# Patient Record
Sex: MALE | Race: WHITE | NOT HISPANIC OR LATINO | Employment: FULL TIME | URBAN - METROPOLITAN AREA
[De-identification: names, ages, dates, MRNs, and addresses within clinical notes are randomized per-mention and may not be internally consistent; named-entity substitution may affect disease eponyms.]

---

## 2020-05-21 ENCOUNTER — TRANSCRIBE ORDERS (OUTPATIENT)
Dept: ADMINISTRATIVE | Facility: HOSPITAL | Age: 55
End: 2020-05-21

## 2020-05-21 ENCOUNTER — APPOINTMENT (OUTPATIENT)
Dept: RADIOLOGY | Facility: CLINIC | Age: 55
End: 2020-05-21
Payer: COMMERCIAL

## 2020-05-21 DIAGNOSIS — M50.83: ICD-10-CM

## 2020-05-21 DIAGNOSIS — M50.83: Primary | ICD-10-CM

## 2020-05-21 PROCEDURE — 72050 X-RAY EXAM NECK SPINE 4/5VWS: CPT

## 2020-06-20 ENCOUNTER — APPOINTMENT (EMERGENCY)
Dept: CT IMAGING | Facility: HOSPITAL | Age: 55
End: 2020-06-20
Payer: COMMERCIAL

## 2020-06-20 ENCOUNTER — HOSPITAL ENCOUNTER (EMERGENCY)
Facility: HOSPITAL | Age: 55
Discharge: HOME | End: 2020-06-20
Attending: EMERGENCY MEDICINE | Admitting: EMERGENCY MEDICINE
Payer: COMMERCIAL

## 2020-06-20 VITALS
OXYGEN SATURATION: 96 % | RESPIRATION RATE: 16 BRPM | DIASTOLIC BLOOD PRESSURE: 79 MMHG | SYSTOLIC BLOOD PRESSURE: 133 MMHG | HEART RATE: 65 BPM | WEIGHT: 278.88 LBS | TEMPERATURE: 96.9 F

## 2020-06-20 DIAGNOSIS — R51.9 NONINTRACTABLE HEADACHE, UNSPECIFIED CHRONICITY PATTERN, UNSPECIFIED HEADACHE TYPE: Primary | ICD-10-CM

## 2020-06-20 LAB
ALBUMIN SERPL BCP-MCNC: 3.7 G/DL (ref 3.5–5)
ALP SERPL-CCNC: 63 U/L (ref 46–116)
ALT SERPL W P-5'-P-CCNC: 35 U/L (ref 12–78)
ANION GAP SERPL CALCULATED.3IONS-SCNC: 9 MMOL/L (ref 4–13)
AST SERPL W P-5'-P-CCNC: 18 U/L (ref 5–45)
BASOPHILS # BLD AUTO: 0.12 THOUSANDS/ΜL (ref 0–0.1)
BASOPHILS NFR BLD AUTO: 2 % (ref 0–1)
BILIRUB SERPL-MCNC: 0.47 MG/DL (ref 0.2–1)
BUN SERPL-MCNC: 12 MG/DL (ref 5–25)
CALCIUM SERPL-MCNC: 8.1 MG/DL (ref 8.3–10.1)
CHLORIDE SERPL-SCNC: 103 MMOL/L (ref 100–108)
CO2 SERPL-SCNC: 26 MMOL/L (ref 21–32)
CREAT SERPL-MCNC: 1.2 MG/DL (ref 0.6–1.3)
EOSINOPHIL # BLD AUTO: 0.62 THOUSAND/ΜL (ref 0–0.61)
EOSINOPHIL NFR BLD AUTO: 9 % (ref 0–6)
ERYTHROCYTE [DISTWIDTH] IN BLOOD BY AUTOMATED COUNT: 12.6 % (ref 11.6–15.1)
GFR SERPL CREATININE-BSD FRML MDRD: 67 ML/MIN/1.73SQ M
GLUCOSE SERPL-MCNC: 112 MG/DL (ref 65–140)
HCT VFR BLD AUTO: 46 % (ref 36.5–49.3)
HGB BLD-MCNC: 15.5 G/DL (ref 12–17)
IMM GRANULOCYTES # BLD AUTO: 0.03 THOUSAND/UL (ref 0–0.2)
IMM GRANULOCYTES NFR BLD AUTO: 1 % (ref 0–2)
LYMPHOCYTES # BLD AUTO: 2.52 THOUSANDS/ΜL (ref 0.6–4.47)
LYMPHOCYTES NFR BLD AUTO: 38 % (ref 14–44)
MAGNESIUM SERPL-MCNC: 1.7 MG/DL (ref 1.6–2.6)
MCH RBC QN AUTO: 31.2 PG (ref 26.8–34.3)
MCHC RBC AUTO-ENTMCNC: 33.7 G/DL (ref 31.4–37.4)
MCV RBC AUTO: 93 FL (ref 82–98)
MONOCYTES # BLD AUTO: 0.62 THOUSAND/ΜL (ref 0.17–1.22)
MONOCYTES NFR BLD AUTO: 9 % (ref 4–12)
NEUTROPHILS # BLD AUTO: 2.69 THOUSANDS/ΜL (ref 1.85–7.62)
NEUTS SEG NFR BLD AUTO: 41 % (ref 43–75)
NRBC BLD AUTO-RTO: 0 /100 WBCS
NT-PROBNP SERPL-MCNC: 27 PG/ML
PLATELET # BLD AUTO: 199 THOUSANDS/UL (ref 149–390)
PMV BLD AUTO: 11.5 FL (ref 8.9–12.7)
POTASSIUM SERPL-SCNC: 3.4 MMOL/L (ref 3.5–5.3)
PROT SERPL-MCNC: 7.1 G/DL (ref 6.4–8.2)
RBC # BLD AUTO: 4.97 MILLION/UL (ref 3.88–5.62)
SODIUM SERPL-SCNC: 138 MMOL/L (ref 136–145)
TROPONIN I SERPL-MCNC: <0.02 NG/ML
TSH SERPL DL<=0.05 MIU/L-ACNC: 1.98 UIU/ML (ref 0.36–3.74)
WBC # BLD AUTO: 6.6 THOUSAND/UL (ref 4.31–10.16)

## 2020-06-20 PROCEDURE — 84484 ASSAY OF TROPONIN QUANT: CPT | Performed by: EMERGENCY MEDICINE

## 2020-06-20 PROCEDURE — 96361 HYDRATE IV INFUSION ADD-ON: CPT

## 2020-06-20 PROCEDURE — 80053 COMPREHEN METABOLIC PANEL: CPT | Performed by: EMERGENCY MEDICINE

## 2020-06-20 PROCEDURE — 70450 CT HEAD/BRAIN W/O DYE: CPT

## 2020-06-20 PROCEDURE — 99285 EMERGENCY DEPT VISIT HI MDM: CPT | Performed by: EMERGENCY MEDICINE

## 2020-06-20 PROCEDURE — 83735 ASSAY OF MAGNESIUM: CPT | Performed by: EMERGENCY MEDICINE

## 2020-06-20 PROCEDURE — 96374 THER/PROPH/DIAG INJ IV PUSH: CPT

## 2020-06-20 PROCEDURE — 36415 COLL VENOUS BLD VENIPUNCTURE: CPT | Performed by: EMERGENCY MEDICINE

## 2020-06-20 PROCEDURE — 84443 ASSAY THYROID STIM HORMONE: CPT | Performed by: EMERGENCY MEDICINE

## 2020-06-20 PROCEDURE — 83880 ASSAY OF NATRIURETIC PEPTIDE: CPT | Performed by: EMERGENCY MEDICINE

## 2020-06-20 PROCEDURE — 93005 ELECTROCARDIOGRAM TRACING: CPT

## 2020-06-20 PROCEDURE — 85025 COMPLETE CBC W/AUTO DIFF WBC: CPT | Performed by: EMERGENCY MEDICINE

## 2020-06-20 PROCEDURE — 99284 EMERGENCY DEPT VISIT MOD MDM: CPT

## 2020-06-20 RX ORDER — METOCLOPRAMIDE HYDROCHLORIDE 5 MG/ML
10 INJECTION INTRAMUSCULAR; INTRAVENOUS ONCE
Status: COMPLETED | OUTPATIENT
Start: 2020-06-20 | End: 2020-06-20

## 2020-06-20 RX ORDER — CITALOPRAM 20 MG/1
TABLET ORAL
COMMUNITY
Start: 2016-02-02 | End: 2021-05-03

## 2020-06-20 RX ORDER — LORAZEPAM 1 MG/1
2 TABLET ORAL
Status: ON HOLD | COMMUNITY
Start: 2016-02-02 | End: 2021-05-12 | Stop reason: SDUPTHER

## 2020-06-20 RX ADMIN — SODIUM CHLORIDE 1000 ML: 0.9 INJECTION, SOLUTION INTRAVENOUS at 09:11

## 2020-06-20 RX ADMIN — METOCLOPRAMIDE HYDROCHLORIDE 10 MG: 5 INJECTION INTRAMUSCULAR; INTRAVENOUS at 09:12

## 2020-06-22 LAB
ATRIAL RATE: 67 BPM
P AXIS: 41 DEGREES
PR INTERVAL: 216 MS
QRS AXIS: 16 DEGREES
QRSD INTERVAL: 104 MS
QT INTERVAL: 458 MS
QTC INTERVAL: 483 MS
T WAVE AXIS: 56 DEGREES
VENTRICULAR RATE: 67 BPM

## 2020-06-22 PROCEDURE — 93010 ELECTROCARDIOGRAM REPORT: CPT | Performed by: INTERNAL MEDICINE

## 2020-07-08 ENCOUNTER — TRANSCRIBE ORDERS (OUTPATIENT)
Dept: ADMINISTRATIVE | Facility: HOSPITAL | Age: 55
End: 2020-07-08

## 2020-07-08 DIAGNOSIS — R00.2 PALPITATIONS: Primary | ICD-10-CM

## 2020-07-09 ENCOUNTER — HOSPITAL ENCOUNTER (OUTPATIENT)
Dept: NON INVASIVE DIAGNOSTICS | Facility: HOSPITAL | Age: 55
Discharge: HOME | End: 2020-07-09
Payer: COMMERCIAL

## 2020-07-09 DIAGNOSIS — R00.2 PALPITATIONS: ICD-10-CM

## 2020-07-09 PROCEDURE — 93226 XTRNL ECG REC<48 HR SCAN A/R: CPT

## 2020-07-09 PROCEDURE — 93225 XTRNL ECG REC<48 HRS REC: CPT

## 2020-07-22 ENCOUNTER — APPOINTMENT (OUTPATIENT)
Dept: PHYSICAL THERAPY | Facility: CLINIC | Age: 55
End: 2020-07-22
Payer: COMMERCIAL

## 2020-07-23 ENCOUNTER — APPOINTMENT (OUTPATIENT)
Dept: PHYSICAL THERAPY | Facility: CLINIC | Age: 55
End: 2020-07-23
Payer: COMMERCIAL

## 2020-07-27 ENCOUNTER — EVALUATION (OUTPATIENT)
Dept: PHYSICAL THERAPY | Facility: CLINIC | Age: 55
End: 2020-07-27
Payer: COMMERCIAL

## 2020-07-27 DIAGNOSIS — R26.81 UNSTEADINESS: Primary | ICD-10-CM

## 2020-07-27 PROCEDURE — 97162 PT EVAL MOD COMPLEX 30 MIN: CPT | Performed by: PHYSICAL THERAPIST

## 2020-07-27 NOTE — PROGRESS NOTES
PT Evaluation     Today's date: 2020  Patient name: Leyla Chambers  : 1965  MRN: [id number]  Referring provider: Chen Lu DO  Dx:   Encounter Diagnosis     ICD-10-CM    1  Unsteadiness R26 81        Start Time: 1115  Stop Time: 1210  Total time in clinic (min): 55 minutes    Assessment  Assessment details: Lashae Cardenas is a 59 y/o male presenting to therapy with idiopathic headaches and lightheadedness  The patient reports that there does not seem to be a pattern of aggravating factors, onset is random and occurs daily  Ibuprofen partially relieves HA, dizziness medication was not effective for treatment of lightheaded episodes  Previous imaging had revealed degenerative changes at C4-C5  Upon examination, the patient's symptoms were not reproduced with special testing or palpation  Clinical findings are not consistent with instability, cervicogenic HA, or VBI  Next session will include vestibular examination to test for dysfunction of the oculomotor system and inner ear  Impairments: poor posture   Other impairment: Headaches and lightheadedness    Symptom irritability: lowUnderstanding of Dx/Px/POC: good   Prognosis: fair    Goals  ST  Patient will report 50% reduction in number of headaches in 2 weeks  2  Patient will improve cervical SB to [facility] in 2 weeks  LT  Patient will abolish lightheadedness in order to feel safe while driving by D/C   2  Patient will report <1 headache per week by D/C        Plan  Patient would benefit from: skilled physical therapy  Planned therapy interventions: postural training, patient education, massage, manual therapy, therapeutic exercise, therapeutic activities and home exercise program  Frequency: 2x week  Duration in weeks: 6  Plan of Care beginning date: 2020  Plan of Care expiration date: 2020  Treatment plan discussed with: patient        Subjective Evaluation    History of Present Illness  Date of onset: 2/1/2020  Mechanism of injury: Patient reports headaches and lightheadedness which started about 6 months ago  Takes ibuprofen for headaches, experiences some relief  States that he sometimes feels concerned about driving due to lightheadedness  Symptoms increase outside in the heat, hydration seems to help  Not a recurrent problem   Quality of life: fair    Pain  No pain reported  Relieving factors: medications  Progression: no change    Social Support  Lives with: alone    Employment status: working    Diagnostic Tests  X-ray: abnormal  CT scan: normal  Carotid study: normal  Treatments  Previous treatment: medication  Current treatment: medication  Patient Goals  Patient goal: Reduced headaches, eliminate lightheadedness during, feel safe with driving        Objective     Concurrent Complaints  Positive for dizziness and headaches  Negative for night pain, faints, nausea/motion sickness, tinnitus, trouble swallowing, difficulty breathing, shortness of breath and respiratory pain    Additional Special Questions  Sleep disturbances a result of chronic anxiety according to subjective report  Static Posture     Head  Forward  Palpation   Left   No palpable tenderness to the cervical paraspinals, scalenes, sternocleidomastoid, suboccipitals and upper trapezius  Hypertonic in the sternocleidomastoid  Right   No palpable tenderness to the cervical paraspinals, levator scapulae, scalenes, sternocleidomastoid, suboccipitals and upper trapezius  Hypertonic in the sternocleidomastoid       Neurological Testing     Sensation   Cervical/Thoracic   Left   Intact: light touch    Right   Intact: light touch    Reflexes   Left   Biceps (C5/C6): trace (1+)  Brachioradialis (C6): trace (1+)  Triceps (C7): trace (1+)    Right   Biceps (C5/C6): trace (1+)  Brachioradialis (C6): trace (1+)  Triceps (C7): trace (1+)    Active Range of Motion   Cervical/Thoracic Spine       Cervical    Flexion:  WFL  Extension: WFL  Left lateral flexion:  Restriction level: minimal  Right lateral flexion:  Restriction level minimal  Left rotation:  WFL  Right rotation:  [facility]    Thoracic    Flexion:  WFL  Extension:  WFL  Left lateral flexion:  WFL  Right lateral flexion:  WFL  Left rotation:  WFL  Right rotation:  WFL    Passive Range of Motion   Cervical/Thoracic Spine   Normal passive range of motion    Strength/Myotome Testing     Left Shoulder     Planes of Motion   Flexion: WFL   Extension: WFL   Abduction: WFL     Right Shoulder     Planes of Motion   Flexion: WFL   Extension: WFL   Abduction: WFL     Left Elbow   Flexion: WFL    Right Elbow   Flexion: WFL    Left Wrist/Hand   Wrist extension: WFL  Wrist flexion: WFL    Right Wrist/Hand   Wrist extension: WFL  Wrist flexion: WFL    Tests   Cervical   Negative alar ligament test, transverse ligament test and VBI  Left   Negative cervical flexion-rotation test      Right   Negative cervical flexion-rotation test      Left Shoulder   Negative cervical rotation lateral flexion  Right Shoulder   Negative cervical rotation lateral flexion  Neuro Exam:     Headaches   Patient reports headaches: Yes  BP (Manual cuff): 118/92  HR: 85 bpm  O2sat: 97%    Patient was evaluated by ELD[facility] [place] under the direct supervision of Aracelis Reed, PT, DPT           Precautions: Heart palpitations, anxiety, LBP, C4-C5 disc degeneration     Daily Treatment Diary:      Initial Evaluation Date: 07/27/20  Compliance 7/27                     Visit Number 1                    Re-Eval  IE                 [facility]   Foto Captured Y                           7/27                     Manual                                                                                        Ther-Ex Neuro Re-Ed                                                                                                Ther-Act                                                               Modalities

## 2020-07-29 ENCOUNTER — EVALUATION (OUTPATIENT)
Dept: PHYSICAL THERAPY | Facility: CLINIC | Age: 55
End: 2020-07-29
Payer: COMMERCIAL

## 2020-07-29 ENCOUNTER — TRANSCRIBE ORDERS (OUTPATIENT)
Dept: PHYSICAL THERAPY | Facility: CLINIC | Age: 55
End: 2020-07-29

## 2020-07-29 DIAGNOSIS — R26.81 GAIT INSTABILITY: Primary | ICD-10-CM

## 2020-07-29 DIAGNOSIS — R26.81 UNSTEADINESS: Primary | ICD-10-CM

## 2020-07-29 PROCEDURE — 97112 NEUROMUSCULAR REEDUCATION: CPT | Performed by: PHYSICAL THERAPIST

## 2020-07-29 PROCEDURE — 97164 PT RE-EVAL EST PLAN CARE: CPT | Performed by: PHYSICAL THERAPIST

## 2020-07-29 NOTE — LETTER
2020    Jesús Stanley DO  [address]    Patient: Guerline Aj   YOB: 1965   Date of Visit: 2020     Encounter Diagnosis     ICD-10-CM    1  Unsteadiness R26 81        Dear Dr Sergey Gardiner: Thank you for your recent referral of Guerline Aj  Please review the attached evaluation summary from Peterson's recent visit  Please verify that you agree with the plan of care by signing the attached order  If you have any questions or concerns, please do not hesitate to call  I sincerely appreciate the opportunity to share in the care of one of your patients and hope to have another opportunity to work with you in the near future  Sincerely,    Gary Rodriges, PT      Referring Provider:      I certify that I have read the below Plan of Care and certify the need for these services furnished under this plan of treatment while under my care  Jesús Stanley DO  [facility] [address]: [phone]          PT Re-Evaluation Negar Torrez Exam    Today's date: 2020  Patient name: Guerline Aj  : 1965  MRN: [id number]  Referring provider: Lety Hernández DO  Dx:   Encounter Diagnosis     ICD-10-CM    1  Unsteadiness R26 81                   Assessment  Assessment details: Guerline Aj was evaluated today for cc of dizziness/light headedness for past 5-6 weeks  Upon examination, 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, particularly with convergence, smooth pursuit, and VOR  These recreated symptoms for him and are quickly fatigued and are likely contributors to his cc  Cervical spine is restricted in rotation though no HA symptoms were reproduced with cervicogenic testing  We discussed lifestyle approaches towards handling dizziness including routine, adequate sleep, respiration, and hydration   Recommend he continue to f/u with cardiology and participate in routine PT to address deficits found at eval      Plan  Patient would benefit from: skilled physical therapy  Planned therapy interventions: joint mobilization, neuromuscular re-education, postural training, strengthening, stretching, therapeutic activities, therapeutic exercise and therapeutic training  Frequency: 2x week  Duration in weeks: 6  Plan of Care beginning date: 7/29/2020  Plan of Care expiration date: 9/9/2020        Subjective Evaluation    History of Present Illness  Mechanism of injury: Pt  Being re-evaluated today for cc of dizziness  He denies any positional causes of symptoms  Describes them as uneasiness/light headedness with sensation of the room moving occasionally  he does notice it most in busy environments and at end of day  He reports hx of palpitations  He did not exercise for past several months but has recently started  He notices it being worse at end of workout and is receiving cardiology workup  Patient Goals  Patient goals for therapy: improved balance and return to sport/leisure activities          Objective     Concurrent Complaints  Positive for headaches and tinnitus (20 seconds on/off )  Negative for nausea/motion sickness, visual change (screen can becomes blurry but minimal issue reported), hearing loss, memory loss, aural fullness, poor concentration and peripheral neuropathy    Active Range of Motion   Cervical/Thoracic Spine       Cervical    Left rotation: 50 degrees  Right rotation: 75 degrees           Neuro Exam:     Dizziness  Positive for disequilibrium, vertigo (in the past, sometimes the room is moving) and light-headedness (15/20 mins)  Negative for oscillopsia, motion sickness (occasional diarrhea), rocking or swaying, diplopia and floating or swimming  Exacerbating factors  Positive for walking and walking in busy environment  Negative for rolling in bed, looking up, turning head and supine to/from sitting       Symptoms   Intensity at best: 0/10  Intensity at worst: 6/10  Average intensity: 2/10    Headaches   Patient reports headaches: Yes  Cervical exam   Ligament Laxity Testing   Alar ligament: WNL  Sharp Edis:  WNL  Modified VBI   Seated posture: forward head posture and internally rotated shoulders    Vestibular/Ocular Motor Screen (VOMS)    Baseline:    Headache (0/10, Dizziness 1/10, Nausea 1/10, Fogginess 0/10  Smooth Pursuits:    Headache (1/10, Dizziness 3/10, Nausea 0/10, Fogginess 0/10  Saccades - Horizontal:  Headache (1}/10, Dizziness 3/10, Nausea 0/10, Fogginess 0/10  Saccades - Vertical  Headache (0/10, Dizziness 4/10, Nausea 0/10, Fogginess 0/10  VOR - Horizontal   Headache (0/10, Dizziness 4/10, Nausea 0/10, Fogginess 0/10  VOR - Vertical   Headache (0/10, Dizziness 4/10, Nausea 0/10, Fogginess 0/10  Visual Motion Sensitivity Headache (2/10, Dizziness 6/10, Nausea 0/10, Fogginess 0/10  Convergece:   Measure 1:12, Measure 2:12, Measure 3:12                 Precautions: Heart palpitations, anxiety, LBP, C4-C5 disc degeneration     Daily Treatment Diary:      Initial Evaluation Date: 07/27/20  Compliance 7/27 7/29                   Visit Number 1 2                   Re-Eval  IE IE                MC   Foto Captured Y Y                          7/27 7/29                   Manual                      C/S STM - -                                                               Ther-Ex                      C/S AROM - 20x ea                   Lev Scap Stretch - 4x30"                                                                                                                                                                                                   Neuro Re-Ed                      VOR1 - 30Hz 2x30"                   Saccades - 30Hz 2x30"           VMS Traning - TS c ball toss                                     Ther-Act                                                               Modalities

## 2020-07-29 NOTE — PROGRESS NOTES
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, particularly with convergence, smooth pursuit, and VOR  These recreated symptoms for him and are quickly fatigued and are likely contributors to his cc  Cervical spine is restricted in rotation though no HA symptoms were reproduced with cervicogenic testing  We discussed lifestyle approaches towards handling dizziness including routine, adequate sleep, respiration, and hydration  Recommend he continue to f/u with cardiology and participate in routine PT to address deficits found at eval      Plan  Patient would benefit from: skilled physical therapy  Planned therapy interventions: joint mobilization, neuromuscular re-education, postural training, strengthening, stretching, therapeutic activities, therapeutic exercise and therapeutic training  Frequency: 2x week  Duration in weeks: 6  Plan of Care beginning date: 2020  Plan of Care expiration date: 2020        Subjective Evaluation    History of Present Illness  Mechanism of injury: Pt  Being re-evaluated today for cc of dizziness  He denies any positional causes of symptoms  Describes them as uneasiness/light headedness with sensation of the room moving occasionally  he does notice it most in busy environments and at end of day  He reports hx of palpitations  He did not exercise for past several months but has recently started  He notices it being worse at end of workout and is receiving cardiology workup      Patient Goals  Patient goals for therapy: improved balance and return to sport/leisure activities          Objective     Concurrent Complaints  Positive for headaches and tinnitus (20 seconds on/off )  Negative for nausea/motion sickness, visual change (screen can becomes blurry but minimal issue reported), hearing loss, memory loss, aural fullness, poor concentration and peripheral neuropathy    Active Range of Motion   Cervical/Thoracic Spine       Cervical    Left rotation: 50 degrees  Right rotation: 75 degrees           Neuro Exam:     Dizziness  Positive for disequilibrium, vertigo (in the past, sometimes the room is moving) and light-headedness (15/20 mins)  Negative for oscillopsia, motion sickness (occasional diarrhea), rocking or swaying, diplopia and floating or swimming  Exacerbating factors  Positive for walking and walking in busy environment  Negative for rolling in bed, looking up, turning head and supine to/from sitting  Symptoms   Intensity at best: 0/10  Intensity at worst: 6/10  Average intensity: 2/10    Headaches   Patient reports headaches: Yes  Cervical exam   Ligament Laxity Testing   Alar ligament: WNL  Sharp Edis:  WNL  Modified VBI   Seated posture: forward head posture and internally rotated shoulders    Vestibular/Ocular Motor Screen (VOMS)    Baseline:    Headache (0/10, Dizziness 1/10, Nausea 1/10, Fogginess 0/10  Smooth Pursuits:    Headache (1/10, Dizziness 3/10, Nausea 0/10, Fogginess 0/10  Saccades - Horizontal:  Headache (1}/10, Dizziness 3/10, Nausea 0/10, Fogginess 0/10  Saccades - Vertical  Headache (0/10, Dizziness 4/10, Nausea 0/10, Fogginess 0/10  VOR - Horizontal   Headache (0/10, Dizziness 4/10, Nausea 0/10, Fogginess 0/10  VOR - Vertical   Headache (0/10, Dizziness 4/10, Nausea 0/10, Fogginess 0/10  Visual Motion Sensitivity Headache (2/10, Dizziness 6/10, Nausea 0/10, Fogginess 0/10  Convergece:   Measure 1:12, Measure 2:12, Measure 3:12                 Precautions: Heart palpitations, anxiety, LBP, C4-C5 disc degeneration     Daily Treatment Diary:      Initial Evaluation Date: 07/27/20  Compliance 7/27 7/29                   Visit Number 1 2                   Re-Eval  IE IE                [address]   Foto Captured Y Y                          7/27 7/29                   Manual                      C/S STM - -                                                               Ther-Ex                      C/S AROM - 20x ea                   Lev Scap Stretch - 4x30"                                                                                                                                                                                                   Neuro Re-Ed                      VOR1 - 30Hz 2x30"                   Saccades - 30Hz 2x30"           VMS Traning - TS c ball toss                                     Ther-Act                                                               Modalities

## 2020-07-30 ENCOUNTER — APPOINTMENT (OUTPATIENT)
Dept: PHYSICAL THERAPY | Facility: CLINIC | Age: 55
End: 2020-07-30
Payer: COMMERCIAL

## 2020-08-03 ENCOUNTER — OFFICE VISIT (OUTPATIENT)
Dept: PHYSICAL THERAPY | Facility: CLINIC | Age: 55
End: 2020-08-03
Payer: COMMERCIAL

## 2020-08-03 DIAGNOSIS — R26.81 UNSTEADINESS: Primary | ICD-10-CM

## 2020-08-03 PROCEDURE — 97110 THERAPEUTIC EXERCISES: CPT | Performed by: PHYSICAL THERAPIST

## 2020-08-03 PROCEDURE — 97140 MANUAL THERAPY 1/> REGIONS: CPT | Performed by: PHYSICAL THERAPIST

## 2020-08-03 PROCEDURE — 97112 NEUROMUSCULAR REEDUCATION: CPT | Performed by: PHYSICAL THERAPIST

## 2020-08-05 ENCOUNTER — OFFICE VISIT (OUTPATIENT)
Dept: PHYSICAL THERAPY | Facility: CLINIC | Age: 55
End: 2020-08-05
Payer: COMMERCIAL

## 2020-08-05 DIAGNOSIS — R26.81 UNSTEADINESS: Primary | ICD-10-CM

## 2020-08-05 PROCEDURE — 97112 NEUROMUSCULAR REEDUCATION: CPT | Performed by: PHYSICAL THERAPIST

## 2020-08-05 PROCEDURE — 97140 MANUAL THERAPY 1/> REGIONS: CPT | Performed by: PHYSICAL THERAPIST

## 2020-08-05 PROCEDURE — 97110 THERAPEUTIC EXERCISES: CPT | Performed by: PHYSICAL THERAPIST

## 2020-08-05 NOTE — PROGRESS NOTES
Daily Note     Today's date: 2020  Patient name: Tay Seay  : 1965  MRN: [id number]  Referring provider: Marc Galeas DO  Dx:   Encounter Diagnosis     ICD-10-CM    1  Unsteadiness  R26 81                   Subjective: Pt  Denies completing HEP  States he will begin them daily this weekend  Objective: See treatment diary below       Assessment: Tay Seay was progressed with PT interventions, focusing on appropriate technique and intensity  Pt  Required moderate cuing from therapist to complete  Initiated scapular training with chin tuck  Reiterated importance of HEP compliance  Pt  Would benefit from continued physical therapy to promote improved activity tolerance and function  Plan: Continue per plan of care  Progress treatment as tolerated         Precautions: Heart palpitations, anxiety, LBP, C4-C5 disc degeneration     Daily Treatment Diary:      Initial Evaluation Date: 20  Compliance 7/27  7/29  8/3  8/5               Visit Number 1 2  3  4               Re-Eval  IE IE  -  -            [facility] Captured Y Y  -  -                      7/27  7/29  8/3  8/5               Manual                      C/S STM - -  10'  10'                                                           Ther-Ex                      C/S AROM - 20x ea  30x  30x               Lev Scap Stretch - 4x30"  4x30"  4x30"               Upper trap stretch - - - 4x30"               No monies with chin tuck - - - No tb, 10x5"               TB rows - - - -                                                                                                                             Neuro Re-Ed                      VOR1 - 30Hz 2x30" 30 Hz 2x30"  HEP               Saccades - 30Hz 2x30" 30HZ 2x45" HEP         VMS Traning - TS c ball toss TS c ball toss HEP                                   Ther-Act                                                               Modalities

## 2020-08-10 ENCOUNTER — OFFICE VISIT (OUTPATIENT)
Dept: PHYSICAL THERAPY | Facility: CLINIC | Age: 55
End: 2020-08-10
Payer: COMMERCIAL

## 2020-08-10 DIAGNOSIS — R26.81 UNSTEADINESS: Primary | ICD-10-CM

## 2020-08-10 PROCEDURE — 97112 NEUROMUSCULAR REEDUCATION: CPT | Performed by: PHYSICAL THERAPIST

## 2020-08-10 PROCEDURE — 97140 MANUAL THERAPY 1/> REGIONS: CPT | Performed by: PHYSICAL THERAPIST

## 2020-08-10 PROCEDURE — 97110 THERAPEUTIC EXERCISES: CPT | Performed by: PHYSICAL THERAPIST

## 2020-08-10 NOTE — PROGRESS NOTES
Daily Note     Today's date: 8/10/2020  Patient name: Maykel Prasad  : 1965  MRN: [id number]  Referring provider: Carlos Barragan DO  Dx:   Encounter Diagnosis     ICD-10-CM    1  Unsteadiness  R26 81                   Subjective: Pt reports his headaches occur when he exerts himself, like cutting the grass, and he has noticed little change over the past week  Objective: See treatment diary below      Assessment:  Pt does well w progression of occulomotor activities and continued cervical stretches  He has slight HA towards end of VOR vert, but subsides w seated rest   He progresses no monies w/o difficulty  He has difficulty w convergence and loses focus at about 4-5in  Patient will continue to benefit from skilled PT to address remaining deficits  Plan: Continue per plan of care        Precautions: Heart palpitations, anxiety, LBP, C4-C5 disc degeneration     Daily Treatment Diary:      Initial Evaluation Date: 20  Compliance 7/27  7/29  8/3  8/5  8/10             Visit Number 1 2  3  4  5             Re-Eval  IE IE  -  -            MC   Foto Captured Y Y  -  -                      7/27  7/29  8/3  8/5  8/10             Manual                      C/S STM - -  10'  10'  10'                                                         Ther-Ex                      C/S AROM - 20x ea  30x  30x  30x             Lev Scap Stretch - 4x30"  4x30"  4x30"  4x30"             Upper trap stretch - - - 4x30"  4x30"             No monies with chin tuck - - - No tb, 10x5"  Green TB, 2x10x5"             TB rows - - - -                                                                                                                             Neuro Re-Ed                      VOR1 - 30Hz 2x30" 30 Hz 2x30"  HEP  30 Hz 4x30 2x hor/vert             Saccades - 30Hz 2x30" 30HZ 2x45" HEP 30Hz  2x45"          VMS Traning - TS c ball toss TS c ball toss HEP TS c ball toss        convergence     10x5" Ther-Act                                                               Modalities                                                                          Treatment assisted by Ava Lung PT,DPT,OCS

## 2020-08-12 ENCOUNTER — APPOINTMENT (OUTPATIENT)
Dept: PHYSICAL THERAPY | Facility: CLINIC | Age: 55
End: 2020-08-12
Payer: COMMERCIAL

## 2020-08-13 ENCOUNTER — APPOINTMENT (OUTPATIENT)
Dept: PHYSICAL THERAPY | Facility: CLINIC | Age: 55
End: 2020-08-13
Payer: COMMERCIAL

## 2020-08-17 ENCOUNTER — APPOINTMENT (OUTPATIENT)
Dept: PHYSICAL THERAPY | Facility: CLINIC | Age: 55
End: 2020-08-17
Payer: COMMERCIAL

## 2020-08-19 ENCOUNTER — APPOINTMENT (OUTPATIENT)
Dept: PHYSICAL THERAPY | Facility: CLINIC | Age: 55
End: 2020-08-19
Payer: COMMERCIAL

## 2020-08-24 ENCOUNTER — EVALUATION (OUTPATIENT)
Dept: PHYSICAL THERAPY | Facility: CLINIC | Age: 55
End: 2020-08-24
Payer: COMMERCIAL

## 2020-08-24 DIAGNOSIS — R26.81 UNSTEADINESS: Primary | ICD-10-CM

## 2020-08-24 PROCEDURE — 97112 NEUROMUSCULAR REEDUCATION: CPT | Performed by: PHYSICAL THERAPIST

## 2020-08-24 NOTE — PROGRESS NOTES
PT Re-Evaluation Caesar Dimple Exam    Today's date: 2020  Patient name: Elizabeth Alvarado  : 1965  MRN: [id number]  Referring provider: Vitaly Curiel DO  Dx:   Encounter Diagnosis     ICD-10-CM    1  Unsteadiness  R26 81                   Assessment  Assessment details: Elizabeth Alvarado was evaluated today for cc of dizziness/light headedness for past 5-6 weeks  Upon examination, 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, particularly with convergence, smooth pursuit, and VOR  These recreated symptoms for him and are quickly fatigued and are likely contributors to his cc  Cervical spine is restricted in rotation though no HA symptoms were reproduced with cervicogenic testing  We discussed lifestyle approaches towards handling dizziness including routine, adequate sleep, respiration, and hydration  Recommend he continue to f/u with cardiology and participate in routine PT to address deficits found at eval      Plan  Patient would benefit from: skilled physical therapy  Planned therapy interventions: joint mobilization, neuromuscular re-education, postural training, strengthening, stretching, therapeutic activities, therapeutic exercise and therapeutic training  Frequency: 2x week  Duration in weeks: 6  Plan of Care beginning date: 2020  Plan of Care expiration date: 2020        Subjective Evaluation    History of Present Illness  Mechanism of injury: Pt  Being re-evaluated today for cc of dizziness  He denies any positional causes of symptoms  Describes them as uneasiness/light headedness with sensation of the room moving occasionally  he does notice it most in busy environments and at end of day  He reports hx of palpitations  He did not exercise for past several months but has recently started  He notices it being worse at end of workout and is receiving cardiology workup      Patient Goals  Patient goals for therapy: improved balance and return to sport/leisure activities          Objective     Concurrent Complaints  Positive for headaches and tinnitus (20 seconds on/off )  Negative for nausea/motion sickness, visual change (screen can becomes blurry but minimal issue reported), hearing loss, memory loss, aural fullness, poor concentration and peripheral neuropathy    Active Range of Motion   Cervical/Thoracic Spine       Cervical    Left rotation: 50 degrees  Right rotation: 75 degrees           Neuro Exam:     Dizziness  Positive for disequilibrium, vertigo (in the past, sometimes the room is moving) and light-headedness (15/20 mins)  Negative for oscillopsia, motion sickness (occasional diarrhea), rocking or swaying, diplopia and floating or swimming  Exacerbating factors  Positive for walking and walking in busy environment  Negative for rolling in bed, looking up, turning head and supine to/from sitting  Symptoms   Intensity at best: 0/10  Intensity at worst: 6/10  Average intensity: 2/10    Headaches   Patient reports headaches: Yes  Cervical exam   Ligament Laxity Testing   Alar ligament: WNL  Sharp Edis:  WNL  Modified VBI   Seated posture: forward head posture and internally rotated shoulders    Vestibular/Ocular Motor Screen (VOMS)    Baseline:    Headache (0/10, Dizziness 1/10, Nausea 1/10, Fogginess 0/10  Smooth Pursuits:    Headache (1/10, Dizziness 3/10, Nausea 0/10, Fogginess 0/10  Saccades - Horizontal:  Headache (1}/10, Dizziness 3/10, Nausea 0/10, Fogginess 0/10  Saccades - Vertical  Headache (0/10, Dizziness 4/10, Nausea 0/10, Fogginess 0/10  VOR - Horizontal   Headache (0/10, Dizziness 4/10, Nausea 0/10, Fogginess 0/10  VOR - Vertical   Headache (0/10, Dizziness 4/10, Nausea 0/10, Fogginess 0/10  Visual Motion Sensitivity Headache (2/10, Dizziness 6/10, Nausea 0/10, Fogginess 0/10  Convergece:   Measure 1:12, Measure 2:12, Measure 3:12          Flowsheet Rows      Most Recent Value   PT/OT G-Codes   Current Score  78   Projected Score 91             Precautions: Heart palpitations, anxiety, LBP, C4-C5 disc degeneration     Daily Treatment Diary:      Initial Evaluation Date: 07/27/20  Compliance 7/27 7/29                   Visit Number 1 2                   Re-Eval  IE IE                [facility]   Foto Captured Y Y                          7/27 7/29                   Manual                      C/S STM - -                                                               Ther-Ex                      C/S AROM - 20x ea                   Lev Scap Stretch - 4x30"                                                                                                                                                                                                   Neuro Re-Ed                      VOR1 - 30Hz 2x30"                   Saccades - 30Hz 2x30"           VMS Traning - TS c ball toss                                     Ther-Act                                                               Modalities

## 2020-08-25 NOTE — PROGRESS NOTES
PT Re-Evaluation Merilynn Pallas Exam   Today's date: 2020   Patient name: Shahram Villafana   : 1965   MRN: [id number]   Referring provider: Will Dhillon DO   Dx:         Encounter Diagnosis     ICD-10-CM    1  Unsteadiness  R26 81    Assessment   Assessment details: Shahram Villafana has continued with PT for 5 sessions addressing vestibular hypofunction  Pt  Continues to have idiopathic dizziness and is following up with ENT and cardiology  Pt  Continues to have symptoms that are vaguely recreated during vestibular testing to establish treatment trajectory  At today's visit, he does seem to have some improvement in convergence and vestibular ocular reflex function  Recommend continued PT for 3 weeks to further treat deficits and promote improved function  Plan   Patient would benefit from: skilled physical therapy  Planned therapy interventions: joint mobilization, neuromuscular re-education, postural training, strengthening, stretching, therapeutic activities, therapeutic exercise and therapeutic training  Frequency: 2x week  Duration in weeks: 6  Plan of Care beginning date: 2020  Plan of Care expiration date: 10/6/2020  Subjective Evaluation   History of Present Illness   Mechanism of injury: Pt  Has continued with PT for 5 sessions of vestibular training with cervical treatment as well  He notes neck is feeling better, but dizziness remains  He continues to describe symptoms as unsteadiness and light headedness  He is following up with ENT and is scheduled for VNG  He is also seeing cardiology  He is motivated to continue  Patient Goals   Patient goals for therapy: improved balance and return to sport/leisure activities  Objective   Concurrent Complaints   Positive for headaches and tinnitus (20 seconds on/off )   Negative for nausea/motion sickness, visual change (screen can becomes blurry but minimal issue reported), hearing loss, memory loss, aural fullness, poor concentration and peripheral neuropathy   Active Range of Motion   Cervical/Thoracic Spine   Cervical   Left rotation: 75 degrees   Right rotation: 75 degrees   Neuro Exam:   Dizziness  Positive for disequilibrium, vertigo (in the past, sometimes the room is moving) and light-headedness (15/20 mins)  Negative for oscillopsia, motion sickness (occasional diarrhea), rocking or swaying, diplopia and floating or swimming  Exacerbating factors  Positive for walking and walking in busy environment  Negative for rolling in bed, looking up, turning head and supine to/from sitting  Symptoms   Intensity at best: 0/10   Intensity at worst: 5/10   Average intensity: 1/10   Headaches   Patient reports headaches: Yes  Cervical exam   Ligament Laxity Testing   Alar ligament: WNL  Sharp Edis:  WNL   Modified VBI   Seated posture: forward head posture and internally rotated shoulders   Oculomotor exam   Cover test: normal   Positional testing   Kristen-Hallpike   Left posterior canal: WNL  Right posterior canal: WNL   Vestibular/Ocular Motor Screen (VOMS)   Baseline: Headache (0/10, Dizziness 1/10, Nausea 1/10, Fogginess 0/10   Smooth Pursuits: Headache (1/10, Dizziness 3/10, Nausea 0/10, Fogginess 0/10   Saccades - Horizontal: Headache (1}/10, Dizziness 3/10, Nausea 0/10, Fogginess 0/10   Saccades - Vertical Headache (0/10, Dizziness 3/10, Nausea 0/10, Fogginess 0/10   VOR - Horizontal Headache (0/10, Dizziness 3/10, Nausea 0/10, Fogginess 0/10   VOR - Vertical Headache (0/10, Dizziness 3/10, Nausea 0/10, Fogginess 0/10   Visual Motion Sensitivity Headache (2/10, Dizziness 5/10, Nausea 0/10, Fogginess 0/10   Convergece: Measure 1:12, Measure 2:12, Measure 3:12       Flowsheet Rows       Most Recent Value   PT/OT G-Codes   Current Score 78   Projected Score 91     Precautions: Heart palpitations, anxiety, LBP, C4-C5 disc degeneration     Daily Treatment Diary:      Initial Evaluation Date: 07/27/20  Compliance 7/27  7/29  8/3  8/5  8/10  8/24 Visit Number 1 2  3  4  5  6           Re-Eval  IE IE  -  -            [facility]   Foto Captured Y Y  -  -                      7/27  7/29  8/3  8/5  8/10  8/24           Manual                      C/S STM - -  10'  10'  10'  10'                                                       Ther-Ex                      C/S AROM - 20x ea  30x  30x  30x  30x           Lev Scap Stretch - 4x30"  4x30"  4x30"  4x30"  -           Upper trap stretch - - - 4x30"  4x30"  -           No monies with chin tuck - - - No tb, 10x5"  Green TB, 2x10x5"  -           TB rows - - - -                                                                                                                             Neuro Re-Ed                      VOR1 - 30Hz 2x30" 30 Hz 2x30"  HEP  30 Hz 4x30 2x hor/vert  Standing on foam, 60Hz, 2x1'           Saccades - 30Hz 2x30" 30HZ 2x45" HEP 30Hz  2x45"   standing on foam  2x1', 60Hz       VMS Traning - TS c ball toss TS c ball toss HEP TS c ball toss Reverse ball bounce and catch 2'       convergence     10x5"                     Ther-Act                                                               Modalities

## 2020-09-03 ENCOUNTER — APPOINTMENT (OUTPATIENT)
Dept: PHYSICAL THERAPY | Facility: CLINIC | Age: 55
End: 2020-09-03
Payer: COMMERCIAL

## 2020-09-09 ENCOUNTER — OFFICE VISIT (OUTPATIENT)
Dept: PHYSICAL THERAPY | Facility: CLINIC | Age: 55
End: 2020-09-09
Payer: COMMERCIAL

## 2020-09-09 DIAGNOSIS — R26.81 UNSTEADINESS: Primary | ICD-10-CM

## 2020-09-09 PROCEDURE — 97110 THERAPEUTIC EXERCISES: CPT | Performed by: PHYSICAL THERAPIST

## 2020-09-09 PROCEDURE — 97140 MANUAL THERAPY 1/> REGIONS: CPT | Performed by: PHYSICAL THERAPIST

## 2020-09-09 PROCEDURE — 97112 NEUROMUSCULAR REEDUCATION: CPT | Performed by: PHYSICAL THERAPIST

## 2020-09-09 NOTE — PROGRESS NOTES
Daily Note     Today's date: 2020  Patient name: Guerline Aj  : 1965  MRN: [id number]  Referring provider: Lety Hernández DO  Dx:   Encounter Diagnosis     ICD-10-CM    1  Unsteadiness  R26 81                   Subjective: Pt  Presents to PT with complaints of lingering lightheadedness  He states that he continues to complete his HEP but feels like he is not making any progress  He reports waking up in the morning feeling lightheaded and it does not improve for a few hours  He also states that the lightheadedness comes and goes throughout the day  He went to see his ear, nose and throat Dr  But states nothing remarkable was found within his inner ear  Objective: See treatment diary below      Assessment: Tolerated treatment well  He was able to tolerate increased speed with his neuro exercises and only had a slight increase in his symptoms  He stated feeling stiff within his neck towards the end of his session which was relieved with STM  Patient exhibited good technique with therapeutic exercises and would benefit from continued PT      Plan: Continue per plan of care      Pt  Seen by ELDER Hubbard under direct supervision of Gary Rodriges, PT, DPT        Precautions: Heart palpitations, anxiety, LBP, C4-C5 disc degeneration     Daily Treatment Diary:      Initial Evaluation Date: 20  Compliance 7/27  7/29  8/3  8/5  8/10  9/9           Visit Number 1 2  3  4  5  7           Re-Eval  IE IE  -  -            [facility]   Foto Captured Y Y  -  -                      7/27  7/29  8/3  8/5  8/10  9/9           Manual                      C/S STM - -  10'  10'  10'  10'                                                       Ther-Ex                      C/S AROM - 20x ea  30x  30x  30x  30x           Lev Scap Stretch - 4x30"  4x30"  4x30"  4x30"  4x30"           Upper trap stretch - - - 4x30"  4x30"  4x30"           No monies with chin tuck - - - No tb, 10x5"  Green TB, 2x10x5"  green TB, 2x10x5" TB rows - - - -               UBE          5'/5'                                                                                                   Neuro Re-Ed                      VOR1 - 30Hz 2x30" 30 Hz 2x30"  HEP  30 Hz 4x30 2x hor/vert  80 Hz 2x1' hor/vert foam            Saccades - 30Hz 2x30" 30HZ 2x45" HEP 30Hz  2x45"   80 Hz 2x1' foam        VMS Traning - TS c ball toss TS c ball toss HEP TS c ball toss Narrow NATHALIA foam c ball toss       convergence     10x5"                     Ther-Act                                                               Modalities

## 2020-09-14 ENCOUNTER — APPOINTMENT (OUTPATIENT)
Dept: PHYSICAL THERAPY | Facility: CLINIC | Age: 55
End: 2020-09-14
Payer: COMMERCIAL

## 2020-09-17 ENCOUNTER — APPOINTMENT (OUTPATIENT)
Dept: PHYSICAL THERAPY | Facility: CLINIC | Age: 55
End: 2020-09-17
Payer: COMMERCIAL

## 2020-10-05 NOTE — PROGRESS NOTES
Faridaalbina Whit has not been been treated in PT since 9/9  Patient did not return phone call to schedule additional appointments and will be discharged at this time

## 2020-10-22 ENCOUNTER — TRANSCRIBE ORDERS (OUTPATIENT)
Dept: ADMINISTRATIVE | Facility: HOSPITAL | Age: 55
End: 2020-10-22

## 2020-10-22 DIAGNOSIS — R00.2 PALPITATIONS: Primary | ICD-10-CM

## 2020-10-22 DIAGNOSIS — I65.29 OCCLUSION AND STENOSIS OF UNSPECIFIED CAROTID ARTERY: ICD-10-CM

## 2020-11-02 ENCOUNTER — HOSPITAL ENCOUNTER (OUTPATIENT)
Dept: NON INVASIVE DIAGNOSTICS | Facility: HOSPITAL | Age: 55
Discharge: HOME | End: 2020-11-02
Payer: COMMERCIAL

## 2020-11-02 DIAGNOSIS — I65.29 OCCLUSION AND STENOSIS OF UNSPECIFIED CAROTID ARTERY: ICD-10-CM

## 2020-11-02 PROCEDURE — 93880 EXTRACRANIAL BILAT STUDY: CPT | Performed by: SURGERY

## 2020-11-02 PROCEDURE — 93880 EXTRACRANIAL BILAT STUDY: CPT

## 2020-11-04 ENCOUNTER — HOSPITAL ENCOUNTER (EMERGENCY)
Facility: HOSPITAL | Age: 55
Discharge: HOME | End: 2020-11-04
Attending: EMERGENCY MEDICINE | Admitting: EMERGENCY MEDICINE
Payer: COMMERCIAL

## 2020-11-04 ENCOUNTER — APPOINTMENT (EMERGENCY)
Dept: CT IMAGING | Facility: HOSPITAL | Age: 55
End: 2020-11-04
Payer: COMMERCIAL

## 2020-11-04 VITALS
OXYGEN SATURATION: 99 % | HEART RATE: 79 BPM | SYSTOLIC BLOOD PRESSURE: 128 MMHG | RESPIRATION RATE: 14 BRPM | HEIGHT: 75 IN | WEIGHT: 255 LBS | TEMPERATURE: 97.6 F | BODY MASS INDEX: 31.71 KG/M2 | DIASTOLIC BLOOD PRESSURE: 80 MMHG

## 2020-11-04 DIAGNOSIS — R42 LIGHTHEADEDNESS: Primary | ICD-10-CM

## 2020-11-04 LAB
ALBUMIN SERPL BCP-MCNC: 4 G/DL (ref 3.5–5)
ALP SERPL-CCNC: 65 U/L (ref 46–116)
ALT SERPL W P-5'-P-CCNC: 42 U/L (ref 12–78)
ANION GAP SERPL CALCULATED.3IONS-SCNC: 8 MMOL/L (ref 4–13)
APTT PPP: 27 SECONDS (ref 23–37)
AST SERPL W P-5'-P-CCNC: 20 U/L (ref 5–45)
BASOPHILS # BLD AUTO: 0.13 THOUSANDS/ΜL (ref 0–0.1)
BASOPHILS NFR BLD AUTO: 2 % (ref 0–1)
BILIRUB SERPL-MCNC: 0.41 MG/DL (ref 0.2–1)
BUN SERPL-MCNC: 18 MG/DL (ref 5–25)
CALCIUM SERPL-MCNC: 8.8 MG/DL (ref 8.3–10.1)
CHLORIDE SERPL-SCNC: 103 MMOL/L (ref 100–108)
CO2 SERPL-SCNC: 29 MMOL/L (ref 21–32)
CREAT SERPL-MCNC: 1.2 MG/DL (ref 0.6–1.3)
EOSINOPHIL # BLD AUTO: 0.52 THOUSAND/ΜL (ref 0–0.61)
EOSINOPHIL NFR BLD AUTO: 7 % (ref 0–6)
ERYTHROCYTE [DISTWIDTH] IN BLOOD BY AUTOMATED COUNT: 13 % (ref 11.6–15.1)
GFR SERPL CREATININE-BSD FRML MDRD: 67 ML/MIN/1.73SQ M
GLUCOSE SERPL-MCNC: 99 MG/DL (ref 65–140)
HCT VFR BLD AUTO: 48.6 % (ref 36.5–49.3)
HGB BLD-MCNC: 16.2 G/DL (ref 12–17)
IMM GRANULOCYTES # BLD AUTO: 0.04 THOUSAND/UL (ref 0–0.2)
IMM GRANULOCYTES NFR BLD AUTO: 1 % (ref 0–2)
INR PPP: 0.91 (ref 0.84–1.19)
LYMPHOCYTES # BLD AUTO: 2.62 THOUSANDS/ΜL (ref 0.6–4.47)
LYMPHOCYTES NFR BLD AUTO: 33 % (ref 14–44)
MCH RBC QN AUTO: 31.4 PG (ref 26.8–34.3)
MCHC RBC AUTO-ENTMCNC: 33.3 G/DL (ref 31.4–37.4)
MCV RBC AUTO: 94 FL (ref 82–98)
MONOCYTES # BLD AUTO: 0.7 THOUSAND/ΜL (ref 0.17–1.22)
MONOCYTES NFR BLD AUTO: 9 % (ref 4–12)
NEUTROPHILS # BLD AUTO: 3.88 THOUSANDS/ΜL (ref 1.85–7.62)
NEUTS SEG NFR BLD AUTO: 48 % (ref 43–75)
NRBC BLD AUTO-RTO: 0 /100 WBCS
NT-PROBNP SERPL-MCNC: 22 PG/ML
PLATELET # BLD AUTO: 244 THOUSANDS/UL (ref 149–390)
PMV BLD AUTO: 11.7 FL (ref 8.9–12.7)
POTASSIUM SERPL-SCNC: 3.8 MMOL/L (ref 3.5–5.3)
PROT SERPL-MCNC: 7.9 G/DL (ref 6.4–8.2)
PROTHROMBIN TIME: 12.1 SECONDS (ref 11.6–14.5)
RBC # BLD AUTO: 5.16 MILLION/UL (ref 3.88–5.62)
SODIUM SERPL-SCNC: 140 MMOL/L (ref 136–145)
TROPONIN I SERPL-MCNC: <0.02 NG/ML
TSH SERPL DL<=0.05 MIU/L-ACNC: 2.94 UIU/ML (ref 0.36–3.74)
WBC # BLD AUTO: 7.89 THOUSAND/UL (ref 4.31–10.16)

## 2020-11-04 PROCEDURE — G1004 CDSM NDSC: HCPCS

## 2020-11-04 PROCEDURE — 96375 TX/PRO/DX INJ NEW DRUG ADDON: CPT

## 2020-11-04 PROCEDURE — 84484 ASSAY OF TROPONIN QUANT: CPT | Performed by: EMERGENCY MEDICINE

## 2020-11-04 PROCEDURE — 93005 ELECTROCARDIOGRAM TRACING: CPT

## 2020-11-04 PROCEDURE — 96361 HYDRATE IV INFUSION ADD-ON: CPT

## 2020-11-04 PROCEDURE — 70450 CT HEAD/BRAIN W/O DYE: CPT

## 2020-11-04 PROCEDURE — 85025 COMPLETE CBC W/AUTO DIFF WBC: CPT | Performed by: EMERGENCY MEDICINE

## 2020-11-04 PROCEDURE — 85610 PROTHROMBIN TIME: CPT | Performed by: EMERGENCY MEDICINE

## 2020-11-04 PROCEDURE — 99284 EMERGENCY DEPT VISIT MOD MDM: CPT

## 2020-11-04 PROCEDURE — 85730 THROMBOPLASTIN TIME PARTIAL: CPT | Performed by: EMERGENCY MEDICINE

## 2020-11-04 PROCEDURE — 36415 COLL VENOUS BLD VENIPUNCTURE: CPT | Performed by: EMERGENCY MEDICINE

## 2020-11-04 PROCEDURE — 99285 EMERGENCY DEPT VISIT HI MDM: CPT | Performed by: EMERGENCY MEDICINE

## 2020-11-04 PROCEDURE — 96374 THER/PROPH/DIAG INJ IV PUSH: CPT

## 2020-11-04 PROCEDURE — 80053 COMPREHEN METABOLIC PANEL: CPT | Performed by: EMERGENCY MEDICINE

## 2020-11-04 PROCEDURE — 83880 ASSAY OF NATRIURETIC PEPTIDE: CPT | Performed by: EMERGENCY MEDICINE

## 2020-11-04 PROCEDURE — 84443 ASSAY THYROID STIM HORMONE: CPT | Performed by: EMERGENCY MEDICINE

## 2020-11-04 RX ORDER — LORAZEPAM 2 MG/ML
1 INJECTION INTRAMUSCULAR ONCE
Status: COMPLETED | OUTPATIENT
Start: 2020-11-04 | End: 2020-11-04

## 2020-11-04 RX ORDER — ONDANSETRON 2 MG/ML
4 INJECTION INTRAMUSCULAR; INTRAVENOUS ONCE
Status: COMPLETED | OUTPATIENT
Start: 2020-11-04 | End: 2020-11-04

## 2020-11-04 RX ADMIN — ONDANSETRON 4 MG: 2 INJECTION INTRAMUSCULAR; INTRAVENOUS at 19:53

## 2020-11-04 RX ADMIN — SODIUM CHLORIDE 1000 ML: 0.9 INJECTION, SOLUTION INTRAVENOUS at 19:14

## 2020-11-04 RX ADMIN — LORAZEPAM 1 MG: 2 INJECTION INTRAMUSCULAR; INTRAVENOUS at 19:54

## 2020-11-05 LAB
ATRIAL RATE: 77 BPM
P AXIS: 13 DEGREES
PR INTERVAL: 186 MS
QRS AXIS: 2 DEGREES
QRSD INTERVAL: 98 MS
QT INTERVAL: 424 MS
QTC INTERVAL: 479 MS
T WAVE AXIS: 34 DEGREES
VENTRICULAR RATE: 77 BPM

## 2020-12-21 ENCOUNTER — HOSPITAL ENCOUNTER (OUTPATIENT)
Dept: PULMONOLOGY | Facility: HOSPITAL | Age: 55
Discharge: HOME | End: 2020-12-21
Payer: COMMERCIAL

## 2020-12-21 DIAGNOSIS — R00.2 PALPITATIONS: ICD-10-CM

## 2020-12-21 PROCEDURE — 94727 GAS DIL/WSHOT DETER LNG VOL: CPT

## 2020-12-21 PROCEDURE — 94729 DIFFUSING CAPACITY: CPT | Performed by: INTERNAL MEDICINE

## 2020-12-21 PROCEDURE — 94760 N-INVAS EAR/PLS OXIMETRY 1: CPT

## 2020-12-21 PROCEDURE — 94010 BREATHING CAPACITY TEST: CPT | Performed by: INTERNAL MEDICINE

## 2020-12-21 PROCEDURE — 94729 DIFFUSING CAPACITY: CPT

## 2020-12-21 PROCEDURE — 94727 GAS DIL/WSHOT DETER LNG VOL: CPT | Performed by: INTERNAL MEDICINE

## 2020-12-21 PROCEDURE — 94010 BREATHING CAPACITY TEST: CPT

## 2021-02-10 ENCOUNTER — TRANSCRIBE ORDERS (OUTPATIENT)
Dept: ADMINISTRATIVE | Facility: HOSPITAL | Age: 56
End: 2021-02-10

## 2021-02-10 DIAGNOSIS — R42 LIGHTHEADEDNESS: Primary | ICD-10-CM

## 2021-02-19 ENCOUNTER — TRANSCRIBE ORDERS (OUTPATIENT)
Dept: ADMINISTRATIVE | Facility: HOSPITAL | Age: 56
End: 2021-02-19

## 2021-02-19 ENCOUNTER — TELEPHONE (OUTPATIENT)
Dept: OTOLARYNGOLOGY | Facility: CLINIC | Age: 56
End: 2021-02-19

## 2021-02-19 DIAGNOSIS — R42 DIZZINESS: Primary | ICD-10-CM

## 2021-02-19 NOTE — TELEPHONE ENCOUNTER
LEONEL to reschedule EEG, because [facility] are no longer doing them  Asked patient to call [phone] to reschedule this appointment  Left him now that I was canceling his appointment at [facility], THE [facility]

## 2021-03-11 ENCOUNTER — TELEPHONE (OUTPATIENT)
Dept: NEUROLOGY | Facility: CLINIC | Age: 56
End: 2021-03-11

## 2021-03-11 NOTE — TELEPHONE ENCOUNTER
Best contact number for patient: [phone]     Emergency Contact name and number: [phone]     Referring provider and telephone number:    Primary Care Provider Name and if affiliated with [facility]:     Reason for Appointment/Dx: lightheadedness     Have you seen and followed up with a pediatric Neurologist for this disease in the past?  No (If yes ok to schedule with Dr Emerita Sepulveda)    Neurology Location patient would like to be seen:    Order received? Yes                                                 Records Received? Yes    Have you ever seen another Neurologist?       No    Insurance Information    Insurance Name:    ID/Policy #:    Secondary Insurance:    ID/Policy#: Workman's Comp/ Accident/ School  Information      Workman's Comp/Accident/School related?        No    If yes name of Insurance company:    Date of Injury:    Type of Injury:    [address] Name and Telephone Number:    Notes:                   Appointment date: 08/24/2021

## 2021-03-22 ENCOUNTER — TELEPHONE (OUTPATIENT)
Dept: PSYCHIATRY | Facility: CLINIC | Age: 56
End: 2021-03-22

## 2021-03-22 NOTE — TELEPHONE ENCOUNTER
Behavorial Health Outpatient Intake Questions    Referred by:pcp    Please advised interviewee that they need to answer all questions truthfully to allow for best care and any misrepresentations of information may affect their ability to be seen at this clinic   => Was this discussed? Yes     [facility] -     Presenting Problem (in patient's words): anxiety and panic attacks    Are there any developmental disabilities? ? If yes, can they speak to you on the phone? If they are too limited to speak to you on phone, refer out No    Are you taking any psychiatric medications? Yes    => If yes, who prescribes? If yes, are they injectable medications? Does the patient have a language barrier or hearing impairment? No    Have you been treated at [facility] by a therapist or a doctor in the past? If yes, who? No    Has the patient been hospitalized for mental health? No   If yes, how long ago was last hospitalization and where was it? Do you actively use alcohol or marijuana or illegal substances? If yes, what and how much - refer out to Drug and alcohol treatment if use is excessive or daily use of illegal substances No concerns of substance abuse are reported  Do you have a community treatment team or ? No    Legal History-     Does the patient have any history of arrests, MCC/assisted time, or DUIs? No  If Yes-  1) What types of charges? 2) When were they last incarcerated? 3) Are they currently on parole or probation? Minor Child-    Who has custody of the child? Is there a custody agreement? If there is a custody agreement remind parent that they must bring a copy to the first appt or they will not be seen       Intake Team, please check with provider before scheduling if flags come up such as:  - complex case  - legal history (other than DUI)  - communication barrier concerns are present  - if, in your judgment, this needs further review    ACCEPTED as a patient Yes  => Appointment Date: tbd    Referred Elsewhere? No    Name of Rin Herrera ID# [id number]  [id number] Phone #  If ins is primary or secondary  If patient is a minor, parents information such as Name, D  O B of guarantor

## 2021-03-24 ENCOUNTER — TELEPHONE (OUTPATIENT)
Dept: BEHAVIORAL/MENTAL HEALTH CLINIC | Facility: CLINIC | Age: 56
End: 2021-03-24

## 2021-03-24 ENCOUNTER — HOSPITAL ENCOUNTER (OUTPATIENT)
Dept: NEUROLOGY | Facility: CLINIC | Age: 56
Discharge: HOME | End: 2021-03-24
Payer: COMMERCIAL

## 2021-03-24 DIAGNOSIS — R42 DIZZINESS: ICD-10-CM

## 2021-03-24 PROCEDURE — 95816 EEG AWAKE AND DROWSY: CPT

## 2021-03-24 PROCEDURE — 95816 EEG AWAKE AND DROWSY: CPT | Performed by: PSYCHIATRY & NEUROLOGY

## 2021-04-28 NOTE — TELEPHONE ENCOUNTER
Called [facility] [employer] and [facility] letting them know I have faxed the patient's order over 3 times and it is not in his chart yet  Waiting for a call back

## 2021-05-03 ENCOUNTER — HOSPITAL ENCOUNTER (EMERGENCY)
Facility: HOSPITAL | Age: 56
End: 2021-05-03
Attending: EMERGENCY MEDICINE | Admitting: EMERGENCY MEDICINE
Payer: COMMERCIAL

## 2021-05-03 VITALS
HEART RATE: 82 BPM | OXYGEN SATURATION: 98 % | DIASTOLIC BLOOD PRESSURE: 82 MMHG | SYSTOLIC BLOOD PRESSURE: 129 MMHG | WEIGHT: 268.08 LBS | RESPIRATION RATE: 20 BRPM | HEIGHT: 75 IN | TEMPERATURE: 98.5 F | BODY MASS INDEX: 33.33 KG/M2

## 2021-05-03 DIAGNOSIS — F41.9 ANXIETY AND DEPRESSION: ICD-10-CM

## 2021-05-03 DIAGNOSIS — R45.851 SUICIDAL IDEATION: ICD-10-CM

## 2021-05-03 DIAGNOSIS — F32.A ANXIETY AND DEPRESSION: ICD-10-CM

## 2021-05-03 DIAGNOSIS — F32.A DEPRESSION: Primary | ICD-10-CM

## 2021-05-03 LAB
AMPHETAMINES SERPL QL SCN: NEGATIVE
ANION GAP SERPL CALCULATED.3IONS-SCNC: 13 MMOL/L (ref 4–13)
BARBITURATES UR QL: NEGATIVE
BASOPHILS # BLD AUTO: 0.08 THOUSANDS/ΜL (ref 0–0.1)
BASOPHILS NFR BLD AUTO: 1 % (ref 0–1)
BENZODIAZ UR QL: NEGATIVE
BUN SERPL-MCNC: 10 MG/DL (ref 5–25)
CALCIUM SERPL-MCNC: 8.9 MG/DL (ref 8.3–10.1)
CHLORIDE SERPL-SCNC: 101 MMOL/L (ref 100–108)
CO2 SERPL-SCNC: 22 MMOL/L (ref 21–32)
COCAINE UR QL: NEGATIVE
CREAT SERPL-MCNC: 1.22 MG/DL (ref 0.6–1.3)
EOSINOPHIL # BLD AUTO: 0.02 THOUSAND/ΜL (ref 0–0.61)
EOSINOPHIL NFR BLD AUTO: 0 % (ref 0–6)
ERYTHROCYTE [DISTWIDTH] IN BLOOD BY AUTOMATED COUNT: 12.6 % (ref 11.6–15.1)
ETHANOL SERPL-MCNC: <3 MG/DL (ref 0–3)
GFR SERPL CREATININE-BSD FRML MDRD: 65 ML/MIN/1.73SQ M
GLUCOSE SERPL-MCNC: 141 MG/DL (ref 65–140)
HCT VFR BLD AUTO: 45.1 % (ref 36.5–49.3)
HGB BLD-MCNC: 15.4 G/DL (ref 12–17)
IMM GRANULOCYTES # BLD AUTO: 0.03 THOUSAND/UL (ref 0–0.2)
IMM GRANULOCYTES NFR BLD AUTO: 0 % (ref 0–2)
LYMPHOCYTES # BLD AUTO: 1.26 THOUSANDS/ΜL (ref 0.6–4.47)
LYMPHOCYTES NFR BLD AUTO: 17 % (ref 14–44)
MCH RBC QN AUTO: 31.2 PG (ref 26.8–34.3)
MCHC RBC AUTO-ENTMCNC: 34.1 G/DL (ref 31.4–37.4)
MCV RBC AUTO: 92 FL (ref 82–98)
METHADONE UR QL: NEGATIVE
MONOCYTES # BLD AUTO: 0.38 THOUSAND/ΜL (ref 0.17–1.22)
MONOCYTES NFR BLD AUTO: 5 % (ref 4–12)
NEUTROPHILS # BLD AUTO: 5.51 THOUSANDS/ΜL (ref 1.85–7.62)
NEUTS SEG NFR BLD AUTO: 77 % (ref 43–75)
NRBC BLD AUTO-RTO: 0 /100 WBCS
OPIATES UR QL SCN: NEGATIVE
OXYCODONE+OXYMORPHONE UR QL SCN: NEGATIVE
PCP UR QL: NEGATIVE
PLATELET # BLD AUTO: 217 THOUSANDS/UL (ref 149–390)
PMV BLD AUTO: 11.6 FL (ref 8.9–12.7)
POTASSIUM SERPL-SCNC: 3.9 MMOL/L (ref 3.5–5.3)
RBC # BLD AUTO: 4.93 MILLION/UL (ref 3.88–5.62)
SARS-COV-2 RNA RESP QL NAA+PROBE: NEGATIVE
SODIUM SERPL-SCNC: 136 MMOL/L (ref 136–145)
THC UR QL: NEGATIVE
TROPONIN I SERPL-MCNC: <0.02 NG/ML
WBC # BLD AUTO: 7.28 THOUSAND/UL (ref 4.31–10.16)

## 2021-05-03 PROCEDURE — 93005 ELECTROCARDIOGRAM TRACING: CPT

## 2021-05-03 PROCEDURE — U0005 INFEC AGEN DETEC AMPLI PROBE: HCPCS | Performed by: PHYSICIAN ASSISTANT

## 2021-05-03 PROCEDURE — 99285 EMERGENCY DEPT VISIT HI MDM: CPT

## 2021-05-03 PROCEDURE — 96374 THER/PROPH/DIAG INJ IV PUSH: CPT

## 2021-05-03 PROCEDURE — 99285 EMERGENCY DEPT VISIT HI MDM: CPT | Performed by: PHYSICIAN ASSISTANT

## 2021-05-03 PROCEDURE — 80307 DRUG TEST PRSMV CHEM ANLYZR: CPT | Performed by: PHYSICIAN ASSISTANT

## 2021-05-03 PROCEDURE — 82077 ASSAY SPEC XCP UR&BREATH IA: CPT | Performed by: PHYSICIAN ASSISTANT

## 2021-05-03 PROCEDURE — NC001 PR NO CHARGE: Performed by: STUDENT IN AN ORGANIZED HEALTH CARE EDUCATION/TRAINING PROGRAM

## 2021-05-03 PROCEDURE — 80048 BASIC METABOLIC PNL TOTAL CA: CPT | Performed by: PHYSICIAN ASSISTANT

## 2021-05-03 PROCEDURE — 96376 TX/PRO/DX INJ SAME DRUG ADON: CPT

## 2021-05-03 PROCEDURE — U0003 INFECTIOUS AGENT DETECTION BY NUCLEIC ACID (DNA OR RNA); SEVERE ACUTE RESPIRATORY SYNDROME CORONAVIRUS 2 (SARS-COV-2) (CORONAVIRUS DISEASE [COVID-19]), AMPLIFIED PROBE TECHNIQUE, MAKING USE OF HIGH THROUGHPUT TECHNOLOGIES AS DESCRIBED BY CMS-2020-01-R: HCPCS | Performed by: PHYSICIAN ASSISTANT

## 2021-05-03 PROCEDURE — 96361 HYDRATE IV INFUSION ADD-ON: CPT

## 2021-05-03 PROCEDURE — 36415 COLL VENOUS BLD VENIPUNCTURE: CPT | Performed by: PHYSICIAN ASSISTANT

## 2021-05-03 PROCEDURE — 84484 ASSAY OF TROPONIN QUANT: CPT | Performed by: PHYSICIAN ASSISTANT

## 2021-05-03 PROCEDURE — 85025 COMPLETE CBC W/AUTO DIFF WBC: CPT | Performed by: PHYSICIAN ASSISTANT

## 2021-05-03 RX ORDER — OLANZAPINE 2.5 MG/1
2.5 TABLET ORAL
Status: CANCELLED | OUTPATIENT
Start: 2021-05-03

## 2021-05-03 RX ORDER — MAGNESIUM HYDROXIDE/ALUMINUM HYDROXICE/SIMETHICONE 120; 1200; 1200 MG/30ML; MG/30ML; MG/30ML
30 SUSPENSION ORAL EVERY 4 HOURS PRN
Status: CANCELLED | OUTPATIENT
Start: 2021-05-03

## 2021-05-03 RX ORDER — ACETAMINOPHEN 325 MG/1
975 TABLET ORAL EVERY 6 HOURS PRN
Status: CANCELLED | OUTPATIENT
Start: 2021-05-03

## 2021-05-03 RX ORDER — OLANZAPINE 2.5 MG/1
5 TABLET ORAL
Status: CANCELLED | OUTPATIENT
Start: 2021-05-03

## 2021-05-03 RX ORDER — LORAZEPAM 2 MG/ML
1 INJECTION INTRAMUSCULAR ONCE
Status: COMPLETED | OUTPATIENT
Start: 2021-05-03 | End: 2021-05-03

## 2021-05-03 RX ORDER — BENZTROPINE MESYLATE 1 MG/ML
1 INJECTION INTRAMUSCULAR; INTRAVENOUS
Status: CANCELLED | OUTPATIENT
Start: 2021-05-03

## 2021-05-03 RX ORDER — TRAZODONE HYDROCHLORIDE 50 MG/1
50 TABLET ORAL
Status: CANCELLED | OUTPATIENT
Start: 2021-05-03

## 2021-05-03 RX ORDER — MINERAL OIL AND PETROLATUM 150; 830 MG/G; MG/G
1 OINTMENT OPHTHALMIC
Status: CANCELLED | OUTPATIENT
Start: 2021-05-03

## 2021-05-03 RX ORDER — ACETAMINOPHEN 325 MG/1
650 TABLET ORAL EVERY 4 HOURS PRN
Status: CANCELLED | OUTPATIENT
Start: 2021-05-03

## 2021-05-03 RX ORDER — OLANZAPINE 10 MG/1
5 INJECTION, POWDER, LYOPHILIZED, FOR SOLUTION INTRAMUSCULAR
Status: CANCELLED | OUTPATIENT
Start: 2021-05-03

## 2021-05-03 RX ORDER — HYDROXYZINE HYDROCHLORIDE 25 MG/1
50 TABLET, FILM COATED ORAL
Status: CANCELLED | OUTPATIENT
Start: 2021-05-03

## 2021-05-03 RX ORDER — HYDROXYZINE HYDROCHLORIDE 25 MG/1
25 TABLET, FILM COATED ORAL
Status: CANCELLED | OUTPATIENT
Start: 2021-05-03

## 2021-05-03 RX ORDER — BENZTROPINE MESYLATE 1 MG/1
0.5 TABLET ORAL
Status: CANCELLED | OUTPATIENT
Start: 2021-05-03

## 2021-05-03 RX ORDER — FLUOXETINE HYDROCHLORIDE 20 MG/1
60 CAPSULE ORAL DAILY
COMMUNITY
Start: 2021-03-09 | End: 2021-05-12 | Stop reason: HOSPADM

## 2021-05-03 RX ORDER — AMOXICILLIN 250 MG
1 CAPSULE ORAL DAILY PRN
Status: CANCELLED | OUTPATIENT
Start: 2021-05-03

## 2021-05-03 RX ORDER — LORAZEPAM 2 MG/ML
0.5 INJECTION INTRAMUSCULAR ONCE
Status: COMPLETED | OUTPATIENT
Start: 2021-05-03 | End: 2021-05-03

## 2021-05-03 RX ORDER — LORAZEPAM 1 MG/1
1 TABLET ORAL ONCE
Status: COMPLETED | OUTPATIENT
Start: 2021-05-03 | End: 2021-05-03

## 2021-05-03 RX ORDER — IBUPROFEN 400 MG/1
400 TABLET ORAL ONCE
Status: COMPLETED | OUTPATIENT
Start: 2021-05-03 | End: 2021-05-03

## 2021-05-03 RX ADMIN — IBUPROFEN 400 MG: 400 TABLET ORAL at 23:22

## 2021-05-03 RX ADMIN — LORAZEPAM 1 MG: 2 INJECTION INTRAMUSCULAR; INTRAVENOUS at 14:20

## 2021-05-03 RX ADMIN — SODIUM CHLORIDE 1000 ML: 0.9 INJECTION, SOLUTION INTRAVENOUS at 15:49

## 2021-05-03 RX ADMIN — LORAZEPAM 1 MG: 1 TABLET ORAL at 20:15

## 2021-05-03 RX ADMIN — LORAZEPAM 0.5 MG: 2 INJECTION INTRAMUSCULAR; INTRAVENOUS at 15:04

## 2021-05-03 NOTE — ED NOTES
Crisis spoke to Patient over the phone as agreed upon by Patient and provider  Patient is a 59 y/o male presenting with increased depression/anxiety, racing thoughts, and suicidal ideations with a plan to shoot self  Patient does own a pistol  Patient said he has been trying to cope with increased anxiety and panic attacks for about 1 yr  Patient stated the anxiety began when he started experiencing chronic light headed "spells" on a daily basis  He began to think there was something seriously wrong causing an increase in anxiety  The anxiety eventually caused panic attacks  Patient is now experiencing panic attacks worrying about having a panic attack  Patient has been to several doctors to address both the lightheadedness and the palpitations he has been experiencing  Patient said he frequently has suicidal ideations with a plan to shoot himself with his hand gun but is able to talk himself out of it  Patient has an initial psych eval with a psychiatrist on 5/5  Patient stated the anxiety has been so bad he has been on FMLA for the last 3-4 weeks  Patient denied any homicidal ideations or any visual/auditory hallucinations  Patient agreed to sign a 201 after rights and detailed explanation of the 72 hr notice were read to and reviewed with him  Patient is interested in staying in network    Crisis to f/u

## 2021-05-03 NOTE — PROGRESS NOTES
Received message that Shameka Tubbs is being admitted from the ER and is needing admission orders  Chart review was done, pertinent information was obtained from the RN and admission orders were placed  Current medications were review, PRN medications for anxiety, sleep, pain and agitation were ordered      Gemma Garcia MD

## 2021-05-03 NOTE — ED NOTES
Pt on portable phone talking to [place] with [facility] at this time     Christian Santos, RN  05/03/21 2973

## 2021-05-03 NOTE — ED NOTES
201 faxed at this time, patient also medically cleared at this time per Phil Stokes RN  05/03/21 1330

## 2021-05-03 NOTE — ED NOTES
While this RN was getting bloodwork pt made comment of "I am having suicidal thoughts"  RN asked pt "how long have you had these thoughts", pt stated "for the past few months but I am going to see a therapist this Wednesday that I've never seen before"  RN asked pt if he has a plan, pt responds with "I want to shot myself with a gun"  RN let Cassie Irby PA-C aware that pt stated he had suicidal thoughts, crisis to be contacted after ethanol and urine are obtained        Willam Doherty RN  05/03/21 [id number]

## 2021-05-03 NOTE — ED PROVIDER NOTES
History  Chief Complaint   Patient presents with    Anxiety     pt c/o increased anxiety w/chest pain and tingling in extremities since last evening  pt took 1/2 tab lorazepam last evening w/o relief  hx covid positive 2/2021  denies travel/fevers/cough     24-year-old male presents emergency department for evaluation of anxiety  Patient states he has been having increased anxiety  Reports today he felt as if he was going to pass out  States he was experiencing chest pain and tingling in all of his extremities  States the symptoms started last night and have worsened today  Reports he took 1/2 tab of Ativan last night without improvement of symptoms  States he has been following with his PCP for anxiety and symptoms are not improving  Scheduled to see outpatient psychiatrist on Wednesday  Patient states he has chronic lightheadedness and was feeling faint today  States he has had several tests and workup for lightheadedness it is believed insert lying to back to his anxiety  Patient reports he has had fleeting ideas of suicide  Denies any current suicidal ideations  Denies any plan  Denies any history of suicide attempt  Denies any homicidal ideations  Denies any visual or auditory hallucinations  Denies any drug or alcohol use  History provided by:  Patient  Anxiety  Presenting symptoms: suicidal thoughts    Presenting symptoms: no aggressive behavior, no agitation, no bizarre behavior, no delusions, no depression, no disorganized speech, no disorganized thought process, no hallucinations, no homicidal ideas, no paranoid behavior, no self-mutilation, no suicidal threats and no suicide attempt    Associated symptoms: anxiety and chest pain    Associated symptoms: no headaches        Prior to Admission Medications   Prescriptions Last Dose Informant Patient Reported? Taking?    FLUoxetine (PROzac) 20 mg capsule 5/3/2021 at Unknown time  Yes Yes   Sig: Take 60 mg by mouth daily    LORazepam (ATIVAN) 1 mg tablet 2021 at Unknown time  Yes Yes   Si mg    betamethasone valerate (VALISONE) 0 1 % cream   Yes No      Facility-Administered Medications: None       Past Medical History:   Diagnosis Date    Anxiety     Hypertension     Palpitation        History reviewed  No pertinent surgical history  History reviewed  No pertinent family history  I have reviewed and agree with the history as documented  E-Cigarette/Vaping    E-Cigarette Use Never User      E-Cigarette/Vaping Substances    Nicotine No     THC No     CBD No     Flavoring No      Social History     Tobacco Use    Smoking status: Former Smoker     Types: Cigars    Smokeless tobacco: Never Used   Substance Use Topics    Alcohol use: Not Currently     Frequency: Monthly or less     Drinks per session: 1 or 2     Binge frequency: Less than monthly    Drug use: Not Currently       Review of Systems   Constitutional: Negative  HENT: Negative  Respiratory: Negative  Cardiovascular: Positive for chest pain  Negative for palpitations and leg swelling  Gastrointestinal: Negative  Genitourinary: Negative  Musculoskeletal: Negative  Skin: Negative  Neurological: Positive for light-headedness  Negative for tremors, seizures, syncope, facial asymmetry, speech difficulty, weakness, numbness and headaches  Psychiatric/Behavioral: Positive for suicidal ideas  Negative for agitation, hallucinations, homicidal ideas, paranoia and self-injury  The patient is nervous/anxious  All other systems reviewed and are negative  Physical Exam  Physical Exam  Vitals signs and nursing note reviewed  Constitutional:       General: He is not in acute distress  Appearance: Normal appearance  He is normal weight  He is not ill-appearing, toxic-appearing or diaphoretic  HENT:      Head: Normocephalic and atraumatic  Nose: Nose normal  No congestion or rhinorrhea        Mouth/Throat:      Mouth: Mucous membranes are moist       Pharynx: Oropharynx is clear  No oropharyngeal exudate or posterior oropharyngeal erythema  Eyes:      Extraocular Movements: Extraocular movements intact  Conjunctiva/sclera: Conjunctivae normal       Pupils: Pupils are equal, round, and reactive to light  Neck:      Musculoskeletal: Normal range of motion and neck supple  Cardiovascular:      Rate and Rhythm: Normal rate and regular rhythm  Pulmonary:      Effort: Pulmonary effort is normal  No respiratory distress  Breath sounds: Normal breath sounds  No stridor  No wheezing, rhonchi or rales  Chest:      Chest wall: No tenderness  Abdominal:      General: Abdomen is flat  Bowel sounds are normal       Palpations: Abdomen is soft  Tenderness: There is no abdominal tenderness  Musculoskeletal: Normal range of motion  General: No swelling, tenderness or deformity  Right lower leg: No edema  Left lower leg: No edema  Skin:     General: Skin is warm and dry  Capillary Refill: Capillary refill takes less than 2 seconds  Coloration: Skin is not pale  Findings: No bruising, erythema or rash  Neurological:      General: No focal deficit present  Mental Status: He is alert and oriented to person, place, and time  Motor: No weakness  Psychiatric:         Attention and Perception: Attention normal  He does not perceive auditory or visual hallucinations  Mood and Affect: Affect normal  Mood is anxious and depressed  Speech: Speech normal          Behavior: Behavior normal          Thought Content: Thought content is not paranoid or delusional  Thought content includes suicidal ideation  Thought content does not include homicidal ideation  Thought content does not include homicidal or suicidal plan           Cognition and Memory: Cognition normal          Judgment: Judgment normal          Vital Signs  ED Triage Vitals [05/03/21 1338]   Temperature Pulse Respirations Blood Pressure SpO2   (!) 97 1 °F (36 2 °C) 98 (!) 25 142/89 97 %      Temp Source Heart Rate Source Patient Position - Orthostatic VS BP Location FiO2 (%)   Temporal Monitor Lying Right arm --      Pain Score       4           Vitals:    05/03/21 1645 05/03/21 1647 05/03/21 1649 05/03/21 1926   BP: 133/85 154/84 140/79 154/93   Pulse: 81 87 90 83   Patient Position - Orthostatic VS: Lying - Orthostatic VS Sitting - Orthostatic VS Standing - Orthostatic VS Lying         Visual Acuity      ED Medications  Medications   LORazepam (ATIVAN) injection 1 mg (1 mg Intravenous Given 5/3/21 1420)   LORazepam (ATIVAN) injection 0 5 mg (0 5 mg Intravenous Given 5/3/21 1504)   sodium chloride 0 9 % bolus 1,000 mL (0 mL Intravenous Stopped 5/3/21 1703)   LORazepam (ATIVAN) tablet 1 mg (1 mg Oral Given 5/3/21 2015)       Diagnostic Studies  Results Reviewed     Procedure Component Value Units Date/Time    Novel Coronavirus [facility] [[id number]]  (Normal) Collected: 05/03/21 1732    Lab Status: Final result Specimen: Nares from Nasopharyngeal Swab Updated: 05/03/21 1832     SARS-CoV-2 Negative    Narrative: The specimen collection materials, transport medium, and/or testing methodology utilized in the production of these test results have been proven to be reliable in a limited validation with an abbreviated program under the Emergency Utilization Authorization provided by the FDA  Testing reported as "Presumptive positive" will be confirmed with secondary testing to ensure result accuracy  Clinical caution and judgement should be used with the interpretation of these results with consideration of the clinical impression and other laboratory testing  Testing reported as "Positive" or "Negative" has been proven to be accurate according to standard laboratory validation requirements  All testing is performed with control materials showing appropriate reactivity at standard intervals        Rapid drug screen, urine [[id number]]  (Normal) Collected: 05/03/21 1548    Lab Status: Final result Specimen: Urine, Clean Catch Updated: 05/03/21 1626     Amph/Meth UR Negative     Barbiturate Ur Negative     Benzodiazepine Urine Negative     Cocaine Urine Negative     Methadone Urine Negative     Opiate Urine Negative     PCP Ur Negative     THC Urine Negative     Oxycodone Urine Negative    Narrative:      FOR MEDICAL PURPOSES ONLY  IF CONFIRMATION NEEDED PLEASE CONTACT THE LAB WITHIN 5 DAYS      Drug Screen Cutoff Levels:  AMPHETAMINE/METHAMPHETAMINES  1000 ng/mL  BARBITURATES     200 ng/mL  BENZODIAZEPINES     200 ng/mL  COCAINE      300 ng/mL  METHADONE      300 ng/mL  OPIATES      300 ng/mL  PHENCYCLIDINE     25 ng/mL  THC       50 ng/mL  OXYCODONE      100 ng/mL    Ethanol [[id number]]  (Normal) Collected: 05/03/21 1505    Lab Status: Final result Specimen: Blood from Arm, Left Updated: 05/03/21 1530     Ethanol Lvl <3 mg/dL     Troponin I [[id number]]  (Normal) Collected: 05/03/21 1414    Lab Status: Final result Specimen: Blood from Arm, Left Updated: 05/03/21 1441     Troponin I <0 02 ng/mL     Basic metabolic panel [[id number]]  (Abnormal) Collected: 05/03/21 1414    Lab Status: Final result Specimen: Blood from Arm, Left Updated: 05/03/21 1432     Sodium 136 mmol/L      Potassium 3 9 mmol/L      Chloride 101 mmol/L      CO2 22 mmol/L      ANION GAP 13 mmol/L      BUN 10 mg/dL      Creatinine 1 22 mg/dL      Glucose 141 mg/dL      Calcium 8 9 mg/dL      eGFR 65 ml/min/1 73sq m     Narrative:      [facility] guidelines for Chronic Kidney Disease (CKD):     Stage 1 with normal or high GFR (GFR > 90 mL/min/1 73 square meters)    Stage 2 Mild CKD (GFR = 60-89 mL/min/1 73 square meters)    Stage 3A Moderate CKD (GFR = 45-59 mL/min/1 73 square meters)    Stage 3B Moderate CKD (GFR = 30-44 mL/min/1 73 square meters)    Stage 4 Severe CKD (GFR = 15-29 mL/min/1 73 square meters)    Stage 5 End Stage CKD (GFR <15 mL/min/1 73 square meters)  Note: GFR calculation is accurate only with a steady state creatinine    CBC and differential [[id number]]  (Abnormal) Collected: 05/03/21 1414    Lab Status: Final result Specimen: Blood from Arm, Left Updated: 05/03/21 1422     WBC 7 28 Thousand/uL      RBC 4 93 Million/uL      Hemoglobin 15 4 g/dL      Hematocrit 45 1 %      MCV 92 fL      MCH 31 2 pg      MCHC 34 1 g/dL      RDW 12 6 %      MPV 11 6 fL      Platelets 106 Thousands/uL      nRBC 0 /100 WBCs      Neutrophils Relative 77 %      Immat GRANS % 0 %      Lymphocytes Relative 17 %      Monocytes Relative 5 %      Eosinophils Relative 0 %      Basophils Relative 1 %      Neutrophils Absolute 5 51 Thousands/µL      Immature Grans Absolute 0 03 Thousand/uL      Lymphocytes Absolute 1 26 Thousands/µL      Monocytes Absolute 0 38 Thousand/µL      Eosinophils Absolute 0 02 Thousand/µL      Basophils Absolute 0 08 Thousands/µL                  No orders to display              Procedures  ECG 12 Lead Documentation Only    Date/Time: 5/3/2021 1:40 PM  Performed by: Aileen Johnson PA-C  Authorized by: Aileen Johnson PA-C     Indications / Diagnosis:  Weakness  ECG reviewed by me, the ED Provider: yes    Patient location:  ED  Interpretation:     Interpretation: non-specific    Rate:     ECG rate:  94    ECG rate assessment: normal    Rhythm:     Rhythm: sinus rhythm    Ectopy:     Ectopy: none    QRS:     QRS axis:  Normal    QRS intervals:  Normal  Conduction:     Conduction: normal    ST segments:     ST segments:  Normal  T waves:     T waves: normal               ED Course  ED Course as of May 03 2045   Mon May 03, 2021   1452 Discussed results and findings with the patient  Patient states he continues to feel very anxious  Reports he has been having suicidal thoughts  Does see outpatient psychiatry on Wednesday  Denies any suicidal plan  Denies any history of attempting to kill self     Called Ugo from Crisis for evaluation waiting for return call      [phone] Patient on phone with Martha Lance from Crisis       1625 Patient was evaluated by Martha Lance  He is agreeable to signing 201  Will be placed on continual observation       1645 201 signed               SBIRT 22yo+      Most Recent Value   SBIRT (25 yo +)   In order to provide better care to our patients, we are screening all of our patients for alcohol and drug use  Would it be okay to ask you these screening questions? Yes Filed at: 05/03/2021 1416   Initial Alcohol Screen: US AUDIT-C    1  How often do you have a drink containing alcohol?  0 Filed at: 05/03/2021 1416   2  How many drinks containing alcohol do you have on a typical day you are drinking? 0 Filed at: 05/03/2021 1416   3a  Male UNDER 65: How often do you have five or more drinks on one occasion? 0 Filed at: 05/03/2021 1416   3b  FEMALE Any Age, or MALE 65+: How often do you have 4 or more drinks on one occassion? 0 Filed at: 05/03/2021 1416   Audit-C Score  0 Filed at: 05/03/2021 1416   LESLY: How many times in the past year have you    Used an illegal drug or used a prescription medication for non-medical reasons? Never Filed at: 05/03/2021 1416                    MDM  Number of Diagnoses or Management Options  Anxiety and depression: new and requires workup  Suicidal ideation: new and requires workup  Diagnosis management comments: 49-year-old male presents to the emergency department for evaluation of anxiety, depression, feeling pain does diffuse going to pass out, chest pain  Vitals and medical record reviewed  Laboratory findings relatively unremarkable  EKG nonischemic  Troponin negative  Patient was found have suicidal ideations  After discussion with crisis, patient agreeable to admission and 201 was signed  Patient's anxiety controlled with Ativan  Resting comfortably in the ED  Accepted by [address] [place]          Amount and/or Complexity of Data Reviewed  Clinical lab tests: ordered and reviewed  Review and summarize past medical records: yes        Disposition  Final diagnoses:   Anxiety and depression   Suicidal ideation     Time reflects when diagnosis was documented in both MDM as applicable and the Disposition within this note     Time User Action Codes Description Comment    5/3/2021  6:31 PM Bard Kc Add [F32 9] Depression     5/3/2021  8:29 PM Lisa Mustafa Add [F41 9,  F32 9] Anxiety and depression     5/3/2021  8:29 PM Lisa Mustafa Add [G15 534] Suicidal ideation       ED Disposition     ED Disposition Condition Date/Time Comment    Transfer to [facility] May 3, 2021  4:34 PM Lorena Jones should be transferred out to behavior health and has been medically cleared          MD Documentation      Most Recent Value   Patient Condition  The patient has been stabilized such that within reasonable medical probability, no material deterioration of the patient condition or the condition of the unborn child(re) is likely to result from the transfer   Reason for Transfer  -- [Behavioral health]   Benefits of Transfer  Specialized equipment and/or services available at the receiving facility (Include comment)________________________ [facility]]   Risks of Transfer  Potential for delay in receiving treatment, Potential deterioration of medical condition, Loss of IV, Increased discomfort during transfer, Possible worsening of condition or death during transfer   Accepting Physician  Dr Ilya Forde Name, [address] [place] [address]    (Name & Tel number)  Gracia Luque [phone]   Transported by [employer] and Unit #)  Gissel Chavarria   Sending MD Dr Hubert Mullen   Provider Certification  General risk, such as traffic hazards, adverse weather conditions, rough terrain or turbulence, possible failure of equipment (including vehicle or aircraft), or consequences of actions of persons outside the control of the transport personnel, Unanticipated needs of medical equipment and personnel during transport, Risk of worsening condition, The possibility of a transport vehicle being unavailable      RN Documentation      Most [address] Name, [address], [place]    (Name & Tel number)  Steve Morin [phone]   Transported by (Company and Unit #)  [employer]      Follow-up Information    None         Patient's Medications   Discharge Prescriptions    No medications on file     No discharge procedures on file      PDMP Review     None          ED Provider  Electronically Signed by           Nivia Martell PA-C  05/03/21 6767

## 2021-05-03 NOTE — ED NOTES
Patient is accepted at [address]  Patient is accepted by Dr Austin Harrington      Transportation is arranged with [facility]  Transportation is scheduled for [phone]  Patient may go to the floor after 2100    Nurse report is to be called to [phone] prior to patient transfer

## 2021-05-03 NOTE — EMTALA/ACUTE CARE TRANSFER
[address]  [place] [address]  Dept: [phone]      EMTALA TRANSFER CONSENT    NAME Mica Oliveira                                         1965                              MRN [id number]    I have been informed of my rights regarding examination, treatment, and transfer   by Dr Maddie Lorenzo DO    Benefits: Specialized equipment and/or services available at the receiving facility (Include comment)________________________(Behavioral health)    Risks: Potential for delay in receiving treatment, Potential deterioration of medical condition, Loss of IV, Increased discomfort during transfer, Possible worsening of condition or death during transfer      Consent for Transfer:  I acknowledge that my medical condition has been evaluated and explained to me by the emergency department physician or other qualified medical person and/or my attending physician, who has recommended that I be transferred to the service of  Accepting Physician: Dr Joshua Zaragoza at [address] [facility] Name, [address] [facility] [facility] [address]  The above potential benefits of such transfer, the potential risks associated with such transfer, and the probable risks of not being transferred have been explained to me, and I fully understand them  The doctor has explained that, in my case, the benefits of transfer outweigh the risks  I agree to be transferred  I authorize the performance of emergency medical procedures and treatments upon me in both transit and upon arrival at the receiving facility  Additionally, I authorize the release of any and all medical records to the receiving facility and request they be transported with me, if possible  I understand that the safest mode of transportation during a medical emergency is an ambulance and that the Hospital advocates the use of this mode of transport   Risks of traveling to the receiving facility by car, including absence of medical control, life sustaining equipment, such as oxygen, and medical personnel has been explained to me and I fully understand them  (ROBBIE CORRECT BOX BELOW)  [  ]  I consent to the stated transfer and to be transported by ambulance/helicopter  [  ]  I consent to the stated transfer, but refuse transportation by ambulance and accept full responsibility for my transportation by car  I understand the risks of non-ambulance transfers and I exonerate the Hospital and its staff from any deterioration in my condition that results from this refusal     X___________________________________________    DATE  21  TIME________  Signature of patient or legally responsible individual signing on patient behalf           RELATIONSHIP TO PATIENT_________________________          Provider Certification    NAME Talia Small                                         1965                              MRN [id number]    A medical screening exam was performed on the above named patient  Based on the examination:    Condition Necessitating Transfer The encounter diagnosis was Depression      Patient Condition: The patient has been stabilized such that within reasonable medical probability, no material deterioration of the patient condition or the condition of the unborn child(re) is likely to result from the transfer    Reason for Transfer: (Behavioral health)    Transfer Requirements: [address], [place] [address]   · Space available and qualified personnel available for treatment as acknowledged by Elizabeth Adams [phone]  · Agreed to accept transfer and to provide appropriate medical treatment as acknowledged by       Dr Dez Mendes  · Appropriate medical records of the examination and treatment of the patient are provided at the time of transfer   [address], [address] _______  · Transfer will be performed by qualified personnel from [facility]  and appropriate transfer equipment as required, including the use of necessary and appropriate life support measures  Provider Certification: I have examined the patient and explained the following risks and benefits of being transferred/refusing transfer to the patient/family:  General risk, such as traffic hazards, adverse weather conditions, rough terrain or turbulence, possible failure of equipment (including vehicle or aircraft), or consequences of actions of persons outside the control of the transport personnel, Unanticipated needs of medical equipment and personnel during transport, Risk of worsening condition, The possibility of a transport vehicle being unavailable      Based on these reasonable risks and benefits to the patient and/or the unborn child(re), and based upon the information available at the time of the patients examination, I certify that the medical benefits reasonably to be expected from the provision of appropriate medical treatments at another medical facility outweigh the increasing risks, if any, to the individuals medical condition, and in the case of labor to the unborn child, from effecting the transfer      X____________________________________________ DATE 05/03/21        TIME_______      ORIGINAL - SEND TO MEDICAL RECORDS   COPY - SEND WITH PATIENT DURING TRANSFER

## 2021-05-03 NOTE — ED NOTES
Once medical clearance note is entered and intake recieves signed 201 patient will be referred to [facility] for admission  Fax 201 to [phone]  [place] Text this crisis worker if there are any questions please

## 2021-05-04 ENCOUNTER — HOSPITAL ENCOUNTER (INPATIENT)
Facility: HOSPITAL | Age: 56
LOS: 8 days | Discharge: HOME | DRG: 881 | End: 2021-05-12
Attending: PSYCHIATRY & NEUROLOGY | Admitting: STUDENT IN AN ORGANIZED HEALTH CARE EDUCATION/TRAINING PROGRAM
Payer: COMMERCIAL

## 2021-05-04 DIAGNOSIS — F32.A DEPRESSION: ICD-10-CM

## 2021-05-04 DIAGNOSIS — F32.A DEPRESSED: Primary | ICD-10-CM

## 2021-05-04 PROBLEM — I49.3 PVC (PREMATURE VENTRICULAR CONTRACTION): Status: ACTIVE | Noted: 2021-05-04

## 2021-05-04 PROBLEM — Z00.8 MEDICAL CLEARANCE FOR PSYCHIATRIC ADMISSION: Status: ACTIVE | Noted: 2021-05-04

## 2021-05-04 LAB
ATRIAL RATE: 93 BPM
ATRIAL RATE: 94 BPM
P AXIS: 49 DEGREES
P AXIS: 53 DEGREES
PR INTERVAL: 174 MS
PR INTERVAL: 178 MS
QRS AXIS: 12 DEGREES
QRS AXIS: 21 DEGREES
QRSD INTERVAL: 94 MS
QRSD INTERVAL: 96 MS
QT INTERVAL: 390 MS
QT INTERVAL: 398 MS
QTC INTERVAL: 484 MS
QTC INTERVAL: 497 MS
T WAVE AXIS: 38 DEGREES
T WAVE AXIS: 49 DEGREES
VENTRICULAR RATE: 93 BPM
VENTRICULAR RATE: 94 BPM

## 2021-05-04 PROCEDURE — 99222 1ST HOSP IP/OBS MODERATE 55: CPT | Performed by: INTERNAL MEDICINE

## 2021-05-04 RX ORDER — ONDANSETRON 4 MG/1
4 TABLET, ORALLY DISINTEGRATING ORAL EVERY 6 HOURS PRN
Status: DISCONTINUED | OUTPATIENT
Start: 2021-05-04 | End: 2021-05-12 | Stop reason: HOSPADM

## 2021-05-04 RX ORDER — OLANZAPINE 10 MG/1
5 INJECTION, POWDER, LYOPHILIZED, FOR SOLUTION INTRAMUSCULAR
Status: DISCONTINUED | OUTPATIENT
Start: 2021-05-04 | End: 2021-05-12 | Stop reason: HOSPADM

## 2021-05-04 RX ORDER — TRAZODONE HYDROCHLORIDE 50 MG/1
50 TABLET ORAL
Status: DISCONTINUED | OUTPATIENT
Start: 2021-05-04 | End: 2021-05-12 | Stop reason: HOSPADM

## 2021-05-04 RX ORDER — BENZTROPINE MESYLATE 0.5 MG/1
0.5 TABLET ORAL
Status: DISCONTINUED | OUTPATIENT
Start: 2021-05-04 | End: 2021-05-12 | Stop reason: HOSPADM

## 2021-05-04 RX ORDER — OLANZAPINE 5 MG/1
5 TABLET ORAL
Status: DISCONTINUED | OUTPATIENT
Start: 2021-05-04 | End: 2021-05-12 | Stop reason: HOSPADM

## 2021-05-04 RX ORDER — MINERAL OIL AND PETROLATUM 150; 830 MG/G; MG/G
1 OINTMENT OPHTHALMIC
Status: DISCONTINUED | OUTPATIENT
Start: 2021-05-04 | End: 2021-05-12 | Stop reason: HOSPADM

## 2021-05-04 RX ORDER — HYDROXYZINE 50 MG/1
50 TABLET, FILM COATED ORAL
Status: DISCONTINUED | OUTPATIENT
Start: 2021-05-04 | End: 2021-05-12 | Stop reason: HOSPADM

## 2021-05-04 RX ORDER — ACETAMINOPHEN 325 MG/1
975 TABLET ORAL EVERY 6 HOURS PRN
Status: DISCONTINUED | OUTPATIENT
Start: 2021-05-04 | End: 2021-05-12 | Stop reason: HOSPADM

## 2021-05-04 RX ORDER — AMOXICILLIN 250 MG
1 CAPSULE ORAL DAILY PRN
Status: DISCONTINUED | OUTPATIENT
Start: 2021-05-04 | End: 2021-05-12 | Stop reason: HOSPADM

## 2021-05-04 RX ORDER — CITALOPRAM 20 MG/1
20 TABLET ORAL DAILY
Status: DISCONTINUED | OUTPATIENT
Start: 2021-05-04 | End: 2021-05-06

## 2021-05-04 RX ORDER — MAGNESIUM HYDROXIDE/ALUMINUM HYDROXICE/SIMETHICONE 120; 1200; 1200 MG/30ML; MG/30ML; MG/30ML
30 SUSPENSION ORAL EVERY 4 HOURS PRN
Status: DISCONTINUED | OUTPATIENT
Start: 2021-05-04 | End: 2021-05-12 | Stop reason: HOSPADM

## 2021-05-04 RX ORDER — ARIPIPRAZOLE 2 MG/1
2 TABLET ORAL DAILY
Status: DISCONTINUED | OUTPATIENT
Start: 2021-05-04 | End: 2021-05-12 | Stop reason: HOSPADM

## 2021-05-04 RX ORDER — ACETAMINOPHEN 325 MG/1
650 TABLET ORAL EVERY 4 HOURS PRN
Status: DISCONTINUED | OUTPATIENT
Start: 2021-05-04 | End: 2021-05-12 | Stop reason: HOSPADM

## 2021-05-04 RX ORDER — MIRTAZAPINE 7.5 MG/1
7.5 TABLET, FILM COATED ORAL
Status: DISCONTINUED | OUTPATIENT
Start: 2021-05-04 | End: 2021-05-05

## 2021-05-04 RX ORDER — BENZTROPINE MESYLATE 1 MG/ML
1 INJECTION INTRAMUSCULAR; INTRAVENOUS
Status: DISCONTINUED | OUTPATIENT
Start: 2021-05-04 | End: 2021-05-12 | Stop reason: HOSPADM

## 2021-05-04 RX ORDER — OLANZAPINE 2.5 MG/1
2.5 TABLET ORAL
Status: DISCONTINUED | OUTPATIENT
Start: 2021-05-04 | End: 2021-05-12 | Stop reason: HOSPADM

## 2021-05-04 RX ORDER — LORAZEPAM 1 MG/1
1 TABLET ORAL EVERY 6 HOURS PRN
Status: DISCONTINUED | OUTPATIENT
Start: 2021-05-04 | End: 2021-05-11

## 2021-05-04 RX ORDER — HYDROXYZINE HYDROCHLORIDE 25 MG/1
25 TABLET, FILM COATED ORAL
Status: DISCONTINUED | OUTPATIENT
Start: 2021-05-04 | End: 2021-05-12 | Stop reason: HOSPADM

## 2021-05-04 RX ADMIN — ONDANSETRON 4 MG: 4 TABLET, ORALLY DISINTEGRATING ORAL at 16:23

## 2021-05-04 RX ADMIN — LORAZEPAM 1 MG: 1 TABLET ORAL at 10:20

## 2021-05-04 RX ADMIN — CITALOPRAM HYDROBROMIDE 20 MG: 20 TABLET ORAL at 10:20

## 2021-05-04 RX ADMIN — ACETAMINOPHEN 650 MG: 325 TABLET ORAL at 16:15

## 2021-05-04 RX ADMIN — ACETAMINOPHEN 975 MG: 325 TABLET ORAL at 10:20

## 2021-05-04 RX ADMIN — LORAZEPAM 1 MG: 1 TABLET ORAL at 16:20

## 2021-05-04 RX ADMIN — TRAZODONE HYDROCHLORIDE 50 MG: 50 TABLET ORAL at 22:28

## 2021-05-04 RX ADMIN — ARIPIPRAZOLE 2 MG: 2 TABLET ORAL at 10:20

## 2021-05-04 RX ADMIN — ACETAMINOPHEN 650 MG: 325 TABLET ORAL at 21:22

## 2021-05-04 RX ADMIN — TRAZODONE HYDROCHLORIDE 50 MG: 50 TABLET ORAL at 01:18

## 2021-05-04 RX ADMIN — MIRTAZAPINE 7.5 MG: 7.5 TABLET, FILM COATED ORAL at 21:19

## 2021-05-04 NOTE — ED NOTES
Belongings from locker and security safe given to [facility]        Marin Marquez, [facility]  05/03/21 8792

## 2021-05-04 NOTE — ASSESSMENT & PLAN NOTE
 The patient was evaluated and is medically clear for admission to the [facility] for treatment of the underlying psychiatric illness

## 2021-05-04 NOTE — ED ATTENDING ATTESTATION
5/3/2021  Rj Goldstein DO, saw and evaluated the patient  I have discussed the patient with the resident/non-physician practitioner and agree with the resident's/non-physician practitioner's findings, Plan of Care, and MDM as documented in the resident's/non-physician practitioner's note, except where noted  All available labs and Radiology studies were reviewed  I was present for key portions of any procedure(s) performed by the resident/non-physician practitioner and I was immediately available to provide assistance  At this point I agree with the current assessment done in the Emergency Department    I have conducted an independent evaluation of this patient a history and physical     Medically cleared and stable for psychiatric transfer

## 2021-05-04 NOTE — ASSESSMENT & PLAN NOTE
· Patient with known history of PVCs  · Being seen by the [facility] and [facility] as an outpatient  · Continue to monitor off medications at this time    · ECG done on 05/03/2021 showed normal sinus rhythm

## 2021-05-04 NOTE — NURSING NOTE
Pt constricted and withdrawn but pleasant and med-compliant  Refused meals and groups  VSS  Using ativan po prn for panic attack with positive effect  Disheveled with steady gait  Denied SI  Monitored for safety and support

## 2021-05-04 NOTE — PROGRESS NOTES
05/04/21 1254   Team Meeting   Meeting Type Tx Team Meeting   Initial Conference Date 05/04/21   Next Conference Date 06/14/21   Team Members Present   Team Members Present Physician;Nurse;   Physician Team Member Gross   Nursing Team Member [facility]   Care Management Team Member Natanael   Patient/Family Present   Patient Present Yes   Patient's Family Present No

## 2021-05-04 NOTE — NURSING NOTE
Patient reports that PRN of Ativan one mg po and Zofran 4 mg po have been effective  Reports decreased anxiety and nausea

## 2021-05-04 NOTE — ED NOTES
Transport packet created, original 201 and copy of chart placed in transport packet        Melody Mills RN  05/03/21 8534

## 2021-05-04 NOTE — PLAN OF CARE
Problem: SELF HARM/SUICIDALITY  Goal: Will have no self-injury during hospital stay  Description: INTERVENTIONS:  - Q 15 MINUTES: Routine safety checks  - Q WAKING SHIFT & PRN: Assess risk to determine if routine checks are adequate to maintain patient safety  - Encourage patient to participate actively in care by formulating a plan to combat response to suicidal ideation, identify supports and resources  Outcome: Progressing     Problem: ANXIETY  Goal: Will report anxiety at manageable levels  Description: INTERVENTIONS:  - Administer medication as ordered  - Teach and encourage coping skills  - Provide emotional support  - Assess patient/family for anxiety and ability to cope  Outcome: Progressing  Goal: By discharge: Patient will verbalize 2 strategies to deal with anxiety  Description: Interventions:  - Identify any obvious source/trigger to anxiety  - Staff will assist patient in applying identified coping technique/skills  - Encourage attendance of scheduled groups and activities  Outcome: Progressing     Problem: DISCHARGE PLANNING  Goal: Discharge to home or other facility with appropriate resources  Description: INTERVENTIONS:  - Identify barriers to discharge w/patient and caregiver  - Arrange for needed discharge resources and transportation as appropriate  - Identify discharge learning needs (meds, wound care, etc )  - Arrange for interpretive services to assist at discharge as needed  - Refer to Case Management Department for coordinating discharge planning if the patient needs post-hospital services based on physician/advanced practitioner order or complex needs related to functional status, cognitive ability, or social support system  Outcome: Progressing     Problem: DEPRESSION  Goal: Will be euthymic at discharge  Description: INTERVENTIONS:  - Administer medication as ordered  - Provide emotional support via 1:1 interaction with staff  - Encourage involvement in milieu/groups/activities  - Monitor for social isolation  Outcome: Progressing     Problem: Alteration in Thoughts and Perception  Goal: Attend and participate in unit activities, including therapeutic, recreational, and educational groups  Description: Interventions:  -Encourage Visitation and family involvement in care  Outcome: Progressing

## 2021-05-04 NOTE — NURSING NOTE
Patient has been mainly seclusive to his room  Laying in bed  " Im just so anxious"  Also c/o not having an appetite, along with nausea  Declined dinner at this time  Spoke about anxiety being his reason for admission   Currently denying SI

## 2021-05-04 NOTE — PROGRESS NOTES
Pt refused self assessment at this time  Pt endorsed a headache and resting in bed  Pt noted he was taking medication for headache and nursing aware  Reviewed group schedule for orientation to unit        05/04/21 0417   Activity/Group Checklist   Group Admission/Discharge   Attendance Refused

## 2021-05-04 NOTE — ED NOTES
Insurance Authorization for admission:   Phone call placed to [facility]  Phone number: [phone]  Spoke to Michelleaaliyah Aminnimesh      4 days approved  Level of care: Inpatient  Review on 05/07/21  Authorization # [id number]         UR to call [phone] on 5/7 for concurrent review  A care manager will be assigned at that time  This is an out of network auth and Pt is to be made aware of same, however, he has out of network benefits    EVS (Eligibility Verification System) called - [phone]    Automated system indicates: not on file

## 2021-05-04 NOTE — PROGRESS NOTES
05/04/21 1254   Team Meeting   Meeting Type Daily Rounds   Initial Conference Date 05/04/21   Next Conference Date 05/05/21   Team Members Present   Team Members Present Physician;Nurse;;; Occupational Therapist   Physician Team Member Gross   Nursing Team Member [facility] Management Team Member [address] [place] [address]   Social Work Team Member seymour   OT Team Member Rea   Patient/Family Present   Patient Present No   Patient's Family Present No   Independent, A&Ox4, increased anxiety, +SI to shoot self, Trazadone for sleep is helpful

## 2021-05-04 NOTE — CASE MANAGEMENT
Patient admitted today at 63 on a 12 from [facility] ER   met with patient in order to complete the intake/assessment and patient presents disheveled in appearance, cooperative, mood depressed and anxious  Manfred Contreras reports he has been experiencing anxiety and panic attacks which started one year ago as well as  lightheadedness   Derrekjackson Contreras reports he has been to the ER may time for these complaints, numerous tests have been completed as well as a number of consults with physicians and physical therapy  Manfred Johnsont said all of the tests came back "ok"  Derrekjackson Contreras said the neurologist then  spoke to him about these symptoms being caused by anxiety  Derrekjackson Contreras reports the last panic attack he had was yesterday and he said his arms and legs went numb and he could not breath  Manfred Contreras reports at times he has thought about suicide and had a plan to shoot himself  Patient reports he does own a handgun and said it is locked in his car and it is in the "glove box"  Marvajuliana Ben said he talks himself out of harming himself and he said he has never attempted to harm himself in the past   called Peterson's wife Nancie Lynch and asked if she can remove the handgun from the car and have a responsible family member or friend lock the gun up at their home  Brie said she will look into this and ask a family member   will follow up with Brie  Manfred Contreras refused to sign a SAM for his wife or PCP  Nancie Lynch was told this and  told her at this time information can not be given to her  and she said she understood  Manfred Contreras reports this is his first inpatient psychiatric admission  He reports he has a first appointment with psychiatrist Dr Jenny Monson in [place] tomorrow  SAM was signed and  called and notified the office of patient's admission  The office asked  to call closer to discharge and the appointment will be rescheduled  Derrekjackson Contreras denies drug and alcohol history   UDS was negative  AUDIT 3/40  PAWSS 0  Minal Hawthorne denies legal   Minal  reports he is employed full time at [address] and is a [place]  but is currently on [employer]  Minal Hawthorne reports he enjoys watching TV and exercising  Minal Hawthorne reports coping mechanisms are walking and listening to music  Minal Hawthorne reports his pharmacy is [facility] in [facility]

## 2021-05-04 NOTE — H&P
Initial Psychiatric Evaluation    Medical Record Number: [id number]  Encounter: [id number]      History:     Darylene Haddock is an 60 y o , male, admitted to the psychiatric unit under a 201 status to Dr Susie Torres' service with the chief complaint of  increased anxiety and panic and fear of  Of having a panic attack  In addition he has chronic lightheadedness that bothers him  He he has been treated in the past by his family doctor with multiple medications including escitalopram Prozac Effexor which have given him partial positive effect but not enough so they were discontinued  He is afraid to leave his home for fear of getting an attack and has been on medical leave from his work place  He lives with his wife who is supportive and has 2 children and apparently there is a good family structure  Patient has been worked up medically and neurologically with the MRI cardiac evaluation and he says he is in good health  Known is sure why he continues to have this chronic lightheadedness which frightens him  He is a social drinker there is no illicit drug use and is not describe any significant trauma from earlier times  He has never seen a psychiatrist before but has an appointment coming up in the near future to start seeing 1 and hopefully a therapist       Past Medical History:   Diagnosis Date    Anxiety     Depression     Hypertension     Palpitation     Panic attack        Past surgical history:  No past surgical history on file  Family history:  No family history on file      Current medications:    Current Facility-Administered Medications:     acetaminophen (TYLENOL) tablet 650 mg, 650 mg, Oral, Q4H PRN, Lorena Gonzalez MD    acetaminophen (TYLENOL) tablet 650 mg, 650 mg, Oral, Q4H PRN, Lorena Gonzalez MD    acetaminophen (TYLENOL) tablet 975 mg, 975 mg, Oral, Q6H PRN, Lorena Gonzalez MD    aluminum-magnesium hydroxide-simethicone (MYLANTA) oral suspension 30 mL, 30 mL, Oral, Q4H PRN, Agustin Zelaya MD    ARIPiprazole (ABILIFY) tablet 2 mg, 2 mg, Oral, Daily, Manny Lenz MD    artificial tear (LUBRIFRESH P M ) ophthalmic ointment 1 application, 1 application, Both Eyes, Q3H PRN, Agustin Zelaya MD    benztropine (COGENTIN) injection 1 mg, 1 mg, Intramuscular, Q4H PRN Max 6/day, Agustin Zelaya MD    benztropine (COGENTIN) tablet 0 5 mg, 0 5 mg, Oral, Q4H PRN Max 6/day, Agustin Zelaya MD    citalopram (CeleXA) oral solution 20 mg, 20 mg, Oral, Daily, Manny Lenz MD    hydrOXYzine HCL (ATARAX) tablet 25 mg, 25 mg, Oral, Q6H PRN Max 4/day, Agustin Zelaya MD    hydrOXYzine HCL (ATARAX) tablet 50 mg, 50 mg, Oral, Q6H PRN Max 4/day, Agustin Zelaya MD    LORazepam (ATIVAN) tablet 1 mg, 1 mg, Oral, Q6H PRN, Manny Lenz MD    mirtazapine (REMERON) tablet 7 5 mg, 7 5 mg, Oral, HS, Manny Lenz MD    OLANZapine (ZyPREXA) IM injection 5 mg, 5 mg, Intramuscular, Q3H PRN Max 3/day, Agustin Zelaya MD    OLANZapine (ZyPREXA) tablet 2 5 mg, 2 5 mg, Oral, Q4H PRN Max 6/day, Agustin Zelaya MD    OLANZapine (ZyPREXA) tablet 5 mg, 5 mg, Oral, Q4H PRN Max 3/day, Agustin Zelaya MD    OLANZapine (ZyPREXA) tablet 5 mg, 5 mg, Oral, Q3H PRN Max 3/day, Agustin Zelaya MD    ondansetron (ZOFRAN-ODT) dispersible tablet 4 mg, 4 mg, Oral, Q6H PRN, Melanie King PA-C    senna-docusate sodium (SENOKOT S) 8 6-50 mg per tablet 1 tablet, 1 tablet, Oral, Daily PRN, Agustin Zelaya MD    traZODone (DESYREL) tablet 50 mg, 50 mg, Oral, Q6H PRN Max 3/day, Agustin Zelaya MD    traZODone (DESYREL) tablet 50 mg, 50 mg, Oral, HS PRN, Agustin Zelaya MD, 50 mg at 05/04/21 0118    Psychiatric Review Of Systems:  sleep: yes  appetite changes: no  weight changes: no  energy/anergy: no  interest/pleasure/anhedonia: no  somatic symptoms: yes  anxiety/panic: yes  shweta: no  guilty/hopeless: yes  self injurious behavior/risky behavior: no    Past Psychiatric History:   Therapy, Out Patient PCP  Currently in treatment with PCP    Past Suicide attempts:  No  Past Violent behavior:  No  Past Psychiatric medication trial:  Yes  Past Psychiatric Hospitalizations:  No    Allergies:  No Known Allergies    Social History:  Social History     Socioeconomic History    Marital status: /Civil Union     Spouse name: Not on file    Number of children: Not on file    Years of education: Not on file    Highest education level: Not on file   Occupational History    Not on file   Social Needs    Financial resource strain: Not on file    Food insecurity     Worry: Not on file     Inability: Not on file   [employer] needs     Medical: Not on file     Non-medical: Not on file   Tobacco Use    Smoking status: Former Smoker     Types: Cigars    Smokeless tobacco: Never Used   Substance and Sexual Activity    Alcohol use: Not Currently     Frequency: Monthly or less     Drinks per session: 1 or 2     Binge frequency: Less than monthly    Drug use: Not Currently    Sexual activity: Not Currently   Lifestyle    Physical activity     Days per week: Not on file     Minutes per session: Not on file    Stress: Not on file   Relationships    Social connections     Talks on phone: Not on file     Gets together: Not on file     Attends Latter day service: Not on file     Active member of club or organization: Not on file     Attends meetings of clubs or organizations: Not on file     Relationship status: Not on file    Intimate partner violence     Fear of current or ex partner: Not on file     Emotionally abused: Not on file     Physically abused: Not on file     Forced sexual activity: Not on file   Other Topics Concern    Not on file   Social History Narrative    Not on file       Trauma/ Abuse/ Neglect none reported    Physical Examination:     Vital Signs:  Vitals:    05/04/21 0017 05/04/21 0100 05/04/21 0756   BP: 132/96 132/96 115/62   BP Location: Right arm Right arm Right arm   Pulse: 104 104 62   Resp: 16 16 18   Temp: (!) 96 5 °F (35 8 °C) (!) 96 5 °F (35 8 °C) 98 2 °F (36 8 °C)   TempSrc: Temporal Temporal Temporal   SpO2: 95%  93%   Weight: 117 kg (258 lb 2 5 oz) 117 kg (258 lb)    Height: 6' 3" (1 905 m) 6' 3" (1 905 m)          Appearance:  age appropriate and casually dressed   Behavior:  normal   Speech:  normal volume   Mood:  anxious and depressed   Affect:  constricted   Thought Process:  normal   Thought Content:  normal   Perceptual Disturbances: None   Risk Potential: none   Sensorium:  person, place, situation and time   Cognition:  intact   Consciousness:  alert and awake    Attention: attention span and concentration were age appropriate   Intellect: average   Recent Memory Short term memory grossly intact   Remote Memory Long term memory grossly intact   Insight:  good   Judgment: good      Motor Activity: no abnormal movements     Patient Strengths/Assets: ability for insight, adapts well, average or above intelligence, capable of independent living  Patient Barriers/Limitations: difficulty adapting    Diagnostic Studies:     Recent Labs:  Results Reviewed     None          I/O Past 24 hours:  No intake/output data recorded  No intake/output data recorded  Impression / Plan:     Depressed  anxiety disorder  Panic disorder  Recommended Treatment:      Medications  1) restarted the citalopram 20 mg added Abilify 2 mg and Remeron 7 5 mg q h s  Non-pharmacological treatments  1) Continue with group therapy, milieu therapy and occupational therapy  2) Medical will be consulted to help manage comorbid conditions    Safety  1) Safety/communication plan established targeting dynamic risk factors above  Counseling / Coordination of Care    Total floor / unit time spent today 50 minutes  Greater than 50% of total time was spent with the patient and / or family counseling and / or coordination of care  A description of the counseling / coordination of care         Patient's Rights, confidentiality and exceptions to confidentiality, use of automated medical record, Jessenia Beavers staff access to medical record, and consent to treatment reviewed        Yohana Jarrell MD

## 2021-05-04 NOTE — ED NOTES
Patient requesting to not be transported until morning  Asked patient why and they stated they "were apprehensive " Pt reported being scared to go  Able to calm and re-direct patient  Patient still wishing to leave jamal Millan RN  05/03/21 1567

## 2021-05-04 NOTE — DISCHARGE INSTR - APPOINTMENTS
Rajwinder Maria RN, our [employer], will be calling you after your discharge, on the phone number that you provided  She will be available as an additional support, if needed  If you wish to speak with her, you may contact Rome Levin at [phone]

## 2021-05-04 NOTE — CONSULTS
Nicolas Jama SOLEDAD [place] 1965, 60 y o  male MRN: [id number]  Unit/Bed#: [facility]uja Mckoy 375-54 Encounter: [id number]  Primary Care Provider: Yaneth Galarza DO   Date and time admitted to hospital: 5/4/2021 12:25 AM    Inpatient consult for Medical Clearance for [facility] patient  Consult performed by: Claressa Seip, PA-C  Consult ordered by: Yessi Chong MD          Medical clearance for psychiatric admission  Assessment & Plan   The patient was evaluated and is medically clear for admission to the [facility] for treatment of the underlying psychiatric illness  PVC (premature ventricular contraction)  Assessment & Plan  · Patient with known history of PVCs  · Being seen by the heart care group and Manuel as an outpatient  · Continue to monitor off medications at this time  · ECG done on 05/03/2021 showed normal sinus rhythm    ECT Clearance:   History of recent seizure or stroke:  No   History of pheochromocytoma:  No   History of active bleeding (Intracranial hemorrhage, aneurysm or AVM):  No   History of metallic implants in the head or neck:  No   History of increased intracranial pressure with mass effect:  No   Does the patient have a current arrhythmia? History PVCs      Based on above criteria, Patient is not medically cleared for ECT should it be recommended  Would recommend clearance by cardiology prior to ECT treatment given history of PVC  Counseling / Coordination of Care Time: 45 minutes  Greater than 50% of total time spent on patient counseling and coordination of care  Collaboration of Care: Were Recommendations Directly Discussed with Primary Treatment Team? - Yes     History of Present Illness:    Kaleb Ta is a 60 y o  male who is originally admitted to the psychiatry service due to increasing anxiety with suicidal ideation   We are consulted for medical clearance for admission to [address] [place] [address] and treatment of underlying psychiatric illness  Patient currently denies any dizziness, headache, shortness of breath, chest pain, vomiting, urinary symptoms at this time  Patient is currently complaining of some lightheadedness, nausea, diarrhea  Patient has a past medical history significant for PVCs which are being monitored off medications at this time  Patient has a psychiatric history significant for anxiety and depression  Review of Systems:    Review of Systems   Constitutional: Negative for chills and fever  HENT: Negative for congestion, ear pain and sore throat  Eyes: Negative for pain and visual disturbance  Respiratory: Negative for cough and shortness of breath  Cardiovascular: Negative for chest pain and palpitations  Gastrointestinal: Negative for abdominal pain, constipation, diarrhea, nausea and vomiting  Genitourinary: Negative for dysuria and hematuria  Musculoskeletal: Negative for arthralgias and back pain  Skin: Negative for color change and rash  Neurological: Positive for light-headedness  Negative for dizziness, seizures, syncope and headaches  Psychiatric/Behavioral: Positive for suicidal ideas  The patient is nervous/anxious  All other systems reviewed and are negative  Past Medical and Surgical History:     Past Medical History:   Diagnosis Date    Anxiety     Depression     Hypertension     Palpitation     Panic attack        No past surgical history on file      Meds/Allergies:    all medications and allergies reviewed    Allergies: No Known Allergies    Social History:     Marital Status: /Civil Union    Substance Use History:   Social History     Substance and Sexual Activity   Alcohol Use Not Currently    Frequency: Monthly or less    Drinks per session: 1 or 2    Binge frequency: Less than monthly     Social History     Tobacco Use   Smoking Status Former Smoker    Types: Cigars   Smokeless Tobacco Never Used     Social History     Substance and Sexual Activity   Drug Use Not Currently       Family History:    No family history on file  Physical Exam:     Vitals:   Blood Pressure: 132/96 (05/04/21 0100)  Pulse: 104 (05/04/21 0100)  Temperature: (!) 96 5 °F (35 8 °C) (05/04/21 0100)  Temp Source: Temporal (05/04/21 0100)  Respirations: 16 (05/04/21 0100)  Height: 6' 3" (190 5 cm) (05/04/21 0100)  Weight - Scale: 117 kg (258 lb) (05/04/21 0100)  SpO2: 95 % (05/04/21 0017)    Physical Exam  Vitals signs reviewed  Constitutional:       General: He is not in acute distress  Appearance: He is obese  HENT:      Head: Normocephalic and atraumatic  Nose: No congestion or rhinorrhea  Mouth/Throat:      Mouth: Mucous membranes are moist       Pharynx: Oropharynx is clear  Eyes:      General: No scleral icterus  Pupils: Pupils are equal, round, and reactive to light  Cardiovascular:      Rate and Rhythm: Regular rhythm  Tachycardia present  Pulses: Normal pulses  Heart sounds: No murmur  Pulmonary:      Effort: Pulmonary effort is normal       Breath sounds: Normal breath sounds  No wheezing or rales  Abdominal:      General: Bowel sounds are normal  There is no distension  Palpations: Abdomen is soft  Tenderness: There is no abdominal tenderness  Musculoskeletal: Normal range of motion  Right lower leg: No edema  Left lower leg: No edema  Skin:     General: Skin is warm and dry  Findings: No rash  Neurological:      Mental Status: He is alert and oriented to person, place, and time  Psychiatric:         Mood and Affect: Mood is anxious  Thought Content: Thought content includes suicidal ideation  Additional Data:     Lab Results: I have personally reviewed pertinent reports        Results from last 7 days   Lab Units 05/03/21  1414   WBC Thousand/uL 7 28   HEMOGLOBIN g/dL 15 4   HEMATOCRIT % 45 1   PLATELETS Thousands/uL 217   NEUTROS PCT % 77*   LYMPHS PCT % 17   MONOS PCT % 5 EOS PCT % 0     Results from last 7 days   Lab Units 05/03/21  1414   SODIUM mmol/L 136   POTASSIUM mmol/L 3 9   CHLORIDE mmol/L 101   CO2 mmol/L 22   BUN mg/dL 10   CREATININE mg/dL 1 22   ANION GAP mmol/L 13   CALCIUM mg/dL 8 9   GLUCOSE RANDOM mg/dL 141*         Results from last 7 days   Lab Units 05/03/21  1414   TROPONIN I ng/mL <0 02     No results found for: HGBA1C        EKG, Pathology, and Other Studies Reviewed on Admission:   · EKG (5/3/2021): Normal sinus rhythm    ** Please Note: This note has been constructed using a voice recognition system   **

## 2021-05-04 NOTE — NURSING NOTE
Pt admitted to [facility] 6B unit from [facility] ED on a 201 status  Pt A/O X4, BMAT 4, LBM 5/3  On admission, Pt indicated that this is his first inpatient psychiatric admission when asked whether he feels safe here  However did say  he feels safe being here in the hospital  Pt reported increased and worsening anxiety led to his visit to the ED and eventually here in this hospital  Pt denied previous SI statement made with a plan to shoot self while in the ED  Pt also admitted to having a hand gun in the car at home and his spouse is aware that there is a gun in the car  Skin intact  Pt received 50mg trazodone at 0118 for c/o difficulties falling/staying asleep

## 2021-05-04 NOTE — PLAN OF CARE
Problem: SELF HARM/SUICIDALITY  Goal: Will have no self-injury during hospital stay  Description: INTERVENTIONS:  - Q 15 MINUTES: Routine safety checks  - Q WAKING SHIFT & PRN: Assess risk to determine if routine checks are adequate to maintain patient safety  - Encourage patient to participate actively in care by formulating a plan to combat response to suicidal ideation, identify supports and resources  5/4/2021 0053 by Александр Lira RN  Outcome: Progressing  5/4/2021 0053 by Александр Lira RN  Outcome: Progressing     Problem: ANXIETY  Goal: Will report anxiety at manageable levels  Description: INTERVENTIONS:  - Administer medication as ordered  - Teach and encourage coping skills  - Provide emotional support  - Assess patient/family for anxiety and ability to cope  5/4/2021 0053 by Александр Lira RN  Outcome: Progressing  5/4/2021 0053 by Александр Lira RN  Outcome: Progressing  Goal: By discharge: Patient will verbalize 2 strategies to deal with anxiety  Description: Interventions:  - Identify any obvious source/trigger to anxiety  - Staff will assist patient in applying identified coping technique/skills  - Encourage attendance of scheduled groups and activities  5/4/2021 0053 by Александр Lira RN  Outcome: Progressing  5/4/2021 0053 by Александр Lira RN  Outcome: Progressing     Problem: DEPRESSION  Goal: Will be euthymic at discharge  Description: INTERVENTIONS:  - Administer medication as ordered  - Provide emotional support via 1:1 interaction with staff  - Encourage involvement in milieu/groups/activities  - Monitor for social isolation  Outcome: Progressing

## 2021-05-04 NOTE — TREATMENT PLAN
TREATMENT PLAN REVIEW - [address]  [facility] 60 [facility]  1965 male MRN: [id number]    51 [facility] [address] 6[address] OABHU Room / Bed: Anuja Mckoy [facility] 030-09 Encounter: [id number]          Admit Date/Time:  5/4/2021 12:25 AM    Treatment Team: Attending Provider: Sanjiv Sears MD; Consulting Physician: Claressa Seip, PA-C; Patient Care Assistant: Justin Kaplan; Registered Nurse: Alfonso Mejia, RN; Patient Care Assistant: Franklin Barton; Registered Nurse: Aravind Patiño RN; Occupational Therapy Assistant: JOSE Dobson    Diagnosis: Principal Problem:    Depressed  Active Problems:    Medical clearance for psychiatric admission    PVC (premature ventricular contraction)      Patient Strengths/Assets: ability for insight, adapts well, average or above intelligence, capable of independent living, cooperative, communication skills    Patient Barriers/Limitations: difficulty adapting    Short Term Goals: decrease in depressive symptoms, decrease in anxiety symptoms    Long Term Goals: improvement in depression, improvement in anxiety, stabilization of mood    Progress Towards Goals: starting psychiatric medications as prescribed    Recommended Treatment: medication management, patient medication education, group therapy, milieu therapy, continued Behavioral Health psychiatric evaluation/assessment process    Treatment Frequency: daily medication monitoring, group and milieu therapy daily, monitoring through interdisciplinary rounds, monitoring through weekly patient care conferences    Expected Discharge Date:  5/11    Discharge Plan: referral for outpatient medication management with a psychiatrist, referral for outpatient psychotherapy    Treatment Plan Created/Updated By: Sanjiv Sears MD

## 2021-05-05 LAB
CHOLEST SERPL-MCNC: 198 MG/DL
EST. AVERAGE GLUCOSE BLD GHB EST-MCNC: 114 MG/DL
HBA1C MFR BLD: 5.6 %
HDLC SERPL-MCNC: 35 MG/DL
LDLC SERPL CALC-MCNC: 129 MG/DL
NONHDLC SERPL-MCNC: 163 MG/DL
TRIGL SERPL-MCNC: 170 MG/DL

## 2021-05-05 PROCEDURE — 80061 LIPID PANEL: CPT | Performed by: PSYCHIATRY & NEUROLOGY

## 2021-05-05 PROCEDURE — 83036 HEMOGLOBIN GLYCOSYLATED A1C: CPT | Performed by: PSYCHIATRY & NEUROLOGY

## 2021-05-05 RX ORDER — MIRTAZAPINE 15 MG/1
15 TABLET, FILM COATED ORAL
Status: DISCONTINUED | OUTPATIENT
Start: 2021-05-05 | End: 2021-05-12 | Stop reason: HOSPADM

## 2021-05-05 RX ADMIN — LORAZEPAM 1 MG: 1 TABLET ORAL at 08:41

## 2021-05-05 RX ADMIN — MIRTAZAPINE 15 MG: 15 TABLET, FILM COATED ORAL at 21:19

## 2021-05-05 RX ADMIN — TRAZODONE HYDROCHLORIDE 50 MG: 50 TABLET ORAL at 22:39

## 2021-05-05 RX ADMIN — ARIPIPRAZOLE 2 MG: 2 TABLET ORAL at 08:36

## 2021-05-05 RX ADMIN — CITALOPRAM HYDROBROMIDE 20 MG: 20 TABLET ORAL at 08:36

## 2021-05-05 NOTE — NURSING NOTE
Pt appears anxious  Pt rated 1/4 for anxiety and 5/10 for depression  No SI or HI  Pt openly discussed reasoning for being here Pt counseled  Med and meal compliant  Pt stated "I really want to get better because I am stressing out my wife and kids"  Worrying about "getting back to work"  Pt showing no signs of distress  PRN trazadone given at  Will continue to monitor pt frequently

## 2021-05-05 NOTE — NURSING NOTE
Tylenol partially effective   Pt requested and given PRN trazodone 50mg for sleep at [place] [id number]

## 2021-05-05 NOTE — PROGRESS NOTES
05/05/21 0859   Team Meeting   Meeting Type Daily Rounds   Initial Conference Date 05/05/21   Next Conference Date 05/06/21   Team Members Present   Team Members Present Physician;Nurse;;Occupational Therapist;   Physician Team Member Gross   Nursing Team Member [facility] Management Team Member [facility]   Social Work Team Member Doe Hernandez   OT Team Member Jojo Best   Patient/Family Present   Patient Present No   Patient's Family Present No   isolative, only ate dinner yesterday, no grps, incr   Anxiety and PRN's fr same, Trazadone helped sleep, med changes planned, plan to contact son today

## 2021-05-05 NOTE — PROGRESS NOTES
Progress Note - Behavioral Health   Darylene Haddock 60 y o  male MRN: [id number]  Unit/Bed#: Brandie Belcher 676-47 Encounter: [id number]    Assessment/Plan   Principal Problem:    Depressed  Active Problems:    Medical clearance for psychiatric admission    PVC (premature ventricular contraction)  Patient is adjusting to the unit  This is his 3rd day  He is quite anxious but did not have a panic attack  Slept a little better last night has started a new medication and is anxious about it but is willing to give it a try  He worries about his blood pressure and has asked several times today when his blood pressure is  The discussion with the pharmacist about the prolonged QTC interval on his cardiogram related to Celexa is almost 500 and we need to watch that in as we continue with the Celexa treatment  The use of Celexa is because he had a good response initially several years ago  Behavior over the last 24 hours:  unchanged  Sleep: normal  Appetite: normal  Medication side effects: No  ROS: no complaints    Mental Status Evaluation:  Appearance:  age appropriate   Behavior:  guarded   Speech:  normal pitch and normal volume   Mood:  anxious and depressed   Affect:  blunted and constricted   Thought Process:  tangential   Thought Content:  normal   Perceptual Disturbances: None   Risk Potential: Suicidal Ideations none  Homicidal Ideations none  Potential for Aggression No   Sensorium:  person, place, and situation   Memory:  recent and remote memory grossly intact   Consciousness:  alert and awake    Attention: attention span appeared shorter than expected for age   Insight:  fair   Judgment: fair   Gait/Station: normal gait/station   Motor Activity: no abnormal movements     Progress Toward Goals:  No problem he had    Recommended Treatment: Continue with group therapy, milieu therapy and occupational therapy        Risks, benefits and possible side effects of Medications:   Risks, benefits, and possible side effects of medications explained to patient and patient verbalizes understanding  Medications: all current active meds have been reviewed  Labs: I have personally reviewed all pertinent laboratory/tests results  Most Recent Labs:   Lab Results   Component Value Date    WBC 7 28 05/03/2021    RBC 4 93 05/03/2021    HGB 15 4 05/03/2021    HCT 45 1 05/03/2021     05/03/2021    RDW 12 6 05/03/2021    NEUTROABS 5 51 05/03/2021    SODIUM 136 05/03/2021    K 3 9 05/03/2021     05/03/2021    CO2 22 05/03/2021    BUN 10 05/03/2021    CREATININE 1 22 05/03/2021    GLUC 141 (H) 05/03/2021    CALCIUM 8 9 05/03/2021    AST 20 11/04/2020    ALT 42 11/04/2020    ALKPHOS 65 11/04/2020    TP 7 9 11/04/2020    ALB 4 0 11/04/2020    TBILI 0 41 11/04/2020    CHOLESTEROL 198 05/05/2021    HDL 35 (L) 05/05/2021    TRIG 170 (H) 05/05/2021    LDLCALC 129 05/05/2021    [place] 163 05/05/2021    [id number] 2 936 11/04/2020       Counseling / Coordination of Care  Total floor / unit time spent today 35 minutes  Greater than 50% of total time was spent with the patient and / or family counseling and / or coordination of care   A description of the counseling / coordination of care:

## 2021-05-05 NOTE — PLAN OF CARE
Problem: SELF HARM/SUICIDALITY  Goal: Will have no self-injury during hospital stay  Description: INTERVENTIONS:  - Q 15 MINUTES: Routine safety checks  - Q WAKING SHIFT & PRN: Assess risk to determine if routine checks are adequate to maintain patient safety  - Encourage patient to participate actively in care by formulating a plan to combat response to suicidal ideation, identify supports and resources  Outcome: Progressing     Problem: ANXIETY  Goal: Will report anxiety at manageable levels  Description: INTERVENTIONS:  - Administer medication as ordered  - Teach and encourage coping skills  - Provide emotional support  - Assess patient/family for anxiety and ability to cope  Outcome: Progressing  Goal: By discharge: Patient will verbalize 2 strategies to deal with anxiety  Description: Interventions:  - Identify any obvious source/trigger to anxiety  - Staff will assist patient in applying identified coping technique/skills  - Encourage attendance of scheduled groups and activities  Outcome: Progressing     Problem: DEPRESSION  Goal: Will be euthymic at discharge  Description: INTERVENTIONS:  - Administer medication as ordered  - Provide emotional support via 1:1 interaction with staff  - Encourage involvement in milieu/groups/activities  - Monitor for social isolation  Outcome: Progressing     Problem: Alteration in Thoughts and Perception  Goal: Attend and participate in unit activities, including therapeutic, recreational, and educational groups  Description: Interventions:  -Encourage Visitation and family involvement in care  Outcome: Progressing

## 2021-05-05 NOTE — PROGRESS NOTES
Pt did not attend groups when prompted or offered        05/05/21 1000   Activity/Group Checklist   Group Other (Comment)  (short term problem solving)   Attendance Did not attend

## 2021-05-05 NOTE — NURSING NOTE
Pt slightly more verbal and less depressed  Using ativan po prn with positive effect  Good appetite and steady gait  Out for meals only, refused group  VSS  Med-compliant  Denied SI  Monitored for safety and support

## 2021-05-06 RX ORDER — HYDROXYZINE 50 MG/1
50 TABLET, FILM COATED ORAL
Status: DISCONTINUED | OUTPATIENT
Start: 2021-05-06 | End: 2021-05-07

## 2021-05-06 RX ADMIN — LORAZEPAM 1 MG: 1 TABLET ORAL at 07:58

## 2021-05-06 RX ADMIN — MIRTAZAPINE 15 MG: 15 TABLET, FILM COATED ORAL at 22:05

## 2021-05-06 RX ADMIN — HYDROXYZINE HYDROCHLORIDE 50 MG: 50 TABLET, FILM COATED ORAL at 22:05

## 2021-05-06 RX ADMIN — CITALOPRAM HYDROBROMIDE 20 MG: 20 TABLET ORAL at 08:00

## 2021-05-06 RX ADMIN — LORAZEPAM 1 MG: 1 TABLET ORAL at 17:48

## 2021-05-06 RX ADMIN — ARIPIPRAZOLE 2 MG: 2 TABLET ORAL at 08:00

## 2021-05-06 NOTE — NURSING NOTE
Pt withdrawn, out for meals only  Med-compliant  Using prn ativan with positive effect  Good appetite and steady gait  VSS  Did not attend group  Denied SI  Monitored for safety and support

## 2021-05-06 NOTE — PROGRESS NOTES
Pt attended 1 afternoon group        05/06/21 1000   Activity/Group Checklist   Group Other (Comment)  ( relapse prevention )   Attendance Did not attend

## 2021-05-06 NOTE — PROGRESS NOTES
05/06/21 0902   Team Meeting   Meeting Type Daily Rounds   Initial Conference Date 05/06/21   Next Conference Date 05/07/21   Team Members Present   Team Members Present Physician;;Nurse; Other (Discipline and Name); Occupational Therapist;   Physician Team Member Gross   Nursing Team Member [facility] Management Team Member [facility]   Social Work Team Member [facility]   OT Team Member Марина Rendon   Other (Discipline and Name) Rain Marinelli - Pharm   Patient/Family Present   Patient Present No   Patient's Family Present No   out for meals, no grps, 5/10, 1/4, Ativan PRN on days was helpful, Trazadone for sleep helpful, med changes planned,

## 2021-05-06 NOTE — PROGRESS NOTES
Progress Note - Behavioral Health   Adry Asp 60 y o  male MRN: [id number]  Unit/Bed#: Carlene Myers 873-35 Encounter: [id number]    Assessment/Plan   Principal Problem:    Depressed  Active Problems:    Medical clearance for psychiatric admission    PVC (premature ventricular contraction)  Patient is feeling like he is having anxiety and this morning while I am talking to him and he is rate becoming a full-blown panic attack  He said he is hopeful that he can get his anxiety under control so that he can return to his normal life  His wife is also supportive of him  He also talked about chronic insomnia which I think is related to his anxiety which has been a long-term problem  He says he sleeps better at night the next day goes much much better  For that reason we talked at length about what we could do to both reduce the anxiety and to help sleep better  I have added hydroxyzine 50 mg at bedtime  And I will increase the citalopram to 30 mg to help with his anxiety        Records were after recheck EKG for QT interval assessment    Behavior over the last 24 hours:  unchanged  Sleep: insomnia  Appetite: normal  Medication side effects: No  ROS: no complaints    Mental Status Evaluation:  Appearance:  age appropriate   Behavior:  guarded   Speech:  normal pitch and normal volume   Mood:  anxious and depressed   Affect:  blunted and constricted   Thought Process:  tangential   Thought Content:  normal   Perceptual Disturbances: None   Risk Potential: Suicidal Ideations none  Homicidal Ideations none  Potential for Aggression No   Sensorium:  person, place, and situation   Memory:  recent and remote memory grossly intact   Consciousness:  alert and awake    Attention: attention span appeared shorter than expected for age   Insight:  fair   Judgment: fair   Gait/Station: normal gait/station   Motor Activity: no abnormal movements     Progress Toward Goals:  Little change    Recommended Treatment: Continue with group therapy, milieu therapy and occupational therapy  Risks, benefits and possible side effects of Medications:   Risks, benefits, and possible side effects of medications explained to patient and patient verbalizes understanding  Medications: planned medication changes: And hydroxyzine 50 mg q h s   Increase citalopram to 30 mg a day  Labs: I have personally reviewed all pertinent laboratory/tests results  Counseling / Coordination of Care  Total floor / unit time spent today 35 minutes  Greater than 50% of total time was spent with the patient and / or family counseling and / or coordination of care   A description of the counseling / coordination of care:

## 2021-05-06 NOTE — NURSING NOTE
Pt appears depressed and is seclusive to room for most of the day  Pt is compliant with meds and meals  Pt appears to have some anxiety on approach  Pt rated 2/4 anxiety and 5/10 depression  Pt stating "I had poor sleep because I sleep all day"  Pt encouraged to get out of room and be active on unit to help with sleep at night  Pt was on phone and stating "Its like a boot camp" and "the only way out of here is to do exactly as they say"  Pt requested anxiety medication asking for ativan in particular stating "I feel like I am about to start climbing the walls" with an anxiety rating of 2/4 and increasing  Patient counseled  Will continue to monitor patient needs frequently

## 2021-05-07 RX ORDER — HYDROXYZINE 50 MG/1
100 TABLET, FILM COATED ORAL
Status: DISCONTINUED | OUTPATIENT
Start: 2021-05-07 | End: 2021-05-12 | Stop reason: HOSPADM

## 2021-05-07 RX ADMIN — LORAZEPAM 1 MG: 1 TABLET ORAL at 18:08

## 2021-05-07 RX ADMIN — MIRTAZAPINE 15 MG: 15 TABLET, FILM COATED ORAL at 21:09

## 2021-05-07 RX ADMIN — CITALOPRAM HYDROBROMIDE 30 MG: 20 TABLET ORAL at 08:12

## 2021-05-07 RX ADMIN — ARIPIPRAZOLE 2 MG: 2 TABLET ORAL at 08:12

## 2021-05-07 RX ADMIN — ACETAMINOPHEN 650 MG: 325 TABLET ORAL at 21:13

## 2021-05-07 RX ADMIN — LORAZEPAM 1 MG: 1 TABLET ORAL at 08:16

## 2021-05-07 RX ADMIN — HYDROXYZINE HYDROCHLORIDE 100 MG: 50 TABLET, FILM COATED ORAL at 21:09

## 2021-05-07 NOTE — NURSING NOTE
Pt appeared anxious but was calm and cooperative  Pt rated 1/4 anxiety and 4/10 depression  Pt denied SI and HI  Pt stated that "I want ativan because I can stay ahead of my panic attacks and am afraid of what happens if I have a panic attack while I am here" and stated "I am afraid you guys will tie me up or lock me in a room if I have one"  Pt reassured  Pt seemed to calm  Pt used call bell and requested ativan for 2/4 anxiety  Given at 1808  Medication was effective  Tylenol given for a 4/10 headache at 2113  Will continue to monitor pt frequently

## 2021-05-07 NOTE — PROGRESS NOTES
05/07/21 0844   Team Meeting   Meeting Type Daily Rounds   Initial Conference Date 05/07/21   Next Conference Date 05/08/21   Team Members Present   Team Members Present Physician;;Nurse; Other (Discipline and Name); Occupational Therapist;   Physician Team Member Gross   Nursing Team Member [facility] Management Team Member [facility]   Social Work Team Member HIMA   OT Team Member Alannah Harris   Other (Discipline and Name) Addi Kearns   Patient/Family Present   Patient Present No   Patient's Family Present No   out for meals, 5/10, 2/4, -SI, PRN for anxiety, attended 1 group on wellness and did well, Med changes planned, no SAM for wife

## 2021-05-07 NOTE — CASE MANAGEMENT
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  Over the past year she has come upon hard times and he has given her some of his own monies to help prevent her from losing her home  He claims his wife is aware of the friendships and visits-but not the financial part  Pt reports he is  28 years and his wife is just "dealing with this friendship' he has with this other woman  Pt refuses to sign an SAM for us to have contact with is wife and says he is telling her everything she needs to hear  I explained that we want to also ensure that his gun is removed until his OP provider feels it should be returned and he again will not permit contact with his wife  He did sign an SAM for his PCP but does not want his PCP to have access or have any conversations about his personal relationship with his friend, Katpapo Caro  Pt does want to be set up with a therapist and is agreeable to consider rescinding his 67 hour notice to give us time to arrange for him to be set up to see a therapist      Pt asks that his scripts be sent to [facility] [address]  He will need a ride home

## 2021-05-07 NOTE — DISCHARGE INSTR - OTHER ORDERS
You will return to your home at [address]       After discharge, if you find your coping skills are not as effective and you continue to feel distressed please call Evens Hernandez services are available 24 hours a day by [facility]  Crisis - [phone] : [phone]    [facility] : [id number]     If you feel you are a danger to yourself or others please contact 911 or go to nearest Emergency room to seek immediate help

## 2021-05-07 NOTE — PROGRESS NOTES
Progress Note - Behavioral Health   Mina Gama 60 y o  male MRN: [id number]  Unit/Bed#: Guillermo Hilton 871-55 Encounter: [id number]    Assessment/Plan   Principal Problem:    Depressed  Active Problems:    Medical clearance for psychiatric admission    PVC (premature ventricular contraction)    Patient continues to focus on his fear of having a panic attack and says that he only had another 1 earlier today and is using Ativan p r n  regularly  He also is focusing in on the faxes had on his relationship with his family and he feels and and he feels not only embarrassment but Alba Maillard and guilt  He is not sure that he trust treatment getting him better and he is thinking about signing a 72 hour notice later today  Behavior over the last 24 hours:  unchanged  Sleep: insomnia  Appetite: normal  Medication side effects: No  ROS: no complaints    Mental Status Evaluation:  Appearance:  age appropriate   Behavior:  guarded   Speech:  normal pitch and normal volume   Mood:  anxious and depressed   Affect:  blunted and constricted   Thought Process:  tangential   Thought Content:  normal   Perceptual Disturbances: None   Risk Potential: Suicidal Ideations none  Homicidal Ideations none  Potential for Aggression No   Sensorium:  person, place, and situation   Memory:  recent and remote memory grossly intact   Consciousness:  alert and awake    Attention: attention span appeared shorter than expected for age   Insight:  fair   Judgment: fair   Gait/Station: normal gait/station   Motor Activity: no abnormal movements     Progress Toward Goals:  Unchanged    Recommended Treatment: Continue with group therapy, milieu therapy and occupational therapy  Risks, benefits and possible side effects of Medications:   Risks, benefits, and possible side effects of medications explained to patient and patient verbalizes understanding        Medications: planned medication changes: Increase Atarax to 100 mg q h s  for persistent insomnia  Labs: I have personally reviewed all pertinent laboratory/tests results  Most Recent Labs:   Lab Results   Component Value Date    WBC 7 28 05/03/2021    RBC 4 93 05/03/2021    HGB 15 4 05/03/2021    HCT 45 1 05/03/2021     05/03/2021    RDW 12 6 05/03/2021    NEUTROABS 5 51 05/03/2021    SODIUM 136 05/03/2021    K 3 9 05/03/2021     05/03/2021    CO2 22 05/03/2021    BUN 10 05/03/2021    CREATININE 1 22 05/03/2021    GLUC 141 (H) 05/03/2021    CALCIUM 8 9 05/03/2021    AST 20 11/04/2020    ALT 42 11/04/2020    ALKPHOS 65 11/04/2020    TP 7 9 11/04/2020    ALB 4 0 11/04/2020    TBILI 0 41 11/04/2020    CHOLESTEROL 198 05/05/2021    HDL 35 (L) 05/05/2021    TRIG 170 (H) 05/05/2021    LDLCALC 129 05/05/2021    [place] 163 05/05/2021    [id number] 2 936 11/04/2020    HGBA1C 5 6 05/05/2021     05/05/2021       Counseling / Coordination of Care  Total floor / unit time spent today 35 minutes  Greater than 50% of total time was spent with the patient and / or family counseling and / or coordination of care   A description of the counseling / coordination of care:

## 2021-05-07 NOTE — NURSING NOTE
Pt brighter, more active on unit socializing and attending group  Med-compliant  Using prn ativan with positive effect  VSS  Good appetite and steady gait  Pt signed 72 hour notice at 1050, Dr Manny Krishna  Pt reports improvement from med changes  Denied SI  Monitored for safety and support

## 2021-05-07 NOTE — PLAN OF CARE
Problem: ANXIETY  Goal: Will report anxiety at manageable levels  Description: INTERVENTIONS:  - Administer medication as ordered  - Teach and encourage coping skills  - Provide emotional support  - Assess patient/family for anxiety and ability to cope  Outcome: Progressing     Problem: Alteration in Thoughts and Perception  Goal: Attend and participate in unit activities, including therapeutic, recreational, and educational groups  Description: Interventions:  -Encourage Visitation and family involvement in care  Outcome: Progressing

## 2021-05-08 PROCEDURE — 99232 SBSQ HOSP IP/OBS MODERATE 35: CPT | Performed by: PHYSICIAN ASSISTANT

## 2021-05-08 RX ORDER — XYLITOL/YERBA SANTA
5 AEROSOL, SPRAY WITH PUMP (ML) MUCOUS MEMBRANE 4 TIMES DAILY PRN
Status: DISCONTINUED | OUTPATIENT
Start: 2021-05-08 | End: 2021-05-12 | Stop reason: HOSPADM

## 2021-05-08 RX ADMIN — CITALOPRAM HYDROBROMIDE 30 MG: 20 TABLET ORAL at 08:26

## 2021-05-08 RX ADMIN — ACETAMINOPHEN 650 MG: 325 TABLET ORAL at 14:20

## 2021-05-08 RX ADMIN — HYDROXYZINE HYDROCHLORIDE 100 MG: 50 TABLET, FILM COATED ORAL at 21:25

## 2021-05-08 RX ADMIN — MIRTAZAPINE 15 MG: 15 TABLET, FILM COATED ORAL at 21:25

## 2021-05-08 RX ADMIN — ACETAMINOPHEN 650 MG: 325 TABLET ORAL at 08:26

## 2021-05-08 RX ADMIN — LORAZEPAM 1 MG: 1 TABLET ORAL at 18:18

## 2021-05-08 RX ADMIN — ARIPIPRAZOLE 2 MG: 2 TABLET ORAL at 08:26

## 2021-05-08 RX ADMIN — LORAZEPAM 1 MG: 1 TABLET ORAL at 08:33

## 2021-05-08 NOTE — NURSING NOTE
Pt is calm and cooperative  Pt reports 3/4 anxiety, received PRN Ativan today, which was effective  Pt reports poor sleep last night due to his new roommate that arrived around 3am  Pts room changed to accommodate due to pts roommates snoring  Pt otherwise offers no complaints, is medication compliant

## 2021-05-08 NOTE — PROGRESS NOTES
Progress Note - Behavioral Health   Leslie Jerome 60 y o  male MRN: [id number]  Unit/Bed#: [facility] 833-18 Encounter: [id number]    Assessment/Plan   Principal Problem:    Depressed  Active Problems:    Medical clearance for psychiatric admission    PVC (premature ventricular contraction)      Subjective: Patient was seen, chart reviewed and case discussed with team   Patient is seclusive to his room  States he had poor sleep last night due to his roommate snoring  States he has been sleeping well otherwise  Energy is low today  Had appropriate questions asked about his medications  Does take p r n  Ativan for anxiety, which he states is helpful  States his depression is slowly improving and denied suicidal thoughts today  Does report having panic attacks at times  Appears to have more forward thinking and is thinking about discharge  Did sign 72 hour notice and is not displaying 302 behavior  Denied psychosis, delusional material, shows no signs of shweta, and is not agitated  Overall cooperative and pleasant  Appears to be tolerating medications well without serious side effects      Psychiatric Review of Systems:  Behavior over the last 24 hours:  improved  Sleep: poor due to roommate snoring  Appetite: normal  Medication side effects: No  ROS: no complaints, all others negative    Current Medications:  Current Facility-Administered Medications   Medication Dose Route Frequency    acetaminophen (TYLENOL) tablet 650 mg  650 mg Oral Q4H PRN    acetaminophen (TYLENOL) tablet 650 mg  650 mg Oral Q4H PRN    acetaminophen (TYLENOL) tablet 975 mg  975 mg Oral Q6H PRN    aluminum-magnesium hydroxide-simethicone (MYLANTA) oral suspension 30 mL  30 mL Oral Q4H PRN    ARIPiprazole (ABILIFY) tablet 2 mg  2 mg Oral Daily    artificial tear (LUBRIFRESH P M ) ophthalmic ointment 1 application  1 application Both Eyes H1B PRN    benztropine (COGENTIN) injection 1 mg  1 mg Intramuscular Q4H PRN Max 6/day    benztropine (COGENTIN) tablet 0 5 mg  0 5 mg Oral Q4H PRN Max 6/day    citalopram (CeleXA) tablet 30 mg  30 mg Oral Daily    hydrOXYzine HCL (ATARAX) tablet 100 mg  100 mg Oral HS    hydrOXYzine HCL (ATARAX) tablet 25 mg  25 mg Oral Q6H PRN Max 4/day    hydrOXYzine HCL (ATARAX) tablet 50 mg  50 mg Oral Q6H PRN Max 4/day    LORazepam (ATIVAN) tablet 1 mg  1 mg Oral Q6H PRN    mirtazapine (REMERON) tablet 15 mg  15 mg Oral HS    OLANZapine (ZyPREXA) IM injection 5 mg  5 mg Intramuscular Q3H PRN Max 3/day    OLANZapine (ZyPREXA) tablet 2 5 mg  2 5 mg Oral Q4H PRN Max 6/day    OLANZapine (ZyPREXA) tablet 5 mg  5 mg Oral Q4H PRN Max 3/day    OLANZapine (ZyPREXA) tablet 5 mg  5 mg Oral Q3H PRN Max 3/day    ondansetron (ZOFRAN-ODT) dispersible tablet 4 mg  4 mg Oral Q6H PRN    saliva substitute (MOUTH KOTE) mucosal solution 5 spray  5 spray Mouth/Throat 4x Daily PRN    senna-docusate sodium (SENOKOT S) 8 6-50 mg per tablet 1 tablet  1 tablet Oral Daily PRN    traZODone (DESYREL) tablet 50 mg  50 mg Oral Q6H PRN Max 3/day    traZODone (DESYREL) tablet 50 mg  50 mg Oral HS PRN       Behavioral Health Medications: all current active meds have been reviewed and continue current psychiatric medications  Vitals:  Vitals:    05/08/21 0641   BP: 121/76   Pulse: 86   Resp: 16   Temp: 98 2 °F (36 8 °C)   SpO2: 97%       Laboratory results:    I have personally reviewed all pertinent laboratory/tests results    Most Recent Labs:   Lab Results   Component Value Date    WBC 7 28 05/03/2021    RBC 4 93 05/03/2021    HGB 15 4 05/03/2021    HCT 45 1 05/03/2021     05/03/2021    RDW 12 6 05/03/2021    NEUTROABS 5 51 05/03/2021    SODIUM 136 05/03/2021    K 3 9 05/03/2021     05/03/2021    CO2 22 05/03/2021    BUN 10 05/03/2021    CREATININE 1 22 05/03/2021    GLUC 141 (H) 05/03/2021    CALCIUM 8 9 05/03/2021    AST 20 11/04/2020    ALT 42 11/04/2020    ALKPHOS 65 11/04/2020    TP 7 9 11/04/2020    ALB 4 0 11/04/2020    TBILI 0 41 11/04/2020    CHOLESTEROL 198 05/05/2021    HDL 35 (L) 05/05/2021    TRIG 170 (H) 05/05/2021    LDLCALC 129 05/05/2021    [place] 163 05/05/2021    [id number] 2 936 11/04/2020    HGBA1C 5 6 05/05/2021     05/05/2021       Mental Status Evaluation:  Appearance:  casually dressed   Behavior:  Cooperative   Speech:  soft   Mood:  Less depressed and anxious   Affect:  mood-congruent   Language Appropriate   Thought Process:  normal   Thought Content:  normal   Perceptual Disturbances: None   Risk Potential: Denied SI/HI  Potential for aggression no   Sensorium:  person, place and time/date   Cognition:  recent and remote memory grossly intact   Consciousness:  alert and awake    Attention: attention span and concentration were age appropriate   Insight:  fair   Judgment: fair   Gait/Station: normal gait/station and normal balance   Motor Activity: no abnormal movements     Progress Toward Goals:  Progressing    Recommended Treatment: Continue with pharmacotherapy, group therapy, milieu therapy and occupational therapy  1  Continue current medications  2  Disposition planning    Risks, benefits and possible side effects of Medications:   Risks, benefits, and possible side effects of medications explained to patient and patient verbalizes understanding

## 2021-05-09 PROCEDURE — 99232 SBSQ HOSP IP/OBS MODERATE 35: CPT | Performed by: PHYSICIAN ASSISTANT

## 2021-05-09 RX ADMIN — ARIPIPRAZOLE 2 MG: 2 TABLET ORAL at 08:19

## 2021-05-09 RX ADMIN — LORAZEPAM 1 MG: 1 TABLET ORAL at 18:46

## 2021-05-09 RX ADMIN — LORAZEPAM 1 MG: 1 TABLET ORAL at 08:23

## 2021-05-09 RX ADMIN — MIRTAZAPINE 15 MG: 15 TABLET, FILM COATED ORAL at 21:17

## 2021-05-09 RX ADMIN — CITALOPRAM HYDROBROMIDE 30 MG: 20 TABLET ORAL at 08:19

## 2021-05-09 RX ADMIN — HYDROXYZINE HYDROCHLORIDE 100 MG: 50 TABLET, FILM COATED ORAL at 21:17

## 2021-05-09 NOTE — PROGRESS NOTES
Progress Note - Behavioral Health   Adry Asp 60 y o  male MRN: [id number]  Unit/Bed#: [facility] 162-63 Encounter: [id number]    Assessment/Plan   Principal Problem:    Depressed  Active Problems:    Medical clearance for psychiatric admission    PVC (premature ventricular contraction)      Subjective: Patient was seen, chart reviewed and case discussed with team   Patient reports having chronic lightheadedness, which he attributes anxiety as the cause  Had extensive medical workup due to this issue  States this causes panic attacks and he fears that he will have more  States his anxiety is manageable with p r n  Ativan  Reports that his depression is slowly decreasing and is denying suicidal thoughts  Appears more future oriented  Slept better last night with room change  Denied issues with energy or appetite today  Not currently manic  Denied psychosis and delusional material   Medication compliant  Appears to be tolerating medications well without serious side effects      Psychiatric Review of Systems:  Behavior over the last 24 hours:  improved  Sleep: normal  Appetite: normal  Medication side effects: No  ROS: lightheadedness, all others negative    Current Medications:  Current Facility-Administered Medications   Medication Dose Route Frequency    acetaminophen (TYLENOL) tablet 650 mg  650 mg Oral Q4H PRN    acetaminophen (TYLENOL) tablet 650 mg  650 mg Oral Q4H PRN    acetaminophen (TYLENOL) tablet 975 mg  975 mg Oral Q6H PRN    aluminum-magnesium hydroxide-simethicone (MYLANTA) oral suspension 30 mL  30 mL Oral Q4H PRN    ARIPiprazole (ABILIFY) tablet 2 mg  2 mg Oral Daily    artificial tear (LUBRIFRESH P M ) ophthalmic ointment 1 application  1 application Both Eyes H5B PRN    benztropine (COGENTIN) injection 1 mg  1 mg Intramuscular Q4H PRN Max 6/day    benztropine (COGENTIN) tablet 0 5 mg  0 5 mg Oral Q4H PRN Max 6/day    citalopram (CeleXA) tablet 30 mg  30 mg Oral Daily    hydrOXYzine HCL (ATARAX) tablet 100 mg  100 mg Oral HS    hydrOXYzine HCL (ATARAX) tablet 25 mg  25 mg Oral Q6H PRN Max 4/day    hydrOXYzine HCL (ATARAX) tablet 50 mg  50 mg Oral Q6H PRN Max 4/day    LORazepam (ATIVAN) tablet 1 mg  1 mg Oral Q6H PRN    mirtazapine (REMERON) tablet 15 mg  15 mg Oral HS    OLANZapine (ZyPREXA) IM injection 5 mg  5 mg Intramuscular Q3H PRN Max 3/day    OLANZapine (ZyPREXA) tablet 2 5 mg  2 5 mg Oral Q4H PRN Max 6/day    OLANZapine (ZyPREXA) tablet 5 mg  5 mg Oral Q4H PRN Max 3/day    OLANZapine (ZyPREXA) tablet 5 mg  5 mg Oral Q3H PRN Max 3/day    ondansetron (ZOFRAN-ODT) dispersible tablet 4 mg  4 mg Oral Q6H PRN    saliva substitute (MOUTH KOTE) mucosal solution 5 spray  5 spray Mouth/Throat 4x Daily PRN    senna-docusate sodium (SENOKOT S) 8 6-50 mg per tablet 1 tablet  1 tablet Oral Daily PRN    traZODone (DESYREL) tablet 50 mg  50 mg Oral Q6H PRN Max 3/day    traZODone (DESYREL) tablet 50 mg  50 mg Oral HS PRN       Behavioral Health Medications: all current active meds have been reviewed and continue current psychiatric medications  Vitals:  Vitals:    05/09/21 0648   BP: 128/80   Pulse: 77   Resp: 16   Temp: (!) 97 4 °F (36 3 °C)   SpO2: 97%       Laboratory results:    I have personally reviewed all pertinent laboratory/tests results    Most Recent Labs:   Lab Results   Component Value Date    WBC 7 28 05/03/2021    RBC 4 93 05/03/2021    HGB 15 4 05/03/2021    HCT 45 1 05/03/2021     05/03/2021    RDW 12 6 05/03/2021    NEUTROABS 5 51 05/03/2021    SODIUM 136 05/03/2021    K 3 9 05/03/2021     05/03/2021    CO2 22 05/03/2021    BUN 10 05/03/2021    CREATININE 1 22 05/03/2021    GLUC 141 (H) 05/03/2021    CALCIUM 8 9 05/03/2021    AST 20 11/04/2020    ALT 42 11/04/2020    ALKPHOS 65 11/04/2020    TP 7 9 11/04/2020    ALB 4 0 11/04/2020    TBILI 0 41 11/04/2020    CHOLESTEROL 198 05/05/2021    HDL 35 (L) 05/05/2021    TRIG 170 (H) 05/05/2021 [address] 129 05/05/2021    [place] 163 05/05/2021    [id number] 2 936 11/04/2020    HGBA1C 5 6 05/05/2021     05/05/2021       Mental Status Evaluation:  Appearance:  casually dressed   Behavior:  Cooperative   Speech:  normal pitch and normal volume   Mood:  anxious   Affect:  mood-congruent   Language Appropriate   Thought Process:  circumstantial   Thought Content:  normal   Perceptual Disturbances: None   Risk Potential: Denied SI/HI  Potential for aggression no   Sensorium:  person, place and time/date   Cognition:  recent and remote memory grossly intact   Consciousness:  awake    Attention: attention span and concentration were age appropriate   Insight:  fair   Judgment: fair   Gait/Station: normal gait/station and normal balance   Motor Activity: no abnormal movements     Progress Toward Goals:  Progressing    Recommended Treatment: Continue with pharmacotherapy, group therapy, milieu therapy and occupational therapy  1  Continue current medications  2  Disposition planning (signed 72 hour notice)    Risks, benefits and possible side effects of Medications:   Risks, benefits, and possible side effects of medications explained to patient and patient verbalizes understanding

## 2021-05-09 NOTE — PLAN OF CARE
Problem: SELF HARM/SUICIDALITY  Goal: Will have no self-injury during hospital stay  Description: INTERVENTIONS:  - Q 15 MINUTES: Routine safety checks  - Q WAKING SHIFT & PRN: Assess risk to determine if routine checks are adequate to maintain patient safety  - Encourage patient to participate actively in care by formulating a plan to combat response to suicidal ideation, identify supports and resources  Outcome: Progressing     Problem: ANXIETY  Goal: Will report anxiety at manageable levels  Description: INTERVENTIONS:  - Administer medication as ordered  - Teach and encourage coping skills  - Provide emotional support  - Assess patient/family for anxiety and ability to cope  Outcome: Progressing  Goal: By discharge: Patient will verbalize 2 strategies to deal with anxiety  Description: Interventions:  - Identify any obvious source/trigger to anxiety  - Staff will assist patient in applying identified coping technique/skills  - Encourage attendance of scheduled groups and activities  Outcome: Progressing     Problem: DEPRESSION  Goal: Will be euthymic at discharge  Description: INTERVENTIONS:  - Administer medication as ordered  - Provide emotional support via 1:1 interaction with staff  - Encourage involvement in milieu/groups/activities  - Monitor for social isolation  Outcome: Progressing     Problem: DISCHARGE PLANNING  Goal: Discharge to home or other facility with appropriate resources  Description: INTERVENTIONS:  - Identify barriers to discharge w/patient and caregiver  - Arrange for needed discharge resources and transportation as appropriate  - Identify discharge learning needs (meds, wound care, etc )  - Arrange for interpretive services to assist at discharge as needed  - Refer to Case Management Department for coordinating discharge planning if the patient needs post-hospital services based on physician/advanced practitioner order or complex needs related to functional status, cognitive ability, or social support system  Outcome: Progressing     Problem: Alteration in Thoughts and Perception  Goal: Attend and participate in unit activities, including therapeutic, recreational, and educational groups  Description: Interventions:  -Encourage Visitation and family involvement in care  Outcome: Progressing

## 2021-05-09 NOTE — NURSING NOTE
Patient was mainly isolative to his room tonight  He was seen working on word finds and puzzles in his room  He was calm and pleasant in conversation  He rated his depression a 3 on a 1 to 10 scale  He denied having any anxiety, hallucinations or suicidal/homicidal thoughts  He was cooperative with taking his scheduled evening medications  Safety plan was reviewed with the patient and staff availability was reinforced

## 2021-05-10 LAB
ATRIAL RATE: 81 BPM
P AXIS: 4 DEGREES
PR INTERVAL: 172 MS
QRS AXIS: -7 DEGREES
QRSD INTERVAL: 92 MS
QT INTERVAL: 406 MS
QTC INTERVAL: 471 MS
T WAVE AXIS: 21 DEGREES
VENTRICULAR RATE: 81 BPM

## 2021-05-10 PROCEDURE — 93010 ELECTROCARDIOGRAM REPORT: CPT | Performed by: INTERNAL MEDICINE

## 2021-05-10 PROCEDURE — 93005 ELECTROCARDIOGRAM TRACING: CPT

## 2021-05-10 RX ORDER — TRIAMCINOLONE ACETONIDE 1 MG/G
CREAM TOPICAL 2 TIMES DAILY
Status: DISCONTINUED | OUTPATIENT
Start: 2021-05-10 | End: 2021-05-12 | Stop reason: HOSPADM

## 2021-05-10 RX ORDER — CITALOPRAM 20 MG/1
40 TABLET ORAL DAILY
Status: DISCONTINUED | OUTPATIENT
Start: 2021-05-11 | End: 2021-05-12 | Stop reason: HOSPADM

## 2021-05-10 RX ADMIN — TRIAMCINOLONE ACETONIDE: 1 CREAM TOPICAL at 17:50

## 2021-05-10 RX ADMIN — HYDROXYZINE HYDROCHLORIDE 25 MG: 25 TABLET, FILM COATED ORAL at 17:50

## 2021-05-10 RX ADMIN — CITALOPRAM HYDROBROMIDE 30 MG: 20 TABLET ORAL at 08:19

## 2021-05-10 RX ADMIN — HYDROXYZINE HYDROCHLORIDE 100 MG: 50 TABLET, FILM COATED ORAL at 21:14

## 2021-05-10 RX ADMIN — MIRTAZAPINE 15 MG: 15 TABLET, FILM COATED ORAL at 21:15

## 2021-05-10 RX ADMIN — LORAZEPAM 1 MG: 1 TABLET ORAL at 08:31

## 2021-05-10 RX ADMIN — ARIPIPRAZOLE 2 MG: 2 TABLET ORAL at 08:19

## 2021-05-10 RX ADMIN — TRIAMCINOLONE ACETONIDE: 1 CREAM TOPICAL at 14:29

## 2021-05-10 NOTE — PROGRESS NOTES
Pt attended all groups  Pt was able to self reflect  Pt noted is how his anxiety has impacted him physically  Pt able to stay for duration  05/10/21 1330   Activity/Group Checklist   Group Other (Comment)  (community supports and info)   Attendance Attended   Attendance Duration (min) 31-45   Interactions Interacted appropriately   Affect/Mood Appropriate   Goals Achieved Identified feelings; Identified relapse prevention strategies; Discussed coping strategies; Discussed self-esteem issues; Identified resources and support systems; Able to listen to others; Able to engage in interactions; Able to reflect/comment on own behavior

## 2021-05-10 NOTE — NURSING NOTE
Pt  pleasant  with staff and peers  He is cooperative with assessment questions  He is rating his depression 3-10 and anxiety 3-4  PRN Ativan was given for anxiety with positive effects  Pt is denying SI/HI/AH/VH  He is med compliant  Will continue to monitor

## 2021-05-10 NOTE — NURSING NOTE
Patient visible on the unit  He reported he wants to talk to Dr klein tomorrow about 72 hours notice and resent   Patient calm cooperative   Rated depression 3/10   Denies depression/suicidal ideation/hallucination  Med and meal compliant   Will continue to monitor

## 2021-05-10 NOTE — PROGRESS NOTES
Progress Note - Behavioral Health   Mary Jo Knutson 60 y o  male MRN: [id number]  Unit/Bed#: [facility] Mix 164-39 Encounter: [id number]    Assessment/Plan   Principal Problem:    Depressed  Active Problems:    Medical clearance for psychiatric admission    PVC (premature ventricular contraction)  Patient has signed a 72 hour notice to leave but is now agreeable staying longer to get further treatment and has rescinded his 72 hour notice  He still has chronic lightheadedness which he fears his and neurologic problem and has never gone away during the last year so  In addition he worries not only about that but having another panic attack which was very frightening to him  He has had for all told  He now reports that he has stress not only from his home and has home health but he has been helping out a 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 and gives her money and gives her the emotional support which has caused some stress within her own marriage  Patient the still is a medically preoccupied and continues to worry about himself      Behavior over the last 24 hours:  unchanged  Sleep: insomnia  Appetite: normal  Medication side effects: No  ROS: no complaints    Mental Status Evaluation:  Appearance:  age appropriate   Behavior:  guarded   Speech:  normal pitch and normal volume   Mood:  anxious and depressed   Affect:  blunted and constricted   Thought Process:  tangential   Thought Content:  normal   Perceptual Disturbances: None   Risk Potential: Suicidal Ideations none  Homicidal Ideations none  Potential for Aggression No   Sensorium:  person, place, and situation   Memory:  recent and remote memory grossly intact   Consciousness:  alert and awake    Attention: attention span appeared shorter than expected for age   Insight:  fair   Judgment: fair   Gait/Station: normal gait/station   Motor Activity: no abnormal movements     Progress Toward Goals:  No significant change    Recommended Treatment: Continue with group therapy, milieu therapy and occupational therapy  Also ordered another EKG to evaluate QT prolongation from the escitalopram     Risks, benefits and possible side effects of Medications:   Risks, benefits, and possible side effects of medications explained to patient and patient verbalizes understanding  Medications: planned medication changes: Increase Celexa to 40 mg  Labs: I have personally reviewed all pertinent laboratory/tests results  Most Recent Labs:   Lab Results   Component Value Date    WBC 7 28 05/03/2021    RBC 4 93 05/03/2021    HGB 15 4 05/03/2021    HCT 45 1 05/03/2021     05/03/2021    RDW 12 6 05/03/2021    NEUTROABS 5 51 05/03/2021    SODIUM 136 05/03/2021    K 3 9 05/03/2021     05/03/2021    CO2 22 05/03/2021    BUN 10 05/03/2021    CREATININE 1 22 05/03/2021    GLUC 141 (H) 05/03/2021    CALCIUM 8 9 05/03/2021    AST 20 11/04/2020    ALT 42 11/04/2020    ALKPHOS 65 11/04/2020    TP 7 9 11/04/2020    ALB 4 0 11/04/2020    TBILI 0 41 11/04/2020    CHOLESTEROL 198 05/05/2021    HDL 35 (L) 05/05/2021    TRIG 170 (H) 05/05/2021    LDLCALC 129 05/05/2021    [place] 163 05/05/2021    [id number] 2 936 11/04/2020    HGBA1C 5 6 05/05/2021     05/05/2021       Counseling / Coordination of Care  Total floor / unit time spent today 35 minutes  Greater than 50% of total time was spent with the patient and / or family counseling and / or coordination of care  A description of the counseling / coordination of care:  I have reviewed patient's own charting of his illness starting a year ago, all the tests he said, all the treatments he has had and his ongoing active symptoms

## 2021-05-10 NOTE — PLAN OF CARE
Problem: ANXIETY  Goal: Will report anxiety at manageable levels  Description: INTERVENTIONS:  - Administer medication as ordered  - Teach and encourage coping skills  - Provide emotional support  - Assess patient/family for anxiety and ability to cope  Outcome: Progressing  Goal: By discharge: Patient will verbalize 2 strategies to deal with anxiety  Description: Interventions:  - Identify any obvious source/trigger to anxiety  - Staff will assist patient in applying identified coping technique/skills  - Encourage attendance of scheduled groups and activities  Outcome: Progressing     Problem: DEPRESSION  Goal: Will be euthymic at discharge  Description: INTERVENTIONS:  - Administer medication as ordered  - Provide emotional support via 1:1 interaction with staff  - Encourage involvement in milieu/groups/activities  - Monitor for social isolation  Outcome: Progressing

## 2021-05-10 NOTE — PROGRESS NOTES
05/10/21 1032   Team Meeting   Initial Conference Date 05/10/21   Next Conference Date 05/11/21   Team Members Present   Team Members Present Physician;;Nurse; Other (Discipline and Name); Occupational Therapist;   Physician Team Member Gross   Nursing Team Member [id number] Management Team Member [facility]   Social Work Team Member [facility]   OT Team Member Marcio Bone   Other (Discipline and Name) Elke Combs   Patient/Family Present   Patient Present No   Patient's Family Present No   Rescinded 72HN, more visible, brighter, wants to see a male therapist  Refuses to sign SAM for wife-has gun in his car-Dr Jada Galloway aware  Med compliant, slept

## 2021-05-10 NOTE — CASE MANAGEMENT
In effort to secure a male therapist for this pt to follow p dc I have called the following sites  [facility] [phone] is transitioning and not scheduling new clients at this time; [facility] is for children only, Leticia Reed [phone] left a request for call back; [facility] [phone] left request for call back; [facility] have no 's on staff at this time  [place] [phone] was able to make an appointment for this pt  However they do not have any available Male therapists at this time  He is agreeable to be set up with a female - Bangpatricia Jones at that office

## 2021-05-11 RX ORDER — LORAZEPAM 1 MG/1
1 TABLET ORAL
Status: DISCONTINUED | OUTPATIENT
Start: 2021-05-11 | End: 2021-05-12 | Stop reason: HOSPADM

## 2021-05-11 RX ADMIN — LORAZEPAM 1 MG: 1 TABLET ORAL at 08:34

## 2021-05-11 RX ADMIN — ACETAMINOPHEN 650 MG: 325 TABLET ORAL at 18:07

## 2021-05-11 RX ADMIN — HYDROXYZINE HYDROCHLORIDE 100 MG: 50 TABLET, FILM COATED ORAL at 21:18

## 2021-05-11 RX ADMIN — CITALOPRAM HYDROBROMIDE 40 MG: 20 TABLET ORAL at 08:13

## 2021-05-11 RX ADMIN — TRIAMCINOLONE ACETONIDE 1 APPLICATION: 1 CREAM TOPICAL at 18:03

## 2021-05-11 RX ADMIN — ACETAMINOPHEN 650 MG: 325 TABLET ORAL at 11:58

## 2021-05-11 RX ADMIN — ARIPIPRAZOLE 2 MG: 2 TABLET ORAL at 08:13

## 2021-05-11 RX ADMIN — TRIAMCINOLONE ACETONIDE: 1 CREAM TOPICAL at 08:14

## 2021-05-11 RX ADMIN — TRAZODONE HYDROCHLORIDE 50 MG: 50 TABLET ORAL at 22:14

## 2021-05-11 RX ADMIN — ACETAMINOPHEN 650 MG: 325 TABLET ORAL at 08:13

## 2021-05-11 RX ADMIN — MIRTAZAPINE 15 MG: 15 TABLET, FILM COATED ORAL at 21:18

## 2021-05-11 RX ADMIN — LORAZEPAM 1 MG: 1 TABLET ORAL at 18:03

## 2021-05-11 NOTE — NURSING NOTE
Patient visible on the unit in the dayroom after meals, social with peers and staff  Patient compliant with assessment questions, reports 1 5/4 anxiety  Denies depression SI/HI AH/VH  Patient approaches writer and insists on receiving ativan for 1/4 anxiety, patient encouraged to try atarax 25mg  Patient accepted at  [phone] and reported relief an hour later  Medication and meal compliant, appetite good  Will continue to monitor frequently

## 2021-05-11 NOTE — CASE MANAGEMENT
Request for Dave Lombard has been sent to [address] [place] [address] for transport for Thursday- however pt is now expecting discharge to happen sooner

## 2021-05-11 NOTE — NURSING NOTE
Patient visible on the unit this am   Had breakfast then stated '' He didn't sleep last night   He usually takes a ativan later in the evening but he didn't take it yesterday '' Reported anxiety 4/4   Requested ativan at 3811   Dr klein made aware because patient requesting ativan and tylenol together   Reported depression 4/10   Denies suicidal ideation/hallucinations  Med and meal compliant  Will continue to monitor

## 2021-05-11 NOTE — PROGRESS NOTES
Progress Note - Behavioral Health   Pia Velasco 60 y o  male MRN: [id number]  Unit/Bed#: [facility] 273-89 Encounter: [id number]    Assessment/Plan   Principal Problem:    Depressed  Active Problems:    Medical clearance for psychiatric admission    PVC (premature ventricular contraction)    Patient is to focus on his health and continues to ask questions about his heart rate his lab results his blood pressure and he still fears go into a panic  He does not like to talk about the stresses that have led up to the state although there is many his life including the fact that he has had a relationship with a woman outside his marriage which has been causing him great deal of stress  He even gave her money to help her in a financial straight  Patient slept poorly last night it which may be because he did not take an Ativan at later in the day which he is used to doing  He will do that tonight  The  Behavior over the last 24 hours:  unchanged  Sleep: insomnia  Appetite: normal  Medication side effects: No  ROS: no complaints    Mental Status Evaluation:  Appearance:  age appropriate   Behavior:  guarded   Speech:  normal pitch and normal volume   Mood:  anxious and depressed   Affect:  blunted and constricted   Thought Process:  tangential   Thought Content:  normal   Perceptual Disturbances: None   Risk Potential: Suicidal Ideations none  Homicidal Ideations none  Potential for Aggression No   Sensorium:  person, place, and situation   Memory:  recent and remote memory grossly intact   Consciousness:  alert and awake    Attention: attention span appeared shorter than expected for age   Insight:  fair   Judgment: fair   Gait/Station: normal gait/station   Motor Activity: no abnormal movements     Progress Toward Goals: Has regressed some    Recommended Treatment: Continue with group therapy, milieu therapy and occupational therapy        Risks, benefits and possible side effects of Medications:   Risks, benefits, and possible side effects of medications explained to patient and patient verbalizes understanding  Medications: planned medication changes: I made is Ativan 1 milligram b i d  Regular water rather than a p r n     Labs: I have personally reviewed all pertinent laboratory/tests results  Most Recent Labs:   Lab Results   Component Value Date    WBC 7 28 05/03/2021    RBC 4 93 05/03/2021    HGB 15 4 05/03/2021    HCT 45 1 05/03/2021     05/03/2021    RDW 12 6 05/03/2021    NEUTROABS 5 51 05/03/2021    SODIUM 136 05/03/2021    K 3 9 05/03/2021     05/03/2021    CO2 22 05/03/2021    BUN 10 05/03/2021    CREATININE 1 22 05/03/2021    GLUC 141 (H) 05/03/2021    CALCIUM 8 9 05/03/2021    AST 20 11/04/2020    ALT 42 11/04/2020    ALKPHOS 65 11/04/2020    TP 7 9 11/04/2020    ALB 4 0 11/04/2020    TBILI 0 41 11/04/2020    CHOLESTEROL 198 05/05/2021    HDL 35 (L) 05/05/2021    TRIG 170 (H) 05/05/2021    LDLCALC 129 05/05/2021    [place] 163 05/05/2021    [id number] 2 936 11/04/2020    HGBA1C 5 6 05/05/2021     05/05/2021       Counseling / Coordination of Care  Total floor / unit time spent today 35 minutes  Greater than 50% of total time was spent with the patient and / or family counseling and / or coordination of care   A description of the counseling / coordination of care:

## 2021-05-11 NOTE — PROGRESS NOTES
05/11/21 1133   Team Meeting   Meeting Type Daily Rounds   Initial Conference Date 05/11/21   Next Conference Date 05/12/21   Team Members Present   Team Members Present Physician;;Nurse; Other (Discipline and Name); Occupational Therapist;   Physician Team Member Gross   Nursing Team Member [id number] Management Team Member [facility]   Social Work Team Member Cori Strickland   OT Team Member Romel Bernstein   Other (Discipline and Name) Shira Brennan   Patient/Family Present   Patient Present No   Patient's Family Present No   Attended 2 grps, incr   Visible, PRN for anxiety, Slept

## 2021-05-12 VITALS
DIASTOLIC BLOOD PRESSURE: 57 MMHG | WEIGHT: 261.7 LBS | BODY MASS INDEX: 32.54 KG/M2 | TEMPERATURE: 97 F | SYSTOLIC BLOOD PRESSURE: 122 MMHG | OXYGEN SATURATION: 98 % | HEIGHT: 75 IN | HEART RATE: 78 BPM | RESPIRATION RATE: 16 BRPM

## 2021-05-12 RX ORDER — CITALOPRAM 40 MG/1
40 TABLET ORAL DAILY
Qty: 30 TABLET | Refills: 1 | Status: SHIPPED | OUTPATIENT
Start: 2021-05-13 | End: 2021-06-02

## 2021-05-12 RX ORDER — ARIPIPRAZOLE 2 MG/1
2 TABLET ORAL DAILY
Qty: 30 TABLET | Refills: 0 | Status: SHIPPED | OUTPATIENT
Start: 2021-05-13 | End: 2021-06-02

## 2021-05-12 RX ORDER — HYDROXYZINE 50 MG/1
100 TABLET, FILM COATED ORAL
Qty: 60 TABLET | Refills: 0 | Status: SHIPPED | OUTPATIENT
Start: 2021-05-12 | End: 2021-06-02 | Stop reason: SDUPTHER

## 2021-05-12 RX ORDER — LORAZEPAM 1 MG/1
2 TABLET ORAL
Qty: 90 TABLET | Refills: 0 | Status: SHIPPED | OUTPATIENT
Start: 2021-05-12 | End: 2021-06-02 | Stop reason: SDUPTHER

## 2021-05-12 RX ORDER — MIRTAZAPINE 15 MG/1
15 TABLET, FILM COATED ORAL
Qty: 30 TABLET | Refills: 1 | Status: SHIPPED | OUTPATIENT
Start: 2021-05-12 | End: 2021-06-25 | Stop reason: DRUGHIGH

## 2021-05-12 RX ADMIN — TRIAMCINOLONE ACETONIDE: 1 CREAM TOPICAL at 08:28

## 2021-05-12 RX ADMIN — LORAZEPAM 1 MG: 1 TABLET ORAL at 08:28

## 2021-05-12 RX ADMIN — CITALOPRAM HYDROBROMIDE 40 MG: 20 TABLET ORAL at 08:28

## 2021-05-12 RX ADMIN — ACETAMINOPHEN 650 MG: 325 TABLET ORAL at 09:19

## 2021-05-12 RX ADMIN — ARIPIPRAZOLE 2 MG: 2 TABLET ORAL at 08:28

## 2021-05-12 NOTE — PROGRESS NOTES
05/12/21 3179   Contacts   Patient Contacts Willow Chatterjee   Realtionship to Patient: Treatment Provider   Contact Method Phone   Phone Number Ph: [phone]   Reason/Outcome Discharge Planning

## 2021-05-12 NOTE — PROGRESS NOTES
Pt complete  relapse prevention plan  Pt signed and copy in chart  Pt idenfied signs and symptoms  [employer] numbers provided for [employer]  Pt happy about d/c today  Pt attends groups  05/12/21 0915   Activity/Group Checklist   Group Admission/Discharge   Attendance Attended   Attendance Duration (min) 31-45   Interactions Interacted appropriately   Affect/Mood Appropriate   Goals Achieved Identified feelings; Discussed coping strategies; Discussed self-esteem issues; Able to listen to others; Able to engage in interactions

## 2021-05-12 NOTE — PROGRESS NOTES
05/12/21 0847   Team Meeting   Meeting Type Daily Rounds   Initial Conference Date 05/12/21   Next Conference Date 05/13/21   Team Members Present   Team Members Present Physician;;Nurse; Other (Discipline and Name); Occupational Therapist;   Physician Team Member Gross   Nursing Team Member [id number] [facility] Management Team Member [facility]   Social Work Team Member HIMA BRYSON Team Member Marcio Bone   Other (Discipline and Name) Elke Combs   Patient/Family Present   Patient Present No   Patient's Family Present No   visible, social, brightens on approach, poor sleep due to new roommate, 4/10, 3/4, -SI, discharging today

## 2021-05-13 NOTE — PROGRESS NOTES
Psychiatric Discharge Summary    Medical Record Number: [id number]  Encounter: [id number]    Discharge diagnosis:  Depressed    Secondary diagnoses:  Problem List     * (Principal) Depressed    Palpitations    Dizziness    Medical clearance for psychiatric admission    PVC (premature ventricular contraction)          HPI:     S patient is a  78-year-old man who was admitted voluntarily on a 201 for treatment of anxiety and panic symptoms  He was afraid of having another panic attack because his last 1 which was the 4th with a made him extremely frightened about the dying  He had been treated with Prozac which did not seem to help by his family physician is had no prior psychiatric treatment  Patient has been under lot of stress stressed but the home at work and also with some other issues that seem to have trigger off this panic syndrome  Patient was afraid that he would never get better and suicide himself in  He also realize the effect it was having on his family which a upset  Him    Brief Hospital Course:     After the patient was admitted to the psychiatric unit  the patient had several psychotropic medication adjustments made to help stabilize their mood  Following these medication changes, the patient started to stabilize  Finally on 5/13/2021, the patient was found to be stable for discharge  On the day of discharge the patient reported their symptoms as significantly improved  All psychiatric medications were being tolerated without side effect or adverse event  No suicidal or homicidal ideations were present  The patient expressed their readiness and willingness to be discharged and referral to outpatient treatment was made  The case was discussed with Dr Marci Hdz and he has deemed the patient stable for discharge  Patient used the Celexa which was increased to 40 mg and Abilify 2 mg and also Remeron 15 mg to help with his anxiety and depression    He tolerated rated medication well and his seem to help him settle down had no panic attacks and no severe anxiety techs on the unit who he did use Ativan once or twice a day which he normally did home as well  Patient related that he had issue with the his former boss who he was trying to help out by given more supported even some monies which may have been a stress at the through over the edge  Patient agreed to go on an outpatient mental health counseling [facility] and also sees psychiatrist and outpatient and continue with his medications  At the end of hospitalization patient felt he was disabled he was less frightened about having medical issues and he was ready for discharge      Condition on discharge:     Improved    Medications Upon Discharge:     No current facility-administered medications for this encounter       Current Outpatient Medications:     betamethasone valerate (VALISONE) 0 1 % cream, , Disp: , Rfl:     ARIPiprazole (ABILIFY) 2 mg tablet, Take 1 tablet (2 mg total) by mouth daily, Disp: 30 tablet, Rfl: 0    citalopram (CeleXA) 40 mg tablet, Take 1 tablet (40 mg total) by mouth daily, Disp: 30 tablet, Rfl: 1    hydrOXYzine HCL (ATARAX) 50 mg tablet, Take 2 tablets (100 mg total) by mouth daily at bedtime, Disp: 60 tablet, Rfl: 0    LORazepam (ATIVAN) 1 mg tablet, Take 2 tablets (2 mg total) by mouth 3 (three) times daily after meals for 10 days, Disp: 90 tablet, Rfl: 0    mirtazapine (REMERON) 15 mg tablet, Take 1 tablet (15 mg total) by mouth daily at bedtime, Disp: 30 tablet, Rfl: 1    Jeremie Farmer MD

## 2021-05-13 NOTE — DISCHARGE SUMMARY
Progress Note - Behavioral Health   Kavin Ortega 60 y o  male MRN: [id number]  Unit/Bed#: [facility] 918-10 Encounter: [id number]    Assessment/Plan   Principal Problem:    Depressed  Active Problems:    Medical clearance for psychiatric admission    PVC (premature ventricular contraction)      Behavior over the last 24 hours:  improved  Sleep: normal  Appetite: normal  Medication side effects: No  ROS: no complaints    Mental Status Evaluation:  Appearance:  age appropriate   Behavior:  guarded   Speech:  normal pitch and normal volume   Mood:  anxious and depressed   Affect:  blunted and constricted   Thought Process:  tangential   Thought Content:  normal   Perceptual Disturbances: None   Risk Potential: Suicidal Ideations none  Homicidal Ideations none  Potential for Aggression No   Sensorium:  person, place, and situation   Memory:  recent and remote memory grossly intact   Consciousness:  alert and awake    Attention: attention span appeared shorter than expected for age   Insight:  fair   Judgment: fair   Gait/Station: normal gait/station   Motor Activity: no abnormal movements     Progress Toward Goals:  Improving    Recommended Treatment: Continue with group therapy, milieu therapy and occupational therapy  Risks, benefits and possible side effects of Medications:   Risks, benefits, and possible side effects of medications explained to patient and patient verbalizes understanding  Medications: all current active meds have been reviewed  Labs: I have personally reviewed all pertinent laboratory/tests results     Most Recent Labs:   Lab Results   Component Value Date    WBC 7 28 05/03/2021    RBC 4 93 05/03/2021    HGB 15 4 05/03/2021    HCT 45 1 05/03/2021     05/03/2021    RDW 12 6 05/03/2021    NEUTROABS 5 51 05/03/2021    SODIUM 136 05/03/2021    K 3 9 05/03/2021     05/03/2021    CO2 22 05/03/2021    BUN 10 05/03/2021    CREATININE 1 22 05/03/2021    GLUC 141 (H) 05/03/2021 CALCIUM 8 9 05/03/2021    AST 20 11/04/2020    ALT 42 11/04/2020    ALKPHOS 65 11/04/2020    TP 7 9 11/04/2020    ALB 4 0 11/04/2020    TBILI 0 41 11/04/2020    CHOLESTEROL 198 05/05/2021    HDL 35 (L) 05/05/2021    TRIG 170 (H) 05/05/2021    LDLCALC 129 05/05/2021    [place] 163 05/05/2021    [id number] 2 936 11/04/2020    HGBA1C 5 6 05/05/2021     05/05/2021       Counseling / Coordination of Care  Total floor / unit time spent today 40 minutes  Greater than 50% of total time was spent with the patient and / or family counseling and / or coordination of care  A description of the counseling / coordination of care:     Sindhu Garner MD   Physician   Behavioral Health   Progress Notes      Addendum   Date of Service:  5/12/2021 12:04 PM                    Show:Clear all  [x]Manual[x]Template[]Copied    Added by:  [x]Benjamin Pleitez MD    []Mo for details  Psychiatric Discharge Summary     Medical Record Number: [id number]  Encounter: [id number]     Discharge diagnosis:  Depressed     Secondary diagnoses:       Problem List           * (Principal) Depressed      Palpitations      Dizziness      Medical clearance for psychiatric admission      PVC (premature ventricular contraction)              HPI:      S patient is a  70-year-old man who was admitted voluntarily on a 201 for treatment of anxiety and panic symptoms  He was afraid of having another panic attack because his last 1 which was the 4th with a made him extremely frightened about the dying  He had been treated with Prozac which did not seem to help by his family physician is had no prior psychiatric treatment  Patient has been under lot of stress stressed but the home at work and also with some other issues that seem to have trigger off this panic syndrome  Patient was afraid that he would never get better and suicide himself in  He also realize the effect it was having on his family which a upset    Him     Brief Hospital Course:    After the patient was admitted to the psychiatric unit  the patient had several psychotropic medication adjustments made to help stabilize their mood  Following these medication changes, the patient started to stabilize  Finally on 5/13/2021, the patient was found to be stable for discharge  On the day of discharge the patient reported their symptoms as significantly improved  All psychiatric medications were being tolerated without side effect or adverse event  No suicidal or homicidal ideations were present  The patient expressed their readiness and willingness to be discharged and referral to outpatient treatment was made  The case was discussed with Dr Jhon Gonzalez and he has deemed the patient stable for discharge      Patient used the Celexa which was increased to 40 mg and Abilify 2 mg and also Remeron 15 mg to help with his anxiety and depression  He tolerated rated medication well and his seem to help him settle down had no panic attacks and no severe anxiety techs on the unit who he did use Ativan once or twice a day which he normally did home as well      Patient related that he had issue with the his former boss who he was trying to help out by given more supported even some monies which may have been a stress at the through over the edge  Patient agreed to go on an outpatient mental health counseling [facility] and also sees psychiatrist and outpatient and continue with his medications    At the end of hospitalization patient felt he was disabled he was less frightened about having medical issues and he was ready for discharge        Condition on discharge:      Improved     Medications Upon Discharge:      No current facility-administered medications for this encounter       Current Outpatient Medications:     betamethasone valerate (VALISONE) 0 1 % cream, , Disp: , Rfl:     ARIPiprazole (ABILIFY) 2 mg tablet, Take 1 tablet (2 mg total) by mouth daily, Disp: 30 tablet, Rfl: 0    citalopram (CeleXA) 40 mg tablet, Take 1 tablet (40 mg total) by mouth daily, Disp: 30 tablet, Rfl: 1    hydrOXYzine HCL (ATARAX) 50 mg tablet, Take 2 tablets (100 mg total) by mouth daily at bedtime, Disp: 60 tablet, Rfl: 0    LORazepam (ATIVAN) 1 mg tablet, Take 2 tablets (2 mg total) by mouth 3 (three) times daily after meals for 10 days, Disp: 90 tablet, Rfl: 0    mirtazapine (REMERON) 15 mg tablet, Take 1 tablet (15 mg total) by mouth daily at bedtime, Disp: 30 tablet, Rfl: 1     Alonzo Regalado MD            Revision History

## 2021-06-01 ENCOUNTER — CONSULT (OUTPATIENT)
Dept: NEUROLOGY | Facility: CLINIC | Age: 56
End: 2021-06-01
Payer: COMMERCIAL

## 2021-06-01 DIAGNOSIS — H81.10 BENIGN PAROXYSMAL POSITIONAL VERTIGO, UNSPECIFIED LATERALITY: ICD-10-CM

## 2021-06-01 DIAGNOSIS — R42 EPISODIC LIGHTHEADEDNESS: Primary | ICD-10-CM

## 2021-06-01 DIAGNOSIS — F32.A DEPRESSION WITH SUICIDAL IDEATION: ICD-10-CM

## 2021-06-01 DIAGNOSIS — R45.851 DEPRESSION WITH SUICIDAL IDEATION: ICD-10-CM

## 2021-06-01 PROCEDURE — 99204 OFFICE O/P NEW MOD 45 MIN: CPT | Performed by: PSYCHIATRY & NEUROLOGY

## 2021-06-01 RX ORDER — FLUOXETINE 10 MG/1
CAPSULE ORAL
COMMUNITY
Start: 2021-03-30 | End: 2021-06-01 | Stop reason: ALTCHOICE

## 2021-06-01 RX ORDER — IBUPROFEN 200 MG
TABLET ORAL
COMMUNITY
End: 2021-06-05

## 2021-06-01 NOTE — PROGRESS NOTES
[facility]  PATIENT:  Shahram Villafana  MRN:  [id number]  :  1965  DATE OF SERVICE:  2021  Assessment/Plan:           Problem List Items Addressed This Visit        Nervous and Auditory    Benign paroxysmal positional vertigo       Other    Depression with suicidal ideation    Relevant Orders    Ambulatory referral to Psychiatry    Episodic lightheadedness - Primary          Mr Mireille Johnson was referred to [facility] multiple [address] for evaluation of lightheadedness:  - patient presented with persistent lightheadedness, with no signs of vertigo or disequilibrium has been described  Patient agrees, his lightheadedness can be brought by discussion about his mood and related conditions, and lightheadedness believed to be due to stress  - CT scan of the head was completed in  in addition to brain MRI, we personally review CT scan of the head with no intracranial pathology appreciated  Carotid Doppler ult she is rasound was completed with normal findings has been described  We evaluated orthostatic vital signs during this office visit in supine/sitting/standing position, patient has no signs of orthostatic hypotension either  Patient completed Ear Nose and Throat team evaluation with no signs of peripheral vestibular dysfunction has been recognized, as per the patient  - patient had started Remeron 15 mg in addition to Abilify 2 mg and celexa 40 mg - new regimen for mood disorder provided since his recent hospitalization at [facility] by Dr Felecia Horvath, close follow-up with Psychiatry team was advised  - Alprazolam for panic attacks PRN has been provided the patient with side effects such as lightheadedness can be seen   - patient agreed our office will request MRI report and CD, if possible, as I would be happy to review his imaging personally      - patient has no focal neurologic deficit during today's evaluation  - follow up with  [facility] on as needed bases  Subjective:  Lightheadedness    HPI    Mrs Chong Dorman is a 59 yo male who was referred to [facility] multiple [address] for evaluation of lightheadedness  Patient has no history of multiple sclerosis  Patient had several hospitalization in 2021, positional peripheral vertigo diagnosis made in March 2021,  with anxiety and depression with suicidal ideation on May 3, 2021  Patient described that fear of become lightheaded is bringing panic attack and with developing panic attack lightheadedness comes even worse, described as a vicious cycle  Patient was very transparent but talking about his mood disorder bringing him lightheadedness within short period of time which is difficult to come himself down  No signs of vertigo or loss of consciousness has been reported  Patient described no focal weakness or numbness with no signs of disequilibrium  Patient followed all recommendations including evaluation by pulmonology and ENT with no pathology has been found so far  Patient has established care with neurology team Dr Grace Marley , evaluation with brain imaging completed with no intracranial pathology has been reported  Patient had EEG study in March of 2021, no epileptiform discharges appreciated, findings on AG with better activity represent benzodiazepine use on regular scheduled basis  Patient had orthostatic vital signs evaluated during this office visit  Supine blood pressure 126/78 mmHg and heart rate 96; sitting 122/ 90 mmHG heart rate 86; standing blood pressure 128/90 mmHG HR 91; patient did not described lightheadedness during this evaluation;   CT scan of the head was completed advised in 2020, including June and repeated in November, with no acute intracranial abnormality has been described  Carotid Doppler ultrasound was completed in November 2020, with less than 50% stenosis noted in internal carotid artery bilaterally        The following portions of the patient's history were reviewed and updated as appropriate: He  has a past medical history of Anxiety, Depression, Hypertension, Palpitation, and Panic attack  He   Patient Active Problem List    Diagnosis Date Noted    Episodic lightheadedness 06/01/2021    Benign paroxysmal positional vertigo 06/01/2021    Medical clearance for psychiatric admission 05/04/2021    PVC (premature ventricular contraction) 05/04/2021    Depression with suicidal ideation 05/04/2021    Dizziness     Palpitations      He  has a past surgical history that includes Knee surgery (08/1982)  His family history includes COPD in his mother; Hypertension in his mother; Migraines in his mother; Stroke in his father  He  reports that he has quit smoking  His smoking use included cigars  He has never used smokeless tobacco  He reports previous alcohol use  He reports previous drug use  Current Outpatient Medications   Medication Sig Dispense Refill    ARIPiprazole (ABILIFY) 2 mg tablet Take 1 tablet (2 mg total) by mouth daily 30 tablet 0    betamethasone valerate (VALISONE) 0 1 % cream       citalopram (CeleXA) 40 mg tablet Take 1 tablet (40 mg total) by mouth daily 30 tablet 1    hydrOXYzine HCL (ATARAX) 50 mg tablet Take 2 tablets (100 mg total) by mouth daily at bedtime 60 tablet 0    LORazepam (ATIVAN) 1 mg tablet Take 2 tablets (2 mg total) by mouth 3 (three) times daily after meals for 10 days 90 tablet 0    mirtazapine (REMERON) 15 mg tablet Take 1 tablet (15 mg total) by mouth daily at bedtime 30 tablet 1    ibuprofen (Motrin IB) 200 mg tablet        No current facility-administered medications for this visit        Current Outpatient Medications on File Prior to Visit   Medication Sig    ARIPiprazole (ABILIFY) 2 mg tablet Take 1 tablet (2 mg total) by mouth daily    betamethasone valerate (VALISONE) 0 1 % cream     citalopram (CeleXA) 40 mg tablet Take 1 tablet (40 mg total) by mouth daily    hydrOXYzine HCL (ATARAX) 50 mg tablet Take 2 tablets (100 mg total) by mouth daily at bedtime    LORazepam (ATIVAN) 1 mg tablet Take 2 tablets (2 mg total) by mouth 3 (three) times daily after meals for 10 days    mirtazapine (REMERON) 15 mg tablet Take 1 tablet (15 mg total) by mouth daily at bedtime    ibuprofen (Motrin IB) 200 mg tablet     [DISCONTINUED] FLUoxetine (PROzac) 10 mg capsule Take by mouth     No current facility-administered medications on file prior to visit  He is allergic to other and shellfish allergy - food allergy            Objective: There were no vitals taken for this visit  Physical Exam    Neurological Exam  CONSTITUTIONAL: NAD, pleasant  NECK: supple, no lymphadenopathy, no thyromegaly, no JVD  CARDIOVASCULAR: RRR, normal S1S2, no murmurs, no rubs  RESP: clear to auscultation bilaterally, no wheezes/rhonchi/rales  ABDOMEN: soft, non tender, non distended  SKIN: no rash or skin lesions  EXTREMITIES: no edema, pulses 2+bilaterally  PSYCH: appropriate mood and affect  NEUROLOGIC COMPREHENSIVE EXAM: Patient is oriented to person, place and time, NAD; appropriate affect  CN II, III, IV, V, VI, VII,VIII,IX,X,XI-XII intact with EOMI, PERRLA, OKN intact, VF grossly intact, fundi poorly visualized secondary to pupillary constriction; symmetric face noted  Motor: 5/5 UE/LE bilateral symmetric; Sensory: intact to light touch and pinprick bilaterally; normal vibration sensation feet bilaterally; Coordination within normal limits on FTN and TEODORO testing; DTR: 2/4 through, no Babinski, no clonus  Tandem gait is intact  Romberg: negative  ROS:  12 points of review of system was reviewed with the patient and was unremarkable with exception: see HPI  Review of Systems   Constitutional: Negative  Negative for appetite change and fever  HENT: Negative  Negative for hearing loss, tinnitus, trouble swallowing and voice change  Eyes: Negative  Negative for photophobia and pain  Respiratory: Negative  Negative for shortness of breath  Cardiovascular: Negative  Negative for palpitations  Gastrointestinal: Negative  Negative for nausea and vomiting  Endocrine: Negative  Negative for cold intolerance  Genitourinary: Negative  Negative for dysuria, frequency and urgency  Musculoskeletal: Negative  Negative for myalgias and neck pain  Skin: Negative  Negative for rash  Allergic/Immunologic: Negative  Neurological: Positive for light-headedness and headaches (frequent 4 times a week)  Negative for dizziness, tremors, seizures, syncope, facial asymmetry, speech difficulty, weakness and numbness  Hematological: Negative  Does not bruise/bleed easily  Psychiatric/Behavioral: Negative  Negative for confusion, hallucinations and sleep disturbance          Panic attacks and anxiety

## 2021-06-02 ENCOUNTER — OFFICE VISIT (OUTPATIENT)
Dept: PSYCHIATRY | Facility: CLINIC | Age: 56
End: 2021-06-02
Payer: COMMERCIAL

## 2021-06-02 DIAGNOSIS — F32.A DEPRESSED: ICD-10-CM

## 2021-06-02 PROCEDURE — 90792 PSYCH DIAG EVAL W/MED SRVCS: CPT | Performed by: HOSPITALIST

## 2021-06-02 RX ORDER — ARIPIPRAZOLE 2 MG/1
TABLET ORAL
Qty: 30 TABLET | Refills: 0 | OUTPATIENT
Start: 2021-06-02

## 2021-06-02 RX ORDER — LORAZEPAM 1 MG/1
1 TABLET ORAL 3 TIMES DAILY
Qty: 90 TABLET | Refills: 0 | Status: SHIPPED | OUTPATIENT
Start: 2021-06-02 | End: 2021-06-25 | Stop reason: SDUPTHER

## 2021-06-02 RX ORDER — ESCITALOPRAM OXALATE 10 MG/1
20 TABLET ORAL DAILY
Qty: 30 TABLET | Refills: 1 | Status: SHIPPED | OUTPATIENT
Start: 2021-06-02 | End: 2021-06-16 | Stop reason: DRUGHIGH

## 2021-06-02 RX ORDER — HYDROXYZINE 50 MG/1
100 TABLET, FILM COATED ORAL
Qty: 60 TABLET | Refills: 0 | OUTPATIENT
Start: 2021-06-02

## 2021-06-02 RX ORDER — HYDROXYZINE 50 MG/1
100 TABLET, FILM COATED ORAL
Qty: 60 TABLET | Refills: 2 | Status: SHIPPED | OUTPATIENT
Start: 2021-06-02 | End: 2021-06-16 | Stop reason: DRUGHIGH

## 2021-06-02 NOTE — PSYCH
Outpatient Psychiatry Intake Exam       PCP: Adriane Redmond DO    Referral source: Self Referred    Identifying information:  Mary Jo Knutson is a 60 y o  male with a history of Depression, Anxiety and Panic  here for evaluation and determination of mental health management needs  Sources of information:   Information for this evaluation was gathered through direct interview with the patient  Additionally EMR was reviewed  Confidentiality discussion: Limits of confidentiality in cases of safety concerns involving self and others as well as this physician's role as a mandatory  of abuse  They voiced understanding and a desire to continue with the evaluation  SUBJECTIVE     Chief Complaint / reason for visit: " I have struggled with anxiety over the last 5 years "    History of Present Illness:  57-year-old male who is , works as an  currently on [employer] with history treatment for depression and anxiety presents to the office today for initial psychiatric evaluation  Patient reports he began treatment for anxiety about 5 years ago with his PCP and was maintained on Celexa and a small dose of lorazepam for about 4 years with good stability  He states in 2019 he began with the assistance of his PCP tapering off of Celexa as he had maintained stability for quite some time  He states having then had an episode in June of 2020 where he woke up with a severe headache and nausea which required him to go to the ED  On that occasion patient had a workup in the ED and they are worse no evidence of any on to word medical illness  He says since that time he has had multiple medical symptoms and has seen many specialists which have revealed no medical findings  He states currently his greatest issue is with chronic lightheadedness which causes him to be quite fatigued    He also admits to acknowledging that he is very anxious and understands that his physical symptoms may be a psychosomatic nature  He describes having excessive anxiety every day and he also feels anxious about having panic attacks which he has had in the past   He reports symptoms of restlessness and fear which then proceed to him feeling that he cannot breathe  He states his has tingling in his arms and legs along with his heart pounding  Patient reports he has been so distraught with his anxiety and panic attacks that he began having passive wish for death which led to a hospitalization at [facility] between May 4th and May 12th  Patient states he is discharged to home on lorazepam 2 mg 3 times a day for 10 days after which he was to return to his 1 mg 3 times a day dose which he has been in the past, Abilify 2 mg daily, Celexa 40 mg daily, hydroxyzine 50 mg 2 tablets at bedtime, and Remeron 15 mg at bedtime  Patient states that he has had little relief from this medication regimen he continues to have feelings of anxiety, panic attack and mild feelings of depression  He however denies any thoughts of self-harm or harm of others  Patient does report that he had a handgun which was removed from his possession and has been placed in an unknown location by his wife  Patient reports stressors of having a lady friend who he is helping out, work is stressful for him as well as financial issues  HPI ROS:  Medication Side Effects: denied  Depression: 6 /10 (10 worst)  Anxiety: 8 /10 (10 worst)  Safety (SI, HI, other): passive thoughts  Sleep: poor  Energy: poor  Appetite: adequate   Weight Change: stable    Past Psychiatric History  Previous diagnoses include Depression and Anxiety    Prior outpatient psychiatric treatment:   With PCP till now    Prior therapy:     Prior inpatient psychiatric treatment: x1 as above    Prior suicide attempts:  None    Prior self harm:  None    Prior violence or aggression:  None    Social History:    The patient grew up in [place]    Childhood was described as "fond of it, good"  During childhood, parents were   They have 1 sister(s) and 3 brother(s)  Patient is 2rd oldest in birth order    Abuse/neglect: none    As far as the patient (or present family member) is aware, overall childhood development: Patient does ascribe to normal developmental milestones such as walking, talking, potty training and making childhood friends  Education level: BA earth science   Current occupation: Environment Specialist   Marital status: , 28 yrs  Children: 3 daughters  Current Living Situation: the patient currently lives home with wife   Social support: good    Restorationist Affiliation: Jna [employer]   experience: [employer] 4yrs  Legal history: no  Access to Guns: no access     Substance use and treatment:  Tobacco use: nonsmoker  Caffeine Use: none  ETOH use: 2-3 beer /wk  Other substance use: none      Endorses previous experimentation with: none    Longest clean time: not applicable  History of Inpatient/Outpatient rehabilitation program: no      Traumatic History:     Abuse: none  Other Traumatic Events: none     Family Psychiatric History:     Psychiatric Illness:  no family history of psychiatric illness  Substance Abuse:  no family history of substance abuse  Suicide Attempts:  no family history of suicide attempts    Denies     Family History   Problem Relation Age of Onset    Hypertension Mother     COPD Mother     Migraines Mother     Stroke Father             Past Medical / Surgical History:    Current PCP: Orion Vo DO   Other providers include:     Patient Active Problem List   Diagnosis    Palpitations    Dizziness    Medical clearance for psychiatric admission    PVC (premature ventricular contraction)    Depression with suicidal ideation    Episodic lightheadedness    Benign paroxysmal positional vertigo       Past Medical History-  Past Medical History:   Diagnosis Date    Anxiety     Depression     Hypertension     Palpitation     Panic attack         Denies     History of Seizures: no  History of Head injury-LOC-Concussion: no    Past Surgical History-  Past Surgical History:   Procedure Laterality Date    KNEE SURGERY  08/1982          Allergies: Allergies   Allergen Reactions    Other Sneezing, Throat Swelling and Tongue Swelling    Shellfish Allergy - Food Allergy Tongue Swelling       Recent labs:   Admission on 05/04/2021, Discharged on 05/12/2021   Component Date Value    Cholesterol 05/05/2021 198     Triglycerides 05/05/2021 170*    HDL, Direct 05/05/2021 35*    LDL Calculated 05/05/2021 129     Non-HDL-Chol (CHOL-HDL) 05/05/2021 163     Hemoglobin A1C 05/05/2021 5 6     EAG 05/05/2021 114     Ventricular Rate 05/10/2021 81     Atrial Rate 05/10/2021 81     CA Interval 05/10/2021 172     QRSD Interval 05/10/2021 92     QT Interval 05/10/2021 406     QTC Interval 05/10/2021 471     P Axis 05/10/2021 4     QRS Axis 05/10/2021 -7     T Wave [place] 05/10/2021 21    Admission on 05/03/2021, Discharged on 05/03/2021   Component Date Value    WBC 05/03/2021 7 28     RBC 05/03/2021 4 93     Hemoglobin 05/03/2021 15 4     Hematocrit 05/03/2021 45 1     MCV 05/03/2021 92     MCH 05/03/2021 31 2     MCHC 05/03/2021 34 1     RDW 05/03/2021 12 6     MPV 05/03/2021 11 6     Platelets 76/89/2536 217     nRBC 05/03/2021 0     Neutrophils Relative 05/03/2021 77*    Immat GRANS % 05/03/2021 0     Lymphocytes Relative 05/03/2021 17     Monocytes Relative 05/03/2021 5     Eosinophils Relative 05/03/2021 0     Basophils Relative 05/03/2021 1     Neutrophils Absolute 05/03/2021 5 51     Immature Grans Absolute 05/03/2021 0 03     Lymphocytes Absolute 05/03/2021 1 26     Monocytes Absolute 05/03/2021 0 38     Eosinophils Absolute 05/03/2021 0 02     Basophils Absolute 05/03/2021 0 08     Sodium 05/03/2021 136     Potassium 05/03/2021 3 9     Chloride 05/03/2021 101     CO2 05/03/2021 22     ANION GAP 05/03/2021 13     BUN 05/03/2021 10     Creatinine 05/03/2021 1 22     Glucose 05/03/2021 141*    Calcium 05/03/2021 8 9     eGFR 05/03/2021 65     Troponin I 05/03/2021 <0 02     Amph/Meth UR 05/03/2021 Negative     Barbiturate Ur 05/03/2021 Negative     Benzodiazepine Urine 05/03/2021 Negative     Cocaine Urine 05/03/2021 Negative     Methadone Urine 05/03/2021 Negative     Opiate Urine 05/03/2021 Negative     PCP Ur 05/03/2021 Negative     THC Urine 05/03/2021 Negative     Oxycodone Urine 05/03/2021 Negative     Ethanol Lvl 05/03/2021 <3     SARS-CoV-2 05/03/2021 Negative     Ventricular Rate 05/03/2021 94     Atrial Rate 05/03/2021 94     NH Interval 05/03/2021 174     QRSD Interval 05/03/2021 94     QT Interval 05/03/2021 398     QTC Interval 05/03/2021 497     P Axis 05/03/2021 53     QRS Axis 05/03/2021 21     T Wave Axis 05/03/2021 49     Ventricular Rate 05/03/2021 93     Atrial Rate 05/03/2021 93     NH Interval 05/03/2021 178     QRSD Interval 05/03/2021 96     QT Interval 05/03/2021 390     QTC Interval 05/03/2021 484     P Axis 05/03/2021 49     QRS Axis 05/03/2021 12     T Wave Axis 05/03/2021 38      Labs were reviewed    Medical Review Of Systems:  As in HPI otherwise noncontributory      Medications:  Current Outpatient Medications on File Prior to Visit   Medication Sig Dispense Refill    ARIPiprazole (ABILIFY) 2 mg tablet Take 1 tablet (2 mg total) by mouth daily 30 tablet 0    betamethasone valerate (VALISONE) 0 1 % cream       citalopram (CeleXA) 40 mg tablet Take 1 tablet (40 mg total) by mouth daily 30 tablet 1    hydrOXYzine HCL (ATARAX) 50 mg tablet Take 2 tablets (100 mg total) by mouth daily at bedtime 60 tablet 0    ibuprofen (Motrin IB) 200 mg tablet       LORazepam (ATIVAN) 1 mg tablet Take 2 tablets (2 mg total) by mouth 3 (three) times daily after meals for 10 days 90 tablet 0    mirtazapine (REMERON) 15 mg tablet Take 1 tablet (15 mg total) by mouth daily at bedtime 30 tablet 1     No current facility-administered medications on file prior to visit  Medication Compliant? Yes    All current active medications have been reviewed  Objective     OBJECTIVE     There were no vitals taken for this visit  MENTAL STATUS EXAM  Appearance:  age appropriate, dressed casually, adequate hygiene   Behavior:  Pleasant & cooperative   Speech:  Normal volume, regular rate and rhythm   Mood:  anxious   Affect:  mood congruent, blunted   Language: intact and appropriate for age, education, and intellect   Thought Process:  Linear and goal directed   Associations: intact associations   Thought Content:  normal and appropriate   Perceptual Disturbances: no auditory or visual hallcunations   Risk Potential / Abnormal Thoughts: Suicidal ideation - None  Homicidal ideation - None  Potential for aggression - No       Consciousness:  Alert & Awake   Sensorium:  Fully oriented to person, place, time/date   Attention: attention span and concentration are age appropriate   Intellect: within normal limits   Fund of Knowledge:  Memory: awareness of current events: yes  recent and remote memory grossly intact   Insight:  fair   Judgment: fair   Muscle Strength Muscle Tone: normal  normal   Gait/Station: normal gait/station with good balance   Motor Activity: no abnormal movements     Pain none   Pain Scale 0     IMPRESSIONS/FORMULATION          There are no diagnoses linked to this encounter  No diagnosis found  Mary Jo Knutson is a 60 y o  male who presents with symptoms supporting the aforementioned  Suicide / Homicide / Safety risk assessment: At this time [place] is at low risk for harm of self or others  Plan:       Education about diagnosis and treatment modalities, patient voiced understanding and agreement with the following plan:    Discussed medication risks, beneftis, alternatives  Patient was informed and had time to ask questions   They agreed to treatment below    Controlled Medication Discussion:   Pt counseled regarding risks, benefits and proper use of controlled substances as the patient is prescribed ativan  Initial treatment plan:   1) MEDS:   Patient's Abilify 2 mg daily will be tapered over the course every other day for 3 doses and then stopped  -Patient's Celexa will be decreased to 20 mg daily for 3 days at the same time he will initiate -Lexapro 10 mg daily for 5 days increased to 20 mg daily  -Patient will be prescribed Ativan 1 mg 3 times a day patient is informed the risks tolerance, addiction and dependence as well as sedation and respiratory arrest in excessive and high doses  It is made clear to him that our goal will be to decrease gradually this medication in the future  We have also informed patient that there will be no early refills this medication if not taking appropriately and if he were to experience withdrawal he would have to go to the emergency room  -Hydroxyzine will be continued 100 mg daily   -Remeron will be continued at 15 mg daily  -patient will have follow-up in 3 weeks at which time further assessment of medication changes will be made, if patient remains highly anxious will consider a low-dose trial of Seroquel such as 12 5 mg to 3 times a day    - Patient will call if issues or concerns     7) Treatment Plan:    - treatment plan not done today due to lengthy initial evaluation requiring the entire hour visit     Discussed self monitoring of symptoms, and symptom monitoring tools  Patient has been informed of 24 hours and weekend coverage for urgent situations accessed by calling the main clinic phone number

## 2021-06-03 ENCOUNTER — TELEPHONE (OUTPATIENT)
Dept: PSYCHIATRY | Facility: CLINIC | Age: 56
End: 2021-06-03

## 2021-06-04 ENCOUNTER — TELEPHONE (OUTPATIENT)
Dept: PSYCHIATRY | Facility: CLINIC | Age: 56
End: 2021-06-04

## 2021-06-04 NOTE — TELEPHONE ENCOUNTER
Patient wants to know if he can have something for sleep   He is scheduled to see Moses Hood on 06/16/2021

## 2021-06-05 ENCOUNTER — HOSPITAL ENCOUNTER (EMERGENCY)
Facility: HOSPITAL | Age: 56
Discharge: HOME | End: 2021-06-05
Attending: EMERGENCY MEDICINE | Admitting: EMERGENCY MEDICINE
Payer: COMMERCIAL

## 2021-06-05 VITALS
HEART RATE: 88 BPM | BODY MASS INDEX: 33.77 KG/M2 | RESPIRATION RATE: 20 BRPM | OXYGEN SATURATION: 95 % | WEIGHT: 277.34 LBS | DIASTOLIC BLOOD PRESSURE: 87 MMHG | HEIGHT: 76 IN | SYSTOLIC BLOOD PRESSURE: 128 MMHG | TEMPERATURE: 97.3 F

## 2021-06-05 DIAGNOSIS — F41.9 ANXIETY: Primary | ICD-10-CM

## 2021-06-05 PROCEDURE — 99284 EMERGENCY DEPT VISIT MOD MDM: CPT | Performed by: EMERGENCY MEDICINE

## 2021-06-05 PROCEDURE — 99283 EMERGENCY DEPT VISIT LOW MDM: CPT

## 2021-06-05 RX ORDER — ARIPIPRAZOLE 2 MG/1
2 TABLET ORAL DAILY
COMMUNITY
End: 2021-06-16 | Stop reason: ALTCHOICE

## 2021-06-05 RX ORDER — LORAZEPAM 1 MG/1
1 TABLET ORAL ONCE
Status: COMPLETED | OUTPATIENT
Start: 2021-06-05 | End: 2021-06-05

## 2021-06-05 RX ADMIN — LORAZEPAM 1 MG: 1 TABLET ORAL at 14:20

## 2021-06-05 NOTE — ED PROVIDER NOTES
History  Chief Complaint   Patient presents with    Panic Attack     pt complains of panic attack started approx 1 hour ago  Also complains of headache  Recent U admission at [facility] May 4th - May 15h     60year old male complains of more frequent panic attacks associated with extremity tingling, ringing in ears  Has been occasionally taking atarax prn and using ice packs on neck  Recent past hospitalized and medications adjusted, just this past week adjusting medications again  Attempting to discontinue abilify per direction  Not suicidal or homicidal  No ingestion  History provided by:  Patient  Panic Attack  Presenting symptoms: no bizarre behavior, no delusions and no suicidal thoughts    Onset quality:  Gradual  Associated symptoms: no abdominal pain, no chest pain and no headaches        Prior to Admission Medications   Prescriptions Last Dose Informant Patient Reported? Taking? ARIPiprazole (ABILIFY) 2 mg tablet   Yes Yes   Sig: Take 2 mg by mouth daily   LORazepam (ATIVAN) 1 mg tablet   No No   Sig: Take 1 tablet (1 mg total) by mouth 3 (three) times a day   betamethasone valerate (VALISONE) 0 1 % cream   Yes No   escitalopram (LEXAPRO) 10 mg tablet   No No   Sig: Take 2 tablets (20 mg total) by mouth daily   hydrOXYzine HCL (ATARAX) 50 mg tablet   No No   Sig: Take 2 tablets (100 mg total) by mouth daily at bedtime   mirtazapine (REMERON) 15 mg tablet   No No   Sig: Take 1 tablet (15 mg total) by mouth daily at bedtime      Facility-Administered Medications: None       Past Medical History:   Diagnosis Date    Anxiety     Depression     Hypertension     Palpitation     Panic attack        Past Surgical History:   Procedure Laterality Date    KNEE SURGERY  08/1982       Family History   Problem Relation Age of Onset    Hypertension Mother     COPD Mother     Migraines Mother     Stroke Father      I have reviewed and agree with the history as documented      E-Cigarette/Vaping    E-Cigarette Use Never User      E-Cigarette/Vaping Substances    Nicotine No     THC No     CBD No     Flavoring No     Other No     Unknown No      Social History     Tobacco Use    Smoking status: Former Smoker     Types: Cigars    Smokeless tobacco: Never Used   Substance Use Topics    Alcohol use: Not Currently     Frequency: Monthly or less     Drinks per session: 1 or 2     Binge frequency: Less than monthly     Comment: socially    Drug use: Not Currently       Review of Systems   Cardiovascular: Negative for chest pain  Gastrointestinal: Negative for abdominal pain  Neurological: Negative for headaches  Psychiatric/Behavioral: Negative for suicidal ideas  All other systems reviewed and are negative  Physical Exam  Physical Exam  Vitals signs and nursing note reviewed  Constitutional:       Appearance: Normal appearance  Comments: Pleasant, comfortable-appearing   HENT:      Head: Normocephalic and atraumatic  Eyes:      Conjunctiva/sclera: Conjunctivae normal       Pupils: Pupils are equal, round, and reactive to light  Neck:      Musculoskeletal: Neck supple  Cardiovascular:      Rate and Rhythm: Normal rate and regular rhythm  Heart sounds: Normal heart sounds  Pulmonary:      Effort: Pulmonary effort is normal       Breath sounds: Normal breath sounds  Abdominal:      General: Bowel sounds are normal  There is no distension  Palpations: Abdomen is soft  Tenderness: There is no abdominal tenderness  Musculoskeletal: Normal range of motion  Skin:     General: Skin is warm and dry  Neurological:      General: No focal deficit present  Mental Status: He is alert and oriented to person, place, and time  Cranial Nerves: No cranial nerve deficit  Coordination: Coordination normal    Psychiatric:         Behavior: Behavior normal          Thought Content:  Thought content normal          Judgment: Judgment normal          Vital Signs  ED Triage Vitals [06/05/21 1337]   Temperature Pulse Respirations Blood Pressure SpO2   (!) 97 3 °F (36 3 °C) 98 20 137/89 93 %      Temp Source Heart Rate Source Patient Position - Orthostatic VS BP Location FiO2 (%)   Temporal Monitor Lying Right arm --      Pain Score       5           Vitals:    06/05/21 1337 06/05/21 1415   BP: 137/89 128/87   Pulse: 98 88   Patient Position - Orthostatic VS: Lying          Visual Acuity      ED Medications  Medications   LORazepam (ATIVAN) tablet 1 mg (has no administration in time range)       Diagnostic Studies  Results Reviewed     None                 No orders to display              Procedures  Procedures         ED Course  ED Course as of Jun 05 1418   Sat Jun 05, 2021   1417 Requests dose of ativan, notes ran-out, will provide                                SBIRT 22yo+      Most Recent Value   SBIRT (22 yo +)   In order to provide better care to our patients, we are screening all of our patients for alcohol and drug use  Would it be okay to ask you these screening questions? Yes Filed at: 06/05/2021 1339   Initial Alcohol Screen: US AUDIT-C    1  How often do you have a drink containing alcohol?  0 Filed at: 06/05/2021 1339   2  How many drinks containing alcohol do you have on a typical day you are drinking? 0 Filed at: 06/05/2021 1339   3a  Male UNDER 65: How often do you have five or more drinks on one occasion? 0 Filed at: 06/05/2021 1339   Audit-C Score  0 Filed at: 06/05/2021 1339   LESLY: How many times in the past year have you    Used an illegal drug or used a prescription medication for non-medical reasons?   Never Filed at: 06/05/2021 1339                    MDM    Disposition  Final diagnoses:   Anxiety     Time reflects when diagnosis was documented in both MDM as applicable and the Disposition within this note     Time User Action Codes Description Comment    6/5/2021  2:04 PM Laurel Hives Add [F41 9] Anxiety       ED Disposition     ED Disposition Condition Date/Time Comment    Discharge Stable Sat Jun 5, 2021  2:04 PM [facility] discharge to home/self care  Follow-up Information     Follow up With Specialties Details Why Contact Info    Jyotsna Cardoso DO Family Medicine Schedule an appointment as soon as possible for a visit in 1 week  [address] [facility]  [phone]            Patient's Medications   Discharge Prescriptions    No medications on file     No discharge procedures on file      PDMP Review     None          ED Provider  Electronically Signed by           Slim Neil DO  06/05/21 0945

## 2021-06-05 NOTE — DISCHARGE INSTRUCTIONS
Please return immediately if worse or any new symptoms  Continue Abilify 2 mg daily without weaning until discussed with your psychiatrist  Continue transition off citalopram onto escitalopram  May use atarax 50 mg 1-2 times daily as needed for panic  Return immediately if worse or any new symptoms  Please call [address] [place] [address] [phone] at any time for any concerns

## 2021-06-06 DIAGNOSIS — F32.A DEPRESSED: ICD-10-CM

## 2021-06-08 RX ORDER — HYDROXYZINE 50 MG/1
100 TABLET, FILM COATED ORAL
Qty: 180 TABLET | OUTPATIENT
Start: 2021-06-08

## 2021-06-10 DIAGNOSIS — F32.A DEPRESSED: ICD-10-CM

## 2021-06-16 ENCOUNTER — OFFICE VISIT (OUTPATIENT)
Dept: PSYCHIATRY | Facility: CLINIC | Age: 56
End: 2021-06-16
Payer: COMMERCIAL

## 2021-06-16 DIAGNOSIS — F39 MOOD DISORDER (HCC): Primary | ICD-10-CM

## 2021-06-16 DIAGNOSIS — F41.1 GAD (GENERALIZED ANXIETY DISORDER): ICD-10-CM

## 2021-06-16 PROCEDURE — 90833 PSYTX W PT W E/M 30 MIN: CPT | Performed by: NURSE PRACTITIONER

## 2021-06-16 PROCEDURE — 99214 OFFICE O/P EST MOD 30 MIN: CPT | Performed by: NURSE PRACTITIONER

## 2021-06-16 RX ORDER — ESCITALOPRAM OXALATE 20 MG/1
30 TABLET ORAL DAILY
Qty: 45 TABLET | Refills: 1 | Status: SHIPPED | OUTPATIENT
Start: 2021-06-16 | End: 2021-08-19 | Stop reason: SDUPTHER

## 2021-06-16 RX ORDER — HYDROXYZINE HYDROCHLORIDE 25 MG/1
25 TABLET, FILM COATED ORAL EVERY 6 HOURS PRN
Qty: 30 TABLET | Refills: 0 | Status: SHIPPED | OUTPATIENT
Start: 2021-06-16 | End: 2021-09-13

## 2021-06-16 RX ORDER — TRAZODONE HYDROCHLORIDE 50 MG/1
50 TABLET ORAL
Qty: 30 TABLET | Refills: 0 | Status: SHIPPED | OUTPATIENT
Start: 2021-06-16 | End: 2021-06-25 | Stop reason: ALTCHOICE

## 2021-06-16 NOTE — PSYCH
Regular Visit    Problem List Items Addressed This Visit     None           Encounter provider KOLBY Raymond    Provider located at   [address] [address] [phone]    Recent Visits  No visits were found meeting these conditions  Showing recent visits within past 7 days and meeting all other requirements  Future Appointments  No visits were found meeting these conditions  Showing future appointments within next 150 days and meeting all other requirements       HPI     Current Outpatient Medications   Medication Sig Dispense Refill    betamethasone valerate (VALISONE) 0 1 % cream       LORazepam (ATIVAN) 1 mg tablet Take 1 tablet (1 mg total) by mouth 3 (three) times a day 90 tablet 0    mirtazapine (REMERON) 15 mg tablet Take 1 tablet (15 mg total) by mouth daily at bedtime 30 tablet 1     No current facility-administered medications for this visit  Review of Systems    I spent 45 minutes directly with the patient during this visit      Jerry CALABRESE    Name and Date of Birth:  Mica Oliveira 60 y o  1965 MRN: [id number]    Date of Visit: June 16, 2021    Allergies   Allergen Reactions    Other Sneezing, Throat Swelling and Tongue Swelling    Shellfish Allergy - Food Allergy Tongue Swelling     SUBJECTIVE:    Aishwarya Wells is seen today for a follow up for mood disorder  He continues to experience ongoing symptoms since the last visit  Aishwarya Wells was last seen by Dr Ba Jett on 06/02/2021  He had been inpatient at [facility] 5/3/21-5/12/21  He is cooperative during the appointment  He is very well-versed in his medication regimen and was able to clearly state all of the medications that he is currently taking    However, he reports that his medications are still not right, and he is continuing to have significant anxiety and tension  He reports that he did have a panic attack on 06/05/2021  He was not suicidal at that time  He states that he has constant worry that makes him uncomfortable, decreased attention, sleeps 6-7 hours per night with frequent awakenings  He states that he worries that he will not fall sleep which causes him to that med not be able to fall sleep  He reports that in the past he has had irritability, but does not have that at this time  He reports anxiety 6/10, 10 being worst symptoms  Depression is 5/10  He denies any suicidal thoughts at this time he denies any auditory or visual hallucinations  He does not display signs of psychosis at this time  He is circumstantial in conversation, hyper talkative  Abilify was tapered, his last dose taken today  He has also been taking the Lexapro 20 mg p o  daily  In he continues to take the Ativan 1 mg t i d , and reports that he is taking the Remeron 15 mg HS  He reports that he does not think the Atarax is effective for his sleep, and is asking about trazodone instead  He does state that he has an appointment with Kinsey Guido for therapy on 09/07/2021  He is however, seeing another therapist at this time and will continue to see her until he sees Kinsey Guido as well  He continues to complain of lightheadedness for which is he is been worked up and cleared of medically  He understands that the lightheadedness could caused by his anxiety and depression  He reports leg muscle weakness  PLAN:  Increase Lexapro to 30 mg p o  daily  Change Atarax to 25 mg q 6 p r n  Continue Remeron 15 mg HS  Continue Ativan 1 mg p o  t i d  p r n   I discussed plan and treatment with Pt  Risks, benefits, and possible side effects of medications explained to patient and patient verbalizes understanding    Discussed with patient the risks of sedation, respiratory depression, impairment of ability to drive and potential for abuse and addiction related to treatment with benzodiazepine medications  The patient understands risk of treatment with benzodiazepine medications, agrees to not drive if feels impaired and agrees to take medications as prescribed  We did discuss tapering  The dose of ativan as his anxiety decreases  Aware of 24 hour and weekend coverage for urgent situations accessed by calling [facility] main practice number  Will start individual therapy with [facility] therapist Rayray Medina on 9/7/21  Medication management every 1 week  Aware of need to follow up with family physician for medical issues    There are no diagnoses linked to this encounter  Current Outpatient Medications on File Prior to Visit   Medication Sig Dispense Refill    betamethasone valerate (VALISONE) 0 1 % cream       LORazepam (ATIVAN) 1 mg tablet Take 1 tablet (1 mg total) by mouth 3 (three) times a day 90 tablet 0    mirtazapine (REMERON) 15 mg tablet Take 1 tablet (15 mg total) by mouth daily at bedtime 30 tablet 1    [DISCONTINUED] ARIPiprazole (ABILIFY) 2 mg tablet Take 2 mg by mouth daily      [DISCONTINUED] escitalopram (LEXAPRO) 10 mg tablet Take 2 tablets (20 mg total) by mouth daily 30 tablet 1    [DISCONTINUED] hydrOXYzine HCL (ATARAX) 50 mg tablet Take 2 tablets (100 mg total) by mouth daily at bedtime 60 tablet 2     No current facility-administered medications on file prior to visit  Psychotherapy Provided:     Individual psychotherapy provided: Yes  Counseling was provided during the session today for 16 minutes  Supportive counseling provided  Medication changes discussed with Tana Johnson  Medication education provided to Tana Johnson  Importance of medication and treatment compliance reviewed with Tana Johnson  Reassurance and supportive therapy provided  Crisis/safety plan discussed with Tana HERRERA Appetite Changes and Sleep:     He reports frequent awakenings, adequate appetite, adequate energy level   Patient denies suicidal or homicidal ideation    Review Of Systems:      HPI ROS:               Medication Side Effects: States he has leg muscle weakness, advised to stand slowly       Depression (10 worst): 5/10 (Was )   Anxiety (10 worst): 6/10 (Was )   Safety concerns (SI, HI, etc): denies (Was )   Sleep: 6-7 hours with frequent awakenings (Was )   Energy: adequate (Was )   Appetite: adequate (Was )     General normal    Personality no change in personality   Constitutional negative   ENT negative   Cardiovascular lightheadedness   Respiratory negative   Gastrointestinal negative   Genitourinary negative   Musculoskeletal leg muscle weakness   Integumentary negative   Neurological negative   Endocrine negative   Other Symptoms none, all other systems are negative     Mental Status Evaluation:    Appearance Adequate hygiene and grooming   Behavior cooperative   Mood anxious and depressed  Depression Scale - 5 of 10 (0 = No depression)  Anxiety Scale - 6 of 10 (0 = No anxiety)   Speech Normal volume and Pressured   Affect appropriate and mood-congruent   Thought Processes Circumstantial   Thought Content Does not verbalize delusional material   Associations Loosely connected   Perceptual Disturbances Denies hallucinations and does not appear to be responding to internal stimuli   Risk Potential Suicidal/Homicidal Ideation - No evidence of suicidal or homicidal ideation and patient does not verbalize suicidal or homicidal ideation  Risk of Violence - No evidence of risk for violence found on assessment  Risk of Self Mutilation - No evidence of risk for self mutilation found on assessment   Orientation oriented to person, place, time/date and situation   Memory recent and remote memory grossly intact   Consciousness alert and awake   Attention/Concentration attention span and concentration are age appropriate   Insight limited   Judgement partial   Muscle Strength and Gait normal muscle strength and normal muscle tone, normal gait/station and normal balance   Motor Activity no abnormal movements   Language no difficulty naming common objects, no difficulty repeating a phrase, no difficulty writing a sentence   Fund of Knowledge adequate knowledge of current events  adequate fund of knowledge regarding past history  adequate fund of knowledge regarding vocabulary      Past Psychiatric History Update:     Inpatient Psychiatric Admission Since Last Encounter:   no  Changes to Outpatient Psychiatric Treatment Team:    no  Suicide Attempt Or Self Mutilation Since Last Encounter:   no  Incidence of Violent Behavior Since Last Encounter:   no    Traumatic History Update:     New Onset of Abuse Since Last Encounter:   no  Traumatic Events Since Last Encounter:   no    Past Medical History:    Past Medical History:   Diagnosis Date    Anxiety     Depression     Hypertension     Palpitation     Panic attack      No past medical history pertinent negatives    Past Surgical History:   Procedure Laterality Date    KNEE SURGERY  08/1982     Allergies   Allergen Reactions    Other Sneezing, Throat Swelling and Tongue Swelling    Shellfish Allergy - Food Allergy Tongue Swelling     Substance Abuse History:    Social History     Substance and Sexual Activity   Alcohol Use Not Currently    Comment: socially     Social History     Substance and Sexual Activity   Drug Use Not Currently     Social History:    Social History     Socioeconomic History    Marital status: /Civil Union     Spouse name: Not on file    Number of children: Not on file    Years of education: Not on file    Highest education level: Not on file   Occupational History    Not on file   Tobacco Use    Smoking status: Former Smoker     Types: Cigars    Smokeless tobacco: Never Used   Vaping Use    Vaping Use: Never used   Substance and Sexual Activity    Alcohol use: Not Currently     Comment: socially    Drug use: Not Currently    Sexual activity: Not Currently Other Topics Concern    Not on file   Social History Narrative    Not on file     Social Determinants of Health     Financial Resource Strain:     Difficulty of Paying Living Expenses:    Food Insecurity:     Worried About Running Out of Food in the Last Year:     [address] in the Last Year:    Transportation Needs:     Lack of Transportation (Medical):  Lack of Transportation (Non-Medical):    Physical Activity:     Days of Exercise per Week:     Minutes of Exercise per Session:    Stress:     Feeling of Stress :    Social Connections:     Frequency of Communication with Friends and Family:     Frequency of Social Gatherings with Friends and Family:     Attends Christian Services:     Active Member of Clubs or Organizations:     Attends Club or Organization Meetings:     Marital Status:    Intimate Partner Violence:     Fear of Current or Ex-Partner:     Emotionally Abused:     Physically Abused:     Sexually Abused:      Family Psychiatric History:     Family History   Problem Relation Age of Onset    Hypertension Mother     COPD Mother     Migraines Mother     Stroke Father      History Review: The following portions of the patient's history were reviewed and updated as appropriate: allergies, current medications, past family history, past medical history, past social history, past surgical history and problem list     OBJECTIVE:     Vital signs in last 24 hours: There were no vitals filed for this visit  Laboratory Results:   Recent Labs (last 2 months):    Admission on 05/04/2021, Discharged on 05/12/2021   Component Date Value    Cholesterol 05/05/2021 198     Triglycerides 05/05/2021 170*    HDL, Direct 05/05/2021 35*    LDL Calculated 05/05/2021 129     Non-HDL-Chol (CHOL-HDL) 05/05/2021 163     Hemoglobin A1C 05/05/2021 5 6     EAG 05/05/2021 114     Ventricular Rate 05/10/2021 81     Atrial Rate 05/10/2021 81     MA Interval 05/10/2021 172     QRSD Interval 05/10/2021 92     QT Interval 05/10/2021 406     QTC Interval 05/10/2021 471     P Axis 05/10/2021 4     QRS Axis 05/10/2021 -7     T Wave [place] 05/10/2021 21    Admission on 05/03/2021, Discharged on 05/03/2021   Component Date Value    WBC 05/03/2021 7 28     RBC 05/03/2021 4 93     Hemoglobin 05/03/2021 15 4     Hematocrit 05/03/2021 45 1     MCV 05/03/2021 92     MCH 05/03/2021 31 2     MCHC 05/03/2021 34 1     RDW 05/03/2021 12 6     MPV 05/03/2021 11 6     Platelets 29/42/4062 217     nRBC 05/03/2021 0     Neutrophils Relative 05/03/2021 77*    Immat GRANS % 05/03/2021 0     Lymphocytes Relative 05/03/2021 17     Monocytes Relative 05/03/2021 5     Eosinophils Relative 05/03/2021 0     Basophils Relative 05/03/2021 1     Neutrophils Absolute 05/03/2021 5 51     Immature Grans Absolute 05/03/2021 0 03     Lymphocytes Absolute 05/03/2021 1 26     Monocytes Absolute 05/03/2021 0 38     Eosinophils Absolute 05/03/2021 0 02     Basophils Absolute 05/03/2021 0 08     Sodium 05/03/2021 136     Potassium 05/03/2021 3 9     Chloride 05/03/2021 101     CO2 05/03/2021 22     ANION GAP 05/03/2021 13     BUN 05/03/2021 10     Creatinine 05/03/2021 1 22     Glucose 05/03/2021 141*    Calcium 05/03/2021 8 9     eGFR 05/03/2021 65     Troponin I 05/03/2021 <0 02     Amph/Meth UR 05/03/2021 Negative     Barbiturate Ur 05/03/2021 Negative     Benzodiazepine Urine 05/03/2021 Negative     Cocaine Urine 05/03/2021 Negative     Methadone Urine 05/03/2021 Negative     Opiate Urine 05/03/2021 Negative     PCP Ur 05/03/2021 Negative     THC Urine 05/03/2021 Negative     Oxycodone Urine 05/03/2021 Negative     Ethanol Lvl 05/03/2021 <3     SARS-CoV-2 05/03/2021 Negative     Ventricular Rate 05/03/2021 94     Atrial Rate 05/03/2021 94     VA Interval 05/03/2021 174     QRSD Interval 05/03/2021 94     QT Interval 05/03/2021 398     QTC Interval 05/03/2021 497     P Axis 05/03/2021 53     QRS Axis 05/03/2021 21     T Wave Axis 05/03/2021 49     Ventricular Rate 05/03/2021 93     Atrial Rate 05/03/2021 93     [place] Interval 05/03/2021 178     QRSD Interval 05/03/2021 96     QT Interval 05/03/2021 390     QTC Interval 05/03/2021 484     P Axis 05/03/2021 49     QRS Axis 05/03/2021 12     T Wave Axis 05/03/2021 38      I have personally reviewed all pertinent laboratory/tests results  Suicide/Homicide Risk Assessment:    Risk of Harm to Self:  The following ratings are based on assessment at the time of the interview  Protective Factors: no current suicidal ideation, access to mental health treatment, being a parent, being , compliant with medications, compliant with mental health treatment, connection to own children, having a desire to be alive  Based on today's assessment, Emmie Umaña presents the following risk of harm to self: minimal    Risk of Harm to Others: The following ratings are based on assessment at the time of the interview  Based on today's assessment, Emmie Umaña presents the following risk of harm to others: none    The following interventions are recommended: contracts for safety at present - agrees to go to ED if feeling unsafe, contracts for safety at present - agrees to call Crisis Intervention Service if feeling unsafe    Medications Risks/Benefits:      Risks, Benefits And Possible Side Effects Of Medications:    Discussed risks and benefits of treatment with patient including risk of suicidality, serotonin syndrome and SIADH related to treatment with antidepressants;  Risk of induction of manic symptoms in certain patient populations and risks of misuse, abuse or dependence, sedation and respiratory depression related to treatment with benzodiazepine medications     Controlled Medication Discussion:     Emmie Umaña has been filling controlled prescriptions on time as prescribed according to [place] Prescription Drug Monitoring Program    Treatment Plan:    Due for update/Updated:   no  Last treatment plan done 6/2/21 by Dr Lorin Paul  Treatment Plan due on 12/2/21  KOLBY Ramirez 06/16/21    This note was shared with patient

## 2021-06-21 RX ORDER — ESCITALOPRAM OXALATE 10 MG/1
TABLET ORAL
Qty: 30 TABLET | Refills: 1 | OUTPATIENT
Start: 2021-06-21

## 2021-06-25 ENCOUNTER — OFFICE VISIT (OUTPATIENT)
Dept: PSYCHIATRY | Facility: CLINIC | Age: 56
End: 2021-06-25
Payer: COMMERCIAL

## 2021-06-25 DIAGNOSIS — F32.A DEPRESSED: ICD-10-CM

## 2021-06-25 DIAGNOSIS — F32.3 MAJOR DEPRESSION WITH PSYCHOTIC FEATURES (HCC): Primary | ICD-10-CM

## 2021-06-25 PROCEDURE — 99214 OFFICE O/P EST MOD 30 MIN: CPT | Performed by: NURSE PRACTITIONER

## 2021-06-25 RX ORDER — QUETIAPINE FUMARATE 50 MG/1
50 TABLET, FILM COATED ORAL
Qty: 30 TABLET | Refills: 1 | Status: SHIPPED | OUTPATIENT
Start: 2021-06-25 | End: 2021-09-27 | Stop reason: DRUGHIGH

## 2021-06-25 RX ORDER — MIRTAZAPINE 15 MG/1
15 TABLET, FILM COATED ORAL
Qty: 30 TABLET | Refills: 1 | Status: SHIPPED | OUTPATIENT
Start: 2021-06-25 | End: 2021-07-12 | Stop reason: DRUGHIGH

## 2021-06-25 RX ORDER — LORAZEPAM 1 MG/1
1 TABLET ORAL 3 TIMES DAILY
Qty: 90 TABLET | Refills: 0 | Status: SHIPPED | OUTPATIENT
Start: 2021-06-25 | End: 2021-09-14 | Stop reason: SDUPTHER

## 2021-06-25 NOTE — PSYCH
Regular Visit    Problem List Items Addressed This Visit     None      Visit Diagnoses     Depressed        Relevant Medications    mirtazapine (REMERON) 15 mg tablet    QUEtiapine (SEROquel) 50 mg tablet    LORazepam (ATIVAN) 1 mg tablet             Encounter provider KOLBY Foreman    Provider located at   [address] [address] [phone]    Recent Visits  No visits were found meeting these conditions  Showing recent visits within past 7 days and meeting all other requirements  Future Appointments  No visits were found meeting these conditions  Showing future appointments within next 150 days and meeting all other requirements       HPI     Current Outpatient Medications   Medication Sig Dispense Refill    betamethasone valerate (VALISONE) 0 1 % cream       escitalopram (LEXAPRO) 20 mg tablet Take 1 5 tablets (30 mg total) by mouth daily 45 tablet 1    hydrOXYzine HCL (ATARAX) 25 mg tablet Take 1 tablet (25 mg total) by mouth every 6 (six) hours as needed for itching or anxiety 30 tablet 0    LORazepam (ATIVAN) 1 mg tablet Take 1 tablet (1 mg total) by mouth 3 (three) times a day 90 tablet 0    mirtazapine (REMERON) 15 mg tablet Take 1 tablet (15 mg total) by mouth daily at bedtime 30 tablet 1    QUEtiapine (SEROquel) 50 mg tablet Take 1 tablet (50 mg total) by mouth daily at bedtime 30 tablet 1     No current facility-administered medications for this visit         Review of Systems    I spent 30 minutes directly with the patient during this visit      [address] [place] [address]    Name and Date of Birth:  Dari Sherman 60 y o  1965 MRN: [id number]    Date of Visit: June 25, 2021    Allergies   Allergen Reactions    Other Sneezing, Throat Swelling and Tongue Swelling    Shellfish Allergy - Food Allergy Tongue Swelling SUBJECTIVE:    Aishwarya Wells is seen today for a follow up for Major Depressive Disorder  He continues to experience ongoing symptoms since the last visit  Aishwarya Wells last seen by this provider on 06/16/2021  At that time we initiated trazodone and increased the Lexapro  Aishwarya Wells continues to have some hyper talkative tendencies during conversation today and is slightly pressured  Aishwarya Wells states that since increasing the Lexapro, his depression and anxiety have both decreased  He reports that his depression is a 4/10, 10 being the worst symptoms  Anxiety is also 4/10  He reports that he sleeps approximately 7 hours per night, but cannot stop waking up very early and believes that this is due to his sleep schedule change from when he was inpatient  He reports that sometimes he does have some fleeting suicidal thoughts, and some hopelessness  He is able to get rid of the thoughts and does not have a plan or any intent  He reports that he does have some auditory hallucinations, and states that 1 or 2 times in the past couple weeks he thought he heard a voice or noise but is indistinguishable as to what is it is saying  He is otherwise a bit perseverative and circumstantial in conversation  He is reporting headaches from the trazodone, so we will discontinue the trazodone at this time  He reports that he is continuing to take the Ativan up to 3 mg per day  We will initiate Seroquel 50 mg HS to help with sleep, hallucinations, and anxiety  If he is able to tolerate this does we will consider using Seroquel throughout the day to help with the anxiety and attempt to taper the Ativan down  He reports headache from trazodone      PLAN:  Continue   Aware of 24 hour and weekend coverage for urgent situations accessed by calling [facility] main practice number  Medication management every 2 weeks  Aware of need to follow up with family physician for medical issues    Diagnoses and all orders for this visit:    Depressed  -     mirtazapine (REMERON) 15 mg tablet; Take 1 tablet (15 mg total) by mouth daily at bedtime  -     QUEtiapine (SEROquel) 50 mg tablet; Take 1 tablet (50 mg total) by mouth daily at bedtime  -     LORazepam (ATIVAN) 1 mg tablet; Take 1 tablet (1 mg total) by mouth 3 (three) times a day        Current Outpatient Medications on File Prior to Visit   Medication Sig Dispense Refill    betamethasone valerate (VALISONE) 0 1 % cream       escitalopram (LEXAPRO) 20 mg tablet Take 1 5 tablets (30 mg total) by mouth daily 45 tablet 1    hydrOXYzine HCL (ATARAX) 25 mg tablet Take 1 tablet (25 mg total) by mouth every 6 (six) hours as needed for itching or anxiety 30 tablet 0    [DISCONTINUED] LORazepam (ATIVAN) 1 mg tablet Take 1 tablet (1 mg total) by mouth 3 (three) times a day 90 tablet 0    [DISCONTINUED] mirtazapine (REMERON) 15 mg tablet Take 1 tablet (15 mg total) by mouth daily at bedtime 30 tablet 1    [DISCONTINUED] traZODone (DESYREL) 50 mg tablet Take 1 tablet (50 mg total) by mouth daily at bedtime as needed for sleep 30 tablet 0     No current facility-administered medications on file prior to visit  Psychotherapy Provided:     Individual psychotherapy provided: Yes  Supportive counseling provided  Medications, treatment progress and treatment plan reviewed with Minal Hawthorne  Medication changes discussed with Minal Hawthorne  Medication education provided to Minal Hawthorne  Importance of medication and treatment compliance reviewed with Minal Hawthorne  Reassurance and supportive therapy provided  Crisis/safety plan discussed with Minal Hawthorne  HPI ROS Appetite Changes and Sleep:     He reports adequate number of sleep hours (7 hours), frequent awakenings, normal appetite, normal energy level   Patient denies suicidal or homicidal ideation    Review Of Systems:    HPI ROS:               Medication Side Effects:  trazodone gave him headaches     Depression (10 worst): 4/10 (Was 5/10 )   Anxiety (10 worst): 4/10 (Was 6/10)   Safety concerns (SI, HI, etc): Passive and fleeting SI, no intent or plan (Was denies)   Sleep: 6-7, frequent awakenings (Was 6-7, frequent awakenings)   Energy: adequate (Was adequate)   Appetite: good (Was adequate)     General normal    Personality no change in personality   Constitutional negative   ENT negative   Cardiovascular negative   Respiratory negative   Gastrointestinal negative   Genitourinary negative   Musculoskeletal negative   Integumentary negative   Neurological negative   Endocrine negative   Other Symptoms none, all other systems are negative     Mental Status Evaluation:    Appearance Adequate hygiene and grooming   Behavior calm and cooperative   Mood anxious and depressed  Depression Scale - 4 of 10 (0 = No depression)  Anxiety Scale - 4 of 10 (0 = No anxiety)   Speech Pressured   Affect blunted   Thought Processes Circumstantial   Thought Content Does not verbalize delusional material   Associations Tightly connected   Perceptual Disturbances Auditory hallucinations without commands   Risk Potential Suicidal/Homicidal Ideation - Suicidal Ideations without plan  Risk of Violence - No evidence of risk for violence found on assessment  Risk of Self Mutilation - No evidence of risk for self mutilation found on assessment   Orientation oriented to person, place, time/date and situation   Memory recent and remote memory grossly intact   Consciousness alert and awake   Attention/Concentration attention span and concentration appear shorter than expected for age   Insight limited   Judgement limited   Muscle Strength and Gait normal muscle strength and normal muscle tone, normal gait/station and normal balance   Motor Activity no abnormal movements   Language no difficulty naming common objects, no difficulty repeating a phrase, no difficulty writing a sentence   Fund of Knowledge adequate knowledge of current events  adequate fund of knowledge regarding past history  adequate fund of knowledge regarding vocabulary      Past Psychiatric History Update:     Inpatient Psychiatric Admission Since Last Encounter:   no  Changes to Outpatient Psychiatric Treatment Team:    no  Suicide Attempt Or Self Mutilation Since Last Encounter:   no  Incidence of Violent Behavior Since Last Encounter:   no    Traumatic History Update:     New Onset of Abuse Since Last Encounter:   no  Traumatic Events Since Last Encounter:   no    Past Medical History:    Past Medical History:   Diagnosis Date    Anxiety     Depression     Hypertension     Palpitation     Panic attack      No past medical history pertinent negatives    Past Surgical History:   Procedure Laterality Date    KNEE SURGERY  08/1982     Allergies   Allergen Reactions    Other Sneezing, Throat Swelling and Tongue Swelling    Shellfish Allergy - Food Allergy Tongue Swelling     Substance Abuse History:    Social History     Substance and Sexual Activity   Alcohol Use Not Currently    Comment: socially     Social History     Substance and Sexual Activity   Drug Use Not Currently     Social History:    Social History     Socioeconomic History    Marital status: /Civil Union     Spouse name: Not on file    Number of children: Not on file    Years of education: Not on file    Highest education level: Not on file   Occupational History    Not on file   Tobacco Use    Smoking status: Former Smoker     Types: Cigars    Smokeless tobacco: Never Used   Vaping Use    Vaping Use: Never used   Substance and Sexual Activity    Alcohol use: Not Currently     Comment: socially    Drug use: Not Currently    Sexual activity: Not Currently   Other Topics Concern    Not on file   Social History Narrative    Not on file     Social Determinants of Health     Financial Resource Strain:     Difficulty of Paying Living Expenses:    Food Insecurity:     Worried About Running Out of Food in the Last Year:     [employer]ie of [employer] in the Last Year:    Transportation Needs:     Lack of Transportation (Medical):  Lack of Transportation (Non-Medical):    Physical Activity:     Days of Exercise per Week:     Minutes of Exercise per Session:    Stress:     Feeling of Stress :    Social Connections:     Frequency of Communication with Friends and Family:     Frequency of Social Gatherings with Friends and Family:     Attends [employer] Services:     Active Member of Clubs or Organizations:     Attends Club or Organization Meetings:     Marital Status:    Intimate Partner Violence:     Fear of Current or Ex-Partner:     Emotionally Abused:     Physically Abused:     Sexually Abused:      Family Psychiatric History:     Family History   Problem Relation Age of Onset    Hypertension Mother     COPD Mother     Migraines Mother     Stroke Father      History Review: The following portions of the patient's history were reviewed and updated as appropriate: allergies, current medications, past family history, past medical history, past social history, past surgical history and problem list     OBJECTIVE:     Vital signs in last 24 hours: There were no vitals filed for this visit  Laboratory Results:   Recent Labs (last 2 months):    Admission on 05/04/2021, Discharged on 05/12/2021   Component Date Value    Cholesterol 05/05/2021 198     Triglycerides 05/05/2021 170*    HDL, Direct 05/05/2021 35*    LDL Calculated 05/05/2021 129     Non-HDL-Chol (CHOL-HDL) 05/05/2021 163     Hemoglobin A1C 05/05/2021 5 6     EAG 05/05/2021 114     Ventricular Rate 05/10/2021 81     Atrial Rate 05/10/2021 81     OH Interval 05/10/2021 172     QRSD Interval 05/10/2021 92     QT Interval 05/10/2021 406     QTC Interval 05/10/2021 471     P Axis 05/10/2021 4     QRS Axis 05/10/2021 -7     T Wave [place] 05/10/2021 21    Admission on 05/03/2021, Discharged on 05/03/2021   Component Date Value    WBC 05/03/2021 7 28     RBC 05/03/2021 4 93  Hemoglobin 05/03/2021 15 4     Hematocrit 05/03/2021 45 1     MCV 05/03/2021 92     MCH 05/03/2021 31 2     MCHC 05/03/2021 34 1     RDW 05/03/2021 12 6     MPV 05/03/2021 11 6     Platelets 99/29/1452 217     nRBC 05/03/2021 0     Neutrophils Relative 05/03/2021 77*    Immat GRANS % 05/03/2021 0     Lymphocytes Relative 05/03/2021 17     Monocytes Relative 05/03/2021 5     Eosinophils Relative 05/03/2021 0     Basophils Relative 05/03/2021 1     Neutrophils Absolute 05/03/2021 5 51     Immature Grans Absolute 05/03/2021 0 03     Lymphocytes Absolute 05/03/2021 1 26     Monocytes Absolute 05/03/2021 0 38     Eosinophils Absolute 05/03/2021 0 02     Basophils Absolute 05/03/2021 0 08     Sodium 05/03/2021 136     Potassium 05/03/2021 3 9     Chloride 05/03/2021 101     CO2 05/03/2021 22     ANION GAP 05/03/2021 13     BUN 05/03/2021 10     Creatinine 05/03/2021 1 22     Glucose 05/03/2021 141*    Calcium 05/03/2021 8 9     eGFR 05/03/2021 65     Troponin I 05/03/2021 <0 02     Amph/Meth UR 05/03/2021 Negative     Barbiturate Ur 05/03/2021 Negative     Benzodiazepine Urine 05/03/2021 Negative     Cocaine Urine 05/03/2021 Negative     Methadone Urine 05/03/2021 Negative     Opiate Urine 05/03/2021 Negative     PCP Ur 05/03/2021 Negative     THC Urine 05/03/2021 Negative     Oxycodone Urine 05/03/2021 Negative     Ethanol Lvl 05/03/2021 <3     SARS-CoV-2 05/03/2021 Negative     Ventricular Rate 05/03/2021 94     Atrial Rate 05/03/2021 94     NV Interval 05/03/2021 174     QRSD Interval 05/03/2021 94     QT Interval 05/03/2021 398     QTC Interval 05/03/2021 497     P Axis 05/03/2021 53     QRS Axis 05/03/2021 21     T Wave Axis 05/03/2021 49     Ventricular Rate 05/03/2021 93     Atrial Rate 05/03/2021 93     NV Interval 05/03/2021 178     QRSD Interval 05/03/2021 96     QT Interval 05/03/2021 390     QTC Interval 05/03/2021 484     P Axis 05/03/2021 49     QRS Axis 05/03/2021 12     T Wave Axis 05/03/2021 38      I have personally reviewed all pertinent laboratory/tests results  Suicide/Homicide Risk Assessment:    Risk of Harm to Self:  The following ratings are based on assessment at the time of the interview  Protective Factors: no current suicidal ideation, access to mental health treatment, compliant with medications, compliant with mental health treatment, having a desire to be alive  Based on today's assessment, Ana Bowman presents the following risk of harm to self: low    Risk of Harm to Others: The following ratings are based on assessment at the time of the interview  Protective Factors: no current homicidal ideation  Based on today's assessment, Ana Bowman presents the following risk of harm to others: none    The following interventions are recommended: contracts for safety at present - agrees to go to ED if feeling unsafe, return in 2 weeks for reassessment, contracts for safety at present - agrees to call Crisis Intervention Service if feeling unsafe    Medications Risks/Benefits:      Risks, Benefits And Possible Side Effects Of Medications:    Discussed risks and benefits of treatment with patient including risk of suicidality, serotonin syndrome and SIADH related to treatment with antidepressants; Risk of induction of manic symptoms in certain patient populations, risk of parkinsonian symptoms, metabolic syndrome, tardive dyskinesia and neuroleptic malignant syndrome related to treatment with antipsychotic medications and risks of misuse, abuse or dependence, sedation and respiratory depression related to treatment with benzodiazepine medications     Controlled Medication Discussion:     Ana Bowman has been filling controlled prescriptions on time as prescribed according to [facility] Prescription Drug Monitoring Program    Treatment Plan:    Due for update/Updated:   yes  Last treatment plan done 6/25/21 by Panfilo Babb    Treatment Plan due on 12/25/21  KOLBY Marte 06/25/21    This note was shared with patient

## 2021-06-25 NOTE — BH TREATMENT PLAN
TREATMENT PLAN (Medication Management Only)        [facility]    Name and Date of Birth:  Ginna Brady 60 y o  1965  Date of Treatment Plan: June 25, 2021  Diagnosis/Diagnoses:    1  Major depression with psychotic features ([address] )    2  Depressed      Strengths/Personal Resources for Self-Care: taking medications as prescribed  Area/Areas of need (in own words): anxiety symptoms  1  Long Term Goal: continue improvement in acceptable anxiety level  Target Date:6 months - 12/25/2021  Person/Persons responsible for completion of goal: Shameka Tubbs  2  Short Term Objective (s) - How will we reach this goal?:   A  Provider new recommended medication/dosage changes and/or continue medication(s): continue current medications as prescribed Lexjen Remeron, Seroquel  Elester Jemima all scheduled appointments  C  Eat a healthy diet   Target Date:6 months - 12/25/2021  Person/Persons Responsible for Completion of Goal: Shameka Tubbs  Progress Towards Goals: continuing treatment  Treatment Modality: medication management every 2 weeks  Review due 180 days from date of this plan: 6 months - 12/25/2021  Expected length of service: ongoing treatment  My Physician/PA/NP and I have developed this plan together and I agree to work on the goals and objectives  I understand the treatment goals that were developed for my treatment      Treatment Plan done but not signed at time of office visit due to:  Plan reviewed by phone or in person  and verbal consent given due to Magi social olge

## 2021-07-01 ENCOUNTER — DOCUMENTATION (OUTPATIENT)
Dept: PSYCHIATRY | Facility: CLINIC | Age: 56
End: 2021-07-01

## 2021-07-01 NOTE — PROGRESS NOTES
Noni Perez contacted this provider via email on 6/30/21 requesting I complete [place] paperwork for his employment  He is hoping to have more time to stabilize his mood and anxiety prior to returning to work  Paperwork was completed and faxed as requested on 7/1/21

## 2021-07-12 ENCOUNTER — OFFICE VISIT (OUTPATIENT)
Dept: PSYCHIATRY | Facility: CLINIC | Age: 56
End: 2021-07-12
Payer: COMMERCIAL

## 2021-07-12 DIAGNOSIS — F41.1 GAD (GENERALIZED ANXIETY DISORDER): ICD-10-CM

## 2021-07-12 DIAGNOSIS — F32.3 MAJOR DEPRESSION WITH PSYCHOTIC FEATURES (HCC): Primary | ICD-10-CM

## 2021-07-12 PROCEDURE — 99214 OFFICE O/P EST MOD 30 MIN: CPT | Performed by: NURSE PRACTITIONER

## 2021-07-12 PROCEDURE — 90833 PSYTX W PT W E/M 30 MIN: CPT | Performed by: NURSE PRACTITIONER

## 2021-07-12 RX ORDER — MIRTAZAPINE 30 MG/1
30 TABLET, FILM COATED ORAL
Qty: 30 TABLET | Refills: 2 | Status: SHIPPED | OUTPATIENT
Start: 2021-07-12 | End: 2021-08-25 | Stop reason: SDUPTHER

## 2021-07-12 NOTE — PSYCH
Regular Visit    Problem List Items Addressed This Visit     None      Visit Diagnoses     Major depression with psychotic features ([facility] [address] )    -  Primary    Relevant Medications    mirtazapine (REMERON) 30 mg tablet    MIKA (generalized anxiety disorder)        Relevant Medications    mirtazapine (REMERON) 30 mg tablet             Encounter provider KOLBY Alexandra    Provider located at   [address] [id number] [phone]    Recent Visits  No visits were found meeting these conditions  Showing recent visits within past 7 days and meeting all other requirements  Future Appointments  No visits were found meeting these conditions  Showing future appointments within next 150 days and meeting all other requirements       HPI     Current Outpatient Medications   Medication Sig Dispense Refill    betamethasone valerate (VALISONE) 0 1 % cream       escitalopram (LEXAPRO) 20 mg tablet Take 1 5 tablets (30 mg total) by mouth daily 45 tablet 1    hydrOXYzine HCL (ATARAX) 25 mg tablet Take 1 tablet (25 mg total) by mouth every 6 (six) hours as needed for itching or anxiety 30 tablet 0    LORazepam (ATIVAN) 1 mg tablet Take 1 tablet (1 mg total) by mouth 3 (three) times a day 90 tablet 0    mirtazapine (REMERON) 30 mg tablet Take 1 tablet (30 mg total) by mouth daily at bedtime 30 tablet 2    QUEtiapine (SEROquel) 50 mg tablet Take 1 tablet (50 mg total) by mouth daily at bedtime 30 tablet 1     No current facility-administered medications for this visit         Review of Systems    I spent 30 minutes directly with the patient during this visit      [address] [place] [address]    Name and Date of Birth:  Maryann Liao 60 y o  1965 MRN: [id number]    Date of Visit: July 12, 2021    Allergies   Allergen Reactions    Other Sneezing, Throat Swelling and Tongue Swelling    Shellfish Allergy - Food Allergy Tongue Swelling     SUBJECTIVE:    Danyel Cross is seen today for a follow up for Major Depressive Disorder and anxiety  He continues to improve slowly since the last visit  Danyel Cross last seen by this provider on 6/25/21  At that time we initiated Seroquel 50 mg HS  He reports today that he is having a decrease in the amount of fear of having a panic attack  He finds that he is less worried throughout the day, but he continues to have anxiety and worry about his future  He tends to focus on how he was before he started having anxiety, instead of focusing on the gains he has made since he started having the anxiety  He reports that he is going back to work on July 26, 2021  He has already begun working from home, even though she is still on leave  He reports that he sleeps 7-8 hours per night  He does state that when he wakes in the morning, is when he has his worst anxiety  We discussed using the Atarax at that time to help alleviate anxiety in the morning  He rates his depression at 5/10, anxiety at 5/10 as well, 10 being worst symptoms  He reports having guilt, and feels bad that his wife is working so hard while he is sitting at [employer]  He reports a good appetite, and states that he has fatigue on exertion  We discussed ways to build up his strength, and is open to these ideas  He sees a therapist at [facility], and has a therapy appointment at [facility] is temp on September 7th  He denies suicidal thoughts, homicidal thoughts  He reports that his auditory hallucinations have ceased and is happy with the Seroquel addition  He does report that he wakes at night and continues to have some depression that he is hopefult can be alleviated  He does not display signs or symptoms of shweta or psychosis at this time  He denies any side effects from current psychiatric medications  PLAN:  Continue Lexapro 30 mg p o  daily  Continue Atarax 25 mg p o  q 6 p r n  Continue Ativan 1 mg p o  t i d  p r n  Continue Seroquel 50 mg p o  HS  Increase Remeron to 30 mg p o  hs  Follow-up in 6 weeks  He will call sooner if issues or concerns arise prior to scheduled appointment    Aware of 24 hour and weekend coverage for urgent situations accessed by calling [facility] main practice number  Continue psychotherapy with therapist  Medication management every 6 weeks  Aware of need to follow up with family physician for medical issues    Diagnoses and all orders for this visit:    Major depression with psychotic features ([facility] [address] )  -     mirtazapine (REMERON) 30 mg tablet; Take 1 tablet (30 mg total) by mouth daily at bedtime    MIKA (generalized anxiety disorder)        Current Outpatient Medications on File Prior to Visit   Medication Sig Dispense Refill    betamethasone valerate (VALISONE) 0 1 % cream       escitalopram (LEXAPRO) 20 mg tablet Take 1 5 tablets (30 mg total) by mouth daily 45 tablet 1    hydrOXYzine HCL (ATARAX) 25 mg tablet Take 1 tablet (25 mg total) by mouth every 6 (six) hours as needed for itching or anxiety 30 tablet 0    LORazepam (ATIVAN) 1 mg tablet Take 1 tablet (1 mg total) by mouth 3 (three) times a day 90 tablet 0    QUEtiapine (SEROquel) 50 mg tablet Take 1 tablet (50 mg total) by mouth daily at bedtime 30 tablet 1    [DISCONTINUED] mirtazapine (REMERON) 15 mg tablet Take 1 tablet (15 mg total) by mouth daily at bedtime 30 tablet 1     No current facility-administered medications on file prior to visit  Psychotherapy Provided:     Individual psychotherapy provided: Yes  Counseling was provided during the session today for 16 minutes  Supportive counseling provided  Medication changes discussed with [facility]  Medication education provided to [facility]  Recent stressor including job stress and ongoing anxiety discussed with [facility]  Coping skills reviewed with [facility]     Educated on importance of medication and treatment compliance  Reassurance and supportive therapy provided  Crisis/safety plan discussed with Noni Perez  HPI ROS Appetite Changes and Sleep:     He reports increase in number of sleep hours (7-8 hours), adequate appetite, improving energy   Patient denies suicidal or homicidal ideation    Review Of Systems:    HPI ROS:               Medication Side Effects:  denies at this time     Depression (10 worst): 5/10 (Was 4/10)   Anxiety (10 worst): 5/10 (Was 4/10)   Safety concerns (SI, HI, etc): denies (Was passive, fleeting, no intent)   Sleep: 7-8 hrs/night (Was 6-7 hrs/night)   Energy: adequate (Was adequate)   Appetite: good (Was good)     General normal    Personality no change in personality   Constitutional negative   ENT negative   Cardiovascular negative   Respiratory dyspnea on exertion   Gastrointestinal negative   Genitourinary negative   Musculoskeletal knee pain   Integumentary negative   Neurological negative   Endocrine negative   Other Symptoms none, all other systems are negative     Mental Status Evaluation:    Appearance Adequate hygiene and grooming   Behavior calm and cooperative   Mood anxious  Depression Scale - 5 of 10 (0 = No depression)  Anxiety Scale - 5 of 10 (0 = No anxiety)   Speech Normal rate and volume   Affect mood-congruent   Thought Processes Goal directed and coherent   Thought Content Does not verbalize delusional material   Associations Tightly connected   Perceptual Disturbances Denies hallucinations and does not appear to be responding to internal stimuli   Risk Potential Suicidal/Homicidal Ideation - No evidence of suicidal or homicidal ideation and patient does not verbalize suicidal or homicidal ideation  Risk of Violence - No evidence of risk for violence found on assessment  Risk of Self Mutilation - No evidence of risk for self mutilation found on assessment   Orientation oriented to person, place, time/date and situation   Memory recent and remote memory grossly intact   Consciousness alert and awake   Attention/Concentration attention span and concentration are age appropriate   Insight improving   Judgement improving   Muscle Strength and Gait normal muscle strength and normal muscle tone, normal gait/station and normal balance   Motor Activity no abnormal movements   Language no difficulty naming common objects, no difficulty repeating a phrase, no difficulty writing a sentence   Fund of Knowledge adequate knowledge of current events  adequate fund of knowledge regarding past history  adequate fund of knowledge regarding vocabulary      Past Psychiatric History Update:     Inpatient Psychiatric Admission Since Last Encounter:   no  Changes to Outpatient Psychiatric Treatment Team:    no  Suicide Attempt Or Self Mutilation Since Last Encounter:   no  Incidence of Violent Behavior Since Last Encounter:   no    Traumatic History Update:     New Onset of Abuse Since Last Encounter:   no  Traumatic Events Since Last Encounter:   no    Past Medical History:    Past Medical History:   Diagnosis Date    Anxiety     Depression     Hypertension     Palpitation     Panic attack      No past medical history pertinent negatives    Past Surgical History:   Procedure Laterality Date    KNEE SURGERY  08/1982     Allergies   Allergen Reactions    Other Sneezing, Throat Swelling and Tongue Swelling    Shellfish Allergy - Food Allergy Tongue Swelling     Substance Abuse History:    Social History     Substance and Sexual Activity   Alcohol Use Not Currently    Comment: socially     Social History     Substance and Sexual Activity   Drug Use Not Currently     Social History:    Social History     Socioeconomic History    Marital status: /Civil Union     Spouse name: Not on file    Number of children: Not on file    Years of education: Not on file    Highest education level: Not on file   Occupational History    Not on file   Tobacco Use    Smoking status: Former Smoker Types: Cigars    Smokeless tobacco: Never Used   Vaping Use    Vaping Use: Never used   Substance and Sexual Activity    Alcohol use: Not Currently     Comment: socially    Drug use: Not Currently    Sexual activity: Not Currently   Other Topics Concern    Not on file   Social History Narrative    Not on file     Social Determinants of Health     Financial Resource Strain:     Difficulty of Paying Living Expenses:    Food Insecurity:     Worried About Running Out of Food in the Last Year:     Ran Out of Food in the Last Year:    Transportation Needs:     Lack of Transportation (Medical):  Lack of Transportation (Non-Medical):    Physical Activity:     Days of Exercise per Week:     Minutes of Exercise per Session:    Stress:     Feeling of Stress :    Social Connections:     Frequency of Communication with Friends and Family:     Frequency of Social Gatherings with Friends and Family:     Attends Bahai Services:     Active Member of Clubs or Organizations:     Attends Club or Organization Meetings:     Marital Status:    Intimate Partner Violence:     Fear of Current or Ex-Partner:     Emotionally Abused:     Physically Abused:     Sexually Abused:      Family Psychiatric History:     Family History   Problem Relation Age of Onset    Hypertension Mother     COPD Mother     Migraines Mother     Stroke Father      History Review: The following portions of the patient's history were reviewed and updated as appropriate: allergies, current medications, past family history, past medical history, past social history, past surgical history and problem list     OBJECTIVE:     Vital signs in last 24 hours: There were no vitals filed for this visit  Laboratory Results:   Recent Labs (last 2 months):   No visits with results within 2 Month(s) from this visit     Latest known visit with results is:   Admission on 05/04/2021, Discharged on 05/12/2021   Component Date Value    Cholesterol 05/05/2021 198     Triglycerides 05/05/2021 170*    HDL, Direct 05/05/2021 35*    LDL Calculated 05/05/2021 129     Non-HDL-Chol (CHOL-HDL) 05/05/2021 163     Hemoglobin A1C 05/05/2021 5 6     EAG 05/05/2021 114     Ventricular Rate 05/10/2021 81     Atrial Rate 05/10/2021 81     WI Interval 05/10/2021 172     QRSD Interval 05/10/2021 92     QT Interval 05/10/2021 406     QTC Interval 05/10/2021 471     P Axis 05/10/2021 4     QRS Axis 05/10/2021 -7     T Wave Axis 05/10/2021 21      I have personally reviewed all pertinent laboratory/tests results  Suicide/Homicide Risk Assessment:    Risk of Harm to Self:  The following ratings are based on assessment at the time of the interview  Protective Factors: no current suicidal ideation, access to mental health treatment, being , compliant with medications, compliant with mental health treatment, having a desire to be alive  Based on today's assessment, Butler Lanes presents the following risk of harm to self: low    Risk of Harm to Others: The following ratings are based on assessment at the time of the interview  Protective Factors: no current homicidal ideation  Based on today's assessment, Butler Lanes presents the following risk of harm to others: none    The following interventions are recommended: contracts for safety at present - agrees to go to ED if feeling unsafe, contracts for safety at present - agrees to call Crisis Intervention Service if feeling unsafe    Medications Risks/Benefits:      Risks, Benefits And Possible Side Effects Of Medications:    Discussed risks and benefits of treatment with patient including risk of suicidality, serotonin syndrome and SIADH related to treatment with antidepressants;  Risk of induction of manic symptoms in certain patient populations, risk of parkinsonian symptoms, metabolic syndrome, tardive dyskinesia and neuroleptic malignant syndrome related to treatment with antipsychotic medications and risks of misuse, abuse or dependence, sedation and respiratory depression related to treatment with benzodiazepine medications     Controlled Medication Discussion:     Cherlynn Lefort has been filling controlled prescriptions on time as prescribed according to [place] Prescription Drug Monitoring Program    Treatment Plan:    Due for update/Updated:   no  Last treatment plan done 6/25/21 by KOLBY Dixon  Treatment Plan due on 12/25/21  KOLBY Vasquez 07/12/21    This note was shared with patient

## 2021-07-19 ENCOUNTER — TELEPHONE (OUTPATIENT)
Dept: PSYCHIATRY | Facility: CLINIC | Age: 56
End: 2021-07-19

## 2021-07-19 NOTE — TELEPHONE ENCOUNTER
Lm for patient to call the office if he is having problems    He has been sending the provider emails

## 2021-07-20 ENCOUNTER — TELEPHONE (OUTPATIENT)
Dept: PSYCHIATRY | Facility: CLINIC | Age: 56
End: 2021-07-20

## 2021-07-20 NOTE — TELEPHONE ENCOUNTER
Patient called and would like to have another week off of work  Does not feel that he is ready to go back yet    Please advise

## 2021-07-23 ENCOUNTER — TELEPHONE (OUTPATIENT)
Dept: PSYCHIATRY | Facility: CLINIC | Age: 56
End: 2021-07-23

## 2021-07-23 NOTE — TELEPHONE ENCOUNTER
Patient contacted this provider via email to help complete forms for FMLA leave of absence  I contacted him this morning requesting more information  Will await his response prior to sending forms to his employer

## 2021-08-04 DIAGNOSIS — F32.A DEPRESSED: ICD-10-CM

## 2021-08-04 NOTE — TELEPHONE ENCOUNTER
Left him a message to call back  I did receive an email from him that stated that he went to a new CRNP for a 2nd opinion  I left him a message find out if he is going to be following up with the new CRNP or myself, as I would prefer she do the refills if he continues to follow up with her instead  Awaiting call back prior to refilling the ativan

## 2021-08-19 DIAGNOSIS — F39 MOOD DISORDER (HCC): ICD-10-CM

## 2021-08-19 RX ORDER — ESCITALOPRAM OXALATE 20 MG/1
30 TABLET ORAL DAILY
Qty: 45 TABLET | Refills: 1 | Status: SHIPPED | OUTPATIENT
Start: 2021-08-19 | End: 2021-09-27 | Stop reason: SDUPTHER

## 2021-08-22 RX ORDER — LORAZEPAM 1 MG/1
1 TABLET ORAL 3 TIMES DAILY
Qty: 90 TABLET | Refills: 0 | OUTPATIENT
Start: 2021-08-22

## 2021-08-25 ENCOUNTER — TELEMEDICINE (OUTPATIENT)
Dept: PSYCHIATRY | Facility: CLINIC | Age: 56
End: 2021-08-25
Payer: COMMERCIAL

## 2021-08-25 DIAGNOSIS — F32.3 MAJOR DEPRESSION WITH PSYCHOTIC FEATURES (HCC): ICD-10-CM

## 2021-08-25 DIAGNOSIS — F32.A DEPRESSED: ICD-10-CM

## 2021-08-25 PROCEDURE — 99214 OFFICE O/P EST MOD 30 MIN: CPT | Performed by: NURSE PRACTITIONER

## 2021-08-25 RX ORDER — MIRTAZAPINE 30 MG/1
30 TABLET, FILM COATED ORAL
Qty: 30 TABLET | Refills: 2 | Status: SHIPPED | OUTPATIENT
Start: 2021-08-25 | End: 2021-11-15 | Stop reason: SDUPTHER

## 2021-08-26 NOTE — PSYCH
Virtual Regular Visit    Verification of patient location:    Patient is located in the following state in which I hold an active license [place]    Problem List Items Addressed This Visit     None      Visit Diagnoses     Major depression with psychotic features ([facility] [address] )        Relevant Medications    mirtazapine (REMERON) 30 mg tablet             Encounter provider KOLBY Palmer    Provider located at   [address] [address] [phone]    Recent Visits  No visits were found meeting these conditions  Showing recent visits within past 7 days and meeting all other requirements  Future Appointments  No visits were found meeting these conditions  Showing future appointments within next 150 days and meeting all other requirements         The patient was identified by name and date of birth  Alexandra Duran was informed that this is a telemedicine visit and that the visit is being conducted through[employer] and patient was informed that this is a secure, HIPAA-compliant platform  He agrees to proceed     My office door was closed  No one else was in the room  He acknowledged consent and understanding of privacy and security of the video platform  The patient has agreed to participate and understands they can discontinue the visit at any time  Patient is aware this is a billable service       HPI     Current Outpatient Medications   Medication Sig Dispense Refill    betamethasone valerate (VALISONE) 0 1 % cream       escitalopram (LEXAPRO) 20 mg tablet Take 1 5 tablets (30 mg total) by mouth daily 45 tablet 1    hydrOXYzine HCL (ATARAX) 25 mg tablet Take 1 tablet (25 mg total) by mouth every 6 (six) hours as needed for itching or anxiety 30 tablet 0    LORazepam (ATIVAN) 1 mg tablet Take 1 tablet (1 mg total) by mouth 3 (three) times a day 90 tablet 0    mirtazapine (REMERON) 30 mg tablet Take 1 tablet (30 mg total) by mouth daily at bedtime 30 tablet 2    QUEtiapine (SEROquel) 50 mg tablet Take 1 tablet (50 mg total) by mouth daily at bedtime 30 tablet 1     No current facility-administered medications for this visit  Review of Systems  Video Exam    There were no vitals filed for this visit  Physical Exam   As a result of this visit, I have referred the patient for further respiratory evaluation  No    I spent 30 minutes directly with the patient during this visit  VIRTUAL VISIT DISCLAIMER    Anca Lennon acknowledges that he has consented to an online visit or consultation  He understands that the online visit is based solely on information provided by him, and that, in the absence of a face-to-face physical evaluation by the physician, the diagnosis he receives is both limited and provisional in terms of accuracy and completeness  This is not intended to replace a full medical face-to-face evaluation by the physician  Anca Lennon understands and accepts these terms  MEDICATION MANAGEMENT NOTE        [facility]    Name and Date of Birth:  Anca Lennon 60 y o  1965 MRN: [id number]    Date of Visit: August 26, 2021    Allergies   Allergen Reactions    Other Sneezing, Throat Swelling and Tongue Swelling    Shellfish Allergy - Food Allergy Tongue Swelling     SUBJECTIVE:    Luther Negrete is seen today for a follow up for Major Depressive Disorder  He continues to improve slowly since the last visit  Luther Negrete was seen virtually today  Luthercarol ann Negrete last seen by this provider on 7/12/21  Since then, Luther Negrete has also seen another CRNP at a separate practice who also prescribed him medication  I discussed with Luther Negrete the need to remain with 1 provider to reduce the chances of overmedicating or creating any confusion with his medications    He was understanding of this, and stated that he would like to continue with the appointment and he would make a decision regarding his provider at a later date  He has been back to work since July, which he states has been going well  He said that he has felt much better and his depression and anxiety have improved  He reports a decrease in jitteriness and light headedness, is able to relax and concentrate at night, stating his anxiety is 3/10  He has not had a panic attack since June and is becoming less concerned about having one  He rates his depression at 4/10  He is sleeping better, and is getting 7-8 hours/night  His appetite is good, states he has been eating more than he would like  We discussed the side effects of the remeron and coping strategies to reduce the side effect  He denies any suicidal or homicidal ideation  He denies auditory and visual hallucinations  He does not endorse symptoms of shweta or psychosis at this time  He reports that his other provider suggested he take his seroquel BID instead of just at HS  We will continue medications as ordered and follow up in 6 weeks  He reports increased appetite  Remeron causes hunger  PLAN:  Continue his medications as ordered:  Seroquel - taking 50mg PO BID  Lexapro 30mg PO daily  Atarax 25mg PO Q6 PRN  Ativan 1mg PO TID  Remeron 30mg PO HS  Aware of 24 hour and weekend coverage for urgent situations accessed by calling [facility] main practice number  Continue psychotherapy with therapist  Medication management every 6 weeks  Aware of need to follow up with family physician for medical issues    Diagnoses and all orders for this visit:    Major depression with psychotic features ([facility] [address] )  -     mirtazapine (REMERON) 30 mg tablet;  Take 1 tablet (30 mg total) by mouth daily at bedtime        Current Outpatient Medications on File Prior to Visit   Medication Sig Dispense Refill    betamethasone valerate (VALISONE) 0 1 % cream       escitalopram (LEXAPRO) 20 mg tablet Take 1 5 tablets (30 mg total) by mouth daily 45 tablet 1    hydrOXYzine HCL (ATARAX) 25 mg tablet Take 1 tablet (25 mg total) by mouth every 6 (six) hours as needed for itching or anxiety 30 tablet 0    LORazepam (ATIVAN) 1 mg tablet Take 1 tablet (1 mg total) by mouth 3 (three) times a day 90 tablet 0    QUEtiapine (SEROquel) 50 mg tablet Take 1 tablet (50 mg total) by mouth daily at bedtime 30 tablet 1     No current facility-administered medications on file prior to visit  Psychotherapy Provided:     Individual psychotherapy provided: Yes  Supportive counseling provided  Importance of medication and treatment compliance reviewed with Danyel Cross  Importance of follow up with family physician for medical issues reviewed with Danyel Cross  Reassurance and supportive therapy provided  Crisis/safety plan discussed with Daynel Cross  HPI ROS Appetite Changes and Sleep:     He reports adequate number of sleep hours (7-8 hours), increased appetite, normal energy level  Patient denies suicidal or homicidal ideation    Review Of Systems:    HPI ROS:               Medication Side Effects:   Increased appetite from remeron     Depression (10 worst): 4/10 (Was 5/10)   Anxiety (10 worst): 3/10 (Was 5/10)   Safety concerns (SI, HI, etc): denies (Was denies)   Sleep: 7-8 hrs/night (Was 7-8 hrs/night)   Energy: adequate (Was adequate)   Appetite: increased (Was good)     General normal    Personality no change in personality   Constitutional negative   ENT negative   Cardiovascular negative   Respiratory negative   Gastrointestinal negative   Genitourinary negative   Musculoskeletal negative   Integumentary negative   Neurological negative   Endocrine negative   Other Symptoms none, all other systems are negative     Mental Status Evaluation:    Appearance Adequate hygiene and grooming   Behavior restless and fidgety   Mood euthymic  Depression Scale - 4 of 10 (0 = No depression)  Anxiety Scale - 3 of 10 (0 = No anxiety)   Speech Normal rate and volume   Affect blunted Thought Processes Goal directed and coherent   Thought Content Does not verbalize delusional material   Associations Tightly connected   Perceptual Disturbances Denies hallucinations and does not appear to be responding to internal stimuli   Risk Potential Suicidal/Homicidal Ideation - No evidence of suicidal or homicidal ideation and patient does not verbalize suicidal or homicidal ideation  Risk of Violence - No evidence of risk for violence found on assessment  Risk of Self Mutilation - No evidence of risk for self mutilation found on assessment   Orientation oriented to person, place, time/date and situation   Memory recent and remote memory grossly intact   Consciousness alert and awake   Attention/Concentration attention span and concentration are age appropriate   Insight partial   Judgement partial   Muscle Strength and Gait normal muscle strength and normal muscle tone, normal gait/station and normal balance   Motor Activity no abnormal movements   Language no difficulty naming common objects, no difficulty repeating a phrase, no difficulty writing a sentence   Fund of Knowledge adequate knowledge of current events  adequate fund of knowledge regarding past history  adequate fund of knowledge regarding vocabulary      Past Psychiatric History Update:     Inpatient Psychiatric Admission Since Last Encounter:   no  Changes to Outpatient Psychiatric Treatment Team:    no  Suicide Attempt Or Self Mutilation Since Last Encounter:   no  Incidence of Violent Behavior Since Last Encounter:   no    Traumatic History Update:     New Onset of Abuse Since Last Encounter:   no  Traumatic Events Since Last Encounter:   no    Past Medical History:    Past Medical History:   Diagnosis Date    Anxiety     Depression     Hypertension     Palpitation     Panic attack      No past medical history pertinent negatives    Past Surgical History:   Procedure Laterality Date    KNEE SURGERY  08/1982     Allergies   Allergen Reactions    Other Sneezing, Throat Swelling and Tongue Swelling    Shellfish Allergy - Food Allergy Tongue Swelling     Substance Abuse History:    Social History     Substance and Sexual Activity   Alcohol Use Not Currently    Comment: socially     Social History     Substance and Sexual Activity   Drug Use Not Currently     Social History:    Social History     Socioeconomic History    Marital status: /Civil Union     Spouse name: Not on file    Number of children: Not on file    Years of education: Not on file    Highest education level: Not on file   Occupational History    Not on file   Tobacco Use    Smoking status: Former Smoker     Types: Cigars    Smokeless tobacco: Never Used   Vaping Use    Vaping Use: Never used   Substance and Sexual Activity    Alcohol use: Not Currently     Comment: socially    Drug use: Not Currently    Sexual activity: Not Currently   Other Topics Concern    Not on file   Social History Narrative    Not on file     Social Determinants of Health     Financial Resource Strain:     Difficulty of Paying Living Expenses:    Food Insecurity:     Worried About Running Out of Food in the Last Year:     Ran Out of Food in the Last Year:    Transportation Needs:     Lack of Transportation (Medical):      Lack of Transportation (Non-Medical):    Physical Activity:     Days of Exercise per Week:     Minutes of Exercise per Session:    Stress:     Feeling of Stress :    Social Connections:     Frequency of Communication with Friends and Family:     Frequency of Social Gatherings with Friends and Family:     Attends Hinduism Services:     Active Member of Clubs or Organizations:     Attends Club or Organization Meetings:     Marital Status:    Intimate Partner Violence:     Fear of Current or Ex-Partner:     Emotionally Abused:     Physically Abused:     Sexually Abused:      Family Psychiatric History:     Family History   Problem Relation Age of Onset  Hypertension Mother     COPD Mother     Migraines Mother     Stroke Father      History Review: The following portions of the patient's history were reviewed and updated as appropriate: allergies, current medications, past family history, past medical history, past social history, past surgical history and problem list     OBJECTIVE:     Vital signs in last 24 hours: There were no vitals filed for this visit  Laboratory Results:   Recent Labs (last 2 months):   No visits with results within 2 Month(s) from this visit  Latest known visit with results is:   Admission on 05/04/2021, Discharged on 05/12/2021   Component Date Value    Cholesterol 05/05/2021 198     Triglycerides 05/05/2021 170*    HDL, Direct 05/05/2021 35*    LDL Calculated 05/05/2021 129     Non-HDL-Chol (CHOL-HDL) 05/05/2021 163     Hemoglobin A1C 05/05/2021 5 6     EAG 05/05/2021 114     Ventricular Rate 05/10/2021 81     Atrial Rate 05/10/2021 81     HI Interval 05/10/2021 172     QRSD Interval 05/10/2021 92     QT Interval 05/10/2021 406     QTC Interval 05/10/2021 471     P Axis 05/10/2021 4     QRS Axis 05/10/2021 -7     T Wave Axis 05/10/2021 21      I have personally reviewed all pertinent laboratory/tests results  Suicide/Homicide Risk Assessment:    Risk of Harm to Self:  The following ratings are based on assessment at the time of the interview  Protective Factors: no current suicidal ideation, access to mental health treatment, compliant with medications, compliant with mental health treatment, having a desire to be alive  Based on today's assessment, Caleb Martins presents the following risk of harm to self: minimal    Risk of Harm to Others: The following ratings are based on assessment at the time of the interview  Historical Risk Factors include: none    Based on today's assessment, Caleb Martins presents the following risk of harm to others: none    The following interventions are recommended: no intervention changes needed    Medications Risks/Benefits:      Risks, Benefits And Possible Side Effects Of Medications:    Discussed risks and benefits of treatment with patient including risk of suicidality, serotonin syndrome and SIADH related to treatment with antidepressants; Risk of induction of manic symptoms in certain patient populations, risk of parkinsonian symptoms, metabolic syndrome, tardive dyskinesia and neuroleptic malignant syndrome related to treatment with antipsychotic medications and risks of misuse, abuse or dependence, sedation and respiratory depression related to treatment with benzodiazepine medications     Controlled Medication Discussion:     Fanny Katz has been filling controlled prescriptions on time as prescribed according to [address] [place] [address] Monitoring Program    Treatment Plan:    Due for update/Updated:   no  Last treatment plan done 6/25/21 by KOLBY Gonzalez  Treatment Plan due on 12/25/21  KOLBY Bianchi 08/26/21    This note was shared with patient

## 2021-09-07 ENCOUNTER — TELEMEDICINE (OUTPATIENT)
Dept: BEHAVIORAL/MENTAL HEALTH CLINIC | Facility: CLINIC | Age: 56
End: 2021-09-07
Payer: COMMERCIAL

## 2021-09-07 DIAGNOSIS — F33.1 MAJOR DEPRESSIVE DISORDER, RECURRENT EPISODE, MODERATE (HCC): Primary | ICD-10-CM

## 2021-09-07 PROCEDURE — 90792 PSYCH DIAG EVAL W/MED SRVCS: CPT | Performed by: SOCIAL WORKER
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
## 2021-09-07 NOTE — BH TREATMENT PLAN
Efren Salinas  1965       Date of Initial Treatment Plan: 9/7/21  Date of Current Treatment Plan: 09/07/21    Treatment Plan Number Initial    Strengths/Personal Resources for Self Care: Supportive sister and girlfriend; one daughter also a support; stable job and financially stable    Diagnosis:   1  Major depressive disorder, recurrent episode, moderate ([address] )         Area of Needs: Anxiety and depression      Long Term Goal 1: "I want to find enjoyment in the things I used to enjoy "      Target Date: 2/7/22  Completion Date: To be determined by therapist and Parker Alvarez Term Objectives for Goal 1:       1  Jacob Cox will attend therapy regularly and process thoughts/feelings regarding current stressors - especially his concerns for his youngest daughter      2  Jacob Cox will learn about the relationship between thoughts, feelings, and behavior, and work with therapist to identify and replace thoughts that are supporting his symptoms      3  Jacob Cox will learn at least 3 skills for managing symptoms - especially focus on health over sensitivity to body sensations      4  Jacob Cox will begin to participate in activities that he once enjoyed - e g  working out, playing golf      5  Jacob Cox will reflect on life's meaning and in particular the process of aging - and work at accepting what he cannot control      6  Jacob Cox will continue to participate in med management and follow recommendations  Responsible:  Jacob Cox and therapist      GOAL 1: Modality: Individual/every two weeks/client centered, CBT, solution focused      [address]: Diagnosis and Treatment Plan explained to Luis Heller relates understanding diagnosis and is agreeable to Treatment Plan  Client Comments : Please share your thoughts, feelings, need and/or experiences regarding your treatment plan: Jacob Cox gave verbal consent to this plan due to virtual nature of session

## 2021-09-08 ENCOUNTER — TELEPHONE (OUTPATIENT)
Dept: PSYCHIATRY | Facility: CLINIC | Age: 56
End: 2021-09-08

## 2021-09-08 NOTE — TELEPHONE ENCOUNTER
Left message for the patient to call the office back to schedule appointment with Margaret Villanueva every 2 weeks after work

## 2021-09-09 DIAGNOSIS — F41.1 GAD (GENERALIZED ANXIETY DISORDER): ICD-10-CM

## 2021-09-10 NOTE — TELEPHONE ENCOUNTER
Patient called back he scheduled for 09/21/2021 with Hui Baldwin at 6:00 in person  He could not schedule any other appointments due to him being at work  He will call back to schedule more appointments

## 2021-09-13 RX ORDER — HYDROXYZINE HYDROCHLORIDE 25 MG/1
25 TABLET, FILM COATED ORAL EVERY 6 HOURS PRN
Qty: 30 TABLET | Refills: 0 | Status: SHIPPED | OUTPATIENT
Start: 2021-09-13 | End: 2021-09-27 | Stop reason: SDUPTHER

## 2021-09-14 DIAGNOSIS — F32.A DEPRESSED: ICD-10-CM

## 2021-09-15 RX ORDER — LORAZEPAM 1 MG/1
1 TABLET ORAL 3 TIMES DAILY
Qty: 21 TABLET | Refills: 0 | Status: SHIPPED | OUTPATIENT
Start: 2021-09-15 | End: 2021-09-22 | Stop reason: SDUPTHER

## 2021-09-15 NOTE — TELEPHONE ENCOUNTER
Refilled script for 7 days  Patient has no follow up appointment scheduled at this time  Staff is calling to schedule an appointment, and relay that he needs an appointment to continue receiving scripts for his medication  Per previous note, it was discussed with the patient that he needs to choose one provider to receive his medications from at this time

## 2021-09-19 DIAGNOSIS — F41.1 GAD (GENERALIZED ANXIETY DISORDER): ICD-10-CM

## 2021-09-19 DIAGNOSIS — F32.A DEPRESSED: ICD-10-CM

## 2021-09-20 RX ORDER — HYDROXYZINE HYDROCHLORIDE 25 MG/1
25 TABLET, FILM COATED ORAL EVERY 6 HOURS PRN
Qty: 30 TABLET | Refills: 0 | OUTPATIENT
Start: 2021-09-20

## 2021-09-21 ENCOUNTER — TELEPHONE (OUTPATIENT)
Dept: PSYCHIATRY | Facility: CLINIC | Age: 56
End: 2021-09-21

## 2021-09-21 ENCOUNTER — SOCIAL WORK (OUTPATIENT)
Dept: BEHAVIORAL/MENTAL HEALTH CLINIC | Facility: CLINIC | Age: 56
End: 2021-09-21
Payer: COMMERCIAL

## 2021-09-21 DIAGNOSIS — F33.1 MAJOR DEPRESSIVE DISORDER, RECURRENT EPISODE, MODERATE (HCC): Primary | ICD-10-CM

## 2021-09-21 PROCEDURE — 90834 PSYTX W PT 45 MINUTES: CPT | Performed by: SOCIAL WORKER

## 2021-09-22 RX ORDER — LORAZEPAM 1 MG/1
1 TABLET ORAL 3 TIMES DAILY PRN
Qty: 15 TABLET | Refills: 0 | Status: SHIPPED | OUTPATIENT
Start: 2021-09-22 | End: 2021-09-27 | Stop reason: SDUPTHER

## 2021-09-22 NOTE — TELEPHONE ENCOUNTER
Provided patient with an ativan refill until his appointment on 9/27/21  Will discuss beginning an ativan taper at that time

## 2021-09-22 NOTE — PSYCH
Psychotherapy Provided: Individual Psychotherapy 55 minutes     Length of time in session: 55 minutes, follow up in 6 weeks due to therapist's scheduling  Cherlynn Lefort agrees to be placed on cancellation list     Encounter Diagnosis     ICD-10-CM    1  Major depressive disorder, recurrent episode, moderate ([email or web address] )  F33 1        Goals addressed in session: Goal 1     Data:  Cherlynn Lefort reports tonight in session that he remains highly anxious  "I'm going backwards "  He states that he has been experiencing heart palpitations and some light headedness  He fears that "another major panic attack is coming "  Therapist focused today on building rapport and providing psycho-ed re:  Triggers and the response of the amygdala with fight/flight/freeze  Therapist also reviewed what constitutes and external and internal trigger  Cherlynn Lefort, after much thought, confides that he "hates" his job  He notes that his supervisor is very demanding and does not offer positive feedback  Cherlynn Lefort has spoken to his supervisor's boss about his, and she does admit that he is "annal "  Nonetheless, she told Cherlynn Lefort that there is nothing she can do  Cherlynn Lefort does admit tonight to claiming last week that he had COVID symptoms so he could stay home from work  He was required to have a COVID screen after several days and was cleared to return  Cherlynn Lefort does not know what he will do now because he has no further vacation/sick leave  Therapist suggested that he simply look for another job; Karoljerome Scottjane gave several reasons why this is not his best option  Kemilynn Lefort, then, spoke at length about his relationships with his wife and girlfriend of [de-identified] years Andrew English  He confides that he feels torn between the two - as each has verbalized that they want him to break off relations with the other  They also have made it clear, according to Cherlynn Lefort, that they will hurt themselves if he does not choose them over the other    Karoljerome Lefort admits that this has placed him in a highly stressful bind, and he does not know what to do  He wants to stay in both women's lives but also recognizes that it is becoming too stressful  Each suffers with mental illness and seeks support from Butler Lanes  Therapist concluded the visit by providing additional psycho-ed around the limits of medication and the necessity of minimizing stress  Butler Lanes admits that he has difficulty accepting his limitations - "I've been able to manage more or less for 20 years "  Therapist suggested that he give consideration to ways in which he can encourage independence/put resources in place for his wife/girlfriend  Assessment:  Peterson's stated mood is very anxious  His affect is congruent  He is cooperative and maintains good eye contact  His thoughts are logical and goal oriented  Butler Lanes denies SI/HI at this time but does admit that he has given consideration to suicide  He adds, however, that he would not do that to his family  Butler Lanes appears to have poor insight into his symptoms of anxiety  He seems to minimize the effects of the stressors in his life - including parenting three adult children - and appears fixated on a physical cause for his symptoms  Plan:  Butler Lanes requires a late appointment  He will follow up in 6 weeks due to therapist's schedule  At that time, we will review his efforts to minimize dependency on him by his wife/girl friend - and explore what appears to be his issues around rescuing others  Pain:      none    0    Current suicide risk : Low         Behavioral Health Treatment Plan [facility]: Diagnosis and Treatment Plan explained to Urbano Javed relates understanding diagnosis and is agreeable to Treatment Plan   YES

## 2021-09-27 ENCOUNTER — TELEMEDICINE (OUTPATIENT)
Dept: PSYCHIATRY | Facility: CLINIC | Age: 56
End: 2021-09-27
Payer: COMMERCIAL

## 2021-09-27 DIAGNOSIS — F41.1 GAD (GENERALIZED ANXIETY DISORDER): ICD-10-CM

## 2021-09-27 DIAGNOSIS — F32.A DEPRESSED: ICD-10-CM

## 2021-09-27 DIAGNOSIS — F39 MOOD DISORDER (HCC): ICD-10-CM

## 2021-09-27 PROCEDURE — 99214 OFFICE O/P EST MOD 30 MIN: CPT | Performed by: NURSE PRACTITIONER

## 2021-09-27 RX ORDER — QUETIAPINE FUMARATE 50 MG/1
50 TABLET, FILM COATED ORAL
Qty: 45 TABLET | Refills: 2 | Status: SHIPPED | OUTPATIENT
Start: 2021-09-27 | End: 2021-11-15 | Stop reason: SDUPTHER

## 2021-09-27 RX ORDER — HYDROXYZINE HYDROCHLORIDE 25 MG/1
25 TABLET, FILM COATED ORAL EVERY 6 HOURS PRN
Qty: 30 TABLET | Refills: 2 | Status: SHIPPED | OUTPATIENT
Start: 2021-09-27 | End: 2021-11-03 | Stop reason: SDUPTHER

## 2021-09-27 RX ORDER — LORAZEPAM 1 MG/1
1 TABLET ORAL 3 TIMES DAILY PRN
Qty: 90 TABLET | Refills: 0 | Status: SHIPPED | OUTPATIENT
Start: 2021-09-27 | End: 2021-11-01

## 2021-09-27 RX ORDER — ESCITALOPRAM OXALATE 20 MG/1
30 TABLET ORAL DAILY
Qty: 45 TABLET | Refills: 2 | Status: SHIPPED | OUTPATIENT
Start: 2021-09-27 | End: 2021-11-15 | Stop reason: SDUPTHER

## 2021-09-27 NOTE — PSYCH
Virtual Regular Visit    Verification of patient location:    Patient is located in the following state in which I hold an active license [place]    Problem List Items Addressed This Visit     None             Encounter provider Doc Keith Li    Provider located at   [address] [address] [phone]    Recent Visits  Date Type Provider Dept   09/21/21 Telephone [facility] [address]   Showing recent visits within past 7 days and meeting all other requirements  Future Appointments  No visits were found meeting these conditions  Showing future appointments within next 150 days and meeting all other requirements         The patient was identified by name and date of birth  Amada Basilio was informed that this is a telemedicine visit and that the visit is being conducted through[employer] and patient was informed that this is a secure, HIPAA-compliant platform  He agrees to proceed     My office door was closed  No one else was in the room  He acknowledged consent and understanding of privacy and security of the video platform  The patient has agreed to participate and understands they can discontinue the visit at any time  Patient is aware this is a billable service       HPI     Current Outpatient Medications   Medication Sig Dispense Refill    betamethasone valerate (VALISONE) 0 1 % cream       escitalopram (LEXAPRO) 20 mg tablet Take 1 5 tablets (30 mg total) by mouth daily 45 tablet 1    hydrOXYzine HCL (ATARAX) 25 mg tablet TAKE 1 TABLET (25 MG TOTAL) BY MOUTH EVERY 6 (SIX) HOURS AS NEEDED FOR ITCHING OR ANXIETY 30 tablet 0    LORazepam (ATIVAN) 1 mg tablet Take 1 tablet (1 mg total) by mouth 3 (three) times a day as needed for anxiety 15 tablet 0    mirtazapine (REMERON) 30 mg tablet Take 1 tablet (30 mg total) by mouth daily at bedtime 30 tablet 2    QUEtiapine (SEROquel) 50 mg tablet Take 1 tablet (50 mg total) by mouth daily at bedtime (Patient taking differently: Take 50 mg by mouth 2 (two) times a day ) 30 tablet 1     No current facility-administered medications for this visit  Review of Systems  Video Exam    There were no vitals filed for this visit  Physical Exam   As a result of this visit, I have referred the patient for further respiratory evaluation  No    I spent 15 minutes directly with the patient during this visit  VIRTUAL VISIT DISCLAIMER    Mack Clark acknowledges that he has consented to an online visit or consultation  He understands that the online visit is based solely on information provided by him, and that, in the absence of a face-to-face physical evaluation by the physician, the diagnosis he receives is both limited and provisional in terms of accuracy and completeness  This is not intended to replace a full medical face-to-face evaluation by the physician  Mack Clark understands and accepts these terms  MEDICATION MANAGEMENT NOTE        [place] [address] [facility]    Name and Date of Birth:  Mack Clakr 60 y o  1965 MRN: [id number]    Date of Visit: September 27, 2021    Allergies   Allergen Reactions    Other Sneezing, Throat Swelling and Tongue Swelling    Shellfish Allergy - Food Allergy Tongue Swelling     SUBJECTIVE:    Sharee Varela is seen today for a follow up for Major Depressive Disorder  He continues to experience on and off symptoms since the last visit  Sharee Varela last seen by this provider on 8/25/21  At that time, his medications were continued as ordered  Today he is seen virtually for his appointment  He states that he will continue to see the [facility] providers at this time and has ceased seeing the provider at the other location  He reports that he has been back to work since 8/2/21 and is continuing to experience anxiety, but less frequently than he was in May  He states that he did call in to work a couple times and has been reprimanded for it, and states that his bosses have told him that if "he cannot do the job, then they will find someone who can"  He states that this "does not help his anxiety, and since that episode, he has been to work every day  He reports that his depression is rated a 4/10, and states that his sleep is good, appetite is good  He reports that his anxiety is 4/10 as well  He reports that he has had fleeting SI a couple times in the last week, but has no plan, no intent  He denies HI, denies auditory and visual  Hallucinations  He reports that he has been taking the hydroxyzine 25mg PO tabs 4x/day  He has been taking the ativan 1mg tabs 3x/day, and has been taking the seroquel and mirtazapine as ordered  He states he is hesitant to go places and see people, and has been feeling more uncomfortable around people  Discussed increasing his seroquel at night to help him sleep, and saving the hydroxyzine for when he is having breakthrough anxiety  Discussed cutting his ativan back to 0 5mg PO in the evening as we are increasing the seroquel  He has agreed to this  Will continue the script for the ativan at 1mg PO TID PRN, but he will cut in half at night  Will continue all other medications as ordered  He reports increased appetite  PLAN:  Continue the lexapro 30mg PO daily  Continue the mirtazapine 30mg PO HS  Will use the hydroxyzine for breakthrough anxiety 25mg PO Q6PRN  Will use ativan PRN for anxiety 1mg PO BID and 0 5mg PO in the evening  Change Seroquel to 50mg PO am and 100mg PO HS  He will continue to work on coping skills  He will continue therapy with Mallissa Goodpasture  He will follow up in one month  He will call sooner if he has concerns or issues prior to scheduled appointment    Discussed boundaries, why I am not allowed to provide my personal cell phone, and that it will be more reliable if he calls the front office to leave a message for me rather than sending an email  He was agreeable  Aware of 24 hour and weekend coverage for urgent situations accessed by calling [facility] main practice number  Continue psychotherapy with therapist  Medication management every 1 month  Aware of need to follow up with family physician for medical issues    There are no diagnoses linked to this encounter  Current Outpatient Medications on File Prior to Visit   Medication Sig Dispense Refill    betamethasone valerate (VALISONE) 0 1 % cream       escitalopram (LEXAPRO) 20 mg tablet Take 1 5 tablets (30 mg total) by mouth daily 45 tablet 1    hydrOXYzine HCL (ATARAX) 25 mg tablet TAKE 1 TABLET (25 MG TOTAL) BY MOUTH EVERY 6 (SIX) HOURS AS NEEDED FOR ITCHING OR ANXIETY 30 tablet 0    LORazepam (ATIVAN) 1 mg tablet Take 1 tablet (1 mg total) by mouth 3 (three) times a day as needed for anxiety 15 tablet 0    mirtazapine (REMERON) 30 mg tablet Take 1 tablet (30 mg total) by mouth daily at bedtime 30 tablet 2    QUEtiapine (SEROquel) 50 mg tablet Take 1 tablet (50 mg total) by mouth daily at bedtime (Patient taking differently: Take 50 mg by mouth 2 (two) times a day ) 30 tablet 1     No current facility-administered medications on file prior to visit  Psychotherapy Provided:     Individual psychotherapy provided: Yes  Supportive counseling provided  Medication changes discussed with Jannet Padgett  Medication education provided to Jannet Padgett  Recent stressor including job stress discussed with Jannet Padgett  Importance of medication and treatment compliance reviewed with Jannet Padgett  Importance of follow up with family physician for medical issues reviewed with Jannet Padgett  Reassurance and supportive therapy provided  Crisis/safety plan discussed with Jannet Padgett  He is aware that he can call the office, call crisis, or go to the ER if his suicidal thoughts increase in intensity or frequency      HPI ROS Appetite Changes and Sleep: He reports normal sleep, increased appetite, normal energy level  Denies homicidal ideation, yes, no plan or intent    Review Of Systems:  HPI ROS:               Medication Side Effects:  Increased appetite from remeron     Depression (10 worst): 4/10 (Was 4/10)   Anxiety (10 worst): 4/10 (Was 3/10)   Safety concerns (SI, HI, etc): SI, no plan, no intent (Was denies)   Sleep: 7-8 hrs/night (Was 7-8 hrs/night)   Energy: adequate (Was adequate)   Appetite: increased (Was increased)     General normal    Personality no change in personality   Constitutional negative   ENT negative   Cardiovascular negative   Respiratory negative   Gastrointestinal negative   Genitourinary negative   Musculoskeletal negative   Integumentary negative   Neurological negative   Endocrine negative   Other Symptoms none, all other systems are negative     Mental Status Evaluation:    Appearance Adequate hygiene and grooming   Behavior calm and cooperative   Mood anxious and depressed  Depression Scale - 4 of 10 (0 = No depression)  Anxiety Scale - 4 of 10 (0 = No anxiety)   Speech Normal rate and volume   Affect appropriate and mood-congruent   Thought Processes Goal directed and coherent   Thought Content Does not verbalize delusional material   Associations Tightly connected   Perceptual Disturbances Denies hallucinations and does not appear to be responding to internal stimuli   Risk Potential Suicidal/Homicidal Ideation - Suicidal Ideations without plan  Risk of Violence - No evidence of risk for violence found on assessment  Risk of Self Mutilation - No evidence of risk for self mutilation found on assessment   Orientation oriented to person, place, time/date and situation   Memory recent and remote memory grossly intact   Consciousness alert and awake   Attention/Concentration attention span and concentration are age appropriate   Insight partial   Judgement partial   Muscle Strength and Gait normal muscle strength and normal muscle tone, normal gait/station and normal balance   Motor Activity no abnormal movements   Language no difficulty naming common objects, no difficulty repeating a phrase, no difficulty writing a sentence   Fund of Knowledge adequate knowledge of current events  adequate fund of knowledge regarding past history  adequate fund of knowledge regarding vocabulary      Past Psychiatric History Update:     Inpatient Psychiatric Admission Since Last Encounter:   no  Changes to Outpatient Psychiatric Treatment Team:    no  Suicide Attempt Or Self Mutilation Since Last Encounter:   no  Incidence of Violent Behavior Since Last Encounter:   no    Traumatic History Update:     New Onset of Abuse Since Last Encounter:   no  Traumatic Events Since Last Encounter:   no    Past Medical History:    Past Medical History:   Diagnosis Date    Anxiety     Depression     Hypertension     Palpitation     Panic attack      No past medical history pertinent negatives    Past Surgical History:   Procedure Laterality Date    KNEE SURGERY  08/1982     Allergies   Allergen Reactions    Other Sneezing, Throat Swelling and Tongue Swelling    Shellfish Allergy - Food Allergy Tongue Swelling     Substance Abuse History:    Social History     Substance and Sexual Activity   Alcohol Use Not Currently    Comment: socially     Social History     Substance and Sexual Activity   Drug Use Not Currently     Social History:    Social History     Socioeconomic History    Marital status: /Civil Union     Spouse name: Not on file    Number of children: Not on file    Years of education: Not on file    Highest education level: Not on file   Occupational History    Not on file   Tobacco Use    Smoking status: Former Smoker     Types: Cigars    Smokeless tobacco: Never Used   Vaping Use    Vaping Use: Never used   Substance and Sexual Activity    Alcohol use: Not Currently     Comment: socially    Drug use: Not Currently    Sexual activity: Not Currently   Other Topics Concern    Not on file   Social History Narrative    Not on file     Social Determinants of Health     Financial Resource Strain:     Difficulty of Paying Living Expenses:    Food Insecurity:     Worried About Running Out of Food in the Last Year:     [address] in the Last Year:    Transportation Needs:     Lack of Transportation (Medical):  Lack of Transportation (Non-Medical):    Physical Activity:     Days of Exercise per Week:     Minutes of Exercise per Session:    Stress:     Feeling of Stress :    Social Connections:     Frequency of Communication with Friends and Family:     Frequency of Social Gatherings with Friends and Family:     Attends Faith Services:     Active Member of Clubs or Organizations:     Attends Club or Organization Meetings:     Marital Status:    Intimate Partner Violence:     Fear of Current or Ex-Partner:     Emotionally Abused:     Physically Abused:     Sexually Abused:      Family Psychiatric History:     Family History   Problem Relation Age of Onset    Hypertension Mother     COPD Mother     Migraines Mother     Stroke Father      History Review: The following portions of the patient's history were reviewed and updated as appropriate: allergies, current medications, past family history, past medical history, past social history, past surgical history and problem list     OBJECTIVE:     Vital signs in last 24 hours: There were no vitals filed for this visit  Laboratory Results:   Recent Labs (last 2 months):   No visits with results within 2 Month(s) from this visit     Latest known visit with results is:   Admission on 05/04/2021, Discharged on 05/12/2021   Component Date Value    Cholesterol 05/05/2021 198     Triglycerides 05/05/2021 170*    HDL, Direct 05/05/2021 35*    LDL Calculated 05/05/2021 129     Non-HDL-Chol (CHOL-HDL) 05/05/2021 163     Hemoglobin A1C 05/05/2021 5 6     EAG 05/05/2021 114     Ventricular Rate 05/10/2021 81     Atrial Rate 05/10/2021 81     OK Interval 05/10/2021 172     QRSD Interval 05/10/2021 92     QT Interval 05/10/2021 406     QTC Interval 05/10/2021 471     P Axis 05/10/2021 4     QRS Axis 05/10/2021 -7     T Wave Axis 05/10/2021 21      I have personally reviewed all pertinent laboratory/tests results  Suicide/Homicide Risk Assessment:    Risk of Harm to Self:  The following ratings are based on assessment at the time of the interview  Recent Specific Risk Factors include: current depressive symptoms, current anxiety symptoms, has suicidal ideation without a plan  Demographic risk factors include: , male, age: over 48 or older  Historical Risk Factors include: chronic anxiety symptoms  Protective Factors: access to mental health treatment, being , compliant with medications, compliant with mental health treatment, no substance use problems, stable job, supportive family  Based on today's assessment, Shaniqua Dodson presents the following risk of harm to self: low    Risk of Harm to Others: The following ratings are based on assessment at the time of the interview  Protective Factors: no current homicidal ideation  Based on today's assessment, Shaniqua Dodson presents the following risk of harm to others: none    The following interventions are recommended: contracts for safety at present - agrees to go to ED if feeling unsafe, return in 1 month for reassessment, therapy appointment in 6 weeks, contracts for safety at present - agrees to call Crisis Intervention Service if feeling unsafe    Medications Risks/Benefits:      Risks, Benefits And Possible Side Effects Of Medications:    Discussed risks and benefits of treatment with patient including risk of suicidality, serotonin syndrome, increased QTc interval and SIADH related to treatment with antidepressants;  Risk of induction of manic symptoms in certain patient populations, risk of parkinsonian symptoms, metabolic syndrome, tardive dyskinesia and neuroleptic malignant syndrome related to treatment with antipsychotic medications and risks of misuse, abuse or dependence, sedation and respiratory depression related to treatment with benzodiazepine medications     Controlled Medication Discussion:     Jannet Padgett has been filling controlled prescriptions on time as prescribed according to [place] Prescription Drug Monitoring Program    Treatment Plan:    Due for update/Updated:   no  Last treatment plan done 9/7/21 by [facility]  Treatment Plan due on 3/7/22  KOLBY Goldberg 09/27/21    This note was shared with patient

## 2021-11-01 DIAGNOSIS — F41.1 GAD (GENERALIZED ANXIETY DISORDER): ICD-10-CM

## 2021-11-01 DIAGNOSIS — F32.A DEPRESSED: ICD-10-CM

## 2021-11-01 RX ORDER — HYDROXYZINE HYDROCHLORIDE 25 MG/1
25 TABLET, FILM COATED ORAL EVERY 6 HOURS PRN
Qty: 30 TABLET | Refills: 2 | Status: CANCELLED | OUTPATIENT
Start: 2021-11-01

## 2021-11-01 RX ORDER — LORAZEPAM 1 MG/1
1 TABLET ORAL 3 TIMES DAILY PRN
Qty: 90 TABLET | Refills: 0 | OUTPATIENT
Start: 2021-11-01

## 2021-11-02 ENCOUNTER — TELEPHONE (OUTPATIENT)
Dept: PSYCHIATRY | Facility: CLINIC | Age: 56
End: 2021-11-02

## 2021-11-02 DIAGNOSIS — F41.1 GAD (GENERALIZED ANXIETY DISORDER): ICD-10-CM

## 2021-11-03 DIAGNOSIS — F41.1 GAD (GENERALIZED ANXIETY DISORDER): ICD-10-CM

## 2021-11-03 RX ORDER — HYDROXYZINE HYDROCHLORIDE 25 MG/1
25 TABLET, FILM COATED ORAL 3 TIMES DAILY PRN
Qty: 90 TABLET | Refills: 0 | Status: SHIPPED | OUTPATIENT
Start: 2021-11-03 | End: 2021-11-15 | Stop reason: SDUPTHER

## 2021-11-08 ENCOUNTER — TELEMEDICINE (OUTPATIENT)
Dept: BEHAVIORAL/MENTAL HEALTH CLINIC | Facility: CLINIC | Age: 56
End: 2021-11-08
Payer: COMMERCIAL

## 2021-11-08 DIAGNOSIS — F33.1 MAJOR DEPRESSIVE DISORDER, RECURRENT EPISODE, MODERATE (HCC): Primary | ICD-10-CM

## 2021-11-08 PROCEDURE — 90834 PSYTX W PT 45 MINUTES: CPT | Performed by: SOCIAL WORKER

## 2021-11-15 ENCOUNTER — TELEMEDICINE (OUTPATIENT)
Dept: PSYCHIATRY | Facility: CLINIC | Age: 56
End: 2021-11-15
Payer: COMMERCIAL

## 2021-11-15 DIAGNOSIS — F39 MOOD DISORDER (HCC): ICD-10-CM

## 2021-11-15 DIAGNOSIS — F32.A DEPRESSED: ICD-10-CM

## 2021-11-15 DIAGNOSIS — F41.1 GAD (GENERALIZED ANXIETY DISORDER): ICD-10-CM

## 2021-11-15 DIAGNOSIS — F32.3 MAJOR DEPRESSION WITH PSYCHOTIC FEATURES (HCC): ICD-10-CM

## 2021-11-15 PROCEDURE — 99214 OFFICE O/P EST MOD 30 MIN: CPT | Performed by: NURSE PRACTITIONER

## 2021-11-15 RX ORDER — ESCITALOPRAM OXALATE 20 MG/1
30 TABLET ORAL DAILY
Qty: 45 TABLET | Refills: 2 | Status: SHIPPED | OUTPATIENT
Start: 2021-11-15 | End: 2022-01-05 | Stop reason: SDUPTHER

## 2021-11-15 RX ORDER — MIRTAZAPINE 30 MG/1
30 TABLET, FILM COATED ORAL
Qty: 30 TABLET | Refills: 2 | Status: SHIPPED | OUTPATIENT
Start: 2021-11-15 | End: 2022-01-05 | Stop reason: SDUPTHER

## 2021-11-15 RX ORDER — QUETIAPINE FUMARATE 50 MG/1
50 TABLET, FILM COATED ORAL
Qty: 45 TABLET | Refills: 2 | Status: SHIPPED | OUTPATIENT
Start: 2021-11-15 | End: 2022-01-05 | Stop reason: SDUPTHER

## 2021-11-15 RX ORDER — HYDROXYZINE HYDROCHLORIDE 25 MG/1
25 TABLET, FILM COATED ORAL 3 TIMES DAILY PRN
Qty: 90 TABLET | Refills: 2 | Status: SHIPPED | OUTPATIENT
Start: 2021-11-15 | End: 2021-12-27 | Stop reason: ALTCHOICE

## 2021-11-16 ENCOUNTER — TELEPHONE (OUTPATIENT)
Dept: PSYCHIATRY | Facility: CLINIC | Age: 56
End: 2021-11-16

## 2021-11-22 ENCOUNTER — TELEMEDICINE (OUTPATIENT)
Dept: BEHAVIORAL/MENTAL HEALTH CLINIC | Facility: CLINIC | Age: 56
End: 2021-11-22
Payer: COMMERCIAL

## 2021-11-22 DIAGNOSIS — F33.1 MAJOR DEPRESSIVE DISORDER, RECURRENT EPISODE, MODERATE (HCC): Primary | ICD-10-CM

## 2021-11-22 PROCEDURE — 90834 PSYTX W PT 45 MINUTES: CPT | Performed by: SOCIAL WORKER

## 2021-12-04 DIAGNOSIS — F32.A DEPRESSED: ICD-10-CM

## 2021-12-06 RX ORDER — LORAZEPAM 1 MG/1
1 TABLET ORAL 2 TIMES DAILY PRN
Qty: 60 TABLET | Refills: 0 | OUTPATIENT
Start: 2021-12-06

## 2021-12-08 RX ORDER — IBUPROFEN 800 MG/1
TABLET ORAL
COMMUNITY
Start: 2021-06-15 | End: 2021-12-09 | Stop reason: ALTCHOICE

## 2021-12-09 ENCOUNTER — OFFICE VISIT (OUTPATIENT)
Dept: FAMILY MEDICINE CLINIC | Facility: CLINIC | Age: 56
End: 2021-12-09
Payer: COMMERCIAL

## 2021-12-09 VITALS
BODY MASS INDEX: 33.73 KG/M2 | DIASTOLIC BLOOD PRESSURE: 74 MMHG | HEIGHT: 76 IN | OXYGEN SATURATION: 96 % | SYSTOLIC BLOOD PRESSURE: 124 MMHG | HEART RATE: 87 BPM | WEIGHT: 277 LBS

## 2021-12-09 DIAGNOSIS — S74.21XA INJURY OF RIGHT LATERAL FEMORAL CUTANEOUS NERVE, INITIAL ENCOUNTER: ICD-10-CM

## 2021-12-09 DIAGNOSIS — Z23 NEEDS FLU SHOT: ICD-10-CM

## 2021-12-09 DIAGNOSIS — G56.03 BILATERAL CARPAL TUNNEL SYNDROME: Primary | ICD-10-CM

## 2021-12-09 DIAGNOSIS — R63.5 WEIGHT GAIN: ICD-10-CM

## 2021-12-09 PROCEDURE — 3008F BODY MASS INDEX DOCD: CPT | Performed by: FAMILY MEDICINE

## 2021-12-09 PROCEDURE — 99203 OFFICE O/P NEW LOW 30 MIN: CPT | Performed by: FAMILY MEDICINE

## 2021-12-09 PROCEDURE — 3008F BODY MASS INDEX DOCD: CPT | Performed by: NURSE PRACTITIONER

## 2021-12-13 ENCOUNTER — TELEMEDICINE (OUTPATIENT)
Dept: BEHAVIORAL/MENTAL HEALTH CLINIC | Facility: CLINIC | Age: 56
End: 2021-12-13
Payer: COMMERCIAL

## 2021-12-13 DIAGNOSIS — F32.A DEPRESSED: ICD-10-CM

## 2021-12-13 DIAGNOSIS — F33.1 MAJOR DEPRESSIVE DISORDER, RECURRENT EPISODE, MODERATE (HCC): Primary | ICD-10-CM

## 2021-12-13 PROCEDURE — 90834 PSYTX W PT 45 MINUTES: CPT | Performed by: SOCIAL WORKER

## 2021-12-13 RX ORDER — LORAZEPAM 1 MG/1
1 TABLET ORAL 2 TIMES DAILY PRN
Qty: 60 TABLET | Refills: 0 | Status: CANCELLED | OUTPATIENT
Start: 2021-12-13

## 2021-12-13 RX ORDER — LORAZEPAM 1 MG/1
1 TABLET ORAL 2 TIMES DAILY PRN
Qty: 60 TABLET | Refills: 0 | Status: SHIPPED | OUTPATIENT
Start: 2021-12-13 | End: 2022-01-05 | Stop reason: SDUPTHER

## 2021-12-16 ENCOUNTER — TELEMEDICINE (OUTPATIENT)
Dept: FAMILY MEDICINE CLINIC | Facility: CLINIC | Age: 56
End: 2021-12-16
Payer: COMMERCIAL

## 2021-12-16 DIAGNOSIS — G56.03 BILATERAL CARPAL TUNNEL SYNDROME: ICD-10-CM

## 2021-12-16 DIAGNOSIS — J06.9 VIRAL UPPER RESPIRATORY TRACT INFECTION: Primary | ICD-10-CM

## 2021-12-16 LAB
T4 FREE SERPL-MCNC: 0.9 NG/DL (ref 0.8–1.8)
TSH SERPL-ACNC: 1.8 MIU/L (ref 0.4–4.5)

## 2021-12-16 PROCEDURE — 1036F TOBACCO NON-USER: CPT | Performed by: FAMILY MEDICINE

## 2021-12-16 PROCEDURE — 99213 OFFICE O/P EST LOW 20 MIN: CPT | Performed by: FAMILY MEDICINE

## 2021-12-27 ENCOUNTER — TELEMEDICINE (OUTPATIENT)
Dept: PSYCHIATRY | Facility: CLINIC | Age: 56
End: 2021-12-27
Payer: COMMERCIAL

## 2021-12-27 DIAGNOSIS — F33.1 MODERATE EPISODE OF RECURRENT MAJOR DEPRESSIVE DISORDER (HCC): Primary | ICD-10-CM

## 2021-12-27 PROCEDURE — 1036F TOBACCO NON-USER: CPT | Performed by: NURSE PRACTITIONER

## 2021-12-27 PROCEDURE — 99214 OFFICE O/P EST MOD 30 MIN: CPT | Performed by: NURSE PRACTITIONER

## 2021-12-27 RX ORDER — HYDROXYZINE HYDROCHLORIDE 25 MG/1
25 TABLET, FILM COATED ORAL EVERY 6 HOURS PRN
Qty: 120 TABLET | Refills: 0 | Status: SHIPPED | OUTPATIENT
Start: 2021-12-27 | End: 2022-02-21 | Stop reason: SDUPTHER

## 2022-01-05 ENCOUNTER — TELEMEDICINE (OUTPATIENT)
Dept: PSYCHIATRY | Facility: CLINIC | Age: 57
End: 2022-01-05
Payer: COMMERCIAL

## 2022-01-05 DIAGNOSIS — F32.3 MAJOR DEPRESSION WITH PSYCHOTIC FEATURES (HCC): ICD-10-CM

## 2022-01-05 DIAGNOSIS — F32.A DEPRESSED: ICD-10-CM

## 2022-01-05 DIAGNOSIS — F39 MOOD DISORDER (HCC): ICD-10-CM

## 2022-01-05 PROCEDURE — 99213 OFFICE O/P EST LOW 20 MIN: CPT | Performed by: NURSE PRACTITIONER

## 2022-01-05 PROCEDURE — 1036F TOBACCO NON-USER: CPT | Performed by: NURSE PRACTITIONER

## 2022-01-05 RX ORDER — ESCITALOPRAM OXALATE 20 MG/1
30 TABLET ORAL DAILY
Qty: 45 TABLET | Refills: 2 | Status: SHIPPED | OUTPATIENT
Start: 2022-01-05 | End: 2022-03-14 | Stop reason: SDUPTHER

## 2022-01-05 RX ORDER — ARIPIPRAZOLE 2 MG/1
2 TABLET ORAL DAILY
Qty: 30 TABLET | Refills: 1 | Status: SHIPPED | OUTPATIENT
Start: 2022-01-05 | End: 2022-02-28 | Stop reason: SDUPTHER

## 2022-01-05 RX ORDER — QUETIAPINE FUMARATE 50 MG/1
50 TABLET, FILM COATED ORAL
Qty: 45 TABLET | Refills: 2 | Status: SHIPPED | OUTPATIENT
Start: 2022-01-05 | End: 2022-02-28 | Stop reason: SDUPTHER

## 2022-01-05 RX ORDER — LORAZEPAM 1 MG/1
1 TABLET ORAL 2 TIMES DAILY PRN
Qty: 60 TABLET | Refills: 0 | Status: SHIPPED | OUTPATIENT
Start: 2022-01-05 | End: 2022-02-14 | Stop reason: SDUPTHER

## 2022-01-05 RX ORDER — MIRTAZAPINE 30 MG/1
30 TABLET, FILM COATED ORAL
Qty: 30 TABLET | Refills: 2 | Status: SHIPPED | OUTPATIENT
Start: 2022-01-05 | End: 2022-03-14 | Stop reason: DRUGHIGH

## 2022-01-05 NOTE — PSYCH
Virtual Regular Visit    Verification of patient location:    Patient is located in the following state in which I hold an active license [place]    Problem List Items Addressed This Visit     None      Visit Diagnoses     Depressed        Relevant Medications    ARIPiprazole (ABILIFY) 2 mg tablet    LORazepam (ATIVAN) 1 mg tablet    QUEtiapine (SEROquel) 50 mg tablet    mirtazapine (REMERON) 30 mg tablet    escitalopram (LEXAPRO) 20 mg tablet    Major depression with psychotic features (HCC)        Relevant Medications    ARIPiprazole (ABILIFY) 2 mg tablet    LORazepam (ATIVAN) 1 mg tablet    QUEtiapine (SEROquel) 50 mg tablet    mirtazapine (REMERON) 30 mg tablet    escitalopram (LEXAPRO) 20 mg tablet    Mood disorder (HCC)        Relevant Medications    ARIPiprazole (ABILIFY) 2 mg tablet    LORazepam (ATIVAN) 1 mg tablet    QUEtiapine (SEROquel) 50 mg tablet    mirtazapine (REMERON) 30 mg tablet    escitalopram (LEXAPRO) 20 mg tablet             Encounter provider Eber Bai[address]    Provider located at   [address] [address] [phone]    Recent Visits  No visits were found meeting these conditions  Showing recent visits within past 7 days and meeting all other requirements  Future Appointments  No visits were found meeting these conditions  Showing future appointments within next 150 days and meeting all other requirements         The patient was identified by name and date of birth  Anthony Duran was informed that this is a telemedicine visit and that the visit is being conducted throughic Embedded and patient was informed this is a secure, HIPAA-complaint platform  He agrees to proceed     My office door was closed  No one else was in the room  He acknowledged consent and understanding of privacy and security of the video platform   The patient has agreed to participate and understands they can discontinue the visit at any time  Patient is aware this is a billable service  HPI     Current Outpatient Medications   Medication Sig Dispense Refill    ARIPiprazole (ABILIFY) 2 mg tablet Take 1 tablet (2 mg total) by mouth daily 30 tablet 1    betamethasone valerate (VALISONE) 0 1 % cream       escitalopram (LEXAPRO) 20 mg tablet Take 1 5 tablets (30 mg total) by mouth daily 45 tablet 2    hydrOXYzine HCL (ATARAX) 25 mg tablet Take 1 tablet (25 mg total) by mouth every 6 (six) hours as needed for anxiety 120 tablet 0    LORazepam (ATIVAN) 1 mg tablet Take 1 tablet (1 mg total) by mouth 2 (two) times a day as needed for anxiety 60 tablet 0    mirtazapine (REMERON) 30 mg tablet Take 1 tablet (30 mg total) by mouth daily at bedtime 30 tablet 2    QUEtiapine (SEROquel) 50 mg tablet Take 1 tablet (50 mg total) by mouth daily at bedtime Take 50mg daily in the morning, and 100mg at bedtime 45 tablet 2     No current facility-administered medications for this visit  Review of Systems  Video Exam    There were no vitals filed for this visit  Physical Exam   As a result of this visit, I have referred the patient for further respiratory evaluation  No    I spent 20 minutes directly with the patient during this visit  VIRTUAL VISIT DISCLAIMER    Anthony Duran acknowledges that he has consented to an online visit or consultation  He understands that the online visit is based solely on information provided by him, and that, in the absence of a face-to-face physical evaluation by the physician, the diagnosis he receives is both limited and provisional in terms of accuracy and completeness  This is not intended to replace a full medical face-to-face evaluation by the physician  Anthony Duran understands and accepts these terms      MEDICATION MANAGEMENT NOTE        [facility]    Name and Date of Birth:  Anthony Duran 60 y o  1965 MRN: [id number]    Date of Visit: January 5, 2022    Allergies   Allergen Reactions    Other Sneezing, Throat Swelling and Tongue Swelling    Shellfish Allergy - Food Allergy Tongue Swelling     SUBJECTIVE:    Shamkea Tubbs is seen today for a follow up for Major Depressive Disorder  He continues to experience on and off depressive symptoms since the last visit  Patient last seen by this provider on 12/27/2021  He is seen virtually today  We discussed the initiation of Abilify  He reports that he has had no change in mood, anxiety is worse in the mornings  He states that his depression and anxiety are both rated 5/10  He denies any suicidal or homicidal ideation, denies any auditory or visual hallucinations  He reports that his sleep and appetite remained the same  He is agreeable to restart the Abilify 2 mg p o  daily  Will follow-up in 3 weeks  He will continue to do therapy with Tnoy Giraldo  He will call sooner if concerns or issues arise prior to scheduled appointment  All other medications will remain the same at this time, and we will reassess a future visits  He denies any side effects from current psychiatric medications  PLAN:  Initiate Abilify 2 mg p o  daily  Continue all other medications:  Lexapro 30 mg p o  daily  Atarax 25 mg q 6 p r n  Ativan 1 mg b i d  p r n  Remeron 30 mg p o  HS  Seroquel 50 mg a m , 100 mg HS  Continue therapy with Tony Giraldo  Follow-up with this provider in 3 weeks  He will call sooner if concerns or issues arise prior to scheduled appointment  Aware of 24 hour and weekend coverage for urgent situations accessed by calling [facility] main practice number  Continue psychotherapy with therapist  Medication management every 3 weeks  Aware of need to follow up with family physician for medical issues    Diagnoses and all orders for this visit:    Depressed  -     LORazepam (ATIVAN) 1 mg tablet;  Take 1 tablet (1 mg total) by mouth 2 (two) times a day as needed for anxiety  -     QUEtiapine (SEROquel) 50 mg tablet; Take 1 tablet (50 mg total) by mouth daily at bedtime Take 50mg daily in the morning, and 100mg at bedtime    Major depression with psychotic features (HCC)  -     ARIPiprazole (ABILIFY) 2 mg tablet; Take 1 tablet (2 mg total) by mouth daily  -     mirtazapine (REMERON) 30 mg tablet; Take 1 tablet (30 mg total) by mouth daily at bedtime    Mood disorder (HCC)  -     escitalopram (LEXAPRO) 20 mg tablet; Take 1 5 tablets (30 mg total) by mouth daily        Current Outpatient Medications on File Prior to Visit   Medication Sig Dispense Refill    betamethasone valerate (VALISONE) 0 1 % cream       hydrOXYzine HCL (ATARAX) 25 mg tablet Take 1 tablet (25 mg total) by mouth every 6 (six) hours as needed for anxiety 120 tablet 0    [DISCONTINUED] escitalopram (LEXAPRO) 20 mg tablet Take 1 5 tablets (30 mg total) by mouth daily 45 tablet 2    [DISCONTINUED] LORazepam (ATIVAN) 1 mg tablet Take 1 tablet (1 mg total) by mouth 2 (two) times a day as needed for anxiety 60 tablet 0    [DISCONTINUED] mirtazapine (REMERON) 30 mg tablet Take 1 tablet (30 mg total) by mouth daily at bedtime 30 tablet 2    [DISCONTINUED] QUEtiapine (SEROquel) 50 mg tablet Take 1 tablet (50 mg total) by mouth daily at bedtime Take 50mg daily in the morning, and 100mg at bedtime 45 tablet 2     No current facility-administered medications on file prior to visit  Psychotherapy Provided:     Individual psychotherapy provided: Yes  Counseling was provided during the session today for 16 minutes  Supportive counseling provided  Medication changes discussed with Kim Reynaga  Medication education provided to Kim Reynaga  Coping strategies reviewed with Kim Reynaga  Importance of medication and treatment compliance reviewed with Kim Reynaga  Importance of follow up with family physician for medical issues reviewed with Kim Reynaga  Reassurance and supportive therapy provided     Crisis/safety plan discussed with Sean Shah  He will utilize crisis as necessary    HPI ROS Appetite Changes and Sleep:     He reports normal sleep, increased appetite, normal energy level   Denies homicidal ideation, denies suicidal ideation    Review Of Systems:  HPI ROS:               Medication Side Effects:  increased appetite     Depression (10 worst): 5/10 (Was 4-5/10)   Anxiety (10 worst): 5/10 (Was high)   Safety concerns (SI, HI, etc): denies (Was denies)   Sleep: 7-7 5/night (Was 7-7 5/night)   Energy: adequate (Was adequate)   Appetite: Increased (Was increased)     General normal    Personality no change in personality   Constitutional negative   ENT negative   Cardiovascular negative   Respiratory negative   Gastrointestinal negative   Genitourinary negative   Musculoskeletal negative   Integumentary negative   Neurological negative   Endocrine negative   Other Symptoms none, all other systems are negative     Mental Status Evaluation:    Appearance Adequate hygiene and grooming   Behavior calm and cooperative   Mood anxious and depressed  Depression Scale - 5 of 10 (0 = No depression)  Anxiety Scale - 5 of 10 (0 = No anxiety)   Speech Normal rate and volume   Affect mood-congruent   Thought Processes Goal directed and coherent   Thought Content Does not verbalize delusional material   Associations Tightly connected   Perceptual Disturbances Denies hallucinations and does not appear to be responding to internal stimuli   Risk Potential Suicidal/Homicidal Ideation - No evidence of suicidal or homicidal ideation and patient does not verbalize suicidal or homicidal ideation  Risk of Violence - No evidence of risk for violence found on assessment  Risk of Self Mutilation - No evidence of risk for self mutilation found on assessment   Orientation oriented to person, place, time/date and situation   Memory recent and remote memory grossly intact   Consciousness alert and awake   Attention/Concentration attention span and concentration are age appropriate   Insight partial   Judgement partial   Muscle Strength and Gait normal muscle strength and normal muscle tone, normal gait/station and normal balance   Motor Activity no abnormal movements   Language no difficulty naming common objects, no difficulty repeating a phrase, no difficulty writing a sentence   Fund of Knowledge adequate knowledge of current events  adequate fund of knowledge regarding past history  adequate fund of knowledge regarding vocabulary      Past Psychiatric History - per Dr Nargis Sinha H&P  Previous diagnoses include Depression and Anxiety   Prior outpatient psychiatric treatment:   With PCP till now  Ling Carreon therapy:   Ling Carreon inpatient psychiatric treatment: x1  Ling Carreon suicide attempts:  None   Prior self harm:  None   Prior violence or aggression:  None    Past Psychiatric History Update:     Inpatient Psychiatric Admission Since Last Encounter:   no  Changes to Outpatient Psychiatric Treatment Team:    no  Suicide Attempt Or Self Mutilation Since Last Encounter:   no  Incidence of Violent Behavior Since Last Encounter:   no    Traumatic History Update:     New Onset of Abuse Since Last Encounter:   no  Traumatic Events Since Last Encounter:   no    Past Medical History:    Past Medical History:   Diagnosis Date    Anxiety     Depression     Hypertension     Palpitation     Panic attack      No past medical history pertinent negatives    Past Surgical History:   Procedure Laterality Date    KNEE SURGERY  08/1982     Allergies   Allergen Reactions    Other Sneezing, Throat Swelling and Tongue Swelling    Shellfish Allergy - Food Allergy Tongue Swelling     Substance Abuse History:    Social History     Substance and Sexual Activity   Alcohol Use Not Currently    Comment: socially     Social History     Substance and Sexual Activity   Drug Use Not Currently     Social History:    Social History     Socioeconomic History    Marital status: /Civil Union     Spouse name: Not on file    Number of children: Not on file    Years of education: Not on file    Highest education level: Not on file   Occupational History    Not on file   Tobacco Use    Smoking status: Former Smoker     Types: Cigars    Smokeless tobacco: Never Used   Vaping Use    Vaping Use: Never used   Substance and Sexual Activity    Alcohol use: Not Currently     Comment: socially    Drug use: Not Currently    Sexual activity: Not Currently   Other Topics Concern    Not on file   Social History Narrative    Not on file     Social Determinants of Health     Financial Resource Strain: Not on file   Food Insecurity: Not on file   Transportation Needs: Not on file   Physical Activity: Not on file   Stress: Not on file   Social Connections: Not on file   Intimate Partner Violence: Not on file   Housing Stability: Not on file     Family Psychiatric History:     Family History   Problem Relation Age of Onset    Hypertension Mother     COPD Mother     Migraines Mother     Stroke Father      History Review: The following portions of the patient's history were reviewed and updated as appropriate: allergies, current medications, past family history, past medical history, past social history, past surgical history and problem list     OBJECTIVE:     Vital signs in last 24 hours: There were no vitals filed for this visit  Laboratory Results: I have personally reviewed all pertinent laboratory/tests results      Suicide/Homicide Risk Assessment:    Risk of Harm to Self:  The following ratings are based on assessment at the time of the interview  Recent Specific Risk Factors include: current depressive symptoms, current anxiety symptoms  Demographic risk factors include: , male, age: over 48 or older  Historical Risk Factors include: chronic depressive symptoms, chronic anxiety symptoms  Protective Factors: no current suicidal ideation, access to mental health treatment, being a parent, being , compliant with medications, compliant with mental health treatment, stable living environment, stable job  Based on today's assessment, Marlon Moffett presents the following risk of harm to self: low    Risk of Harm to Others: The following ratings are based on assessment at the time of the interview  Protective Factors: no current homicidal ideation  Based on today's assessment, Marlon Moffett presents the following risk of harm to others: none    The following interventions are recommended: no intervention changes needed    Medications Risks/Benefits:      Risks, Benefits And Possible Side Effects Of Medications:    Discussed risks and benefits of treatment with patient including risk of suicidality, serotonin syndrome, increased QTc interval and SIADH related to treatment with antidepressants; Risk of induction of manic symptoms in certain patient populations, risk of parkinsonian symptoms, metabolic syndrome, tardive dyskinesia and neuroleptic malignant syndrome related to treatment with antipsychotic medications and risks of misuse, abuse or dependence, sedation and respiratory depression related to treatment with benzodiazepine medications     Controlled Medication Discussion:     Not applicable    Treatment Plan:    Due for update/Updated:   no  Last treatment plan done 9/7/21 by Mary Jo Harvey  Treatment Plan due on 3/7/22  KOLBY Belcher 01/05/22    This note was shared with patient

## 2022-01-24 ENCOUNTER — TELEMEDICINE (OUTPATIENT)
Dept: BEHAVIORAL/MENTAL HEALTH CLINIC | Facility: CLINIC | Age: 57
End: 2022-01-24
Payer: COMMERCIAL

## 2022-01-24 DIAGNOSIS — F33.1 MAJOR DEPRESSIVE DISORDER, RECURRENT EPISODE, MODERATE (HCC): Primary | ICD-10-CM

## 2022-01-24 PROCEDURE — 90834 PSYTX W PT 45 MINUTES: CPT | Performed by: SOCIAL WORKER

## 2022-01-25 NOTE — PSYCH
Psychotherapy Provided: Individual Psychotherapy 40 minutes     Length of time in session: 40 minutes, follow up in 2 weeks    Encounter Diagnosis     ICD-10-CM    1  Major depressive disorder, recurrent episode, moderate ([facility] [address] )  F33 1        Goals addressed in session: Goal 1     Data:  Manfred Contreras presents from his car this evening  He notes that he is feeling much better with the addition of Abilify  Tonight he scores a 6 on the MIKA 7 (a 10 on Sept 7) and a 8 on the PHQ-9 (a 17 on Sept 7)  He is happy to learn that his scores have improved and he remarks that he feels more hopeful about the future  Manfred Contreras provides some updates on work and Gayle's condition  He comments that he believes he had "a nervous breakdown" over the summer because she was in danger of losing her house and talking about suicide  Therapist focuses on this and emphasizes that he is responsible for his own mental welfare and points out that being in relationship with two women (and having 3 daughters) is understandably stressful  He is reluctant to agree, and he reiterates that he has resolved that he will not end either his relationship with Blas Preciado or his wife  Therapist switches the session to a focus on the CBT triangle, and discusses positive self talk  Therapist assists Manfred Contreras to explore some of his thoughts - especially regarding his physical status - and uses motivation interviewing to help Manfred Contreras consider what steps he is prepared to make to change his status that he admits makes him unhappy  The remainder of the session is cut-off, as we lose connection  Therapist is able to message Manfred Contreras about meeting in 2 weeks  He agrees  Assessment:  Peterson's reported mood is euthymic  His affect is congruent  He is cooperative and his thoughts are logical/goal oriented  He denies SI/HI  Manfred Contreras does seem to evidence some characterlogical issues - especially a reluctance/unwillingness to take responsibility    He seems to blame others and has poor insight into this fact  Manfred Contreras is reluctant to accept that his choices contribute to the high stress level in which he finds himself  Plan:  Manfred Contreras will follow up in 2 weeks  His homework is to be mindful of what he can/cannot change as well as the viewpoint (positive/negative) that he takes each day  Pain:          0    Current suicide risk : Low         Behavioral Health Treatment Plan [facility]: Diagnosis and Treatment Plan explained to Margretta Dakin relates understanding diagnosis and is agreeable to Treatment Plan  YES    Virtual Regular Visit    Verification of patient location:    Patient is located in the following state in which I hold an active license [place]      Assessment/Plan:    Problem List Items Addressed This Visit        Other    Major depressive disorder, recurrent episode, moderate ([facility] [address] ) - Primary          Goals addressed in session: Goal 1          Reason for visit is   Chief Complaint   Patient presents with    Virtual Regular Visit        Encounter provider Shanique Troy LCSW    Provider located at [address] [place] [id number] [phone]      Recent Visits  Date Type Provider Dept   01/24/22 Telemedicine Shanique Troy [address] [place] [address] [facility] Helio   Showing recent visits within past 7 days and meeting all other requirements  Future Appointments  No visits were found meeting these conditions  Showing future appointments within next 150 days and meeting all other requirements       The patient was identified by name and date of birth  Pia Rod was informed that this is a telemedicine visit and that the [facility] and patient was informed this is a secure, HIPAA-complaint platform  He agrees to proceed     My office door was closed  No one else was in the room    He acknowledged consent and understanding of privacy and security of the video platform  The patient has agreed to participate and understands they can discontinue the visit at any time  Patient is aware this is a billable service  Subjective  Héctor Neely is a 60 y o  male   HPI     Past Medical History:   Diagnosis Date    Anxiety     Depression     Hypertension     Palpitation     Panic attack        Past Surgical History:   Procedure Laterality Date    KNEE SURGERY  08/1982       Current Outpatient Medications   Medication Sig Dispense Refill    ARIPiprazole (ABILIFY) 2 mg tablet Take 1 tablet (2 mg total) by mouth daily 30 tablet 1    betamethasone valerate (VALISONE) 0 1 % cream       escitalopram (LEXAPRO) 20 mg tablet Take 1 5 tablets (30 mg total) by mouth daily 45 tablet 2    hydrOXYzine HCL (ATARAX) 25 mg tablet Take 1 tablet (25 mg total) by mouth every 6 (six) hours as needed for anxiety 120 tablet 0    LORazepam (ATIVAN) 1 mg tablet Take 1 tablet (1 mg total) by mouth 2 (two) times a day as needed for anxiety 60 tablet 0    mirtazapine (REMERON) 30 mg tablet Take 1 tablet (30 mg total) by mouth daily at bedtime 30 tablet 2    QUEtiapine (SEROquel) 50 mg tablet Take 1 tablet (50 mg total) by mouth daily at bedtime Take 50mg daily in the morning, and 100mg at bedtime 45 tablet 2     No current facility-administered medications for this visit  Allergies   Allergen Reactions    Other Sneezing, Throat Swelling and Tongue Swelling    Shellfish Allergy - Food Allergy Tongue Swelling       Review of Systems    Video Exam    There were no vitals filed for this visit  Physical Exam     I spent 40 minutes directly with the patient during this visit    VIRTUAL VISIT DISCLAIMER    Héctor Neely verbally agrees to participate in [facility]   Pt is aware that Virtual Care Services could be limited without vital signs or the ability to perform a full hands-on physical exam  Peterson ROWE Rita understands he or the provider may request at any time to terminate the video visit and request the patient to seek care or treatment in person

## 2022-02-10 ENCOUNTER — OFFICE VISIT (OUTPATIENT)
Dept: FAMILY MEDICINE CLINIC | Facility: CLINIC | Age: 57
End: 2022-02-10
Payer: COMMERCIAL

## 2022-02-10 VITALS
HEART RATE: 86 BPM | HEIGHT: 76 IN | WEIGHT: 312 LBS | OXYGEN SATURATION: 96 % | BODY MASS INDEX: 37.99 KG/M2 | SYSTOLIC BLOOD PRESSURE: 124 MMHG | DIASTOLIC BLOOD PRESSURE: 74 MMHG

## 2022-02-10 DIAGNOSIS — Z00.00 WELLNESS EXAMINATION: Primary | ICD-10-CM

## 2022-02-10 DIAGNOSIS — Z12.12 SCREENING FOR COLORECTAL CANCER: ICD-10-CM

## 2022-02-10 DIAGNOSIS — Z12.11 SCREENING FOR COLORECTAL CANCER: ICD-10-CM

## 2022-02-10 DIAGNOSIS — Z23 ENCOUNTER FOR IMMUNIZATION: ICD-10-CM

## 2022-02-10 DIAGNOSIS — F32.3 MAJOR DEPRESSION WITH PSYCHOTIC FEATURES (HCC): ICD-10-CM

## 2022-02-10 PROCEDURE — 3008F BODY MASS INDEX DOCD: CPT | Performed by: FAMILY MEDICINE

## 2022-02-10 PROCEDURE — 1036F TOBACCO NON-USER: CPT | Performed by: FAMILY MEDICINE

## 2022-02-10 PROCEDURE — 99396 PREV VISIT EST AGE 40-64: CPT | Performed by: FAMILY MEDICINE

## 2022-02-10 PROCEDURE — 3725F SCREEN DEPRESSION PERFORMED: CPT | Performed by: FAMILY MEDICINE

## 2022-02-10 PROCEDURE — 3008F BODY MASS INDEX DOCD: CPT | Performed by: NURSE PRACTITIONER

## 2022-02-10 NOTE — PATIENT INSTRUCTIONS
Discussed the problem with the back pain and needs to be very careful keeping the back straight when working over a bench or sink or when bending always have 1 ft in front of the other  Will check renal panel and PSA for wellness check  Recent cholesterol level was okay  Will give Tdap to update immunizations and will set up for Cologuard stool screen    Is going to try to be much more aggressive with the diet to push for weight loss which I certainly think will help with the energy level and the back as has gained 34 lb in the last 2 months

## 2022-02-10 NOTE — PROGRESS NOTES
ADULT ANNUAL [facility] PRIMARY CARE    NAME: Ginna Bardy  AGE: 60 y o  SEX: male  : 1965     DATE: 2022     Assessment and Plan:     Problem List Items Addressed This Visit        Other    Major depression with psychotic features ([address] )     Continue overall follow-up with Psychiatry           Other Visit Diagnoses     Wellness examination    -  Primary    Relevant Orders    Renal function panel    PSA, Total Screen    Encounter for immunization        Screening for colorectal cancer        Relevant Orders    Cologuard          Immunizations and preventive care screenings were discussed with patient today  Appropriate education was printed on patient's after visit summary  Counseling:  Exercise: the importance of regular exercise/physical activity was discussed  Recommend exercise 3-5 times per week for at least 30 minutes  · Watch starch in diet    BMI Counseling: Body mass index is 37 98 kg/m²  The BMI is above normal  Nutrition recommendations include moderation in carbohydrate intake  Exercise recommendations include moderate physical activity 150 minutes/week  No pharmacotherapy was ordered  Rationale for BMI follow-up plan is due to patient being overweight or obese  Return in about 3 months (around 5/10/2022) for Recheck  Chief Complaint:     Chief Complaint   Patient presents with    Annual Exam     little improvement with wearing brace at night for carpal tunnel    Back Pain     lower back      History of Present Illness:     Adult Annual Physical   Patient here for a comprehensive physical exam  The patient reports problems - Problems with anxiety and shortness of breath related to this as well as intermittent problems with low back pain that does not radiate down the legs and is not evident at this time  Diet and Physical Activity  · Diet/Nutrition: poor diet  · Exercise: no formal exercise  Depression Screening  PHQ-2/9 Depression Screening    Little interest or pleasure in doing things: 1 - several days  Feeling down, depressed, or hopeless: 1 - several days  Trouble falling or staying asleep, or sleeping too much: 2 - more than half the days  Feeling tired or having little energy: 1 - several days  Poor appetite or overeatin - more than half the days  Feeling bad about yourself - or that you are a failure or have let yourself or your family down: 1 - several days  Trouble concentrating on things, such as reading the newspaper or watching television: 0 - not at all  Moving or speaking so slowly that other people could have noticed  Or the opposite - being so fidgety or restless that you have been moving around a lot more than usual: 1 - several days  Thoughts that you would be better off dead, or of hurting yourself in some way: 0 - not at all  PHQ-9 Score: 9   PHQ-9 Interpretation: Mild depression        General Health  · Sleep: sleeps well  · Hearing: decreased - right  · Vision: no vision problems  · Dental: no dental visits for >1 year   Health  · Symptoms include: none     Review of Systems:     Review of Systems   Constitutional: Negative for activity change, appetite change, chills, fatigue, fever and unexpected weight change  HENT: Negative for congestion, dental problem, hearing loss, rhinorrhea and trouble swallowing  Eyes: Negative for visual disturbance  Respiratory: Positive for shortness of breath ( related to anxiety)  Negative for apnea, cough and chest tightness  Cardiovascular: Positive for palpitations ( related to anxiety)  Negative for chest pain and leg swelling  Gastrointestinal: Negative for abdominal distention, abdominal pain, constipation and diarrhea  Endocrine: Negative for polyuria ( nocturia x1)  Genitourinary: Negative for difficulty urinating  Musculoskeletal: Positive for arthralgias and back pain (Intermittent low back pain)  Negative for myalgias  Skin: Negative for rash  Allergic/Immunologic: Positive for environmental allergies  Neurological: Positive for light-headedness  Negative for dizziness, weakness, numbness and headaches  Hematological: Negative for adenopathy  Psychiatric/Behavioral: Positive for agitation  Negative for confusion and sleep disturbance  The patient is nervous/anxious         Past Medical History:     Past Medical History:   Diagnosis Date    Anxiety     Depression     Hypertension     Palpitation     Panic attack       Past Surgical History:     Past Surgical History:   Procedure Laterality Date    KNEE SURGERY  08/1982      Family History:     Family History   Problem Relation Age of Onset    Hypertension Mother     COPD Mother    [facility] Migraines Mother     Stroke Father       Social History:     Social History     Socioeconomic History    Marital status: /Civil Union     Spouse name: None    Number of children: None    Years of education: None    Highest education level: None   Occupational History    None   Tobacco Use    Smoking status: Former Smoker     Types: Cigars    Smokeless tobacco: Never Used   Vaping Use    Vaping Use: Never used   Substance and Sexual Activity    Alcohol use: Not Currently     Comment: socially    Drug use: Not Currently    Sexual activity: Not Currently   Other Topics Concern    None   Social History Narrative    None     Social Determinants of Health     Financial Resource Strain: Not on file   Food Insecurity: Not on file   Transportation Needs: Not on file   Physical Activity: Not on file   Stress: Not on file   Social Connections: Not on file   Intimate Partner Violence: Not on file   Housing Stability: Not on file      Current Medications:     Current Outpatient Medications   Medication Sig Dispense Refill    ARIPiprazole (ABILIFY) 2 mg tablet Take 1 tablet (2 mg total) by mouth daily 30 tablet 1    betamethasone valerate (VALISONE) 0 1 % cream       escitalopram (LEXAPRO) 20 mg tablet Take 1 5 tablets (30 mg total) by mouth daily 45 tablet 2    LORazepam (ATIVAN) 1 mg tablet Take 1 tablet (1 mg total) by mouth 2 (two) times a day as needed for anxiety 60 tablet 0    mirtazapine (REMERON) 30 mg tablet Take 1 tablet (30 mg total) by mouth daily at bedtime 30 tablet 2    QUEtiapine (SEROquel) 50 mg tablet Take 1 tablet (50 mg total) by mouth daily at bedtime Take 50mg daily in the morning, and 100mg at bedtime 45 tablet 2    hydrOXYzine HCL (ATARAX) 25 mg tablet Take 1 tablet (25 mg total) by mouth every 6 (six) hours as needed for anxiety 120 tablet 0     No current facility-administered medications for this visit  Allergies: Allergies   Allergen Reactions    Other Sneezing, Throat Swelling and Tongue Swelling    Shellfish Allergy - Food Allergy Tongue Swelling      Physical Exam:     /74 (BP Location: Left arm, Patient Position: Sitting, Cuff Size: Standard)   Pulse 86   Ht 6' 4" (1 93 m)   Wt (!) 142 kg (312 lb)   SpO2 96%   BMI 37 98 kg/m²     Physical Exam  Vitals and nursing note reviewed  Constitutional:       Appearance: Normal appearance  HENT:      Head: Normocephalic  Right Ear: Tympanic membrane, ear canal and external ear normal  Decreased hearing (Twenty-five decibel hearing screen off except at 500 hertz  No difficulty with conversation) noted  Left Ear: Hearing, tympanic membrane, ear canal and external ear normal       Mouth/Throat:      Mouth: Mucous membranes are moist       Dentition: Normal dentition  Eyes:      Extraocular Movements: Extraocular movements intact  Conjunctiva/sclera: Conjunctivae normal       Pupils: Pupils are equal, round, and reactive to light  Neck:      Vascular: No carotid bruit  Cardiovascular:      Rate and Rhythm: Normal rate and regular rhythm  Pulses:           Dorsalis pedis pulses are 1+ on the right side and 1+ on the left side  Posterior tibial pulses are 2+ on the right side and 2+ on the left side  Heart sounds: Normal heart sounds  No murmur (Rate is 84) heard  Pulmonary:      Effort: Pulmonary effort is normal       Breath sounds: Normal breath sounds  Abdominal:      General: Abdomen is flat  There is no distension  Palpations: Abdomen is soft  There is no mass  Tenderness: There is no abdominal tenderness  Hernia: There is no hernia in the left inguinal area or right inguinal area  Genitourinary:     Penis: Normal and circumcised  Testes: Normal    Musculoskeletal:         General: No swelling  Cervical back: Normal range of motion and neck supple  No tenderness  No muscular tenderness  Right lower leg: No edema  Left lower leg: No edema  Right foot: No deformity  Left foot: No deformity  Comments: No tenderness to palpation of the low back although the area he identifies with the intermittent pain is in the area of the sacroiliac joint   Feet:      Right foot:      Protective Sensation: 8 sites tested  8 sites sensed  Skin integrity: Skin integrity normal       Left foot:      Protective Sensation: 8 sites tested  8 sites sensed  Skin integrity: Skin integrity normal    Lymphadenopathy:      Cervical: No cervical adenopathy  Skin:     General: Skin is warm and dry  Findings: No rash  Neurological:      General: No focal deficit present  Mental Status: He is alert and oriented to person, place, and time  Cranial Nerves: No cranial nerve deficit  Sensory: No sensory deficit  Motor: No weakness  Coordination: Coordination normal       Gait: Gait normal       Deep Tendon Reflexes: Reflexes abnormal    Psychiatric:         Mood and Affect: Mood is anxious  Behavior: Behavior normal          Thought Content: Thought content is paranoid           Judgment: Judgment normal           Nolon Seip, MD  [address] [place] [address] [facility]

## 2022-02-11 PROBLEM — G89.29 CHRONIC BILATERAL LOW BACK PAIN WITHOUT SCIATICA: Status: ACTIVE | Noted: 2022-02-11

## 2022-02-11 PROBLEM — M54.50 CHRONIC BILATERAL LOW BACK PAIN WITHOUT SCIATICA: Status: ACTIVE | Noted: 2022-02-11

## 2022-02-11 PROBLEM — J06.9 VIRAL UPPER RESPIRATORY TRACT INFECTION: Status: RESOLVED | Noted: 2021-12-16 | Resolved: 2022-02-11

## 2022-02-14 DIAGNOSIS — F32.A DEPRESSED: ICD-10-CM

## 2022-02-14 RX ORDER — LORAZEPAM 1 MG/1
1 TABLET ORAL 2 TIMES DAILY PRN
Qty: 60 TABLET | Refills: 0 | Status: SHIPPED | OUTPATIENT
Start: 2022-02-14 | End: 2022-03-14 | Stop reason: SDUPTHER

## 2022-02-14 NOTE — TELEPHONE ENCOUNTER
He was supposed to follow up in 3 weeks, he needs to schedule in order for me to refill the ativan  Thanks!

## 2022-02-21 ENCOUNTER — TELEPHONE (OUTPATIENT)
Dept: PSYCHIATRY | Facility: CLINIC | Age: 57
End: 2022-02-21

## 2022-02-21 ENCOUNTER — DOCUMENTATION (OUTPATIENT)
Dept: BEHAVIORAL/MENTAL HEALTH CLINIC | Facility: CLINIC | Age: 57
End: 2022-02-21

## 2022-02-21 DIAGNOSIS — F33.1 MODERATE EPISODE OF RECURRENT MAJOR DEPRESSIVE DISORDER (HCC): ICD-10-CM

## 2022-02-21 RX ORDER — HYDROXYZINE HYDROCHLORIDE 25 MG/1
25 TABLET, FILM COATED ORAL EVERY 6 HOURS PRN
Qty: 120 TABLET | Refills: 0 | Status: SHIPPED | OUTPATIENT
Start: 2022-02-21 | End: 2022-03-14 | Stop reason: SDUPTHER

## 2022-02-21 NOTE — PROGRESS NOTES
Peterson's treatment plan was 11 months old on 2/7/22  He cancelled tomorrow's appointment and is scheduled for an appointment on 3/7/22  At that time, his treatment plan will be updated

## 2022-02-28 DIAGNOSIS — F32.3 MAJOR DEPRESSION WITH PSYCHOTIC FEATURES (HCC): ICD-10-CM

## 2022-02-28 DIAGNOSIS — F32.A DEPRESSED: ICD-10-CM

## 2022-02-28 RX ORDER — QUETIAPINE FUMARATE 50 MG/1
50 TABLET, FILM COATED ORAL
Qty: 45 TABLET | Refills: 2 | Status: SHIPPED | OUTPATIENT
Start: 2022-02-28 | End: 2022-03-14 | Stop reason: DRUGHIGH

## 2022-02-28 RX ORDER — ARIPIPRAZOLE 2 MG/1
2 TABLET ORAL DAILY
Qty: 30 TABLET | Refills: 1 | Status: SHIPPED | OUTPATIENT
Start: 2022-02-28 | End: 2022-03-14 | Stop reason: DRUGHIGH

## 2022-03-07 ENCOUNTER — TELEMEDICINE (OUTPATIENT)
Dept: BEHAVIORAL/MENTAL HEALTH CLINIC | Facility: CLINIC | Age: 57
End: 2022-03-07
Payer: COMMERCIAL

## 2022-03-07 DIAGNOSIS — F33.1 MAJOR DEPRESSIVE DISORDER, RECURRENT EPISODE, MODERATE (HCC): Primary | ICD-10-CM

## 2022-03-07 PROCEDURE — 90834 PSYTX W PT 45 MINUTES: CPT | Performed by: SOCIAL WORKER

## 2022-03-08 NOTE — PSYCH
Psychotherapy Provided: Individual Psychotherapy 40 minutes     Length of time in session: 40 minutes  Per Peterson's request,  will reach out to Tana Johnson and schedule follow up appointments  Encounter Diagnosis     ICD-10-CM    1  Major depressive disorder, recurrent episode, moderate ([facility] )  F33 1        Goals addressed in session: Goal 1     Data:  Tana Johnson presents tonight lying in bed  He reports that today he worked from home which he is happy about  He states that going physically into work causes him great anxiety  He denies any panic attacks in the last nine months  Tana Johnson shares that he fears not measuring up at work and being fired; he states that he is working hard to please his Manager  Therapist points out that the Manager must be happier with Tana Johnson, since he now permits him to work from home on Mondays  Therapist then works with [employer] cognitions pointing out that he is assuming the worst and in turn causing himself anxiety  Therapist adds that he has lost/found jobs in the past and will be able to do it again if necessary  Tana Johnson does not agree; he plans to apply for disability if he is ever fired  Tana Johnson asks if he will ever get better; he admits that he believes that the medications should be doing more to help him  He is especially confused by the fact that he "suddenly" suffered with panic 2 years ago, "after years of no problems "  Therapist provides psycho-ed, pointing out again that Peterson's high anxiety/panic symptoms are largely due to the stress under which he finds himself, e g   with a girlfriend, working a job he does not feel appreciated at, raising 3 daughters with mental health issues  Therapist challenges Tana Johnson again to do something more to minimize his stress  Tana Johnson defensively remarks that he has no plans to change anything in his life, and begins to talk at length about [place] who recently had surgery to her femur    Tana Johnson is taking off work next Friday to take her to her follow up appointment  Therapist concludes the session by updating Peterson's treatment plan; therapist points out that Emmie Umaña is the source of change/growth/wellness in his life  Therapist recommends that Emmie Umaña begin to use the [facility] [employer] which will address his distorted thoughts and possibly participate in EMDR to address his negative cognition that he is "powerless" to change anything  Therapist instructs him to view the [employer] website, specifically What Is EMDR video  Emmie mUaña agrees  Assessment:  Peterson's reports that his mood remains anxious with some improvement since the Abilify was added  Peterson's affect is constricted, and he is tearful at times  Emmie Umaña is cooperative but resistant to the idea that he must make changes in his thoughts/behavior to improve his mood  His thoughts are logical and goal oriented but distorted  Emmie Umaña denies SI/HI  Plan:  Emmie Umaña requests to participate in therapy only monthly  Therapist explains that she is booked until the end of April but agrees to have the  reach out to Emmie Umaña to reschedule  At our next scheduled session we will review Peterson's homework  Pain:      moderate to severe    2    Current suicide risk : [address]: Diagnosis and Treatment Plan explained to Skylar Bustamante relates understanding diagnosis and is agreeable to Treatment Plan   YES    Virtual Regular Visit    Verification of patient location:    Patient is located in the following state in which I hold an active license [place]      Assessment/Plan:    Problem List Items Addressed This Visit        Other    Major depressive disorder, recurrent episode, moderate ([facility] [address] ) - Primary          Goals addressed in session: Goal 1          Reason for visit is   Chief Complaint   Patient presents with    Virtual Regular Visit        Encounter provider Arlin Brown LCSW    Provider located at [facility]  [address] [facility]  [id number] [place] [id number] [phone]      Recent Visits  No visits were found meeting these conditions  Showing recent visits within past 7 days and meeting all other requirements  Future Appointments  No visits were found meeting these conditions  Showing future appointments within next 150 days and meeting all other requirements       The patient was identified by name and date of birth  Leslie Jerome was informed that this is a telemedicine visit and that the visit is being conducted throughEpic Embedded and patient was informed this is a secure, HIPAA-complaint platform  He agrees to proceed     My office door was closed  No one else was in the room  He acknowledged consent and understanding of privacy and security of the video platform  The patient has agreed to participate and understands they can discontinue the visit at any time  Patient is aware this is a billable service  Subjective  Leslie Jerome is a 60 y o  male          HPI     Past Medical History:   Diagnosis Date    Anxiety     Depression     Hypertension     Palpitation     Panic attack        Past Surgical History:   Procedure Laterality Date    KNEE SURGERY  08/1982       Current Outpatient Medications   Medication Sig Dispense Refill    ARIPiprazole (ABILIFY) 2 mg tablet Take 1 tablet (2 mg total) by mouth daily 30 tablet 1    betamethasone valerate (VALISONE) 0 1 % cream       escitalopram (LEXAPRO) 20 mg tablet Take 1 5 tablets (30 mg total) by mouth daily 45 tablet 2    hydrOXYzine HCL (ATARAX) 25 mg tablet Take 1 tablet (25 mg total) by mouth every 6 (six) hours as needed for anxiety 120 tablet 0    LORazepam (ATIVAN) 1 mg tablet Take 1 tablet (1 mg total) by mouth 2 (two) times a day as needed for anxiety 60 tablet 0    mirtazapine (REMERON) 30 mg tablet Take 1 tablet (30 mg total) by mouth daily at bedtime 30 tablet 2    QUEtiapine (SEROquel) 50 mg tablet Take 1 tablet (50 mg total) by mouth daily at bedtime Take 50mg daily in the morning, and 100mg at bedtime 45 tablet 2     No current facility-administered medications for this visit  Allergies   Allergen Reactions    Other Sneezing, Throat Swelling and Tongue Swelling    Shellfish Allergy - Food Allergy Tongue Swelling       Review of Systems    Video Exam    There were no vitals filed for this visit  Physical Exam     I spent 40 minutes directly with the patient during this visit    VIRTUAL VISIT DISCLAIMER    Adrian Agent verbally agrees to participate in [employer]  Pt is aware that [employer] could be limited without vital signs or the ability to perform a full hands-on physical exam  Peterson See understands he or the provider may request at any time to terminate the video visit and request the patient to seek care or treatment in person

## 2022-03-08 NOTE — BH TREATMENT PLAN
Darylene Haddock  1965       Date of Initial Treatment Plan: 9/7/21  Date of Current Treatment Plan: 03/08/22    Treatment Plan Number 2    Vania Ball reports that he has benefited from the current medication regimen  He believes, however, that the medication should be doing more to control his symptoms  He has little insight into the fact that situational stressors are likely worsening his symptoms - and he is resistant to idea of taking steps to minimize their effects  Vania Ball seems to be pre-occupied with his appearance, e g  his weight and his graying hair  He reports having taken no steps to address his weight issues other than starting a supplement that he has ordered on line  He does add tonight, however, that he has now made an appointment with a dietician but feels that he is eating no differently than he ever did and cannot understand why he is gaining weight  The principle goal of treatment is to increase Peterson's insight into the fact that changes in his thinking patterns and behavior is likely to affect the severity of his symptoms  Strengths/Personal Resources for Self Care: Supportive sister and girlfriend; one daughter also a support; able to pay bills     Diagnosis:   1  Major depressive disorder, recurrent episode, moderate (HCC)         Area of Needs: Improve anxiety symptoms      Long Term Goal 1: "I wish I felt less anxious  I want to feel the way I used to feel "    Target Date: 8/8/22  Completion Date: To be determined by therapist and Terri Hood Term Objectives for Goal 1:     1  Vania Ball will attend therapy and process thoughts/feelings regarding current stressors  2  Vania Ball will work to decrease cognitive distortions, agnes  His tendency to focus on the worst possible scenario  3  Vania Ball will work with therapist to reprocess various trips to the ER with panic; he states that he found these instances traumatic  4    Vania Ball will set small reasonable goals for changing entrenched behavior patterns  5  Octavio Hernandez will continue to participate in med management and follow recommendations  Responsible:  Octavio Hernandez and therapist    GOAL 1: Modality: Individual/monthly/client centered, CBT, mindfulness, trauma informed - EMDR      Behavioral Health Treatment Plan [facility]: Diagnosis and Treatment Plan explained to Shantal Rodrigo relates understanding diagnosis and is agreeable to Treatment Plan  Client Comments : Please share your thoughts, feelings, need and/or experiences regarding your treatment plan: Octavio Hernandez gives verbal consent for this plan due to virtual nature of session

## 2022-03-14 ENCOUNTER — TELEMEDICINE (OUTPATIENT)
Dept: PSYCHIATRY | Facility: CLINIC | Age: 57
End: 2022-03-14
Payer: COMMERCIAL

## 2022-03-14 ENCOUNTER — OFFICE VISIT (OUTPATIENT)
Dept: BARIATRICS | Facility: CLINIC | Age: 57
End: 2022-03-14

## 2022-03-14 VITALS — BODY MASS INDEX: 38.66 KG/M2 | HEIGHT: 75 IN | WEIGHT: 310.9 LBS

## 2022-03-14 DIAGNOSIS — F39 MOOD DISORDER (HCC): ICD-10-CM

## 2022-03-14 DIAGNOSIS — F33.1 MODERATE EPISODE OF RECURRENT MAJOR DEPRESSIVE DISORDER (HCC): ICD-10-CM

## 2022-03-14 DIAGNOSIS — F32.A DEPRESSED: ICD-10-CM

## 2022-03-14 DIAGNOSIS — R63.5 ABNORMAL WEIGHT GAIN: ICD-10-CM

## 2022-03-14 PROCEDURE — 99214 OFFICE O/P EST MOD 30 MIN: CPT | Performed by: NURSE PRACTITIONER

## 2022-03-14 PROCEDURE — RECHECK

## 2022-03-14 PROCEDURE — 3008F BODY MASS INDEX DOCD: CPT | Performed by: NURSE PRACTITIONER

## 2022-03-14 PROCEDURE — WMDI30

## 2022-03-14 PROCEDURE — 3008F BODY MASS INDEX DOCD: CPT | Performed by: FAMILY MEDICINE

## 2022-03-14 RX ORDER — ESCITALOPRAM OXALATE 20 MG/1
30 TABLET ORAL DAILY
Qty: 45 TABLET | Refills: 2 | Status: SHIPPED | OUTPATIENT
Start: 2022-03-14

## 2022-03-14 RX ORDER — QUETIAPINE FUMARATE 100 MG/1
100 TABLET, FILM COATED ORAL
Qty: 30 TABLET | Refills: 0 | Status: SHIPPED | OUTPATIENT
Start: 2022-03-14 | End: 2022-04-04 | Stop reason: SDUPTHER

## 2022-03-14 RX ORDER — LORAZEPAM 1 MG/1
TABLET ORAL
Qty: 45 TABLET | Refills: 0 | Status: SHIPPED | OUTPATIENT
Start: 2022-03-14 | End: 2022-04-12 | Stop reason: SDUPTHER

## 2022-03-14 RX ORDER — HYDROXYZINE HYDROCHLORIDE 25 MG/1
25 TABLET, FILM COATED ORAL EVERY 6 HOURS PRN
Qty: 120 TABLET | Refills: 0 | Status: SHIPPED | OUTPATIENT
Start: 2022-03-14 | End: 2022-04-22 | Stop reason: SDUPTHER

## 2022-03-14 RX ORDER — ARIPIPRAZOLE 5 MG/1
5 TABLET ORAL DAILY
Qty: 30 TABLET | Refills: 0 | Status: SHIPPED | OUTPATIENT
Start: 2022-03-14 | End: 2022-04-04 | Stop reason: DRUGHIGH

## 2022-03-14 RX ORDER — MIRTAZAPINE 15 MG/1
15 TABLET, FILM COATED ORAL
Qty: 30 TABLET | Refills: 0 | Status: SHIPPED | OUTPATIENT
Start: 2022-03-14 | End: 2022-04-04

## 2022-03-16 NOTE — PSYCH
Virtual Regular Visit    Verification of patient location:    Patient is located in the following state in which I hold an active license [place]    Problem List Items Addressed This Visit     None      Visit Diagnoses     Mood disorder (HCC)        Relevant Medications    ARIPiprazole (ABILIFY) 5 mg tablet    escitalopram (LEXAPRO) 20 mg tablet    hydrOXYzine HCL (ATARAX) 25 mg tablet    LORazepam (ATIVAN) 1 mg tablet    mirtazapine (REMERON) 15 mg tablet    QUEtiapine (SEROquel) 100 mg tablet    Moderate episode of recurrent major depressive disorder (HCC)        Relevant Medications    ARIPiprazole (ABILIFY) 5 mg tablet    escitalopram (LEXAPRO) 20 mg tablet    hydrOXYzine HCL (ATARAX) 25 mg tablet    LORazepam (ATIVAN) 1 mg tablet    mirtazapine (REMERON) 15 mg tablet    QUEtiapine (SEROquel) 100 mg tablet    Depressed        Relevant Medications    ARIPiprazole (ABILIFY) 5 mg tablet    escitalopram (LEXAPRO) 20 mg tablet    hydrOXYzine HCL (ATARAX) 25 mg tablet    LORazepam (ATIVAN) 1 mg tablet    mirtazapine (REMERON) 15 mg tablet    QUEtiapine (SEROquel) 100 mg tablet             Encounter provider Saadia Sy, [facility]    Provider located at   [address] [address] [phone]    Recent Visits  No visits were found meeting these conditions  Showing recent visits within past 7 days and meeting all other requirements  Future Appointments  No visits were found meeting these conditions  Showing future appointments within next 150 days and meeting all other requirements         The patient was identified by name and date of birth  Layman Harness was informed that this is a telemedicine visit and that [facility] and patient was informed this is a secure, HIPAA-complaint platform  He agrees to proceed     My office door was closed  No one else was in the room    He acknowledged consent and understanding of privacy and security of the video platform  The patient has agreed to participate and understands they can discontinue the visit at any time  Patient is aware this is a billable service  HPI     Current Outpatient Medications   Medication Sig Dispense Refill    ARIPiprazole (ABILIFY) 5 mg tablet Take 1 tablet (5 mg total) by mouth daily 30 tablet 0    betamethasone valerate (VALISONE) 0 1 % cream       escitalopram (LEXAPRO) 20 mg tablet Take 1 5 tablets (30 mg total) by mouth daily 45 tablet 2    hydrOXYzine HCL (ATARAX) 25 mg tablet Take 1 tablet (25 mg total) by mouth every 6 (six) hours as needed for anxiety 120 tablet 0    LORazepam (ATIVAN) 1 mg tablet Take 1/2 tab in am, 1 tab in afternoon as needed for anxiety 45 tablet 0    mirtazapine (REMERON) 15 mg tablet Take 1 tablet (15 mg total) by mouth daily at bedtime 30 tablet 0    QUEtiapine (SEROquel) 100 mg tablet Take 1 tablet (100 mg total) by mouth daily at bedtime 30 tablet 0     No current facility-administered medications for this visit  Review of Systems  Video Exam    There were no vitals filed for this visit  Physical Exam   As a result of this visit, I have referred the patient for further respiratory evaluation  No    I spent 30 minutes directly with the patient during this visit  VIRTUAL VISIT DISCLAIMER    Marvin Wrenalen acknowledges that he has consented to an online visit or consultation  He understands that the online visit is based solely on information provided by him, and that, in the absence of a face-to-face physical evaluation by the physician, the diagnosis he receives is both limited and provisional in terms of accuracy and completeness  This is not intended to replace a full medical face-to-face evaluation by the physician  Marvin Chaidez understands and accepts these terms      MEDICATION MANAGEMENT NOTE        [facility]    Name and Date of Birth:  Talia Small 60 y o  1965 MRN: [id number]    Date of Visit: March 16, 2022    Allergies   Allergen Reactions    Other Sneezing, Throat Swelling and Tongue Swelling    Shellfish Allergy - Food Allergy Tongue Swelling     SUBJECTIVE:    Quita Nageotte is seen today for a follow up for Major Depressive Disorder  He continues to do relatively well since the last visit  Quita Nageotte last seen by this provider on 1/5/22  He is seen virtually today for medication management follow up  He is calm and appropriate in conversation  He reports that he attended a weight loss management appointment today  He states that the Abilify we initiated in January did give him some improvement for the 1st few weeks and did decrease his depression and anxiety at that time  Currently he states that some moments in his day are okay, others are not  Rates his depression at this time 5/10, anxiety also 5/10  On average he reports 7-1/2 hours of sleep per night, good appetite  Denies any auditory or visual hallucinations  He reports that he does have suicidal thoughts at times, but states that they are passing thoughts and denies any plan or intent  He continues to work in therapy with Katiuska Hoff, which he is happy with  He is hopeful to get off some of the medications that cause weight gain  Because the abilify did have a positive effect, we will continue to work with increasing that medication  We will also continue to taper the ativan, and will decrease the remeron and seroquel  He asked about wellbutrin, and we discussed continuing to keep that option open if needed  He is agreeable to the current treatment  We will follow up in 3 weeks  He will call sooner with concerns or issues if they arise prior to scheduled appointment  He denies any side effects from current psychiatric medications      PLAN:  Continue medications as ordered  Lexapro 30 mg p o  daily  Atarax 25 mg p o  q 6 p r n  for anxiety  Increase Abilify to 5 mg p o  daily  Decrease Ativan to 1 mg p o  b i d  p r n - 1/2 tab in the morning, 1 tab in the afternoon as needed for anxiety  Decrease Remeron to 15 mg p o  HS  Decrease Seroquel to only 100 mg p o  HS  Continue therapy with Chris Duckworth  Follow-up with this provider in 3 weeks  He will call sooner with concerns or issues if they arise prior to scheduled appointment  Aware of 24 hour and weekend coverage for urgent situations accessed by calling [facility] main practice number  Continue psychotherapy with therapist  Medication management every 3 weeks  Aware of need to follow up with family physician for medical issues    Diagnoses and all orders for this visit:    Mood disorder ([address] )  -     ARIPiprazole (ABILIFY) 5 mg tablet; Take 1 tablet (5 mg total) by mouth daily  -     escitalopram (LEXAPRO) 20 mg tablet; Take 1 5 tablets (30 mg total) by mouth daily    Moderate episode of recurrent major depressive disorder (HCC)  -     hydrOXYzine HCL (ATARAX) 25 mg tablet; Take 1 tablet (25 mg total) by mouth every 6 (six) hours as needed for anxiety  -     mirtazapine (REMERON) 15 mg tablet; Take 1 tablet (15 mg total) by mouth daily at bedtime  -     QUEtiapine (SEROquel) 100 mg tablet; Take 1 tablet (100 mg total) by mouth daily at bedtime    Depressed  -     LORazepam (ATIVAN) 1 mg tablet; Take 1/2 tab in am, 1 tab in afternoon as needed for anxiety        Current Outpatient Medications on File Prior to Visit   Medication Sig Dispense Refill    betamethasone valerate (VALISONE) 0 1 % cream        No current facility-administered medications on file prior to visit  Psychotherapy Provided:     Individual psychotherapy provided: Yes  Counseling was provided during the session today for 16 minutes  Supportive counseling provided  Medication changes discussed with Aftab Ellsworth  Medication education provided to Aftab Ellsworth    Recent stressor including job stress, social difficulties, ongoing anxiety and chronic mental illness discussed with Tucker Keith  Coping strategies reviewed with Tucker Sagar  Importance of medication and treatment compliance reviewed with Tucker Sagar  Importance of follow up with family physician for medical issues reviewed with Tucker Sagar  Reassurance and supportive therapy provided  Crisis/safety plan discussed with Tucker Keith  Will utilize crisis as needed    HPI ROS Appetite Changes and Sleep:     He reports normal sleep, normal appetite, adequate energy level  Denies homicidal ideation, denies suicidal ideation    Review Of Systems:  HPI ROS:               Medication Side Effects:  Increased appetite     Depression (10 worst): 5/10 (Was 5/10)   Anxiety (10 worst): 5/10 (Was 5/10)   Safety concerns (SI, HI, etc): denies (Was denies)   Sleep: 7 5 hours/night (Was 7-7 5 hours/night)   Energy: adequate (Was adequate)   Appetite: increased (Was increased)     General normal    Personality no change in personality   Constitutional feeling tired, low energy and as noted in HPI   ENT negative   Cardiovascular negative   Respiratory negative   Gastrointestinal negative   Genitourinary negative   Musculoskeletal negative   Integumentary negative   Neurological negative   Endocrine negative   Other Symptoms none, all other systems are negative     Mental Status Evaluation:    Appearance Adequate hygiene and grooming   Behavior calm and cooperative   Mood anxious and depressed  Depression Scale - 5 of 10 (0 = No depression)  Anxiety Scale - 5 of 10 (0 = No anxiety)   Speech Normal rate and volume   Affect mood-congruent   Thought Processes Goal directed and coherent   Thought Content Does not verbalize delusional material   Associations Tightly connected   Perceptual Disturbances Denies hallucinations and does not appear to be responding to internal stimuli   Risk Potential Suicidal/Homicidal Ideation - No evidence of suicidal or homicidal ideation and patient does not verbalize suicidal or homicidal ideation  Risk of Violence - No evidence of risk for violence found on assessment  Risk of Self Mutilation - No evidence of risk for self mutilation found on assessment   Orientation oriented to person, place, time/date and situation   Memory recent and remote memory grossly intact   Consciousness alert and awake   Attention/Concentration attention span and concentration are age appropriate   Insight partial   Judgement partial   Muscle Strength and Gait normal muscle strength and normal muscle tone, normal gait/station and normal balance   Motor Activity unable to assess today due to virtual visit   Language no difficulty naming common objects, no difficulty repeating a phrase, no difficulty writing a sentence   Fund of Knowledge adequate knowledge of current events  adequate fund of knowledge regarding past history  adequate fund of knowledge regarding vocabulary      Past Psychiatric History - per Dr Yair Pal H&P  Previous diagnoses include Depression and Anxiety   Prior outpatient psychiatric treatment:   With PCP till now   Prior therapy:    Prior inpatient psychiatric treatment: x1  [address] suicide attempts:  None   Prior self harm:  None   Prior violence or aggression:  None    Past Psychiatric History Update:     Inpatient Psychiatric Admission Since Last Encounter:   no  Changes to Outpatient Psychiatric Treatment Team:    no  Suicide Attempt Or Self Mutilation Since Last Encounter:   no  Incidence of Violent Behavior Since Last Encounter:   no    Traumatic History Update:     New Onset of Abuse Since Last Encounter:   no  Traumatic Events Since Last Encounter:   no    Past Medical History:    Past Medical History:   Diagnosis Date    Anxiety     Depression     Hypertension     Palpitation     Panic attack      No past medical history pertinent negatives    Past Surgical History:   Procedure Laterality Date    KNEE SURGERY  08/1982     Allergies   Allergen Reactions    Other Sneezing, Throat Swelling and Tongue Swelling    Shellfish Allergy - Food Allergy Tongue Swelling     Substance Abuse History:    Social History     Substance and Sexual Activity   Alcohol Use Not Currently    Comment: socially     Social History     Substance and Sexual Activity   Drug Use Not Currently     Social History:    Social History     Socioeconomic History    Marital status: /Civil Union     Spouse name: Not on file    Number of children: Not on file    Years of education: Not on file    Highest education level: Not on file   Occupational History    Not on file   Tobacco Use    Smoking status: Former Smoker     Types: Cigars    Smokeless tobacco: Never Used   Vaping Use    Vaping Use: Never used   Substance and Sexual Activity    Alcohol use: Not Currently     Comment: socially    Drug use: Not Currently    Sexual activity: Not Currently   Other Topics Concern    Not on file   Social History Narrative    Not on file     Social Determinants of Health     Financial Resource Strain: Not on file   Food Insecurity: Not on file   Transportation Needs: Not on file   Physical Activity: Not on file   Stress: Not on file   Social Connections: Not on file   Intimate Partner Violence: Not on file   Housing Stability: Not on file     Family Psychiatric History:     Family History   Problem Relation Age of Onset    Hypertension Mother     COPD Mother     Migraines Mother     Stroke Father      History Review: The following portions of the patient's history were reviewed and updated as appropriate: allergies, current medications, past family history, past medical history, past social history, past surgical history and problem list     OBJECTIVE:     Vital signs in last 24 hours: There were no vitals filed for this visit  Laboratory Results:   Recent Labs (last 2 months):   No visits with results within 2 Month(s) from this visit     Latest known visit with results is:   Orders Only on 12/15/2021   Component Date Value    Free t4 12/15/2021 0 9     TSH 12/15/2021 1 80      I have personally reviewed all pertinent laboratory/tests results  Suicide/Homicide Risk Assessment:    Risk of Harm to Self:  The following ratings are based on assessment at the time of the interview  Recent Specific Risk Factors include: current depressive symptoms, current anxiety symptoms  Demographic risk factors include: , male, age: over 48 or older  Historical Risk Factors include: chronic depression, chronic anxiety symptoms  Protective Factors: no current suicidal ideation, access to mental health treatment, being a parent, being , compliant with medications, compliant with mental health treatment, stable living environment, stable job, strong relationships  Based on today's assessment, Sean Shah presents the following risk of harm to self: low    Risk of Harm to Others: The following ratings are based on assessment at the time of the interview  Protective Factors: no current homicidal ideation  Based on today's assessment, Sean Shah presents the following risk of harm to others: none    The following interventions are recommended: contracts for safety at present - agrees to go to ED if feeling unsafe, return in 3 weeks for reassessment, therapy appointment in 1 week, contracts for safety at present - agrees to call Crisis Intervention Service if feeling unsafe    Medications Risks/Benefits:      Risks, Benefits And Possible Side Effects Of Medications:    Discussed risks and benefits of treatment with patient including risk of suicidality, serotonin syndrome, increased QTc interval and SIADH related to treatment with antidepressants;  Risk of induction of manic symptoms in certain patient populations, risk of parkinsonian symptoms, metabolic syndrome, tardive dyskinesia and neuroleptic malignant syndrome related to treatment with antipsychotic medications and risks of misuse, abuse or dependence, sedation and respiratory depression related to treatment with benzodiazepine medications     Controlled Medication Discussion:     Ivan Fernandez has been filling controlled prescriptions on time as prescribed according to [place] Prescription Drug Monitoring Program    Treatment Plan:    Due for update/Updated:   no  Last treatment plan done 3/7/22 by Janak Infante LCSW  Treatment Plan due on 9/7/22  KOLBY Miller 03/16/22    This note was shared with patient

## 2022-03-18 ENCOUNTER — TELEPHONE (OUTPATIENT)
Dept: PSYCHIATRY | Facility: CLINIC | Age: 57
End: 2022-03-18

## 2022-03-18 DIAGNOSIS — F39 MOOD DISORDER (HCC): Primary | ICD-10-CM

## 2022-03-18 NOTE — TELEPHONE ENCOUNTER
Vania Ball called to report that he has been having some dizziness since the change in medication    Can you call and speak with him

## 2022-03-18 NOTE — TELEPHONE ENCOUNTER
Patient called with complaint that abilify was making him "dizzy" in the am after taking  He reports that there are no other side effects at this time  I have left a message for Xi Zayas to hold his abilify at this time and have entered an order for an EKG for him  I will call him on Monday to confirm

## 2022-03-24 DIAGNOSIS — F33.1 MODERATE EPISODE OF RECURRENT MAJOR DEPRESSIVE DISORDER (HCC): ICD-10-CM

## 2022-03-24 RX ORDER — HYDROXYZINE HYDROCHLORIDE 25 MG/1
25 TABLET, FILM COATED ORAL EVERY 6 HOURS PRN
Qty: 120 TABLET | Refills: 0 | Status: CANCELLED | OUTPATIENT
Start: 2022-03-24

## 2022-03-26 ENCOUNTER — OFFICE VISIT (OUTPATIENT)
Dept: LAB | Facility: HOSPITAL | Age: 57
End: 2022-03-26
Payer: COMMERCIAL

## 2022-03-26 DIAGNOSIS — F39 MOOD DISORDER (HCC): ICD-10-CM

## 2022-03-26 PROCEDURE — 93005 ELECTROCARDIOGRAM TRACING: CPT

## 2022-03-27 LAB
ATRIAL RATE: 82 BPM
P AXIS: 39 DEGREES
PR INTERVAL: 206 MS
QRS AXIS: -19 DEGREES
QRSD INTERVAL: 94 MS
QT INTERVAL: 400 MS
QTC INTERVAL: 467 MS
T WAVE AXIS: 32 DEGREES
VENTRICULAR RATE: 82 BPM

## 2022-04-01 ENCOUNTER — DOCUMENTATION (OUTPATIENT)
Dept: PSYCHIATRY | Facility: CLINIC | Age: 57
End: 2022-04-01

## 2022-04-01 NOTE — PSYCH
This provider received an email from Leticia Smith regarding his EKG results  This provider did read the results this evening and emailed the patient back that he should contact his PCP or cardiologist to discuss the findings and that this NP would hold off on medication changes until he's been to his PCP/Cardiologist   Also recommended that he not email this provider, and instead call the office to speak to someone, as it is more reliable  Will follow up with Leticia Smith at his appointment schedule on Monday

## 2022-04-02 ENCOUNTER — PATIENT MESSAGE (OUTPATIENT)
Dept: FAMILY MEDICINE CLINIC | Facility: CLINIC | Age: 57
End: 2022-04-02

## 2022-04-02 DIAGNOSIS — R42 DIZZINESS: Primary | ICD-10-CM

## 2022-04-02 DIAGNOSIS — I49.3 PVC (PREMATURE VENTRICULAR CONTRACTION): ICD-10-CM

## 2022-04-04 ENCOUNTER — TELEMEDICINE (OUTPATIENT)
Dept: PSYCHIATRY | Facility: CLINIC | Age: 57
End: 2022-04-04
Payer: COMMERCIAL

## 2022-04-04 DIAGNOSIS — F33.1 MAJOR DEPRESSIVE DISORDER, RECURRENT EPISODE, MODERATE (HCC): Primary | ICD-10-CM

## 2022-04-04 DIAGNOSIS — F33.1 MODERATE EPISODE OF RECURRENT MAJOR DEPRESSIVE DISORDER (HCC): ICD-10-CM

## 2022-04-04 PROCEDURE — 1036F TOBACCO NON-USER: CPT | Performed by: NURSE PRACTITIONER

## 2022-04-04 PROCEDURE — 99214 OFFICE O/P EST MOD 30 MIN: CPT | Performed by: NURSE PRACTITIONER

## 2022-04-04 RX ORDER — ARIPIPRAZOLE 5 MG/1
2.5 TABLET ORAL DAILY
Qty: 30 TABLET | Refills: 0 | Status: SHIPPED | OUTPATIENT
Start: 2022-04-04 | End: 2022-07-18 | Stop reason: SDUPTHER

## 2022-04-04 RX ORDER — MIRTAZAPINE 15 MG/1
TABLET, FILM COATED ORAL
Qty: 11 TABLET | Refills: 0 | COMMUNITY
Start: 2022-04-04 | End: 2022-04-26

## 2022-04-04 RX ORDER — QUETIAPINE FUMARATE 100 MG/1
100 TABLET, FILM COATED ORAL 2 TIMES DAILY
Qty: 60 TABLET | Refills: 0 | Status: SHIPPED | OUTPATIENT
Start: 2022-04-04 | End: 2022-04-22 | Stop reason: SDUPTHER

## 2022-04-04 NOTE — PSYCH
Virtual Regular Visit    Verification of patient location:    Patient is located in the following state in which I hold an active license [place]    Problem List Items Addressed This Visit        Other    Major depressive disorder, recurrent episode, moderate ([facility] [address] ) - Primary             Encounter provider KOLBY Anaya    Provider located at   [address] [address] [phone]    Recent Visits  No visits were found meeting these conditions  Showing recent visits within past 7 days and meeting all other requirements  Future Appointments  No visits were found meeting these conditions  Showing future appointments within next 150 days and meeting all other requirements         The patient was identified by name and date of birth  Wilder Mercedes was informed that this is a telemedicine visit and that the visit is being conducted through[employer] Embedded and patient was informed this is a secure, HIPAA-complaint platform  He agrees to proceed     My office door was closed  No one else was in the room  He acknowledged consent and understanding of privacy and security of the video platform  The patient has agreed to participate and understands they can discontinue the visit at any time  Patient is aware this is a billable service       HPI     Current Outpatient Medications   Medication Sig Dispense Refill    ARIPiprazole (ABILIFY) 5 mg tablet Take 1 tablet (5 mg total) by mouth daily 30 tablet 0    betamethasone valerate (VALISONE) 0 1 % cream       escitalopram (LEXAPRO) 20 mg tablet Take 1 5 tablets (30 mg total) by mouth daily 45 tablet 2    hydrOXYzine HCL (ATARAX) 25 mg tablet Take 1 tablet (25 mg total) by mouth every 6 (six) hours as needed for anxiety 120 tablet 0    LORazepam (ATIVAN) 1 mg tablet Take 1/2 tab in am, 1 tab in afternoon as needed for anxiety 45 tablet 0    mirtazapine (REMERON) 15 mg tablet Take 1 tablet (15 mg total) by mouth daily at bedtime 30 tablet 0    QUEtiapine (SEROquel) 100 mg tablet Take 1 tablet (100 mg total) by mouth daily at bedtime 30 tablet 0     No current facility-administered medications for this visit  Review of Systems  Video Exam    There were no vitals filed for this visit  Physical Exam   As a result of this visit, I have referred the patient for further respiratory evaluation  No    I spent 30 minutes directly with the patient during this visit  VIRTUAL VISIT DISCLAIMER    Lorna Roberts acknowledges that he has consented to an online visit or consultation  He understands that the online visit is based solely on information provided by him, and that, in the absence of a face-to-face physical evaluation by the physician, the diagnosis he receives is both limited and provisional in terms of accuracy and completeness  This is not intended to replace a full medical face-to-face evaluation by the physician  Lorna Roberts understands and accepts these terms  MEDICATION MANAGEMENT NOTE        [facility]    Name and Date of Birth:  Lorna Roberts 60 y o  1965 MRN: [id number]    Date of Visit: April 4, 2022    Allergies   Allergen Reactions    Other Sneezing, Throat Swelling and Tongue Swelling    Shellfish Allergy - Food Allergy Tongue Swelling     SUBJECTIVE:    Barrera San is seen today for a follow up for Major Depressive Disorder  He continues to experience ongoing symptoms since the last visit  Barrera San last seen by this provider on 3/14/22  He is seen virtually today for medication management follow-up  He recently had an EKG done following complaints of dizziness after increasing his Abilify to 5 mg p o  daily the EKG showed PVCs, which were already established previously  The EKG also showed anterior infarct, which this provider suggested he speak to his PCP or cardiologist about    He is in agreement and is trying to make an appointment  He reports that he changed his medications on his own and has been taking 3 5 mg of Abilify, 30 mg of Lexapro, 30 mg of Remeron, 100 mg of Seroquel in the morning, and 50 mg at bedtime  He reports he has been taking half a mg of Ativan in the morning and 1 in the afternoon  He rates his depression at 5/10, anxiety at 5/10  He states that this past week he has not been feeling well, reporting that he has been weak and lightheaded  He also reports that his father is going to be going to an assisted living facility, but that has been an ongoing issue because he does not want to go  This has been causing stress and anxiety for Mortimer Salk  Work is also been stressful, even though he reports that he has been doing better in his bosses have been happier with his productivity  He does report that he has passive suicidal ideation, with no plan or intent the last time he had it was this morning, thinking that he "just does not want to do this anymore  He reports normal appetite, normal sleep, was 7-8 hours per night  He is trying to eat less at night and states that the planned bariatric scheme up with his very difficult to follow  We continue to discuss that the medications are not going to change his situations at home, that he needs to make changes in his life in conjunction with the medications  We also discussed not changing the medications before speaking to the provider  At this time the following medication changes will be made as outlined below, and patient will follow up in one month  He denies any side effects from current psychiatric medications      PLAN:  Abilify 2 5 mg p o  daily  Lexapro 30 mg p o  daily  We will begin to taper the Remeron, 15 mg x 7 days then decrease to 7 5 mg for 7 days then discontinue  Seroquel 100 mg in the morning, 100 mg at bedtime  We again discussed the Ativan and the need to only use it as needed, when his anxiety was at a point where even his coping skills were ineffective  He is under understanding of this  He will utilize 1/2 in the morning or 1 in the afternoon as needed  We will continue the Atarax 25 mg q 6 p r n  also for anxiety  He will continue seeing Patsy Girard for therapy  He will follow-up with this provider in 1 month  He will call sooner with concerns or issues if needed prior to scheduled appointment  He understands that it is more effective to call the office rather than emailing this provider, as this provider does not check emails regularly  Aware of 24 hour and weekend coverage for urgent situations accessed by calling [facility] main practice number  Continue psychotherapy with therapist  Medication management every 4 weeks  Aware of need to follow up with family physician for medical issues    Diagnoses and all orders for this visit:    Major depressive disorder, recurrent episode, moderate ([facility] [address] )        Current Outpatient Medications on File Prior to Visit   Medication Sig Dispense Refill    ARIPiprazole (ABILIFY) 5 mg tablet Take 1 tablet (5 mg total) by mouth daily 30 tablet 0    betamethasone valerate (VALISONE) 0 1 % cream       escitalopram (LEXAPRO) 20 mg tablet Take 1 5 tablets (30 mg total) by mouth daily 45 tablet 2    hydrOXYzine HCL (ATARAX) 25 mg tablet Take 1 tablet (25 mg total) by mouth every 6 (six) hours as needed for anxiety 120 tablet 0    LORazepam (ATIVAN) 1 mg tablet Take 1/2 tab in am, 1 tab in afternoon as needed for anxiety 45 tablet 0    mirtazapine (REMERON) 15 mg tablet Take 1 tablet (15 mg total) by mouth daily at bedtime 30 tablet 0    QUEtiapine (SEROquel) 100 mg tablet Take 1 tablet (100 mg total) by mouth daily at bedtime 30 tablet 0     No current facility-administered medications on file prior to visit         Psychotherapy Provided:     Individual psychotherapy provided: Yes  Counseling was provided during the session today for 16 minutes  Supportive counseling provided  Medication changes discussed with Tre Pandey  Medication education provided to Tre Pandey  Reviewed with Tre Pandey coping skills and strategies for anxiety and depression, such as distraction and meditation  Importance of medication and treatment compliance reviewed with Tre Pandey  Importance of follow up with family physician for medical issues reviewed with Tre Pandey  Reassurance and supportive therapy provided  Crisis/safety plan discussed with Tre Pandey  Will utilize crisis as needed  HPI ROS Appetite Changes and Sleep:     He reports adequate number of sleep hours (7-8 hours), normal appetite, normal energy level   Denies homicidal ideation, denies suicidal ideation    Review Of Systems:    HPI ROS:               Medication Side Effects:  denies     Depression (10 worst): 5/10 (Was 5/10)   Anxiety (10 worst): 5/10 (Was 5/10)   Safety concerns (SI, HI, etc): Passive SI, no plan or intent (Was denies)   Sleep: 7-8 hours/night (Was 7 5 hours/night)   Energy: adequate (Was adequate)   Appetite: normal (Was increased)     General normal    Personality no change in personality   Constitutional as noted in HPI   ENT negative   Cardiovascular negative   Respiratory negative   Gastrointestinal negative   Genitourinary negative   Musculoskeletal negative   Integumentary negative   Neurological negative   Endocrine negative   Other Symptoms none, all other systems are negative     Mental Status Evaluation:    Appearance Adequate hygiene and grooming   Behavior calm and cooperative   Mood anxious and depressed  Depression Scale - 5 of 10 (0 = No depression)  Anxiety Scale - 5 of 10 (0 = No anxiety)   Speech Normal rate and volume   Affect mood-congruent   Thought Processes Goal directed and coherent   Thought Content Does not verbalize delusional material   Associations Tightly connected   Perceptual Disturbances Denies hallucinations and does not appear to be responding to internal stimuli   Risk Potential Suicidal/Homicidal Ideation - Passive suicidal ideation without intent or plan  Risk of Violence - No evidence of risk for violence found on assessment  Risk of Self Mutilation - No evidence of risk for self mutilation found on assessment   Orientation oriented to person, place, time/date and situation   Memory recent and remote memory grossly intact   Consciousness alert and awake   Attention/Concentration attention span and concentration are age appropriate   Insight partial   Judgement partial   Muscle Strength and Gait normal muscle strength and normal muscle tone, normal gait/station and normal balance   Motor Activity no abnormal movements   Language no difficulty naming common objects, no difficulty repeating a phrase, no difficulty writing a sentence   Fund of Knowledge adequate knowledge of current events  adequate fund of knowledge regarding past history  adequate fund of knowledge regarding vocabulary      Past Psychiatric History - per Dr Carmen Lange H&P  Previous diagnoses include Depression and Anxiety   Prior outpatient psychiatric treatment:   With PCP till now   Prior therapy:    Prior inpatient psychiatric treatment: x1  [address] suicide attempts:  None   Prior self harm:  None   Prior violence or aggression:  None    Past Psychiatric History Update:     Inpatient Psychiatric Admission Since Last Encounter:   no  Changes to Outpatient Psychiatric Treatment Team:    no  Suicide Attempt Or Self Mutilation Since Last Encounter:   no  Incidence of Violent Behavior Since Last Encounter:   no    Traumatic History Update:     New Onset of Abuse Since Last Encounter:   no  Traumatic Events Since Last Encounter:   no    Past Medical History:    Past Medical History:   Diagnosis Date    Anxiety     Depression     Hypertension     Palpitation     Panic attack      No past medical history pertinent negatives    Past Surgical History:   Procedure Laterality Date    KNEE SURGERY 08/1982     Allergies   Allergen Reactions    Other Sneezing, Throat Swelling and Tongue Swelling    Shellfish Allergy - Food Allergy Tongue Swelling     Substance Abuse History:    Social History     Substance and Sexual Activity   Alcohol Use Not Currently    Comment: socially     Social History     Substance and Sexual Activity   Drug Use Not Currently     Social History:    Social History     Socioeconomic History    Marital status: /Civil Union     Spouse name: Not on file    Number of children: Not on file    Years of education: Not on file    Highest education level: Not on file   Occupational History    Not on file   Tobacco Use    Smoking status: Former Smoker     Types: Cigars    Smokeless tobacco: Never Used   Vaping Use    Vaping Use: Never used   Substance and Sexual Activity    Alcohol use: Not Currently     Comment: socially    Drug use: Not Currently    Sexual activity: Not Currently   Other Topics Concern    Not on file   Social History Narrative    Not on file     Social Determinants of Health     Financial Resource Strain: Not on file   Food Insecurity: Not on file   Transportation Needs: Not on file   Physical Activity: Not on file   Stress: Not on file   Social Connections: Not on file   Intimate Partner Violence: Not on file   Housing Stability: Not on file     Family Psychiatric History:     Family History   Problem Relation Age of Onset    Hypertension Mother     COPD Mother     Migraines Mother     Stroke Father      History Review: The following portions of the patient's history were reviewed and updated as appropriate: allergies, current medications, past family history, past medical history, past social history, past surgical history and problem list     OBJECTIVE:     Vital signs in last 24 hours: There were no vitals filed for this visit    Laboratory Results:   Recent Labs (last 2 months):   Office Visit on 03/26/2022   Component Date Value    Ventricular Rate 03/26/2022 82     Atrial Rate 03/26/2022 82     MN Interval 03/26/2022 206     QRSD Interval 03/26/2022 94     QT Interval 03/26/2022 400     QTC Interval 03/26/2022 467     P Axis 03/26/2022 39     QRS Axis 03/26/2022 -23     T Wave Axis 03/26/2022 32      I have personally reviewed all pertinent laboratory/tests results  Suicide/Homicide Risk Assessment:    Risk of Harm to Self:  The following ratings are based on assessment at the time of the interview  Recent Specific Risk Factors include: current depressive symptoms, current anxiety symptoms, passive death wishes  Demographic risk factors include: , male, age: over 48 or older  Historical Risk Factors include: chronic depression, chronic anxiety symptoms, history of traumatic experiences  Protective Factors: no current suicidal ideation, access to mental health treatment, being a parent, being , compliant with medications, compliant with mental health treatment, having a desire to live, stable living environment, stable job  Based on today's assessment, Tre Pandey presents the following risk of harm to self: low    Risk of Harm to Others: The following ratings are based on assessment at the time of the interview  Recent Specific Risk Factors include: none  Demographic Risk Factors include: male  Historical Risk Factors include: none  Protective Factors: no current homicidal ideation  Based on today's assessment, Tre Pandey presents the following risk of harm to others: none    The following interventions are recommended: no intervention changes needed    Medications Risks/Benefits:      Risks, Benefits And Possible Side Effects Of Medications:    Discussed risks and benefits of treatment with patient including risk of suicidality, serotonin syndrome, increased QTc interval and SIADH related to treatment with antidepressants;  Risk of induction of manic symptoms in certain patient populations, risk of parkinsonian symptoms, metabolic syndrome, tardive dyskinesia and neuroleptic malignant syndrome related to treatment with antipsychotic medications and risks of misuse, abuse or dependence, sedation and respiratory depression related to treatment with benzodiazepine medications     Controlled Medication Discussion:     [facility] [place] has been filling controlled prescriptions on time as prescribed according to [place] Prescription Drug Monitoring Program     Treatment Plan:    Due for update/Updated:   no  Last treatment plan done 3/7/22 by KOLBY Alvarado  Treatment Plan due on 9/7/22  KOLBY Najera 04/04/22    This note was shared with patient

## 2022-04-12 DIAGNOSIS — F32.A DEPRESSED: ICD-10-CM

## 2022-04-12 RX ORDER — LORAZEPAM 1 MG/1
TABLET ORAL
Qty: 45 TABLET | Refills: 0 | Status: SHIPPED | OUTPATIENT
Start: 2022-04-12 | End: 2022-05-16 | Stop reason: SDUPTHER

## 2022-04-22 DIAGNOSIS — F33.1 MODERATE EPISODE OF RECURRENT MAJOR DEPRESSIVE DISORDER (HCC): ICD-10-CM

## 2022-04-22 RX ORDER — HYDROXYZINE HYDROCHLORIDE 25 MG/1
25 TABLET, FILM COATED ORAL EVERY 6 HOURS PRN
Qty: 120 TABLET | Refills: 0 | Status: SHIPPED | OUTPATIENT
Start: 2022-04-22 | End: 2022-05-16

## 2022-04-22 RX ORDER — QUETIAPINE FUMARATE 100 MG/1
100 TABLET, FILM COATED ORAL 2 TIMES DAILY
Qty: 60 TABLET | Refills: 0 | Status: SHIPPED | OUTPATIENT
Start: 2022-04-22 | End: 2022-06-22 | Stop reason: SDUPTHER

## 2022-04-25 ENCOUNTER — TELEPHONE (OUTPATIENT)
Dept: FAMILY MEDICINE CLINIC | Facility: CLINIC | Age: 57
End: 2022-04-25

## 2022-04-26 ENCOUNTER — OFFICE VISIT (OUTPATIENT)
Dept: FAMILY MEDICINE CLINIC | Facility: CLINIC | Age: 57
End: 2022-04-26
Payer: COMMERCIAL

## 2022-04-26 VITALS
OXYGEN SATURATION: 92 % | BODY MASS INDEX: 38.3 KG/M2 | HEIGHT: 75 IN | SYSTOLIC BLOOD PRESSURE: 120 MMHG | HEART RATE: 88 BPM | WEIGHT: 308 LBS | DIASTOLIC BLOOD PRESSURE: 78 MMHG

## 2022-04-26 DIAGNOSIS — R00.2 PALPITATIONS: Primary | ICD-10-CM

## 2022-04-26 PROCEDURE — 99213 OFFICE O/P EST LOW 20 MIN: CPT | Performed by: FAMILY MEDICINE

## 2022-04-26 PROCEDURE — 1036F TOBACCO NON-USER: CPT | Performed by: FAMILY MEDICINE

## 2022-04-26 PROCEDURE — 3008F BODY MASS INDEX DOCD: CPT | Performed by: FAMILY MEDICINE

## 2022-04-26 PROCEDURE — 3008F BODY MASS INDEX DOCD: CPT | Performed by: NURSE PRACTITIONER

## 2022-04-26 NOTE — PROGRESS NOTES
Assessment/Plan:    Palpitations  I feel much of this relates to anxiety but has been showing some PVCs which I feel relates to the medication he is taking  Does have consult request in for Cardiology  Would not alter medication at this time and will clear to return to work tomorrow       Diagnoses and all orders for this visit:    Palpitations          Subjective:      Patient ID: Chuck Hughes is a 60 y o  male  Patient has problems with severe anxiety disorder and major depression  Seen today because of problems with dizziness and weakness much of this he feels related to worsening problems with anxiety but has been off work for the last 3 days does notice some problems with palpitations      The following portions of the patient's history were reviewed and updated as appropriate: allergies, current medications, past medical history, past social history and problem list     Review of Systems   Constitutional: Positive for fatigue  HENT: Negative for congestion  Eyes: Negative for visual disturbance  Respiratory: Negative for cough, chest tightness and shortness of breath  Cardiovascular: Positive for palpitations  Negative for chest pain  Endocrine: Negative for polyuria  Neurological: Positive for dizziness  Negative for light-headedness  Hematological: Negative for adenopathy  Psychiatric/Behavioral: Positive for sleep disturbance  The patient is nervous/anxious  Objective:      /78 (BP Location: Left arm, Patient Position: Sitting, Cuff Size: Large)   Pulse 88   Ht 6' 2 75" (1 899 m)   Wt (!) 140 kg (308 lb)   SpO2 92%   BMI 38 76 kg/m²          Physical Exam  Vitals and nursing note reviewed  Constitutional:       Appearance: Normal appearance  He is well-developed  HENT:      Head: Normocephalic  Eyes:      Conjunctiva/sclera: Conjunctivae normal    Neck:      Thyroid: No thyromegaly     Cardiovascular:      Rate and Rhythm: Normal rate and regular rhythm  Heart sounds: Normal heart sounds  No murmur (Rate is 78) heard  Pulmonary:      Effort: Pulmonary effort is normal       Breath sounds: Normal breath sounds  Abdominal:      General: There is no distension  Palpations: There is no mass  Tenderness: There is no abdominal tenderness  Musculoskeletal:         General: Normal range of motion  Cervical back: Normal range of motion and neck supple  Right lower leg: No edema  Left lower leg: No edema  Lymphadenopathy:      Cervical: No cervical adenopathy  Skin:     General: Skin is warm and dry  Findings: No rash  Neurological:      Mental Status: He is alert and oriented to person, place, and time  Motor: No weakness  Coordination: Coordination normal       Gait: Gait normal       Deep Tendon Reflexes: Reflexes normal    Psychiatric:         Mood and Affect: Mood is anxious

## 2022-04-26 NOTE — PATIENT INSTRUCTIONS
Discussed overall problem with the medication and should address this with Psychiatry  Will give note to cleared to return to work tomorrow    For the palpitations the referral for Cardiology was already done

## 2022-04-26 NOTE — ASSESSMENT & PLAN NOTE
I feel much of this relates to anxiety but has been showing some PVCs which I feel relates to the medication he is taking  Does have consult request in for Cardiology    Would not alter medication at this time and will clear to return to work tomorrow

## 2022-05-05 ENCOUNTER — RA CDI HCC (OUTPATIENT)
Dept: OTHER | Facility: HOSPITAL | Age: 57
End: 2022-05-05

## 2022-05-14 DIAGNOSIS — F33.1 MODERATE EPISODE OF RECURRENT MAJOR DEPRESSIVE DISORDER (HCC): ICD-10-CM

## 2022-05-14 DIAGNOSIS — F32.A DEPRESSED: ICD-10-CM

## 2022-05-16 RX ORDER — HYDROXYZINE HYDROCHLORIDE 25 MG/1
TABLET, FILM COATED ORAL
Qty: 120 TABLET | Refills: 0 | Status: SHIPPED | OUTPATIENT
Start: 2022-05-16 | End: 2022-06-22 | Stop reason: SDUPTHER

## 2022-05-16 RX ORDER — LORAZEPAM 1 MG/1
TABLET ORAL
Qty: 45 TABLET | Refills: 0 | Status: SHIPPED | OUTPATIENT
Start: 2022-05-16 | End: 2022-06-14 | Stop reason: SDUPTHER

## 2022-05-23 ENCOUNTER — OFFICE VISIT (OUTPATIENT)
Dept: PSYCHIATRY | Facility: CLINIC | Age: 57
End: 2022-05-23
Payer: COMMERCIAL

## 2022-05-23 DIAGNOSIS — F39 MOOD DISORDER (HCC): ICD-10-CM

## 2022-05-23 DIAGNOSIS — F33.1 MAJOR DEPRESSIVE DISORDER, RECURRENT EPISODE, MODERATE (HCC): ICD-10-CM

## 2022-05-23 PROCEDURE — 99214 OFFICE O/P EST MOD 30 MIN: CPT | Performed by: NURSE PRACTITIONER

## 2022-05-23 PROCEDURE — 1036F TOBACCO NON-USER: CPT | Performed by: NURSE PRACTITIONER

## 2022-05-23 NOTE — PSYCH
Regular Visit    Problem List Items Addressed This Visit    None            Encounter provider KOLBY Cardona    Provider located at   [address] [address] [phone]    Recent Visits  No visits were found meeting these conditions  Showing recent visits within past 7 days and meeting all other requirements  Future Appointments  No visits were found meeting these conditions  Showing future appointments within next 150 days and meeting all other requirements       HPI     Current Outpatient Medications   Medication Sig Dispense Refill    ARIPiprazole (ABILIFY) 5 mg tablet Take 0 5 tablets (2 5 mg total) by mouth daily 30 tablet 0    betamethasone valerate (VALISONE) 0 1 % cream       escitalopram (LEXAPRO) 20 mg tablet Take 1 5 tablets (30 mg total) by mouth daily 45 tablet 2    hydrOXYzine HCL (ATARAX) 25 mg tablet TAKE 1 TABLET BY MOUTH EVERY 6 HOURS AS NEEDED FOR ANXIETY  120 tablet 0    LORazepam (ATIVAN) 1 mg tablet Take 1/2 tab in am, 1 tab in afternoon as needed for anxiety 45 tablet 0    QUEtiapine (SEROquel) 100 mg tablet Take 1 tablet (100 mg total) by mouth 2 (two) times a day Take 1 tab in am, 1 tab before bed  60 tablet 0     No current facility-administered medications for this visit  Review of Systems    I spent 30 minutes directly with the patient during this visit      Jerry CALABRESE    Name and Date of Birth:  Mariellen Canavan 60 y o  1965 MRN: [id number]    Date of Visit: May 23, 2022    Allergies   Allergen Reactions    Other Sneezing, Throat Swelling and Tongue Swelling    Shellfish Allergy - Food Allergy Tongue Swelling     SUBJECTIVE:    [facility] is seen today for a follow up for Major Depressive Disorder  He continues to improve gradually since the last visit   [facility] is a 58-year-old male with a psychiatric history significant for major depressive disorder who was seen in person for a medication management follow-up appointment  He was last seen on 04/04/2022 and reports improvements since the last visit  He reports having good and bad days, but states that it has been improving more over the past week and a half  He stated that he had a meeting with his bosses, and they werent happy with the quality of his work although he thought he was doing okay  He was relived with the good news he received today that his wifes cancer was fully removed from her breast, which was another major stressor for him  He reports frustration with his current physical stamina and feeling out of breath easily  He however has been trying to force himself to be more active, and even plans to play golf with his brother in an upcoming tournament  He was encouraged to continue to follow the recommendations provided to him by weight management  He expresses concern of having a panic attack when playing though, because he gets lightheaded when he is under pressure  Both depression and anxiety are currently rated as a 3-4/10  He sleeps about 8 hours a night and wakes up In the middle of the night, but is able to fall right back to sleep  He uses his CPAP machine 6 nights a week  He has an upcoming therapy appointment with Macy Noriega, and he plans to talk about discontinuing his care as he feels he has exhausted all discussions about his stressors at this time and feels his progress has stalled  He was encouraged to continue his socialization with peers and family, and encouraged to continue working on his physical goals  He will follow up with cardiology and his PCP  We discussed the need to continue to utilize the lowest dose of ativan possible, as well as utilizing it as needed, not scheduled  We discussed what it meant to use the medication as needed, and he verbalized understanding  I discussed plan and treatment with Pt  Risks, benefits, and possible side effects of medications explained to patient and patient verbalizes understanding  Discussed with patient the risks of sedation, respiratory depression, impairment of ability to drive and potential for abuse and addiction related to treatment with benzodiazepine medications  The patient understands risk of treatment with benzodiazepine medications, agrees to not drive if feels impaired and agrees to take medications as prescribed  Patient agreeable and understanding to the current plan of care  He was encouraged to contact the office sooner if needed  He denies current homicidal thoughts, auditory hallucinations, visual hallucinations, delusions, feelings of shweta, and thoughts to self-harm  He denies any side effects from current psychiatric medications  PLAN:  Continue medication as ordered  Abilify 2 5 mg PO Daily  Lexapro 30 mg PO Daily  Seroquel 100 mg PO in the morning and 100 mg PO QHS  Ativan 0 5 to 1 mg PO Daily PRN for excessive anxiety   Atarax 25 mg PO Q6hr PRN  Follow-up with therapy with Polly Hammonds   Follow-up appointment scheduled in 1 months   Will call sooner with concerns or issues if they arise prior to scheduled appointment  Aware of 24 hour and weekend coverage for urgent situations accessed by calling [facility] main practice number  Medication management every 1 month  Aware of need to follow up with family physician for medical issues    There are no diagnoses linked to this encounter  Current Outpatient Medications on File Prior to Visit   Medication Sig Dispense Refill    ARIPiprazole (ABILIFY) 5 mg tablet Take 0 5 tablets (2 5 mg total) by mouth daily 30 tablet 0    betamethasone valerate (VALISONE) 0 1 % cream       escitalopram (LEXAPRO) 20 mg tablet Take 1 5 tablets (30 mg total) by mouth daily 45 tablet 2    hydrOXYzine HCL (ATARAX) 25 mg tablet TAKE 1 TABLET BY MOUTH EVERY 6 HOURS AS NEEDED FOR ANXIETY   [facility] tablet 0    LORazepam (ATIVAN) 1 mg tablet Take 1/2 tab in am, 1 tab in afternoon as needed for anxiety 45 tablet 0    QUEtiapine (SEROquel) 100 mg tablet Take 1 tablet (100 mg total) by mouth 2 (two) times a day Take 1 tab in am, 1 tab before bed  60 tablet 0     No current facility-administered medications on file prior to visit  Psychotherapy Provided:     Individual psychotherapy provided: Yes  Counseling was provided during the session today for 16 minutes  Supportive counseling provided  Medication changes discussed with Urban Ledesma  Medication education provided to Urban Ledesma  Recent stressors discussed with Urban Ledesma including medical illness in family, ongoing anxiety  Importance of medication and treatment compliance reviewed with Urban Ledesma  Importance of follow up with family physician for medical issues reviewed with Urban Ledesma  Reassurance and supportive therapy provided  Crisis/safety plan discussed with Urban Ledesma  Will utilize crisis as needed  HPI ROS Appetite Changes and Sleep:     He reports adequate number of sleep hours (8 hours), normal appetite, normal energy level   Denies homicidal ideation, denies suicidal ideation    Review Of Systems:  HPI ROS:               Medication Side Effects:  denies     Depression (10 worst): 3-4/10 (Was 5/10)   Anxiety (10 worst): 3-4/10 (Was 5/10)   Safety concerns (SI, HI, etc): denies (Was passive SI, no plan or intent)   Sleep: 8 hours/night (Was 7-8/ night)   Energy: adequate (Was adequate)   Appetite: good (Was good)     General normal    Personality no change in personality   Constitutional as noted in HPI   ENT negative   Cardiovascular negative   Respiratory negative   Gastrointestinal negative   Genitourinary negative   Musculoskeletal negative   Integumentary negative   Neurological negative   Endocrine negative   Other Symptoms none, all other systems are negative     Mental Status Evaluation:    Appearance Appropriately dressed and Good eye contact Behavior calm and cooperative   Mood euthymic  Depression Scale - 3-4 of 10 (0 = No depression)  Anxiety Scale - 3-4 of 10 (0 = No anxiety)   Speech Normal rate and volume   Affect appropriate and mood-congruent   Thought Processes Goal directed and coherent   Thought Content Does not verbalize delusional material   Associations Tightly connected   Perceptual Disturbances Denies hallucinations and does not appear to be responding to internal stimuli   Risk Potential Suicidal/Homicidal Ideation - No evidence of suicidal or homicidal ideation and patient does not verbalize suicidal or homicidal ideation  Risk of Violence - No evidence of risk for violence found on assessment  Risk of Self Mutilation - No evidence of risk for self mutilation found on assessment   Orientation oriented to person, place, time/date and situation    Memory recent and remote memory grossly intact   Consciousness alert and awake   Attention/Concentration attention span and concentration are age appropriate   Insight partial   Judgement partial   Muscle Strength and Gait normal muscle strength and normal muscle tone, normal gait/station and normal balance   Motor Activity no abnormal movements   Language no difficulty naming common objects, no difficulty repeating a phrase, no difficulty writing a sentence   Fund of Knowledge adequate knowledge of current events  adequate fund of knowledge regarding past history  adequate fund of knowledge regarding vocabulary      Past Psychiatric History - per Dr Lauri Hayden H&P  Previous diagnoses include Depression and Anxiety   Prior outpatient psychiatric treatment:   With PCP till now   Prior therapy:    Prior inpatient psychiatric treatment: x1  Balwinder Maldonado suicide attempts:  None   Prior self harm:  None   Prior violence or aggression:  None     Past Psychiatric History Update:     Inpatient Psychiatric Admission Since Last Encounter:   no  Changes to Outpatient Psychiatric Treatment Team:    no  Suicide Attempt Or Self Mutilation Since Last Encounter:   no  Incidence of Violent Behavior Since Last Encounter:   no    Traumatic History Update:     New Onset of Abuse Since Last Encounter:   no  Traumatic Events Since Last Encounter:   no    Past Medical History:    Past Medical History:   Diagnosis Date    Anxiety     Depression     Hypertension     Palpitation     Panic attack      No past medical history pertinent negatives    Past Surgical History:   Procedure Laterality Date    KNEE SURGERY  08/1982     Allergies   Allergen Reactions    Other Sneezing, Throat Swelling and Tongue Swelling    Shellfish Allergy - Food Allergy Tongue Swelling     Substance Abuse History:    Social History     Substance and Sexual Activity   Alcohol Use Not Currently    Comment: socially     Social History     Substance and Sexual Activity   Drug Use Not Currently     Social History:    Social History     Socioeconomic History    Marital status: /Civil Union     Spouse name: Not on file    Number of children: Not on file    Years of education: Not on file    Highest education level: Not on file   Occupational History    Not on file   Tobacco Use    Smoking status: Former Smoker     Types: Cigars    Smokeless tobacco: Never Used   Vaping Use    Vaping Use: Never used   Substance and Sexual Activity    Alcohol use: Not Currently     Comment: socially    Drug use: Not Currently    Sexual activity: Not Currently   Other Topics Concern    Not on file   Social History Narrative    Not on file     Social Determinants of Health     Financial Resource Strain: Not on file   Food Insecurity: Not on file   Transportation Needs: Not on file   Physical Activity: Not on file   Stress: Not on file   Social Connections: Not on file   Intimate Partner Violence: Not on file   Housing Stability: Not on file     Family Psychiatric History:     Family History   Problem Relation Age of Onset    Hypertension Mother    Emmanuelledomenic Kierra COPD Mother  Migraines Mother     Stroke Father      History Review: The following portions of the patient's history were reviewed and updated as appropriate: allergies, current medications, past family history, past medical history, past social history, past surgical history and problem list     OBJECTIVE:     Vital signs in last 24 hours: There were no vitals filed for this visit  Laboratory Results:   Recent Labs (last 2 months):   Office Visit on 03/26/2022   Component Date Value    Ventricular Rate 03/26/2022 82     Atrial Rate 03/26/2022 82     [place] Interval 03/26/2022 206     QRSD Interval 03/26/2022 94     QT Interval 03/26/2022 400     QTC Interval 03/26/2022 467     P Axis 03/26/2022 39     QRS Axis 03/26/2022 -23     T Wave Axis 03/26/2022 32      I have personally reviewed all pertinent laboratory/tests results  Suicide/Homicide Risk Assessment:    Risk of Harm to Self:  The following ratings are based on assessment at the time of the interview  Recent Specific Risk Factors include: current depressive symptoms, current anxiety symptoms, worries about finances or work, recent rejection  Demographic risk factors include: , male, age: over 48 or older  Historical Risk Factors include: chronic depression, chronic anxiety symptoms  Protective Factors: no current suicidal ideation, access to mental health treatment, being a parent, being , compliant with medications, compliant with mental health treatment, responsibilities and duties to others, stable living environment, stable job, strong relationships  Based on today's assessment, Bridget Mcqueen presents the following risk of harm to self: low    Risk of Harm to Others:   The following ratings are based on assessment at the time of the interview  Protective Factors: no current homicidal ideation  Based on today's assessment, Bridget Mcqueen presents the following risk of harm to others: none    The following interventions are recommended: no intervention changes needed    Medications Risks/Benefits:      Risks, Benefits And Possible Side Effects Of Medications:    Discussed risks and benefits of treatment with patient including risk of suicidality, serotonin syndrome, increased QTc interval and SIADH related to treatment with antidepressants; Risk of induction of manic symptoms in certain patient populations, risk of parkinsonian symptoms, metabolic syndrome, tardive dyskinesia and neuroleptic malignant syndrome related to treatment with antipsychotic medications and risks of misuse, abuse or dependence, sedation and respiratory depression related to treatment with benzodiazepine medications     Controlled Medication Discussion:     Sahra Pineda has been filling controlled prescriptions on time as prescribed according to [place] Prescription Drug Monitoring Program    Treatment Plan:    Due for update/Updated:   no  Last treatment plan done 3/7/22 by Orestes Ortiz LCSW  Treatment Plan due on 9/7/22  KOLBY Jenkins 05/23/22    This note was shared with patient

## 2022-06-07 ENCOUNTER — TELEMEDICINE (OUTPATIENT)
Dept: BEHAVIORAL/MENTAL HEALTH CLINIC | Facility: CLINIC | Age: 57
End: 2022-06-07
Payer: COMMERCIAL

## 2022-06-07 DIAGNOSIS — F33.1 MAJOR DEPRESSIVE DISORDER, RECURRENT EPISODE, MODERATE (HCC): Primary | ICD-10-CM

## 2022-06-07 PROCEDURE — 90832 PSYTX W PT 30 MINUTES: CPT | Performed by: SOCIAL WORKER

## 2022-06-08 NOTE — PSYCH
Psychotherapy Provided: Individual Psychotherapy 31 minutes     Length of time in session: 31 minutes  Dori Infante and therapist are in agreement that he will be discharged at this time  Encounter Diagnosis     ICD-10-CM    1  Major depressive disorder, recurrent episode, moderate (HCC)  F33 1        Goals addressed in session: Goal 1     Data:  Dori Infante reports tonight that he is having "good days and bad "  He adds that today is a good day = he feels like his old self  Dori Infante shares that he is focused on minimizing his stress level, although he has made no major changes in his life  He is still seeing his girlfriend and finds work to be challenging  He reports that his managers are still 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 with his work but they do not want to lay him off or want him to quit  Dori Infante states that it has been over a year since he had a panic attack and he feels good about this  He still experiences anxiety and associated physical symptoms, e g  weakness in his legs, shortness of breath, lightheadedness  He comments that everything checks out fine physically, so he is unsure why he continues to struggle physically  Therapist again provides psycho-ed re: anxiety, triggers, fight-flight-freeze, and physical manifestations  Therapist again reviews skills such as deep breathing, progressive muscle relaxation, guided imagery, and positive self talk  Dori Infante does agree that he does better when he does not focus on the negative or imagining the worst case scenario  Dori Infante shares that he is scheduled to play golf this weekend with his brothers, but worries that he will become ill  Therapist suggests a positive visualization exercise  We conclude by discussing Peterson's participation in therapy; he wishes to be discharged as he feels that "there is nothing more to talk about "  Therapist does point out that she has given him skills - which he seems to be using sparingly - and has recommended minimizing stressors  Adriana Bain reports that he is not ready to make any major life changes  Therapist agrees to discharge at this time, but Adriana Bain is made aware that he can return any time over the next year  Assessment:  Peterson's reported mood is "good "  His affect is somewhat constricted  He is cooperative, and his thoughts are logical and goal oriented  He does engage in catastrophic thinking, and this is pointed out to him  Adriana Bain denies SI/HI  Plan:  Adriana Bain wishes to be discharged  Pain:      mild emotional distress reported    2    Current suicide risk : [address]: Diagnosis and Treatment Plan explained to Ankita Pete relates understanding diagnosis and is agreeable to Treatment Plan  YES    Virtual Regular Visit    Verification of patient location:    Patient is located in the following state in which I hold an active license [place]      Assessment/Plan:    Problem List Items Addressed This Visit        Other    Major depressive disorder, recurrent episode, moderate ([facility] [address] ) - Primary          Goals addressed in session: Goal 1          Reason for visit is   Chief Complaint   Patient presents with    Virtual Regular Visit        Encounter provider Ariane Mccullough LCSW    Provider located at [address] [place] [id number] [phone]      Recent Visits  No visits were found meeting these conditions  Showing recent visits within past 7 days and meeting all other requirements  Future Appointments  No visits were found meeting these conditions  Showing future appointments within next 150 days and meeting all other requirements       The patient was identified by name and date of birth   Carmen Mcmillan was informed that this is a telemedicine visit and that [facility] and patient was informed this is a secure, HIPAA-complaint platform  He agrees to proceed     My office door was closed  No one else was in the room  He acknowledged consent and understanding of privacy and security of the video platform  The patient has agreed to participate and understands they can discontinue the visit at any time  Patient is aware this is a billable service  Subjective  John Arellano is a 59 y o  male    HPI     Past Medical History:   Diagnosis Date    Anxiety     Depression     Hypertension     Palpitation     Panic attack        Past Surgical History:   Procedure Laterality Date    KNEE SURGERY  08/1982       Current Outpatient Medications   Medication Sig Dispense Refill    ARIPiprazole (ABILIFY) 5 mg tablet Take 0 5 tablets (2 5 mg total) by mouth daily 30 tablet 0    betamethasone valerate (VALISONE) 0 1 % cream       escitalopram (LEXAPRO) 20 mg tablet Take 1 5 tablets (30 mg total) by mouth daily 45 tablet 2    hydrOXYzine HCL (ATARAX) 25 mg tablet TAKE 1 TABLET BY MOUTH EVERY 6 HOURS AS NEEDED FOR ANXIETY  120 tablet 0    LORazepam (ATIVAN) 1 mg tablet Take 1/2 tab in am, 1 tab in afternoon as needed for anxiety 45 tablet 0    QUEtiapine (SEROquel) 100 mg tablet Take 1 tablet (100 mg total) by mouth 2 (two) times a day Take 1 tab in am, 1 tab before bed  60 tablet 0     No current facility-administered medications for this visit  Allergies   Allergen Reactions    Other Sneezing, Throat Swelling and Tongue Swelling    Shellfish Allergy - Food Allergy Tongue Swelling       Review of Systems    Video Exam    There were no vitals filed for this visit  Physical Exam     I spent 31 minutes directly with the patient during this visit    VIRTUAL VISIT DISCLAIMER    John Arellano verbally agrees to participate in [employer]   Pt is aware that [employer] could be limited without vital signs or the ability to perform a full hands-on physical exam  Peterson Castaneda understands he or the provider may request at any time to terminate the video visit and request the patient to seek care or treatment in person

## 2022-06-14 DIAGNOSIS — F32.A DEPRESSED: ICD-10-CM

## 2022-06-15 RX ORDER — LORAZEPAM 1 MG/1
TABLET ORAL
Qty: 45 TABLET | Refills: 0 | Status: SHIPPED | OUTPATIENT
Start: 2022-06-15 | End: 2022-07-18 | Stop reason: SDUPTHER

## 2022-06-16 ENCOUNTER — TELEPHONE (OUTPATIENT)
Dept: OTHER | Facility: OTHER | Age: 57
End: 2022-06-16

## 2022-06-16 ENCOUNTER — OFFICE VISIT (OUTPATIENT)
Dept: FAMILY MEDICINE CLINIC | Facility: CLINIC | Age: 57
End: 2022-06-16
Payer: COMMERCIAL

## 2022-06-16 VITALS
DIASTOLIC BLOOD PRESSURE: 72 MMHG | HEIGHT: 75 IN | OXYGEN SATURATION: 97 % | HEART RATE: 76 BPM | SYSTOLIC BLOOD PRESSURE: 122 MMHG | BODY MASS INDEX: 38.42 KG/M2 | WEIGHT: 309 LBS

## 2022-06-16 DIAGNOSIS — M54.50 CHRONIC RIGHT-SIDED LOW BACK PAIN WITHOUT SCIATICA: ICD-10-CM

## 2022-06-16 DIAGNOSIS — R42 LIGHTHEADEDNESS: Primary | ICD-10-CM

## 2022-06-16 DIAGNOSIS — G89.29 CHRONIC RIGHT-SIDED LOW BACK PAIN WITHOUT SCIATICA: ICD-10-CM

## 2022-06-16 PROCEDURE — 99214 OFFICE O/P EST MOD 30 MIN: CPT | Performed by: FAMILY MEDICINE

## 2022-06-16 PROCEDURE — 1036F TOBACCO NON-USER: CPT | Performed by: FAMILY MEDICINE

## 2022-06-16 PROCEDURE — 3008F BODY MASS INDEX DOCD: CPT | Performed by: FAMILY MEDICINE

## 2022-06-16 PROCEDURE — 3008F BODY MASS INDEX DOCD: CPT | Performed by: NURSE PRACTITIONER

## 2022-06-16 NOTE — PROGRESS NOTES
Assessment/Plan:    Lightheadedness  Discussed with slight the recurring nature of this and has had extensive workup in the past   This could certainly relate to current medication regimen and seems okay at this time    Chronic right-sided low back pain without sciatica  Discussed overall problem  Needs to be very careful with back mechanics which I feel should significantly improve the recurrence problem  May take Advil or Aleve with this as long as does not upset stomach  Should resume the regular stretching and strengthening exercises that he stopped 6 months ago       Diagnoses and all orders for this visit:    Lightheadedness    Chronic right-sided low back pain without sciatica          Subjective:      Patient ID: Zane Sanchez is a 59 y o  male  Patient has problems with severe anxiety disorder and major depression  Has had repeated episodes of lightheadedness which was bothersome this morning and he did not feel he could drive although this has cleared  Has been having also recurrence problems with low back pain  Had history of back surgery in the past 20 years  Intermittently takes Tylenol occasionally Advil for this  It does not radiate down the legs      The following portions of the patient's history were reviewed and updated as appropriate: allergies, current medications, past medical history, past social history, past surgical history and problem list     Review of Systems   Constitutional: Negative for fever  Eyes: Negative for visual disturbance  Respiratory: Negative for chest tightness and shortness of breath  Cardiovascular: Negative for chest pain, palpitations and leg swelling  Gastrointestinal: Negative for abdominal pain  Genitourinary: Negative for dysuria  Musculoskeletal: Positive for back pain  Neurological: Positive for light-headedness  Negative for weakness, numbness and headaches  Psychiatric/Behavioral: Negative for sleep disturbance  Objective:      /72 (BP Location: Left arm, Patient Position: Sitting, Cuff Size: Large)   Pulse 76   Ht 6' 2 75" (1 899 m)   Wt (!) 140 kg (309 lb)   SpO2 97%   BMI 38 88 kg/m²          Physical Exam  Vitals and nursing note reviewed  Constitutional:       Appearance: Normal appearance  He is well-developed  He is not ill-appearing  HENT:      Head: Normocephalic  Eyes:      Conjunctiva/sclera: Conjunctivae normal       Pupils: Pupils are equal, round, and reactive to light  Neck:      Thyroid: No thyromegaly  Vascular: No carotid bruit  Cardiovascular:      Rate and Rhythm: Normal rate and regular rhythm  Heart sounds: Normal heart sounds  No murmur (Rate is 72) heard  Pulmonary:      Effort: Pulmonary effort is normal       Breath sounds: Normal breath sounds  Abdominal:      General: There is no distension  Palpations: There is no mass  Tenderness: There is no abdominal tenderness  Musculoskeletal:         General: Normal range of motion  Cervical back: Normal range of motion and neck supple  No tenderness  Right lower leg: No edema  Left lower leg: No edema  Comments: Mild tenderness in the paralumbar muscles on the right side otherwise normal low back exam   Lymphadenopathy:      Cervical: No cervical adenopathy  Skin:     General: Skin is warm and dry  Findings: No rash  Neurological:      Mental Status: He is alert and oriented to person, place, and time  Motor: No weakness        Coordination: Coordination normal       Gait: Gait normal       Deep Tendon Reflexes: Reflexes normal    Psychiatric:         Mood and Affect: Mood normal

## 2022-06-16 NOTE — ASSESSMENT & PLAN NOTE
Discussed with slight the recurring nature of this and has had extensive workup in the past   This could certainly relate to current medication regimen and seems okay at this time

## 2022-06-16 NOTE — PATIENT INSTRUCTIONS
Discussed the problem with the back pain  Be very careful with back mechanics  When standing stand with 1 ft in front of the other  If sweeping should suite from the side not from the front and may use Advil or Aleve as long as this does not upset the stomach    Will give note for work because of the problems with the lightheadedness which seems to be better at this time

## 2022-06-16 NOTE — LETTER
June 16, 2022     Patient: Rocio Alvarez  YOB: 1965  Date of Visit: 6/16/2022      To Whom it May Concern:    Dorn Closs is under my professional care  Immanuel Granados was seen in my office on 6/16/2022  Markydelma Granados  May return to work on  06/17/2022    If you have any questions or concerns, please don't hesitate to call           Sincerely,          Tank Resendiz MD        CC: No Recipients

## 2022-06-16 NOTE — ASSESSMENT & PLAN NOTE
Discussed overall problem  Needs to be very careful with back mechanics which I feel should significantly improve the recurrence problem  May take Advil or Aleve with this as long as does not upset stomach    Should resume the regular stretching and strengthening exercises that he stopped 6 months ago

## 2022-06-20 ENCOUNTER — DOCUMENTATION (OUTPATIENT)
Dept: BEHAVIORAL/MENTAL HEALTH CLINIC | Facility: CLINIC | Age: 57
End: 2022-06-20

## 2022-06-20 DIAGNOSIS — F32.3 MAJOR DEPRESSION WITH PSYCHOTIC FEATURES (HCC): Primary | ICD-10-CM

## 2022-06-20 NOTE — PROGRESS NOTES
Assessment/Plan:      Diagnoses and all orders for this visit:    [facility] ([facility]          Subjective:     Patient ID: Bridgett Silva is a 59 y o  male  Outpatient Discharge Summary:   Admission Date: 9/7/21  Gatito Strickland was referred by Eliza Sanz  Discharge Date: 6/20/22    Discharge Diagnosis:    1  Major depression with psychotic features [facility])         Treatment Complications: None noted  Presenting Problem: From evaluation by Dr July Harp on 102/21::"55-year-old male who is , works as an  currently on [employer] with history treatment for depression and anxiety presents to the office today for initial psychiatric evaluation    Patient reports he began treatment for anxiety about 5 years ago with his PCP and was maintained on Celexa and a small dose of lorazepam for about 4 years with good stability   He states in 2019 he began with the assistance of his PCP tapering off of Celexa as he had maintained stability for quite some time  Luis Alberto Morel states having then had an episode in June of 2020 where he woke up with a severe headache and nausea which required him to go to the ED   On that occasion patient had a workup in the ED and they are worse no evidence of any on to word medical illness  Luis Alberto Morel says since that time he has had multiple medical symptoms and has seen many specialists which have revealed no medical findings  Luis Alberto Morel states currently his greatest issue is with chronic lightheadedness which causes him to be quite fatigued   He also admits to acknowledging that he is very anxious and understands that his physical symptoms may be a psychosomatic nature   He describes having excessive anxiety every day and he also feels anxious about having panic attacks which he has had in the past  Luis Alberto Morel reports symptoms of restlessness and fear which then proceed to him feeling that he cannot breathe  Luis Alberto Morel states his has tingling in his arms and legs along with his heart fahad  Patient reports he has been so distraught with his anxiety and panic attacks that he began having passive wish for death which led to a hospitalization at [facility] between May 4th and May 12th   Patient states he is discharged to home on lorazepam 2 mg 3 times a day for 10 days after which he was to return to his 1 mg 3 times a day dose which he has been in the past, Abilify 2 mg daily, Celexa 40 mg daily, hydroxyzine 50 mg 2 tablets at bedtime, and Remeron 15 mg at bedtime  Patient states that he has had little relief from this medication regimen he continues to have feelings of anxiety, panic attack and mild feelings of depression   He however denies any thoughts of self-harm or harm of others  Dayo Leal does report that he had a handgun which was removed from his possession and has been placed in an unknown location by his wife  Patient reports stressors of having a lady friend who he is helping out, work is stressful for him as well as financial issues  "     Per this writer:  Dayna Snfranciadiandra reports "some improvements" since May 2021  He denies any panic attacks for the last 3 months  He finds the current med regimen to be very helpful, but he states that he is "still not where [he] wants to be "  Dayna franciadiandra reports that the mornings are the worst; he feels very anxious  "I'm doing the best I can to not miss any work "  He finds work to be very stressful  Dayna Lynchdiandra confides that his struggle with anxiety began in 2016 following an episode of dizziness/weakness  Now, he states "I can't stop worrying about it "  Dayna franciadiandra scores a 17 on PHQ-9 and a 10 on the MIKA today  Course of treatment includes:    individual therapy   Treatment Progress: fair  Criteria for Discharge: Dayna Torres requested discharge  Therapist is in agreement    Dayna oTrres feels that he knows the therapist's recommendations to make life changes (e g  with regard to girlfriend and wife) to decrease stress and he chooses not to do this   He is satisfied with what he has achieved through therapy  Aftercare recommendations include:  Continues to participate in medication management and return to psychotherapy if/when needed    Discharge Medications include:  Current Outpatient Medications:     ARIPiprazole (ABILIFY) 5 mg tablet, Take 0 5 tablets (2 5 mg total) by mouth daily, Disp: 30 tablet, Rfl: 0    betamethasone valerate (VALISONE) 0 1 % cream, , Disp: , Rfl:     escitalopram (LEXAPRO) 20 mg tablet, Take 1 5 tablets (30 mg total) by mouth daily, Disp: 45 tablet, Rfl: 2    hydrOXYzine HCL (ATARAX) 25 mg tablet, TAKE 1 TABLET BY MOUTH EVERY 6 HOURS AS NEEDED FOR ANXIETY , Disp: 120 tablet, Rfl: 0    LORazepam (ATIVAN) 1 mg tablet, Take 1/2 tab in am, 1 tab in afternoon as needed for anxiety, Disp: 45 tablet, Rfl: 0    QUEtiapine (SEROquel) 100 mg tablet, Take 1 tablet (100 mg total) by mouth 2 (two) times a day Take 1 tab in am, 1 tab before bed , Disp: 60 tablet, Rfl: 0    Prognosis: fair

## 2022-06-22 DIAGNOSIS — F33.1 MODERATE EPISODE OF RECURRENT MAJOR DEPRESSIVE DISORDER (HCC): ICD-10-CM

## 2022-06-22 RX ORDER — HYDROXYZINE HYDROCHLORIDE 25 MG/1
25 TABLET, FILM COATED ORAL EVERY 6 HOURS PRN
Qty: 120 TABLET | Refills: 0 | Status: SHIPPED | OUTPATIENT
Start: 2022-06-22 | End: 2022-07-08

## 2022-06-22 RX ORDER — QUETIAPINE FUMARATE 100 MG/1
100 TABLET, FILM COATED ORAL 2 TIMES DAILY
Qty: 60 TABLET | Refills: 0 | Status: SHIPPED | OUTPATIENT
Start: 2022-06-22 | End: 2022-07-08

## 2022-07-08 DIAGNOSIS — F33.1 MODERATE EPISODE OF RECURRENT MAJOR DEPRESSIVE DISORDER (HCC): ICD-10-CM

## 2022-07-08 RX ORDER — HYDROXYZINE HYDROCHLORIDE 25 MG/1
TABLET, FILM COATED ORAL
Qty: 120 TABLET | Refills: 0 | Status: SHIPPED | OUTPATIENT
Start: 2022-07-08 | End: 2022-08-29 | Stop reason: SDUPTHER

## 2022-07-08 RX ORDER — QUETIAPINE FUMARATE 100 MG/1
TABLET, FILM COATED ORAL
Qty: 60 TABLET | Refills: 0 | Status: SHIPPED | OUTPATIENT
Start: 2022-07-08 | End: 2022-07-20

## 2022-07-11 ENCOUNTER — OFFICE VISIT (OUTPATIENT)
Dept: BARIATRICS | Facility: CLINIC | Age: 57
End: 2022-07-11
Payer: COMMERCIAL

## 2022-07-11 VITALS
RESPIRATION RATE: 16 BRPM | HEIGHT: 75 IN | SYSTOLIC BLOOD PRESSURE: 118 MMHG | DIASTOLIC BLOOD PRESSURE: 80 MMHG | HEART RATE: 96 BPM | WEIGHT: 306.1 LBS | BODY MASS INDEX: 38.06 KG/M2

## 2022-07-11 DIAGNOSIS — F32.3 MAJOR DEPRESSION WITH PSYCHOTIC FEATURES (HCC): ICD-10-CM

## 2022-07-11 DIAGNOSIS — E16.1 HYPOGLYCEMIC REACTION: ICD-10-CM

## 2022-07-11 DIAGNOSIS — E66.9 OBESITY, CLASS II, BMI 35-39.9: Primary | ICD-10-CM

## 2022-07-11 DIAGNOSIS — Z78.9 KETOGENIC DIET: ICD-10-CM

## 2022-07-11 DIAGNOSIS — R00.2 PALPITATIONS: ICD-10-CM

## 2022-07-11 PROBLEM — G47.33 OSA (OBSTRUCTIVE SLEEP APNEA): Status: ACTIVE | Noted: 2022-07-11

## 2022-07-11 PROCEDURE — 99204 OFFICE O/P NEW MOD 45 MIN: CPT | Performed by: PHYSICIAN ASSISTANT

## 2022-07-11 NOTE — ASSESSMENT & PLAN NOTE
Following with psych  Discussed atarax and seroquel can be associated with weight gain    Recommend labs assess for DM

## 2022-07-11 NOTE — ASSESSMENT & PLAN NOTE
-Discussed options of [facility]-Intensive Lifestyle Intervention Program, Very Low Calorie Diet-VLCD and Conservative Program and the role of weight loss medications   -Initial weight loss goal of 5-10% weight loss for improved health  -Screening labs  Recommend checking lab coverage before having labs drawn  -NEEDS LABS or Labs reviewed from 5/5/21 all within acceptable limits    -Patient is interested in pursuing vlcd or conservative  Unsure at this time  Also discussed about possible medications-saxend    Would avoid phentermine/qsymia due to PVCs and contrave due to psych meds    VLCD Review:  Contraindications: no  Labs ordered: yes  EKG ordered: no-reviewed from march  HTN meds addressed: n/a  DM2 meds addressed: n/a  VLCD time restriction based on BMI: no  LMP/OCP: n/a    Discussed program details inclusing-    monitoring fluid intake with a goal of 80 ounces of water daily, food intake, possible diet complications, need for 2 week follow up and monthly labs    Also discussed that blue apron plans are calorie dense and recommend switching to lower calorie plan

## 2022-07-11 NOTE — PROGRESS NOTES
Assessment/Plan:    Obesity, Class II, BMI 35-39 9  -Discussed options of [facility]-Intensive Lifestyle Intervention Program, Very Low Calorie Diet-VLCD and Conservative Program and the role of weight loss medications   -Initial weight loss goal of 5-10% weight loss for improved health  -Screening labs  Recommend checking lab coverage before having labs drawn  -NEEDS LABS or Labs reviewed from 5/5/21 all within acceptable limits    -Patient is interested in pursuing vlcd or conservative  Unsure at this time  Also discussed about possible medications-saxend  Would avoid phentermine/qsymia due to PVCs and contrave due to psych meds    VLCD Review:  Contraindications: no  Labs ordered: yes  EKG ordered: no-reviewed from march  HTN meds addressed: n/a  DM2 meds addressed: n/a  VLCD time restriction based on BMI: no  LMP/OCP: n/a    Discussed program details inclusing-    monitoring fluid intake with a goal of 80 ounces of water daily, food intake, possible diet complications, need for 2 week follow up and monthly labs    Also discussed that blue apron plans are calorie dense and recommend switching to lower calorie plan    Major depression with psychotic features [facility])  Following with psych  Discussed atarax and seroquel can be associated with weight gain  Recommend labs assess for DM    Palpitations  Had been referred to cardiology  Would avoid sympathomimetics       Follow up in approximately 1 month with Non-Surgical Dietician  Diagnoses and all orders for this visit:    Obesity, Class II, BMI 35-39 9  -     Magnesium; Future  -     Comprehensive metabolic panel; Future  -     Comprehensive metabolic panel; Future  -     Insulin, fasting; Future  -     Magnesium; Future    Ketogenic diet  -     Magnesium; Future  -     Comprehensive metabolic panel; Future  -     Comprehensive metabolic panel; Future  -     Insulin, fasting; Future  -     Magnesium;  Future    Hypoglycemic reaction  -     Magnesium; Future  -     Comprehensive metabolic panel; Future  -     Comprehensive metabolic panel; Future  -     Insulin, fasting; Future  -     Magnesium; Future    Major depression with psychotic features [facility])    Palpitations          Subjective:   Chief Complaint   Patient presents with    Consult     MWM consult; waist 53in; goal wt 225; pt has sleep apnea       Patient ID: Izabela Hardy  is a 59 y o  male with excess weight/obesity here to pursue weight management  Past Medical History:   Diagnosis Date    Anxiety     Depression     Headache(784 0) March 2020    Degenerative disk in neck may be cause   Hypertension     Palpitation     Panic attack        HPI:  Here for MWM consult  He did see RD first for meal planning in March [address] 9 on 3/14/22  He struggled with diet plan  He has long commute with work  Wife cooks dinner at night typically  He tried to cut back on food intake at night   He did well with this before  He often does 2nds then    He does get out of breath easilyand relates it some to anxiety  Over last year he has decreased his exercise    He has had episodes   Obesity/Excess Weight:  Severity: class II  Onset:  years    Modifiers: Diet and Exercise  Contributing factors: Poor Food Choices, Medications and Depression  Associated symptoms: increased joint pain, decreased exercise capacity and body image issues    Goals:225  Hydration:1/2 gallon water, 1 coffee decaf creamer  Alcohol:limited   Exercise:gym 30 minutes of weights this week  Was going 3 times a week  Occupation:sedentary    Diet recall:  B(9am): bagel or cereal  L(1PM)-left over or hoagie or sometimes chicken sandwich  S: pretzel, cottage cheese with fruit, grapes or cheeries  D:chicken with pasta  Blue apron meals   sometmies has 2nds      Colonoscopy-ordered prior    The following portions of the patient's history were reviewed and updated as appropriate:   He  has a past medical history of Anxiety, Depression, Headache(784 0) (March 2020), Hypertension, Palpitation, and Panic attack  He   Patient Active Problem List    Diagnosis Date Noted    Obesity, Class II, BMI 35-39 9 07/11/2022    CHEVY (obstructive sleep apnea) 07/11/2022    Chronic right-sided low back pain without sciatica 02/11/2022    Major depression with psychotic features ([facility] ) 02/10/2022    Bilateral carpal tunnel syndrome 12/09/2021    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 12/09/2021    Weight gain 12/09/2021    Major depressive disorder, recurrent episode, moderate ([facility] ) 09/07/2021    Lightheadedness 06/01/2021    Benign paroxysmal positional vertigo 06/01/2021    Medical clearance for psychiatric admission 05/04/2021    PVC (premature ventricular contraction) 05/04/2021    Depression with suicidal ideation 05/04/2021    Dizziness     Palpitations      He  has a past surgical history that includes Knee surgery (08/1982) and Fracture surgery (6/1970 and 6/1979)  His family history includes COPD in his mother; Hypertension in his mother; Migraines in his mother; Stroke in his father  He  reports that he is a non-smoker but has been exposed to tobacco smoke  He has been exposed to 0 00 packs per day for the past 25 00 years  He has never used smokeless tobacco  He reports current alcohol use of about 2 0 standard drinks of alcohol per week  He reports that he does not use drugs  Current Outpatient Medications   Medication Sig Dispense Refill    ARIPiprazole (ABILIFY) 5 mg tablet Take 0 5 tablets (2 5 mg total) by mouth daily 30 tablet 0    betamethasone valerate (VALISONE) 0 1 % cream       escitalopram (LEXAPRO) 20 mg tablet Take 1 5 tablets (30 mg total) by mouth daily 45 tablet 2    hydrOXYzine HCL (ATARAX) 25 mg tablet TAKE 1 TABLET BY MOUTH EVERY 6 HOURS AS NEEDED FOR ANXIETY   120 tablet 0    LORazepam (ATIVAN) 1 mg tablet Take 1/2 tab in am, 1 tab in afternoon as needed for anxiety 45 tablet 0    QUEtiapine (SEROquel) 100 mg tablet TAKE 1 TABLET BY MOUTH 2 TIMES A DAY IN THE MORNING AND BEFORE BED 60 tablet 0     No current facility-administered medications for this visit  Current Outpatient Medications on File Prior to Visit   Medication Sig    ARIPiprazole (ABILIFY) 5 mg tablet Take 0 5 tablets (2 5 mg total) by mouth daily    betamethasone valerate (VALISONE) 0 1 % cream     escitalopram (LEXAPRO) 20 mg tablet Take 1 5 tablets (30 mg total) by mouth daily    hydrOXYzine HCL (ATARAX) 25 mg tablet TAKE 1 TABLET BY MOUTH EVERY 6 HOURS AS NEEDED FOR ANXIETY   LORazepam (ATIVAN) 1 mg tablet Take 1/2 tab in am, 1 tab in afternoon as needed for anxiety    QUEtiapine (SEROquel) 100 mg tablet TAKE 1 TABLET BY MOUTH 2 TIMES A DAY IN THE MORNING AND BEFORE BED     No current facility-administered medications on file prior to visit  He is allergic to other and shellfish allergy - food allergy       Review of Systems   Constitutional: Negative for fatigue  Respiratory: Positive for shortness of breath  Cardiovascular: Negative for chest pain and palpitations  Gastrointestinal: Negative for abdominal pain, constipation and diarrhea  Endocrine: Negative for cold intolerance and heat intolerance  Genitourinary: Negative for difficulty urinating  Musculoskeletal: Positive for back pain  Negative for arthralgias  Skin: Negative for rash  Neurological: Positive for dizziness  Negative for headaches  Psychiatric/Behavioral: Negative for dysphoric mood  The patient is nervous/anxious  Objective:    /80   Pulse 96   Resp 16   Ht 6' 3" (1 905 m)   Wt (!) 139 kg (306 lb 1 6 oz)   BMI 38 26 kg/m²     Physical Exam  Vitals and nursing note reviewed  Constitutional:       General: He is not in acute distress  Appearance: He is well-developed  He is obese  HENT:      Head: Normocephalic and atraumatic     Eyes:      Conjunctiva/sclera: Conjunctivae normal    Neck:      Thyroid: No thyromegaly  Pulmonary:      Effort: Pulmonary effort is normal  No respiratory distress  Skin:     Findings: No rash (visible)  Neurological:      Mental Status: He is alert and oriented to person, place, and time     Psychiatric:         Behavior: Behavior normal

## 2022-07-15 DIAGNOSIS — F32.A DEPRESSED: ICD-10-CM

## 2022-07-15 DIAGNOSIS — F33.1 MAJOR DEPRESSIVE DISORDER, RECURRENT EPISODE, MODERATE (HCC): ICD-10-CM

## 2022-07-15 RX ORDER — ARIPIPRAZOLE 5 MG/1
2.5 TABLET ORAL DAILY
Qty: 30 TABLET | Refills: 0 | Status: CANCELLED | OUTPATIENT
Start: 2022-07-15

## 2022-07-15 RX ORDER — LORAZEPAM 1 MG/1
TABLET ORAL
Qty: 45 TABLET | Refills: 0 | Status: CANCELLED | OUTPATIENT
Start: 2022-07-15

## 2022-07-18 ENCOUNTER — TELEMEDICINE (OUTPATIENT)
Dept: PSYCHIATRY | Facility: CLINIC | Age: 57
End: 2022-07-18
Payer: COMMERCIAL

## 2022-07-18 DIAGNOSIS — F32.A DEPRESSED: ICD-10-CM

## 2022-07-18 DIAGNOSIS — F33.1 MODERATE EPISODE OF RECURRENT MAJOR DEPRESSIVE DISORDER (HCC): ICD-10-CM

## 2022-07-18 DIAGNOSIS — F33.1 MAJOR DEPRESSIVE DISORDER, RECURRENT EPISODE, MODERATE (HCC): ICD-10-CM

## 2022-07-18 PROCEDURE — 99214 OFFICE O/P EST MOD 30 MIN: CPT | Performed by: NURSE PRACTITIONER

## 2022-07-18 RX ORDER — ARIPIPRAZOLE 5 MG/1
2.5 TABLET ORAL
Qty: 30 TABLET | Refills: 1 | Status: SHIPPED | OUTPATIENT
Start: 2022-07-18 | End: 2022-08-29 | Stop reason: SDUPTHER

## 2022-07-18 RX ORDER — LORAZEPAM 1 MG/1
TABLET ORAL
Qty: 45 TABLET | Refills: 0 | Status: SHIPPED | OUTPATIENT
Start: 2022-07-18 | End: 2022-08-18 | Stop reason: SDUPTHER

## 2022-07-20 RX ORDER — QUETIAPINE FUMARATE 100 MG/1
TABLET, FILM COATED ORAL
Qty: 60 TABLET | Refills: 0 | COMMUNITY
Start: 2022-07-20 | End: 2022-08-29 | Stop reason: SDUPTHER

## 2022-07-20 NOTE — PSYCH
Virtual Regular Visit    Verification of patient location:    Patient is located in the following state in which I hold an active license [place]    Problem List Items Addressed This Visit        Other    Major depressive disorder, recurrent episode, moderate (HCC)    Relevant Medications    ARIPiprazole (ABILIFY) 5 mg tablet    LORazepam (ATIVAN) 1 mg tablet      Other Visit Diagnoses     Depressed        Relevant Medications    ARIPiprazole (ABILIFY) 5 mg tablet    LORazepam (ATIVAN) 1 mg tablet             Encounter provider KOLBY Garcia    Provider located at    [address] [place] [address] [phone]    Recent Visits  No visits were found meeting these conditions  Showing recent visits within past 7 days and meeting all other requirements  Future Appointments  No visits were found meeting these conditions  Showing future appointments within next 150 days and meeting all other requirements         The patient was identified by name and date of birth  Sandy Orf was informed that this is a telemedicine visit and that the visit is being conducted through[employer]QOic Embedded and patient was informed this is a secure, HIPAA-complaint platform  He agrees to proceed     My office door was closed  No one else was in the room  He acknowledged consent and understanding of privacy and security of the video platform  The patient has agreed to participate and understands they can discontinue the visit at any time  Patient is aware this is a billable service       HPI     Current Outpatient Medications   Medication Sig Dispense Refill    ARIPiprazole (ABILIFY) 5 mg tablet Take 0 5 tablets (2 5 mg total) by mouth daily at bedtime 30 tablet 1    LORazepam (ATIVAN) 1 mg tablet Take 1/2 tab in am, 1 tab in afternoon as needed for anxiety 45 tablet 0    betamethasone valerate (VALISONE) 0 1 % cream       escitalopram (LEXAPRO) 20 mg tablet Take 1 5 tablets (30 mg total) by mouth daily 45 tablet 2    hydrOXYzine HCL (ATARAX) 25 mg tablet TAKE 1 TABLET BY MOUTH EVERY 6 HOURS AS NEEDED FOR ANXIETY  120 tablet 0    QUEtiapine (SEROquel) 100 mg tablet TAKE 1 TABLET BY MOUTH 2 TIMES A DAY IN THE MORNING AND BEFORE BED 60 tablet 0     No current facility-administered medications for this visit  Review of Systems  Video Exam    There were no vitals filed for this visit  Physical Exam   As a result of this visit, I have referred the patient for further respiratory evaluation  No    I spent 30 minutes directly with the patient during this visit  VIRTUAL VISIT DISCLAIMER    Kaleb Ta acknowledges that he has consented to an online visit or consultation  He understands that the online visit is based solely on information provided by him, and that, in the absence of a face-to-face physical evaluation by the physician, the diagnosis he receives is both limited and provisional in terms of accuracy and completeness  This is not intended to replace a full medical face-to-face evaluation by the physician  Kaleb Ta understands and accepts these terms  MEDICATION MANAGEMENT NOTE        [address]    Name and Date of Birth:  Kaleb Ta 59 y o  1965 MRN: [id number]    Date of Visit: July 18, 2022    Allergies   Allergen Reactions    Other Sneezing, Throat Swelling and Tongue Swelling    Shellfish Allergy - Food Allergy Tongue Swelling     SUBJECTIVE:    Lisa Law is seen today for a follow up for Major Depressive Disorder  He continues to improve slowly since the last visit  Lisa Law is a 24-year-old male with a history of MDD  He is seen today virtually for medication management follow-up  He was last seen by this provider on 05/23/2022    Today he reports that he did not go golfing with his brother, but did att we still woke extensively about his medication regimen end the after party in did have a good time  He states that he does have good days and has bed days  Currently his depression is rated a 3/10, anxiety is also 3/10  He continues to have occasional fleeting suicidal thoughts, without intent or plan  We spoke extensively about his medication regimen, as he tends to perseverate on what medications he needs to take at what time  I explained to him again what he has the as needed medications for, and provided Education on how the p r n  meds should be taken  He states that he does become fatigued in the morning after taking his medications  It was explained that he should only take the hydroxyzine or Ativan if he is feeling anxiety  He was encouraged to try utilizing the hydroxyzine 1st and if that was not effective to take half of an Ativan  We will also changes Seroquel to only 100 mg at night as he is feeling to fatigue during the day it could be from the Seroquel  If he continues to have anxiety during the day he can utilize a half of the Seroquel to help with that anxiety  He reports that he is sleeping well at night and getting at least 7 hours of sleep  He is eating well with good appetite  Things are going okay at work  He states that he is no longer going to see [facility] as he believes they have gone as far as they could go in therapy  He was encouraged to make changes in his life to improve his quality of life that will potentially decrease his anxiety at home  No other med changes will be made, he will follow-up in 1 month  He is agreeable with this treatment plan at this time  He denies any side effects from current psychiatric medications  Fatigue during the day after taking morning medications, possibly from seroquel  PLAN:  Continue medications as ordered  Scheduled Medications:  Abilify 2 5 mg p o  daily before bed  Lexapro 30 mg p o  daily am  Seroquel 100 mg p o  before bed  As needed for Anxiety:  1   Atarax 25 mg p o   2  Ativan 1/2 tab 0 5 mg  3  Seroquel 1/2 tab 50 mg    Continue to follow up with medical doctors as needed  Continue to work with weight management doctors as needed  Work on self-care to improve anxiety without medication management  Follow-up with this provider in 4 weeks  Call sooner with concerns or issues if they arise prior to scheduled appointment    Aware of 24 hour and weekend coverage for urgent situations accessed by calling [facility] main practice number  Does not want to see a therapist  Medication management every 4 weeks  Aware of need to follow up with family physician for medical issues    Diagnoses and all orders for this visit:    Major depressive disorder, recurrent episode, moderate (HCC)  -     ARIPiprazole (ABILIFY) 5 mg tablet; Take 0 5 tablets (2 5 mg total) by mouth daily at bedtime    Depressed  -     LORazepam (ATIVAN) 1 mg tablet; Take 1/2 tab in am, 1 tab in afternoon as needed for anxiety        Current Outpatient Medications on File Prior to Visit   Medication Sig Dispense Refill    betamethasone valerate (VALISONE) 0 1 % cream       escitalopram (LEXAPRO) 20 mg tablet Take 1 5 tablets (30 mg total) by mouth daily 45 tablet 2    hydrOXYzine HCL (ATARAX) 25 mg tablet TAKE 1 TABLET BY MOUTH EVERY 6 HOURS AS NEEDED FOR ANXIETY  120 tablet 0    QUEtiapine (SEROquel) 100 mg tablet TAKE 1 TABLET BY MOUTH 2 TIMES A DAY IN THE MORNING AND BEFORE BED 60 tablet 0     No current facility-administered medications on file prior to visit  Psychotherapy Provided:     Individual psychotherapy provided: Yes  Counseling was provided during the session today for 16 minutes  Supportive counseling provided  Medication changes discussed with Tucker Keith  Medication education provided to Tucker Keith  Recent stressors discussed with Tucker Keith including stress at work and ongoing anxiety  Coping strategies reviewed with Tucker Keith     Importance of medication and treatment compliance reviewed with Ana Bowman  Importance of follow up with family physician for medical issues reviewed with Ana Bowman  Reassurance and supportive therapy provided  Crisis/safety plan discussed with Ana Bowman  He will utilize crisis as needed    HPI ROS Appetite Changes and Sleep:     He reports adequate number of sleep hours (7 hours), adequate appetite, normal energy level   Denies homicidal ideation, denies suicidal ideation    Review Of Systems:  HPI ROS:               Medication Side Effects:  Denies     Depression (10 worst): 3/10 (Was 3-4/10)   Anxiety (10 worst): 3/10 (Was 3 to 4/10)   Safety concerns (SI, HI, etc): Fleeting, no plan or intent (Was denies)   Sleep: 7 hours per night (Was 8 hours per night)   Energy: Adequate (Was adequate)   Appetite: Good (Was good)     General normal    Personality no change in personality   Constitutional negative   ENT negative   Cardiovascular negative   Respiratory negative   Gastrointestinal negative   Genitourinary negative   Musculoskeletal negative   Integumentary negative   Neurological negative   Endocrine negative   Other Symptoms none, all other systems are negative     Mental Status Evaluation:    Appearance Adequate hygiene and grooming   Behavior calm and cooperative   Mood anxious and depressed  Depression Scale - 3 of 10 (0 = No depression)  Anxiety Scale - 3 of 10 (0 = No anxiety)   Speech Normal rate and volume   Affect mood-congruent   Thought Processes Goal directed and coherent   Thought Content Does not verbalize delusional material   Associations Tightly connected   Perceptual Disturbances Denies hallucinations and does not appear to be responding to internal stimuli   Risk Potential Suicidal/Homicidal Ideation - Passive suicidal ideation without intent or plan  Risk of Violence - No evidence of risk for violence found on assessment  Risk of Self Mutilation - No evidence of risk for self mutilation found on assessment   Orientation oriented to person, place, time/date and situation   Memory recent and remote memory grossly intact   Consciousness alert and awake   Attention/Concentration attention span and concentration are age appropriate   Insight partial   Judgement partial   Muscle Strength and Gait normal muscle strength and normal muscle tone, normal gait/station and normal balance   Motor Activity no abnormal movements   Language no difficulty naming common objects, no difficulty repeating a phrase, no difficulty writing a sentence   Fund of Knowledge adequate knowledge of current events  adequate fund of knowledge regarding past history  adequate fund of knowledge regarding vocabulary      Past Psychiatric [address]- per Dr Melanie Sanford H&P  Previous diagnoses include Depression and Anxiety   Prior outpatient psychiatric treatment:   With PCP till now  [address] therapy:   [address] inpatient psychiatric treatment: x1  [address] suicide attempts:  None   Prior self harm:  None   Prior violence or aggression:  None    Past Psychiatric History Update:     Inpatient Psychiatric Admission Since Last Encounter:   no  Changes to Outpatient Psychiatric Treatment Team:    no  Suicide Attempt Or Self Mutilation Since Last Encounter:   no  Incidence of Violent Behavior Since Last Encounter:   no    Traumatic History Update:     New Onset of Abuse Since Last Encounter:   no  Traumatic Events Since Last Encounter:   no    Past Medical History:    Past Medical History:   Diagnosis Date    Anxiety     Depression     Headache(784 0) March 2020    Degenerative disk in neck may be cause      Hypertension     Palpitation     Panic attack         Past Surgical History:   Procedure Laterality Date    FRACTURE SURGERY  6/1970 and 6/1979    Left and right wrists    KNEE SURGERY  08/1982     Allergies   Allergen Reactions    Other Sneezing, Throat Swelling and Tongue Swelling    Shellfish Allergy - Food Allergy Tongue Swelling     Substance Abuse History:    Social History     Substance and Sexual Activity   Alcohol Use Yes    Alcohol/week: 2 0 standard drinks    Types: 2 Cans of beer per week    Comment: socially     Social History     Substance and Sexual Activity   Drug Use Never     Social History:    Social History     Socioeconomic History    Marital status: /Civil Union     Spouse name: Not on file    Number of children: Not on file    Years of education: Not on file    Highest education level: Not on file   Occupational History    Not on file   Tobacco Use    Smoking status: Passive Smoke Exposure - Never Smoker    Smokeless tobacco: Never Used   Vaping Use    Vaping Use: Never used   Substance and Sexual Activity    Alcohol use: Yes     Alcohol/week: 2 0 standard drinks     Types: 2 Cans of beer per week     Comment: socially    Drug use: Never    Sexual activity: Not Currently     Partners: Female     Birth control/protection: Female Sterilization   Other Topics Concern    Not on file   Social History Narrative    Not on file     Social Determinants of Health     Financial Resource Strain: Not on file   Food Insecurity: Not on file   Transportation Needs: Not on file   Physical Activity: Not on file   Stress: Not on file   Social Connections: Not on file   Intimate Partner Violence: Not on file   Housing Stability: Not on file     Family Psychiatric History:     Family History   Problem Relation Age of Onset    Hypertension Mother     COPD Mother     Migraines Mother     Stroke Father         Mini stroke in 2010     History Review: The following portions of the patient's history were reviewed and updated as appropriate: allergies, current medications, past family history, past medical history, past social history, past surgical history and problem list     OBJECTIVE:     Vital signs in last 24 hours: There were no vitals filed for this visit  Laboratory Results:   Recent Labs (last 2 months):   No visits with results within 2 Month(s) from this visit     Latest known visit with results is:   Office Visit on 03/26/2022   Component Date Value    Ventricular Rate 03/26/2022 82     Atrial Rate 03/26/2022 82     OR Interval 03/26/2022 206     QRSD Interval 03/26/2022 94     QT Interval 03/26/2022 400     QTC Interval 03/26/2022 467     P Axis 03/26/2022 39     QRS Axis 03/26/2022 -23     T Wave Axis 03/26/2022 32      I have personally reviewed all pertinent laboratory/tests results  Suicide/Homicide Risk Assessment:    Risk of Harm to Self:  The following ratings are based on assessment at the time of the interview  Recent Specific Risk Factors include: current depressive symptoms, current anxiety symptoms  Demographic risk factors include: , male, age: over 48 or older  Historical Risk Factors include: chronic depression, chronic anxiety symptoms  Protective Factors: no current suicidal ideation, access to mental health treatment, being a parent, being , compliant with medications, compliant with mental health treatment, medical compliance, responsibilities and duties to others, stable living environment, stable job, supportive family  Based on today's assessment, Rosendo Hammonds presents the following risk of harm to self: low    Risk of Harm to Others: The following ratings are based on assessment at the time of the interview  Protective Factors: no current homicidal ideation  Based on today's assessment, Rosendo Hammonds presents the following risk of harm to others: none    The following interventions are recommended: no intervention changes needed    Medications Risks/Benefits:      Risks, Benefits And Possible Side Effects Of Medications:    Discussed risks and benefits of treatment with patient including risk of suicidality, serotonin syndrome, increased QTc interval and SIADH related to treatment with antidepressants;  Risk of induction of manic symptoms in certain patient populations, risk of parkinsonian symptoms, metabolic syndrome, tardive dyskinesia and neuroleptic malignant syndrome related to treatment with antipsychotic medications and risks of misuse, abuse or dependence, sedation and respiratory depression related to treatment with benzodiazepine medications     Controlled Medication Discussion:     Tiesha Dye has been filling controlled prescriptions on time as prescribed according to [place] Prescription Drug Monitoring Program    Treatment Plan:    Due for update/Updated:   no  Last treatment plan done 3/7/22 by Nicholas Dewitt LCSW  Treatment Plan due on 9/7/22  KOLBY Jordan 07/20/22    This note was shared with patient

## 2022-07-27 ENCOUNTER — OFFICE VISIT (OUTPATIENT)
Dept: FAMILY MEDICINE CLINIC | Facility: CLINIC | Age: 57
End: 2022-07-27
Payer: COMMERCIAL

## 2022-07-27 VITALS
OXYGEN SATURATION: 92 % | HEIGHT: 75 IN | HEART RATE: 90 BPM | BODY MASS INDEX: 38.17 KG/M2 | DIASTOLIC BLOOD PRESSURE: 78 MMHG | WEIGHT: 307 LBS | SYSTOLIC BLOOD PRESSURE: 110 MMHG

## 2022-07-27 DIAGNOSIS — G44.209 ACUTE NON INTRACTABLE TENSION-TYPE HEADACHE: Primary | ICD-10-CM

## 2022-07-27 PROCEDURE — 99213 OFFICE O/P EST LOW 20 MIN: CPT | Performed by: FAMILY MEDICINE

## 2022-07-27 PROCEDURE — 3008F BODY MASS INDEX DOCD: CPT | Performed by: FAMILY MEDICINE

## 2022-07-27 PROCEDURE — 1036F TOBACCO NON-USER: CPT | Performed by: FAMILY MEDICINE

## 2022-07-27 NOTE — PROGRESS NOTES
Assessment/Plan:    Tension type headache  I feel this is primarily muscular although the neck muscles do not feel tender at this time  May continue the over-the-counter medication and be very careful with neck position  Gave no to be able return to work tomorrow       Diagnoses and all orders for this visit:    Acute non intractable tension-type headache          Subjective:      Patient ID: Leyla Chambers is a 59 y o  male  Patient has problems with severe anxiety disorder and major depression  Patient seen today for headache that he woke up with this morning and some dizziness  Took over-the-counter medication apparently generic Excedrin migraine which has helped      The following portions of the patient's history were reviewed and updated as appropriate: allergies, current medications, past medical history, past social history and problem list     Review of Systems   Constitutional: Negative for fever  HENT: Negative for congestion  Eyes: Negative for itching and visual disturbance  Respiratory: Negative for cough, chest tightness and shortness of breath  Cardiovascular: Negative for chest pain  Gastrointestinal: Negative for abdominal pain and nausea  Allergic/Immunologic: Negative for environmental allergies  Neurological: Positive for dizziness and headaches  Negative for light-headedness  Psychiatric/Behavioral: Negative for sleep disturbance  Objective:      /78 (BP Location: Left arm, Patient Position: Sitting, Cuff Size: Large)   Pulse 90   Ht 6' 3" (1 905 m)   Wt (!) 139 kg (307 lb)   SpO2 92%   BMI 38 37 kg/m²          Physical Exam  Vitals and nursing note reviewed  Constitutional:       Appearance: Normal appearance  He is well-developed  HENT:      Head: Normocephalic  Nose: Nose normal       Mouth/Throat:      Mouth: Mucous membranes are moist    Eyes:      Extraocular Movements: Extraocular movements intact        Conjunctiva/sclera: Conjunctivae normal       Pupils: Pupils are equal, round, and reactive to light  Neck:      Thyroid: No thyromegaly  Cardiovascular:      Rate and Rhythm: Normal rate and regular rhythm  Heart sounds: Normal heart sounds  No murmur (Rate is 72) heard  Pulmonary:      Effort: Pulmonary effort is normal       Breath sounds: Normal breath sounds  Abdominal:      General: There is no distension  Palpations: There is no mass  Tenderness: There is no abdominal tenderness  Musculoskeletal:         General: Normal range of motion  Cervical back: Normal range of motion and neck supple  No tenderness  Right lower leg: No edema  Left lower leg: No edema  Comments: No tenderness to palpation of the neck musculature   Lymphadenopathy:      Cervical: No cervical adenopathy  Skin:     General: Skin is warm and dry  Findings: No rash  Neurological:      Mental Status: He is alert and oriented to person, place, and time  Motor: No weakness        Coordination: Coordination normal       Gait: Gait normal    Psychiatric:         Mood and Affect: Mood normal

## 2022-07-27 NOTE — PATIENT INSTRUCTIONS
Discussed the problem with the headache  This is probably muscular    May take the over-the-counter medication and try to keep the head in a neutral position

## 2022-07-27 NOTE — ASSESSMENT & PLAN NOTE
I feel this is primarily muscular although the neck muscles do not feel tender at this time  May continue the over-the-counter medication and be very careful with neck position    Gave no to be able return to work tomorrow

## 2022-08-16 DIAGNOSIS — F32.A DEPRESSED: ICD-10-CM

## 2022-08-19 RX ORDER — LORAZEPAM 1 MG/1
TABLET ORAL
Qty: 45 TABLET | Refills: 0 | Status: SHIPPED | OUTPATIENT
Start: 2022-08-19 | End: 2022-09-29 | Stop reason: SDUPTHER

## 2022-08-22 NOTE — TELEPHONE ENCOUNTER
Spoke with the patient he is aware the medication was sent to the pharmacy  Patient also confirmed his virtual appointment with his provider

## 2022-08-29 ENCOUNTER — TELEMEDICINE (OUTPATIENT)
Dept: PSYCHIATRY | Facility: CLINIC | Age: 57
End: 2022-08-29
Payer: COMMERCIAL

## 2022-08-29 DIAGNOSIS — F39 MOOD DISORDER (HCC): ICD-10-CM

## 2022-08-29 DIAGNOSIS — F32.A DEPRESSED: ICD-10-CM

## 2022-08-29 DIAGNOSIS — F33.1 MAJOR DEPRESSIVE DISORDER, RECURRENT EPISODE, MODERATE (HCC): ICD-10-CM

## 2022-08-29 DIAGNOSIS — F33.1 MODERATE EPISODE OF RECURRENT MAJOR DEPRESSIVE DISORDER (HCC): ICD-10-CM

## 2022-08-29 PROCEDURE — 99213 OFFICE O/P EST LOW 20 MIN: CPT | Performed by: NURSE PRACTITIONER

## 2022-08-29 RX ORDER — HYDROXYZINE HYDROCHLORIDE 25 MG/1
25 TABLET, FILM COATED ORAL EVERY 6 HOURS PRN
Qty: 120 TABLET | Refills: 2 | Status: SHIPPED | OUTPATIENT
Start: 2022-08-29 | End: 2022-09-30

## 2022-08-29 RX ORDER — QUETIAPINE FUMARATE 100 MG/1
TABLET, FILM COATED ORAL
Qty: 60 TABLET | Refills: 2 | Status: SHIPPED | OUTPATIENT
Start: 2022-08-29 | End: 2022-10-10

## 2022-08-29 RX ORDER — ARIPIPRAZOLE 5 MG/1
2.5 TABLET ORAL
Qty: 30 TABLET | Refills: 2 | Status: SHIPPED | OUTPATIENT
Start: 2022-08-29 | End: 2022-10-24

## 2022-08-29 NOTE — PSYCH
Virtual Regular Visit    Verification of patient location:    Patient is located in the following state in which I hold an active license [place]    Problem List Items Addressed This Visit    None            Encounter provider Paulette Meehan    Provider located at    [address] [place] [id number] [phone]    Recent Visits  No visits were found meeting these conditions  Showing recent visits within past 7 days and meeting all other requirements  Future Appointments  No visits were found meeting these conditions  Showing future appointments within next 150 days and meeting all other requirements         The patient was identified by name and date of birth  Sergio Peterson was informed that this is a telemedicine visit and that the visit is being conducted throughTESARO Embedded and patient was informed this is a secure, HIPAA-complaint platform  He agrees to proceed     My office door was closed  No one else was in the room  He acknowledged consent and understanding of privacy and security of the video platform  The patient has agreed to participate and understands they can discontinue the visit at any time  Patient is aware this is a billable service  HPI     Current Outpatient Medications   Medication Sig Dispense Refill    ARIPiprazole (ABILIFY) 5 mg tablet Take 0 5 tablets (2 5 mg total) by mouth daily at bedtime 30 tablet 1    betamethasone valerate (VALISONE) 0 1 % cream       escitalopram (LEXAPRO) 20 mg tablet Take 1 5 tablets (30 mg total) by mouth daily 45 tablet 2    hydrOXYzine HCL (ATARAX) 25 mg tablet TAKE 1 TABLET BY MOUTH EVERY 6 HOURS AS NEEDED FOR ANXIETY  120 tablet 0    LORazepam (ATIVAN) 1 mg tablet Take 1/2 tab in am, 1 tab in afternoon as needed for anxiety 45 tablet 0    QUEtiapine (SEROquel) 100 mg tablet Take 100mg PO before bed  You may also take 1/2 tab daily as needed for anxiety   61 tablet 0     No current facility-administered medications for this visit  Review of Systems  Video Exam    There were no vitals filed for this visit  Physical Exam   As a result of this visit, I have referred the patient for further respiratory evaluation  No    I spent 30 minutes directly with the patient during this visit  VIRTUAL VISIT DISCLAIMER    Adry Villarreal acknowledges that he has consented to an online visit or consultation  He understands that the online visit is based solely on information provided by him, and that, in the absence of a face-to-face physical evaluation by the physician, the diagnosis he receives is both limited and provisional in terms of accuracy and completeness  This is not intended to replace a full medical face-to-face evaluation by the physician  Adry Villarreal understands and accepts these terms  MEDICATION MANAGEMENT NOTE          [facility]    Name and Date of Birth:  Adry Villarreal 59 y o  1965 MRN: [id number]    Date of Visit: August 29, 2022    Allergies   Allergen Reactions    Other Sneezing, Throat Swelling and Tongue Swelling    Shellfish Allergy - Food Allergy Tongue Swelling     SUBJECTIVE:    Sarah Yoo is seen today for a follow up for Major Depressive Disorder  He continues to experience on and off depressive symptoms since the last visit  Sarah Yoo is a 69-year-old male with a history of major depressive disorder  He was last seen by this provider on 07/18/2022  He is seen virtually today for medication management follow-up  He reports that he is not feeling good for the past week  He also states that he has been worse since his last visit and his anxiety is increased at 4 to 5/10  He reports that his anxiety is primarily work related and he would like to give his resignation at work    We discussed different ways that he could handle this anxiety such as going for walks, finding hobbies, and he was offered to attend the depression group here at the office  He declined the depression group  He states that he will try to work on things on his own  He reports passing SI, no plan or intent  Denies HI, denies AVH at this time  Reports normal sleep, normal appetite  Is cooperative in conversation, mood-congruent and appropriate in affect  Will continue medications as ordered, and continue to monitor his coping skills with goals set in treatment plan  He denies any side effects from current psychiatric medications  PLAN:  Continue medications as ordered  Abilify 5 mg half a tab at bedtime  Atarax 25 mg p o  q 6 p r n  Seroquel 100 mg p o  HS  Seroquel 50 mg daily p r n  for anxiety  Lexapro 30 mg p o  daily  Ativan half tab in the morning, 1 tab in the afternoon as needed p r n  for anxiety  Coping skills discussed, going for walks, eating healthy, finding hobbies outside of work  Will follow up in one month  Will call sooner with concerns or issues if they arise prior to scheduled appointment    Aware of 24 hour and weekend coverage for urgent situations accessed by calling [facility] main practice number  Medication management every 1 month  Aware of need to follow up with family physician for medical issues    There are no diagnoses linked to this encounter  Current Outpatient Medications on File Prior to Visit   Medication Sig Dispense Refill    ARIPiprazole (ABILIFY) 5 mg tablet Take 0 5 tablets (2 5 mg total) by mouth daily at bedtime 30 tablet 1    betamethasone valerate (VALISONE) 0 1 % cream       escitalopram (LEXAPRO) 20 mg tablet Take 1 5 tablets (30 mg total) by mouth daily 45 tablet 2    hydrOXYzine HCL (ATARAX) 25 mg tablet TAKE 1 TABLET BY MOUTH EVERY 6 HOURS AS NEEDED FOR ANXIETY   120 tablet 0    LORazepam (ATIVAN) 1 mg tablet Take 1/2 tab in am, 1 tab in afternoon as needed for anxiety 45 tablet 0    QUEtiapine (SEROquel) 100 mg tablet Take 100mg PO before bed  You may also take 1/2 tab daily as needed for anxiety  60 tablet 0     No current facility-administered medications on file prior to visit  Psychotherapy Provided:     Individual psychotherapy provided: Yes  Counseling was provided during the session today for 16 minutes  Supportive counseling provided  Medication changes discussed with Roxy Skiff  Medication education provided to Roxy Skiff  Coping skills reviewed with Roxy Skiff  Importance of medication and treatment compliance reviewed with Roxy Skiff  Importance of follow up with family physician for medical issues reviewed with Roxy Skiff  Reassurance and supportive therapy provided  Crisis/safety plan discussed with Roxy Skiff  He will utilize crisis as needed    HPI ROS Appetite Changes and Sleep:     He reports normal sleep, normal appetite, low energy   Denies homicidal ideation, Intermittent passive suicidal ideation without intent or plan    Review Of Systems:  HPI ROS:               Medication Side Effects:  denies     Depression (10 worst): increased/10 (Was 3/10)   Anxiety (10 worst): 4-5/10 (Was 3/10)   Safety concerns (SI, HI, etc): Passive si, no plan or intent (Was fleeting, no plan or intent)   Sleep: normal (Was 7 hours/night)   Energy: poor (Was adequate)   Appetite: normal (Was good)     General decreased functioning and sleep disturbances   Personality no change in personality   Constitutional as noted in HPI   ENT negative   Cardiovascular negative   Respiratory negative   Gastrointestinal negative   Genitourinary negative   Musculoskeletal negative   Integumentary negative   Neurological negative   Endocrine negative   Other Symptoms none, all other systems are negative     Mental Status Evaluation:    Appearance disheveled   Behavior calm and cooperative   Mood anxious and depressed  Depression Scale - increased of 10 (0 = No depression)  Anxiety Scale - 4-5 of 10 (0 = No anxiety)   Speech Normal rate and volume   Affect blunted   Thought Processes Goal directed and coherent   Thought Content Does not verbalize delusional material   Associations Tightly connected   Perceptual Disturbances Denies hallucinations and does not appear to be responding to internal stimuli   Risk Potential Suicidal/Homicidal Ideation - Passive suicidal ideation without intent or plan  Risk of Violence - No evidence of risk for violence found on assessment  Risk of Self Mutilation - No evidence of risk for self mutilation found on assessment   Orientation oriented to person, place, time/date and situation   Memory recent and remote memory grossly intact   Consciousness alert and awake   Attention/Concentration attention span and concentration appear shorter than expected for age   Insight partial   Judgement partial   Muscle Strength and Gait normal muscle strength and normal muscle tone, normal gait/station and normal balance   Motor Activity no abnormal movements   Language no difficulty naming common objects, no difficulty repeating a phrase, no difficulty writing a sentence   Fund of Knowledge adequate knowledge of current events  adequate fund of knowledge regarding past history  adequate fund of knowledge regarding vocabulary      Past Psychiatric History - per Dr Krishan Davis H&P  Previous diagnoses include Depression and Anxiety   Prior outpatient psychiatric treatment:   With PCP till now   Prior therapy:    Prior inpatient psychiatric treatment: x1  [address] suicide attempts:  None   Prior self harm:  None   Prior violence or aggression:  None    Past Psychiatric History Update:     Inpatient Psychiatric Admission Since Last Encounter:   no  Changes to Outpatient Psychiatric Treatment Team:    no  Suicide Attempt Or Self Mutilation Since Last Encounter:   no  Incidence of Violent Behavior Since Last Encounter:   no    Traumatic History Update:     New Onset of Abuse Since Last Encounter:   no  Traumatic Events Since Last Encounter:   no    Past Medical History:    Past Medical History:   Diagnosis Date    Anxiety     Depression     Headache(784 0) March 2020    Degenerative disk in neck may be cause   Hypertension     Palpitation     Panic attack         Past Surgical History:   Procedure Laterality Date    FRACTURE SURGERY  6/1970 and 6/1979    Left and right wrists    KNEE SURGERY  08/1982     Allergies   Allergen Reactions    Other Sneezing, Throat Swelling and Tongue Swelling    Shellfish Allergy - Food Allergy Tongue Swelling     Substance Abuse History:    Social History     Substance and Sexual Activity   Alcohol Use Yes    Alcohol/week: 2 0 standard drinks    Types: 2 Cans of beer per week    Comment: socially     Social History     Substance and Sexual Activity   Drug Use Never     Social History:    Social History     Socioeconomic History    Marital status: /Civil Union     Spouse name: Not on file    Number of children: Not on file    Years of education: Not on file    Highest education level: Not on file   Occupational History    Not on file   Tobacco Use    Smoking status: Passive Smoke Exposure - Never Smoker    Smokeless tobacco: Never Used   Vaping Use    Vaping Use: Never used   Substance and Sexual Activity    Alcohol use:  Yes     Alcohol/week: 2 0 standard drinks     Types: 2 Cans of beer per week     Comment: socially    Drug use: Never    Sexual activity: Not Currently     Partners: Female     Birth control/protection: Female Sterilization   Other Topics Concern    Not on file   Social History Narrative    Not on file     Social Determinants of Health     Financial Resource Strain: Not on file   Food Insecurity: Not on file   Transportation Needs: Not on file   Physical Activity: Not on file   Stress: Not on file   Social Connections: Not on file   Intimate Partner Violence: Not on file   Housing Stability: Not on file     Family Psychiatric History:     Family History Problem Relation Age of Onset    Hypertension Mother     COPD Mother     Migraines Mother     Stroke Father         Mini stroke in 2010     History Review: The following portions of the patient's history were reviewed and updated as appropriate: allergies, current medications, past family history, past medical history, past social history, past surgical history and problem list     OBJECTIVE:     Vital signs in last 24 hours: There were no vitals filed for this visit  Laboratory Results:   Recent Labs (last 2 months):   No visits with results within 2 Month(s) from this visit  Latest known visit with results is:   Office Visit on 03/26/2022   Component Date Value    Ventricular Rate 03/26/2022 82     Atrial Rate 03/26/2022 82     NJ Interval 03/26/2022 206     QRSD Interval 03/26/2022 94     QT Interval 03/26/2022 400     QTC Interval 03/26/2022 467     P Axis 03/26/2022 39     QRS Axis 03/26/2022 -23     T Wave Axis 03/26/2022 32      I have personally reviewed all pertinent laboratory/tests results      Suicide/Homicide Risk Assessment:    Risk of Harm to Self:  The following ratings are based on assessment at the time of the interview  Recent Specific Risk Factors include: current depressive symptoms, current anxiety symptoms, has suicidal ideation without a plan, worries about finances or work  Demographic risk factors include: , male, age: over 48 or older  Historical Risk Factors include: chronic depression, chronic anxiety symptoms, history of traumatic experiences  Protective Factors: no current suicidal ideation, access to mental health treatment, being a parent, being , compliant with medications, compliant with mental health treatment, having a desire to be alive, healthy fear of risky behaviors and pain, responsibilities and duties to others, stable living environment, stable job, strong relationships  Based on today's assessment, Angelo Shepherd presents the following risk of harm to self: low    Risk of Harm to Others: The following ratings are based on assessment at the time of the interview  Protective Factors: no current homicidal ideation  Based on today's assessment, Rosendo Hammonds presents the following risk of harm to others: none    The following interventions are recommended: recommended therapy group, but declined, contracts for safety at present - agrees to go to ED if feeling unsafe, return in 1 month for reassessment, contracts for safety at present - agrees to call Crisis Intervention Service if feeling unsafe    Medications Risks/Benefits:      Risks, Benefits And Possible Side Effects Of Medications:    Discussed risks and benefits of treatment with patient including risk of suicidality, serotonin syndrome, increased QTc interval and SIADH related to treatment with antidepressants; Risk of induction of manic symptoms in certain patient populations, risk of parkinsonian symptoms, metabolic syndrome, tardive dyskinesia and neuroleptic malignant syndrome related to treatment with antipsychotic medications and risks of misuse, abuse or dependence, sedation and respiratory depression related to treatment with benzodiazepine medications     Controlled Medication Discussion:     Rosendo Hammonds has been filling controlled prescriptions on time as prescribed according to [address] [place] [address] Monitoring Program    Treatment Plan:    Due for update/Updated:   yes  Last treatment plan done 8/29/22 by KOLBY Rice  Treatment Plan due on 3/1/23      KOLBY Og 08/29/22    This note was not shared with the patient due to reasonable likelihood of causing patient harm

## 2022-09-02 NOTE — BH TREATMENT PLAN
TREATMENT PLAN (Medication Management Only)        [facility]    Name and Date of Birth:  Stephanie Bronson 59 y o  1965  Date of Treatment Plan: August 29, 2022  Diagnosis/Diagnoses:    1  Major depressive disorder, recurrent episode, moderate ([facility] [address] )    2  Mood disorder ([facility] [address] )    3  Mod[facility] episode of [facility] major depressive disorder [facility]    4  Depressed      Strengths/Personal Resources for Self-Care: taking medications as prescribed  Area/Areas of need (in own words): anxiety symptoms  1  Long Term Goal: continue improvement in acceptable anxiety level  Target Date:6 months - 3/1/2023  Person/Persons responsible for completion of goal: René Sharma  2  Short Term Objective (s) - How will we reach this goal?:   A  Provider new recommended medication/dosage changes and/or continue medication(s): continue current medications as prescribed  Pamula Donaldly a healthy diet   C  Exercise regularly   Target Date:6 months - 3/1/2023  Person/Persons Responsible for Completion of Goal: René Sharma  Progress Towards Goals: continuing treatment, little progress  Treatment Modality: medication management every 1 month, medication education at every visit  Review due 180 days from date of this plan: 6 months - 3/1/2023  Expected length of service: ongoing treatment  My Physician/PA/NP and I have developed this plan together and I agree to work on the goals and objectives  I understand the treatment goals that were developed for my treatment  Treatment Plan done but not signed at time of office visit due to:  Plan reviewed by phone or in person  and verbal consent given due to telehealth visit

## 2022-09-18 NOTE — LETTER
July 27, 2022     Patient: Macho Greene  YOB: 1965  Date of Visit: 7/27/2022      To Whom it May Concern:    Misa Granados is under my professional care  Aftab Ellsworth was seen in my office on 7/27/2022  Aftab Ellsworth  May return to work on 07/28/2022    If you have any questions or concerns, please don't hesitate to call           Sincerely,          Anurag Mccormick MD        CC: No Recipients
room air

## 2022-09-29 DIAGNOSIS — F32.A DEPRESSED: ICD-10-CM

## 2022-09-30 DIAGNOSIS — F39 MOOD DISORDER (HCC): ICD-10-CM

## 2022-09-30 RX ORDER — LORAZEPAM 1 MG/1
TABLET ORAL
Qty: 45 TABLET | Refills: 0 | Status: SHIPPED | OUTPATIENT
Start: 2022-09-30

## 2022-10-03 RX ORDER — ESCITALOPRAM OXALATE 20 MG/1
TABLET ORAL
Qty: 45 TABLET | Refills: 0 | Status: SHIPPED | OUTPATIENT
Start: 2022-10-03 | End: 2022-10-10 | Stop reason: SDUPTHER

## 2022-10-06 DIAGNOSIS — F33.1 MODERATE EPISODE OF RECURRENT MAJOR DEPRESSIVE DISORDER (HCC): ICD-10-CM

## 2022-10-10 ENCOUNTER — TELEPHONE (OUTPATIENT)
Dept: PSYCHIATRY | Facility: CLINIC | Age: 57
End: 2022-10-10

## 2022-10-10 DIAGNOSIS — F33.1 MODERATE EPISODE OF RECURRENT MAJOR DEPRESSIVE DISORDER (HCC): ICD-10-CM

## 2022-10-10 DIAGNOSIS — F39 MOOD DISORDER (HCC): ICD-10-CM

## 2022-10-10 RX ORDER — QUETIAPINE FUMARATE 100 MG/1
TABLET, FILM COATED ORAL
Qty: 135 TABLET | Refills: 0 | Status: SHIPPED | OUTPATIENT
Start: 2022-10-10

## 2022-10-10 RX ORDER — ESCITALOPRAM OXALATE 10 MG/1
10 TABLET ORAL DAILY
Qty: 30 TABLET | Refills: 2 | Status: SHIPPED | OUTPATIENT
Start: 2022-10-10

## 2022-10-10 RX ORDER — ESCITALOPRAM OXALATE 20 MG/1
20 TABLET ORAL DAILY
Qty: 30 TABLET | Refills: 2 | Status: SHIPPED | OUTPATIENT
Start: 2022-10-10

## 2022-10-10 RX ORDER — QUETIAPINE FUMARATE 100 MG/1
TABLET, FILM COATED ORAL
Qty: 135 TABLET | Refills: 2 | Status: SHIPPED | OUTPATIENT
Start: 2022-10-10 | End: 2022-10-10

## 2022-10-10 NOTE — TELEPHONE ENCOUNTER
Spoke with the pharmacy, patients Lexapro the insurance will not cover a half tablet, there needs to be a new script for 10 mg  The medication only comes in 5 mg,  10 mg, 20 mg

## 2022-10-10 NOTE — TELEPHONE ENCOUNTER
Left message letting the patient know there is a 10 mg tablet added to his 20 mg Lexapro  Insurance company was not covering the half tablet

## 2022-10-17 ENCOUNTER — TELEMEDICINE (OUTPATIENT)
Dept: PSYCHIATRY | Facility: CLINIC | Age: 57
End: 2022-10-17
Payer: COMMERCIAL

## 2022-10-17 DIAGNOSIS — F33.1 MAJOR DEPRESSIVE DISORDER, RECURRENT EPISODE, MODERATE (HCC): Primary | ICD-10-CM

## 2022-10-17 PROCEDURE — 99213 OFFICE O/P EST LOW 20 MIN: CPT | Performed by: NURSE PRACTITIONER

## 2022-10-18 NOTE — PSYCH
Virtual Regular Visit    Verification of patient location:    Patient is located in the following state in which I hold an active license [place]    Problem List Items Addressed This Visit        Other    Major depressive disorder, recurrent episode, moderate ([facility] [address] ) - Primary             Encounter provider KOLBY Cotter    Provider located at    [address] [place] [id number] [phone]    Recent Visits  Date Type Provider Dept   10/17/22 Lora Tomlinson [address]   Showing recent visits within past 7 days and meeting all other requirements  Future Appointments  No visits were found meeting these conditions  Showing future appointments within next 150 days and meeting all other requirements         The patient was identified by name and date of birth  Angeloscar Panda was informed that this is a telemedicine visit and that the visit is being conducted throughEpic Embedded and patient was informed this is a secure, HIPAA-complaint platform  He agrees to proceed     My office door was closed  No one else was in the room  He acknowledged consent and understanding of privacy and security of the video platform  The patient has agreed to participate and understands they can discontinue the visit at any time  Patient is aware this is a billable service  HPI     Current Outpatient Medications   Medication Sig Dispense Refill   • ARIPiprazole (ABILIFY) 5 mg tablet Take 0 5 tablets (2 5 mg total) by mouth daily at bedtime 30 tablet 2   • betamethasone valerate (VALISONE) 0 1 % cream      • escitalopram (Lexapro) 10 mg tablet Take 1 tablet (10 mg total) by mouth daily Take with 20mg pill to total 30mg daily  30 tablet 2   • escitalopram (LEXAPRO) 20 mg tablet Take 1 tablet (20 mg total) by mouth daily Take with 10mg tablet to total 30mg   30 tablet 2   • hydrOXYzine HCL (ATARAX) 25 mg tablet Take 1 tablet (25 mg total) by mouth 3 (three) times a day as needed for anxiety (Take up to 3 times daily for anxiety ) 90 tablet 1   • LORazepam (ATIVAN) 1 mg tablet Take 1/2 tab in am, 1 tab in afternoon as needed for anxiety 45 tablet 0   • QUEtiapine (SEROquel) 100 mg tablet Take 1 tablet (100mg) daily at bedtime  You may also take 1/2 tab (50mg) daily as needed for anxiety  135 tablet 0     No current facility-administered medications for this visit  Review of Systems  Video Exam    There were no vitals filed for this visit  Physical Exam   As a result of this visit, I have referred the patient for further respiratory evaluation  No    I spent 20 minutes directly with the patient during this visit  VIRTUAL VISIT DISCLAIMER    Leyla Chambers acknowledges that he has consented to an online visit or consultation  He understands that the online visit is based solely on information provided by him, and that, in the absence of a face-to-face physical evaluation by the physician, the diagnosis he receives is both limited and provisional in terms of accuracy and completeness  This is not intended to replace a full medical face-to-face evaluation by the physician  Leyla Chambers understands and accepts these terms  MEDICATION MANAGEMENT NOTE          [facility]    Name and Date of Birth:  Leyla Chambers 59 y o  1965 MRN: [id number]    Date of Visit: October 17, 2022    Allergies   Allergen Reactions   • Other Sneezing, Throat Swelling and Tongue Swelling   • Shellfish Allergy - Food Allergy Tongue Swelling     SUBJECTIVE:    Tucker Keith is seen today for a follow up for Major Depressive Disorder  He continues to do well since the last visit  Tucker Keith is a 59year old male with a history of MDD and MIKA  He is seen virtually today for medication management follow-up  He was last seen by this provider on 8/29/22    He reports today that everything is “going as well as expected”  He reports that he resigned from his job and is happy that he did so  He has had numerous interviews and has already received an offer from the [employer]  He states that he has been sleeping well, getting approximately 8 hours of sleep  His appetite is good  States there has been some stress in that he it plates his dad in assisted living, however he has been decreasing the amounts of his p r n  medications  He reports he has only been taking 1 mg of Ativan daily, and has only been taking Atarax t i d  He is not been taking the p r n  Seroquel every day  He reports that his depression is rated at 3 to 4/10, and anxiety is 3-4/10  He states that his anxiety is related to starting a new job  He denies any suicidal or homicidal ideation, and denies any auditory or visual hallucinations  He is brighter and joking in conversation at times  He is mood congruent  He does not endorse symptoms of shweta or psychosis, and does not have any hopelessness or helplessness  We will continue his medications as ordered and follow up in 2 months  He is agreeable with this treatment plan and will call sooner with concerns or issues if they arise prior to scheduled appointment  He denies any side effects from current psychiatric medications  PLAN:  Continue Abilify 2 5 mg p o  HS  Continue Lexapro 30 mg p o  daily  Continue Atarax 25 mg p o  t i d  p r n    Continue Ativan 1 5 mg p o  daily p r n  as needed for severe anxiety  Seroquel 100 mg p o  daily HS, 1/2 tab daily as needed for anxiety  Follow-up with this provider in 2 months  He will call sooner with concerns or issues if they arise prior to scheduled appointment  Continue follow-up with weight management and other medical specialists as needed  Aware of 24 hour and weekend coverage for urgent situations accessed by calling [facility] main practice number  Medication management every 2 months  Aware of need to follow up with family physician for medical issues    Diagnoses and all orders for this visit:    Major depressive disorder, recurrent episode, moderate ([facility] [address] )        Current Outpatient Medications on File Prior to Visit   Medication Sig Dispense Refill   • ARIPiprazole (ABILIFY) 5 mg tablet Take 0 5 tablets (2 5 mg total) by mouth daily at bedtime 30 tablet 2   • betamethasone valerate (VALISONE) 0 1 % cream      • escitalopram (Lexapro) 10 mg tablet Take 1 tablet (10 mg total) by mouth daily Take with 20mg pill to total 30mg daily  30 tablet 2   • escitalopram (LEXAPRO) 20 mg tablet Take 1 tablet (20 mg total) by mouth daily Take with 10mg tablet to total 30mg  30 tablet 2   • hydrOXYzine HCL (ATARAX) 25 mg tablet Take 1 tablet (25 mg total) by mouth 3 (three) times a day as needed for anxiety (Take up to 3 times daily for anxiety ) 90 tablet 1   • LORazepam (ATIVAN) 1 mg tablet Take 1/2 tab in am, 1 tab in afternoon as needed for anxiety 45 tablet 0   • QUEtiapine (SEROquel) 100 mg tablet Take 1 tablet (100mg) daily at bedtime  You may also take 1/2 tab (50mg) daily as needed for anxiety  135 tablet 0     No current facility-administered medications on file prior to visit  Psychotherapy Provided:     Individual psychotherapy provided: Yes  Counseling was provided during the session today for 16 minutes  Supportive counseling provided  Medication education provided to Reji Nascimento  Recent stressor including job stress, everyday stressors and ongoing anxiety discussed with Reji Nascimento  Coping strategies reviewed with Reji Nascimento  Importance of medication and treatment compliance reviewed with Reji Nascimento  Importance of follow up with family physician for medical issues reviewed with Reji Nascimento  Reassurance and supportive therapy provided  Crisis/safety plan discussed with Reji Nascimento  Utilize crisis as needed      HPI ROS Appetite Changes and Sleep:     He reports normal sleep, normal appetite, normal energy level  Denies homicidal ideation, denies suicidal ideation    Review Of Systems:    HPI ROS:               Medication Side Effects:  denies     Depression (10 worst): 3-4/10 (Was increased)   Anxiety (10 worst): 3-4/10 (Was 4-5/10)   Safety concerns (SI, HI, etc): denies (Was passive si, no plan or intent)   Sleep: Normal 8 hours/night (Was normal)   Energy: good (Was poor)   Appetite: good (Was normal)     General normal    Personality no change in personality   Constitutional as noted in HPI   ENT negative   Cardiovascular negative   Respiratory negative   Gastrointestinal negative   Genitourinary negative   Musculoskeletal negative   Integumentary negative   Neurological negative   Endocrine negative   Other Symptoms none, all other systems are negative     Mental Status Evaluation:    Appearance Appropriately dressed and Good eye contact   Behavior calm and cooperative   Mood euthymic  Depression Scale - 3-4 of 10 (0 = No depression)  Anxiety Scale - 3-4 of 10 (0 = No anxiety)   Speech Normal rate and volume   Affect appropriate and mood-congruent   Thought Processes Goal directed and coherent   Thought Content Does not verbalize delusional material   Associations Tightly connected   Perceptual Disturbances Denies hallucinations and does not appear to be responding to internal stimuli   Risk Potential Suicidal/Homicidal Ideation - No evidence of suicidal or homicidal ideation and patient does not verbalize suicidal or homicidal ideation  Risk of Violence - No evidence of risk for violence found on assessment  Risk of Self Mutilation - No evidence of risk for self mutilation found on assessment   Orientation oriented to person, place, time/date and situation   Memory recent and remote memory grossly intact   Consciousness alert and awake   Attention/Concentration attention span and concentration are age appropriate   Insight intact   Judgement intact   Muscle Strength and Gait normal muscle strength and normal muscle tone, normal gait/station and normal balance   Motor Activity no abnormal movements   Language no difficulty naming common objects, no difficulty repeating a phrase, no difficulty writing a sentence   Fund of Knowledge adequate knowledge of current events  adequate fund of knowledge regarding past history  adequate fund of knowledge regarding vocabulary      Past Psychiatric [address]- per Dr Cliff Hampton H&P  Previous diagnoses include Depression and Anxiety   Prior outpatient psychiatric treatment:   With PCP till now  [address] therapy:   [address] inpatient psychiatric treatment: x1  [address] suicide attempts:  None   Prior self harm:  None   Prior violence or aggression:  None    Past Psychiatric History Update:     Inpatient Psychiatric Admission Since Last Encounter:   no  Changes to Outpatient Psychiatric Treatment Team:    no  Suicide Attempt Or Self Mutilation Since Last Encounter:   no  Incidence of Violent Behavior Since Last Encounter:   no    Traumatic History Update:     New Onset of Abuse Since Last Encounter:   no  Traumatic Events Since Last Encounter:   no    Past Medical History:    Past Medical History:   Diagnosis Date   • Anxiety    • Depression    • Headache(784 0) March 2020    Degenerative disk in neck may be cause     • Hypertension    • Palpitation    • Panic attack         Past Surgical History:   Procedure Laterality Date   • FRACTURE SURGERY  6/1970 and 6/1979    Left and right wrists   • KNEE SURGERY  08/1982     Allergies   Allergen Reactions   • Other Sneezing, Throat Swelling and Tongue Swelling   • Shellfish Allergy - Food Allergy Tongue Swelling     Substance Abuse History:    Social History     Substance and Sexual Activity   Alcohol Use Yes   • Alcohol/week: 2 0 standard drinks   • Types: 2 Cans of beer per week    Comment: socially     Social History     Substance and Sexual Activity   Drug Use Never     Social History:    Social History     Socioeconomic History   • Marital status: /Civil Union     Spouse name: Not on file   • Number of children: Not on file   • Years of education: Not on file   • Highest education level: Not on file   Occupational History   • Not on file   Tobacco Use   • Smoking status: Passive Smoke Exposure - Never Smoker   • Smokeless tobacco: Never Used   Vaping Use   • Vaping Use: Never used   Substance and Sexual Activity   • Alcohol use: Yes     Alcohol/week: 2 0 standard drinks     Types: 2 Cans of beer per week     Comment: socially   • Drug use: Never   • Sexual activity: Not Currently     Partners: Female     Birth control/protection: Female Sterilization   Other Topics Concern   • Not on file   Social History Narrative   • Not on file     Social Determinants of Health     Financial Resource Strain: Not on file   Food Insecurity: Not on file   Transportation Needs: Not on file   Physical Activity: Not on file   Stress: Not on file   Social Connections: Not on file   Intimate Partner Violence: Not on file   Housing Stability: Not on file     Family Psychiatric History:     Family History   Problem Relation Age of Onset   • Hypertension Mother    • COPD Mother    • Migraines Mother    • Stroke Father         Mini stroke in 2010     History Review: The following portions of the patient's history were reviewed and updated as appropriate: allergies, current medications, past family history, past medical history, past social history, past surgical history and problem list     OBJECTIVE:     Vital signs in last 24 hours: There were no vitals filed for this visit  Laboratory Results:   Recent Labs (last 2 months):   No visits with results within 2 Month(s) from this visit     Latest known visit with results is:   Office Visit on 03/26/2022   Component Date Value   • Ventricular Rate 03/26/2022 82    • Atrial Rate 03/26/2022 82    • OH Interval 03/26/2022 206    • QRSD Interval 03/26/2022 94    • QT Interval 03/26/2022 400    • QTC Interval 03/26/2022 467 • P Axis 03/26/2022 39    • QRS Axis 03/26/2022 -19    • T Wave Axis 03/26/2022 32      I have personally reviewed all pertinent laboratory/tests results  Suicide/Homicide Risk Assessment:    Risk of Harm to Self:  The following ratings are based on assessment at the time of the interview  Recent Specific Risk Factors include: current anxiety symptoms  Demographic risk factors include: , male, age: over 48 or older  Historical Risk Factors include: chronic depression, chronic anxiety symptoms, history of traumatic experiences  Protective Factors: no current suicidal ideation, access to mental health treatment, being a parent, being , compliant with medications, compliant with mental health treatment, having a desire to live, responsibilities and duties to others, stable living environment, sense of determination, supportive family  Based on today's assessment, Lisa Law presents the following risk of harm to self: low    Risk of Harm to Others: The following ratings are based on assessment at the time of the interview  Protective Factors: no current homicidal ideation  Based on today's assessment, Lisa Law presents the following risk of harm to others: none    The following interventions are recommended: no intervention changes needed    Medications Risks/Benefits:      Risks, Benefits And Possible Side Effects Of Medications:    Discussed risks and benefits of treatment with patient including risk of suicidality, serotonin syndrome, increased QTc interval and SIADH related to treatment with antidepressants;  Risk of induction of manic symptoms in certain patient populations, risk of parkinsonian symptoms, metabolic syndrome, tardive dyskinesia and neuroleptic malignant syndrome related to treatment with antipsychotic medications and risks of misuse, abuse or dependence, sedation and respiratory depression related to treatment with benzodiazepine medications     Controlled Medication Discussion: Emmie Umaña has been filling controlled prescriptions on time as prescribed according to [facility]    Treatment Plan:    Due for update/Updated:   no  Last treatment plan done 8/29/22 by KOLBY Pretty  Treatment Plan due on 3/1/23  KOLBY Dodson 10/19/22    This note was shared with patient

## 2022-10-31 DIAGNOSIS — F32.A DEPRESSED: ICD-10-CM

## 2022-10-31 RX ORDER — LORAZEPAM 1 MG/1
TABLET ORAL
Qty: 45 TABLET | Refills: 0 | Status: SHIPPED | OUTPATIENT
Start: 2022-10-31

## 2022-11-02 DIAGNOSIS — F33.1 MODERATE EPISODE OF RECURRENT MAJOR DEPRESSIVE DISORDER (HCC): ICD-10-CM

## 2022-11-02 DIAGNOSIS — F39 MOOD DISORDER (HCC): ICD-10-CM

## 2022-11-02 RX ORDER — ESCITALOPRAM OXALATE 20 MG/1
20 TABLET ORAL DAILY
Qty: 90 TABLET | Refills: 1 | Status: SHIPPED | OUTPATIENT
Start: 2022-11-02

## 2022-11-02 RX ORDER — ESCITALOPRAM OXALATE 10 MG/1
10 TABLET ORAL DAILY
Qty: 90 TABLET | Refills: 1 | Status: SHIPPED | OUTPATIENT
Start: 2022-11-02

## 2022-11-21 ENCOUNTER — OFFICE VISIT (OUTPATIENT)
Dept: FAMILY MEDICINE CLINIC | Facility: CLINIC | Age: 57
End: 2022-11-21

## 2022-11-21 VITALS
HEART RATE: 80 BPM | BODY MASS INDEX: 38.54 KG/M2 | HEIGHT: 75 IN | OXYGEN SATURATION: 94 % | DIASTOLIC BLOOD PRESSURE: 74 MMHG | WEIGHT: 310 LBS | SYSTOLIC BLOOD PRESSURE: 124 MMHG

## 2022-11-21 DIAGNOSIS — G89.29 CHRONIC BILATERAL LOW BACK PAIN WITHOUT SCIATICA: Primary | ICD-10-CM

## 2022-11-21 DIAGNOSIS — M54.50 CHRONIC BILATERAL LOW BACK PAIN WITHOUT SCIATICA: Primary | ICD-10-CM

## 2022-11-21 DIAGNOSIS — R23.0 BLUISH SKIN DISCOLORATION: ICD-10-CM

## 2022-11-21 RX ORDER — MELOXICAM 15 MG/1
15 TABLET ORAL DAILY
Qty: 30 TABLET | Refills: 1 | Status: SHIPPED | OUTPATIENT
Start: 2022-11-21

## 2022-11-21 NOTE — PROGRESS NOTES
Name: John Arellano      : 1965      MRN: [id number]  Encounter Provider: Xiomara Espinosa MD  Encounter Date: 2022   Encounter department: [address] [place] [address] PRIMARY CARE    Assessment & Plan     1  Chronic bilateral low back pain without sciatica  Assessment & Plan:  Has bilateral strain in the sacroiliac joints  Needs to try to stand straight when using the machine in front of him so is not to bend the back  Will place on trial of physical therapy and may try using meloxicam 15 mg daily    Orders:  -     Ambulatory Referral to Physical Therapy; Future  -     meloxicam (Mobic) 15 mg tablet; Take 1 tablet (15 mg total) by mouth daily    2  Bluish skin discoloration  Assessment & Plan:  Discussed problem  I feel these are just stretch marks and of no consequence as they do not bother him           Subjective       Patient has problems with severe anxiety disorder and major depression  Has had chronic problems with low back pain which has been worse over the last few weeks  Has taking Advil or Aleve which does not seem to help much  Has activity at work where he must use a machine that he bends over to hold just above the surface of the concrete  Also has note is bluish areas which she feels are bruising on both sides of the groin  These are not painful and do not it    Review of Systems   Gastrointestinal: Negative for abdominal pain  Genitourinary: Negative for difficulty urinating  Musculoskeletal: Positive for arthralgias and back pain  Skin: Positive for rash  Neurological: Positive for numbness (Intermittently in the right upper leg)  Negative for weakness  Psychiatric/Behavioral: Negative for sleep disturbance         Current Outpatient Medications on File Prior to Visit   Medication Sig   • ARIPiprazole (ABILIFY) 5 mg tablet TAKE 0 5 TABLETS (2 5 MG TOTAL) BY MOUTH DAILY AT BEDTIME   • escitalopram (LEXAPRO) 10 mg tablet TAKE 1 TABLET (10 MG TOTAL) BY MOUTH DAILY TAKE WITH 20MG PILL TO TOTAL 30MG DAILY  • escitalopram (LEXAPRO) 20 mg tablet TAKE 1 TABLET (20 MG TOTAL) BY MOUTH DAILY TAKE WITH 10MG TABLET TO TOTAL 30MG  • hydrOXYzine HCL (ATARAX) 25 mg tablet TAKE 1 TABLET BY MOUTH 3 TIMES A DAY AS NEEDED FOR ANXIETY   • LORazepam (ATIVAN) 1 mg tablet Take 1/2 tab in am, 1 tab in afternoon as needed for anxiety   • QUEtiapine (SEROquel) 100 mg tablet Take 1 tablet (100mg) daily at bedtime  You may also take 1/2 tab (50mg) daily as needed for anxiety  • betamethasone valerate (VALISONE) 0 1 % cream        Objective     /74 (BP Location: Left arm, Patient Position: Sitting, Cuff Size: Large)   Pulse 80   Ht 6' 3" (1 905 m)   Wt (!) 141 kg (310 lb)   SpO2 94%   BMI 38 75 kg/m²     Physical Exam  Vitals and nursing note reviewed  Constitutional:       Appearance: Normal appearance  He is well-developed  HENT:      Head: Normocephalic  Eyes:      Conjunctiva/sclera: Conjunctivae normal    Neck:      Thyroid: No thyromegaly  Cardiovascular:      Rate and Rhythm: Normal rate and regular rhythm  Pulses: Intact distal pulses  Heart sounds: Normal heart sounds  No murmur (Rate is 78) heard  Pulmonary:      Effort: Pulmonary effort is normal       Breath sounds: Normal breath sounds  Abdominal:      General: Abdomen is flat  There is no distension  Palpations: Abdomen is soft  There is no mass  Tenderness: There is no abdominal tenderness  Musculoskeletal:         General: No edema  Normal range of motion  Cervical back: Normal range of motion and neck supple  No tenderness  Right lower leg: No edema  Left lower leg: No edema  Comments: Bilateral tenderness in the sacroiliac joints and very slight sciatic notch tenderness  No tenderness to manipulation of legs   Lymphadenopathy:      Cervical: No cervical adenopathy  Skin:     General: Skin is warm and dry  Findings: No rash  Neurological:      Mental Status: He is alert and oriented to person, place, and time  Motor: No weakness        Coordination: Coordination normal       Gait: Gait normal       Deep Tendon Reflexes: Reflexes normal    Psychiatric:         Mood and Affect: Mood and affect and mood normal        Pk Anne MD

## 2022-11-21 NOTE — PATIENT INSTRUCTIONS
Discussed the problem with the bluish areas in the groin which are stretch marks in the skin and do not present any particular problem  Has longstanding problem with the back which has gotten worse which I feel right now is a strain on both sacroiliac joints  Try to maintain a straight position when using the machine at work instead of bending over    Will place on trial of physical therapy and may take meloxicam 15 mg once a day

## 2022-11-21 NOTE — ASSESSMENT & PLAN NOTE
Discussed problem    I feel these are just stretch marks and of no consequence as they do not bother him

## 2022-11-21 NOTE — ASSESSMENT & PLAN NOTE
Has bilateral strain in the sacroiliac joints  Needs to try to stand straight when using the machine in front of him so is not to bend the back    Will place on trial of physical therapy and may try using meloxicam 15 mg daily

## 2022-11-21 NOTE — LETTER
November 21, 2022     Patient: Jessica Vogel  YOB: 1965  Date of Visit: 11/21/2022      To Whom it May Concern:    Gregg Carrera is under my professional care  Arletrosalina Valentin was seen in my office on 11/21/2022  Arletrosalina Valentin  May return to work on 11/22/2022  If you have any questions or concerns, please don't hesitate to call           Sincerely,          Rachel Rosenbaum MD        CC: No Recipients

## 2022-11-29 NOTE — PROGRESS NOTES
Pt attended all groups  Pt expressed interest in understanding his mh diagnosis, signs and symptoms and aspects of mh recovery  05/11/21 1100   Activity/Group Checklist   Group Other (Comment)  (Mh education: depression and bipolar)   Attendance Attended   Attendance Duration (min) 31-45   Interactions Interacted appropriately   Affect/Mood Other (Comment)  (anxious)   Goals Achieved Identified feelings; Discussed coping strategies denies pain/discomfort

## 2022-12-08 ENCOUNTER — EVALUATION (OUTPATIENT)
Dept: PHYSICAL THERAPY | Facility: CLINIC | Age: 57
End: 2022-12-08

## 2022-12-08 DIAGNOSIS — G89.29 CHRONIC BILATERAL LOW BACK PAIN WITHOUT SCIATICA: ICD-10-CM

## 2022-12-08 DIAGNOSIS — M54.51 VERTEBROGENIC LOW BACK PAIN: Primary | ICD-10-CM

## 2022-12-08 DIAGNOSIS — M54.50 CHRONIC BILATERAL LOW BACK PAIN WITHOUT SCIATICA: ICD-10-CM

## 2022-12-08 NOTE — PROGRESS NOTES
PT Evaluation     Today's date: 2022  Patient name: Mary Jo Knutson  : 1965  MRN: [id number]  Referring provider: Dixon Dawson, *  Dx:   Encounter Diagnosis     ICD-10-CM    1  Vertebrogenic low back pain  M54 51       2  Chronic bilateral low back pain without sciatica  M54 50 Ambulatory Referral to Physical Therapy    G89 [id number]                      Assessment  Assessment details: Pt presents to PT with chronic low back pain, more R sided than L  Does have hx of numbness lateral thigh with standing/walking for long periods, also if bending forward and moving such as when mopping or cutting lawn  Objective findings are impaired R hip flexor mobility, R hip IR limitation, hip weakness, R pa mob L4-5  Signs and symptoms consistent with mechanical low back pain  No specific movement pattern as certain flexed positions and extended positions bother him - more time dependent  Goals outlined  Skilled PT warranted to address findings and progress towards outlined goals  Pt in agreement with current POC        Symptom irritability: moderateUnderstanding of Dx/Px/POC: good   Prognosis: good    Goals  Stg 3-4 weeks:  Clear hip mobility R compared to L with prone hip IR to reduce excess strain on lumbar  Symmetrical hip flexor mobility to reduce strain on lumbar  Independent with initial trunk stability program    LT-8 weeks  Hip strength 5/5 and hip abd 4+/5 to demonstrate improve strength  Stand and walk for 20 minutes prior to pain that requires taking break  Return to cooking and house work with less pain    Plan  Plan details: TE, NMR, TA, MT, self education, and modalities as needed in order to progress through skilled PT focused on  ROM, strength, balance, coordination   Patient would benefit from: skilled physical therapy  Planned modality interventions: thermotherapy: hydrocollator packs and cryotherapy  Planned therapy interventions: manual therapy, neuromuscular re-education, self care, therapeutic activities, therapeutic exercise, home exercise program and gait training  Frequency: 2x week  Duration in weeks: 6  Plan of Care beginning date: 2022  Plan of Care expiration date: 2023  Treatment plan discussed with: patient        Subjective Evaluation    History of Present Illness  Mechanism of injury: 59year old male presenting to PT with chronic low back pain  Initially started in 's but over last 5 years has been a little worse  Now gets numbness on outside of right thigh with standing or walking > 5 minutes  Bending over while is worst  Sitting and laying down helps relieve symptoms  Has not been able to workout due to anxiety issues that lead to SOB  Is under physician care for this     Pain  At best pain ratin  At worst pain ratin    Patient Goals  Patient goals for therapy: decreased pain, increased motion, increased strength, independence with ADLs/IADLs and return to sport/leisure activities  Patient goal: stand up and walk for longer periods of time        Objective     General Comments:      Lumbar Comments  Multi-seg movement patterns  Flex: distal tibia  Ext: scap to heels  SB: distal tibia b/l      Lumbar  Stiff with PA L4-5 R u/l    Prone press-up: no pain    Hip screen  R hip IR 15, L hip 20 deg  Flex [facility]    Strength/myotome  Hip flex: 4/5  Hip abd: 3+/5  Knee flex/ext: 5/5  Ankle 5/5      DTR's  2+ L4 b/l, 0 S1 b/l    Hamstring mobility: 55 deg b/l  Hip flexor mobility (Giancarlo test): R 3" from table, L to table                   Precautions: anxiety, SOB     Daily Treatment Diary:      Initial Evaluation Date: 22  Compliance                      Visit Number 1                    Re-Eval  IE                 MC   Foto Captured                           Manuals        Manual traction        Joint work R hip IR Mulligan mob and lat distraction gr 3       flexibility        ST        Neuro Re-Ed        TA x10       TA+march x10       birddog bridge x20       Supine hand to knee 15x5"                       Ther Ex        NuStep/TM        Giancarlo test R 4x30" gentle passive overpressure               Leg press        Ham curls        Knee ext        Ankle strength - band        Standing HR        Lumbar ext        Lumbar flex                SKTC        LTR        Prone ext                Ther Activity                        Gait Training                        Modalities                          HEP - ta, ta+march, supine hand to knee, bridge - provided handout and reviewed with pt

## 2022-12-13 ENCOUNTER — RA CDI HCC (OUTPATIENT)
Dept: OTHER | Facility: HOSPITAL | Age: 57
End: 2022-12-13

## 2022-12-15 ENCOUNTER — APPOINTMENT (OUTPATIENT)
Dept: PHYSICAL THERAPY | Facility: CLINIC | Age: 57
End: 2022-12-15

## 2022-12-19 ENCOUNTER — TELEMEDICINE (OUTPATIENT)
Dept: PSYCHIATRY | Facility: CLINIC | Age: 57
End: 2022-12-19

## 2022-12-19 DIAGNOSIS — F41.1 GAD (GENERALIZED ANXIETY DISORDER): ICD-10-CM

## 2022-12-19 DIAGNOSIS — F33.1 MAJOR DEPRESSIVE DISORDER, RECURRENT EPISODE, MODERATE (HCC): Primary | ICD-10-CM

## 2022-12-19 RX ORDER — QUETIAPINE 150 MG/1
150 TABLET, FILM COATED, EXTENDED RELEASE ORAL
Qty: 30 TABLET | Refills: 1 | Status: SHIPPED | OUTPATIENT
Start: 2022-12-19

## 2022-12-19 RX ORDER — LORAZEPAM 1 MG/1
TABLET ORAL
Qty: 30 TABLET | Refills: 0 | Status: SHIPPED | OUTPATIENT
Start: 2022-12-19

## 2022-12-19 NOTE — PSYCH
Virtual Regular Visit    Verification of patient location:    Patient is located in the following state in which I hold an active license [place]    Problem List Items Addressed This Visit        Other    Major depressive disorder, recurrent episode, moderate (HCC) - Primary    Relevant Medications    QUEtiapine (SEROquel XR) 150 mg 24 hr tablet    LORazepam (ATIVAN) 1 mg tablet   Other Visit Diagnoses     MIKA (generalized anxiety disorder)        Relevant Medications    QUEtiapine (SEROquel XR) 150 mg 24 hr tablet    LORazepam (ATIVAN) 1 mg tablet             Encounter provider KOLBY Fernandez    Provider located at    [address] [place] [address]  [address] [facility] [address] [phone]    Recent Visits  No visits were found meeting these conditions  Showing recent visits within past 7 days and meeting all other requirements  Today's Visits  Date Type Provider Dept   12/19/22 Telemedicine Martin Pacheco, [address] [place] [address] today's visits and meeting all other requirements  Future Appointments  No visits were found meeting these conditions  Showing future appointments within next 150 days and meeting all other requirements         The patient was identified by name and date of birth  Ortiz Brown was informed that this is a telemedicine visit and [facility]  He agrees to proceed     My office door was closed  No one else was in the room  He acknowledged consent and understanding of privacy and security of the video platform  The patient has agreed to participate and understands they can discontinue the visit at any time  Patient is aware this is a billable service  HPI     Current Outpatient Medications   Medication Sig Dispense Refill   • LORazepam (ATIVAN) 1 mg tablet Take 1/2 - 1 tab daily as needed for severe anxiety   30 tablet 0   • QUEtiapine (SEROquel XR) 150 mg 24 hr tablet Take 1 tablet (150 mg total) by mouth daily at bedtime 30 tablet 1   • betamethasone valerate (VALISONE) 0 1 % cream      • escitalopram (LEXAPRO) 10 mg tablet TAKE 1 TABLET (10 MG TOTAL) BY MOUTH DAILY TAKE WITH 20MG PILL TO TOTAL 30MG DAILY  90 tablet 1   • escitalopram (LEXAPRO) 20 mg tablet TAKE 1 TABLET (20 MG TOTAL) BY MOUTH DAILY TAKE WITH 10MG TABLET TO TOTAL 30MG  90 tablet 1   • hydrOXYzine HCL (ATARAX) 25 mg tablet TAKE 1 TABLET BY MOUTH 3 TIMES A DAY AS NEEDED FOR ANXIETY 270 tablet 0   • meloxicam (Mobic) 15 mg tablet Take 1 tablet (15 mg total) by mouth daily 30 tablet 1     No current facility-administered medications for this visit  Review of Systems  Video Exam    There were no vitals filed for this visit  Physical Exam   As a result of this visit, I have referred the patient for further respiratory evaluation  No    I spent 30 minutes directly with the patient during this visit  VIRTUAL VISIT DISCLAIMER    Sukhjinder Gold acknowledges that he has consented to an online visit or consultation  He understands that the online visit is based solely on information provided by him, and that, in the absence of a face-to-face physical evaluation by the physician, the diagnosis he receives is both limited and provisional in terms of accuracy and completeness  This is not intended to replace a full medical face-to-face evaluation by the physician  Sukhjinder Gold understands and accepts these terms      MEDICATION MANAGEMENT NOTE        [facility]    Name and Date of Birth:  Sukhjinder Gold 59 y o  1965 MRN: [id number]    Date of Visit: December 19, 2022    Allergies   Allergen Reactions   • Other Sneezing, Throat Swelling and Tongue Swelling   • Shellfish Allergy - Food Allergy Tongue Swelling       Visit Time    Visit Start Time: 1637  Visit Stop Time: 1700  Total Visit Duration: 30 minutes    SUBJECTIVE:    Cy Mitchell is seen today for a follow up for Major Depressive Disorder  He continues to do fairly well since the last visit  Joy Guzman is a 15-year-old male with a history of MDD and anxiety  He is seen virtually today for medication management follow-up  He was last seen on 10/17/2022  Today he reports that he did get a new job and started on November 1, but he quit after a month because it was not what he was looking for  He resigned  He states that he has been looking for another job and has been busy visiting his father in assisted living  He reports that he has been trying to cut back on his medications and has not been taking as much Atarax, taking 2-1/2 tablets/day  He also reports that he has not been taking the extra 50 mg of the Seroquel  He also states that he has only been taking 1 tablet of the Ativan per day  He states some days are better than others  However, he is controlling and managing his anxiety and depression  He denies any suicidal or homicidal ideation, denies any auditory or visual hallucinations  He reports good sleep, reports good appetite  He states that his concentration is adequate  He is blunted in affect, calm and cooperative with this provider  We discussed changing his meds around to make them more effective  We are going to decrease the Ativan to take 1/2 to 1 tablet/day instead of 1 to 1-1/2 tablets/day  We are also discontinuing the Abilify, which she was taking 2 5 mg/day  Instead, he will take 150 mg of the Seroquel XR  He is agreeable to these medication changes at this time  We will follow up in 1 month, he will call sooner with concerns or issues if they arise prior to scheduled appointment  He denies any side effects from current psychiatric medications  PLAN:  Continue Lexapro 30 mg p o  daily  Continue Atarax 25 mg 3 times daily as needed for anxiety  Discontinue Abilify 2 5 mg  Decrease Ativan to take 1/2-1 tab daily as needed for severe anxiety   Discussed continuation of taper and will continue to attempt to discontinue the medication all together  Change Seroquel to take Seroquel  mg p o  at bedtime  Follow up in 1 month  He will call sooner with concerns or issues if they arise prior to scheduled appointment  Aware of 24 hour and weekend coverage for urgent situations accessed by calling [facility] main practice number  Does not want to see a therapist despite recommendation  Medication management every 1 month  Aware of need to follow up with family physician for medical issues    Diagnoses and all orders for this visit:    Major depressive disorder, recurrent episode, moderate (HCC)  -     QUEtiapine (SEROquel XR) 150 mg 24 hr tablet; Take 1 tablet (150 mg total) by mouth daily at bedtime    MIKA (generalized anxiety disorder)  -     LORazepam (ATIVAN) 1 mg tablet; Take 1/2 - 1 tab daily as needed for severe anxiety  Education provided on Neuroleptic malignant syndrome  Patient told that NMS is a life-threatening, neurological disorder most often caused by an adverse reaction to neuroleptic or antipsychotic drugs  Symptoms include high fever, sweating, unstable blood pressure, stupor, muscular rigidity, and autonomic dysfunction  In most cases, the disorder develops within the first 2 weeks of treatment with the drug; however, the disorder may develop any time during the therapy period  Patient advised to call the office or go to the ED immediately if these symptoms develop  Patient education on serotonin syndrome symptoms completed which included the following:  symptoms routinely occur when starting a new drug or increasing the dose of a drug you're already taking  Signs and symptoms include: agitation,restlessness,confusion,rapid heart rate, high blood pressure, dilated pupils, loss of muscle coordination, muscle twitching or rigidity, heavy sweating, diarrhea, headache, shivering, goose bumps    The patient was also informed that severe serotonin syndrome can be life-threatening and needs to seek immediate medical care, these sign and symptoms include: high fever, seizures, irregular heartbeat, and unconsciousness  The patient verbalized understanding  Seroquel PARQ completed including dizziness, sedation, GI distress, orthostatic hypotension and cardiovascular risks, metabolic syndrome, NMS, TD, EPS, Seizures, and others    I discussed plan and treatment with Pt  Risks, benefits, and possible side effects of medications explained to patient and patient verbalizes understanding  Discussed with patient the risks of sedation, respiratory depression, impairment of ability to drive and potential for abuse and addiction related to treatment with benzodiazepine medications  The patient understands risk of treatment with benzodiazepine medications, agrees to not drive if feels impaired and agrees to take medications as prescribed      Current Rating Scores:     Current PHQ-9   PHQ-2/9 Depression Screening    Little interest or pleasure in doing things: 1 - several days  Feeling down, depressed, or hopeless: 1 - several days  Trouble falling or staying asleep, or sleeping too much: 0 - not at all  Feeling tired or having little energy: 1 - several days  Poor appetite or overeatin - not at all  Feeling bad about yourself - or that you are a failure or have let yourself or your family down: 2 - more than half the days  Trouble concentrating on things, such as reading the newspaper or watching television: 0 - not at all  Moving or speaking so slowly that other people could have noticed   Or the opposite - being so fidgety or restless that you have been moving around a lot more than usual: 0 - not at all  Thoughts that you would be better off dead, or of hurting yourself in some way: 0 - not at all  PHQ-9 Score: 5   PHQ-9 Interpretation: Mild depression        Current MIKA-7 is   MIKA-7 Flowsheet Screening    Flowsheet Row Most Recent Value Over the last 2 weeks, how often have you been bothered by any of the following problems? Feeling nervous, anxious, or on edge 1   Not being able to stop or control worrying 1   Worrying too much about different things 2   Trouble relaxing 2   Being so restless that it is hard to sit still 1   Becoming easily annoyed or irritable 0   Feeling afraid as if something awful might happen 1   MIKA-7 Total Score 8        Current Outpatient Medications on File Prior to Visit   Medication Sig Dispense Refill   • betamethasone valerate (VALISONE) 0 1 % cream      • escitalopram (LEXAPRO) 10 mg tablet TAKE 1 TABLET (10 MG TOTAL) BY MOUTH DAILY TAKE WITH 20MG PILL TO TOTAL 30MG DAILY  90 tablet 1   • escitalopram (LEXAPRO) 20 mg tablet TAKE 1 TABLET (20 MG TOTAL) BY MOUTH DAILY TAKE WITH 10MG TABLET TO TOTAL 30MG  90 tablet 1   • hydrOXYzine HCL (ATARAX) 25 mg tablet TAKE 1 TABLET BY MOUTH 3 TIMES A DAY AS NEEDED FOR ANXIETY 270 tablet 0   • meloxicam (Mobic) 15 mg tablet Take 1 tablet (15 mg total) by mouth daily 30 tablet 1   • [DISCONTINUED] ARIPiprazole (ABILIFY) 5 mg tablet TAKE 0 5 TABLETS (2 5 MG TOTAL) BY MOUTH DAILY AT BEDTIME 45 tablet 0   • [DISCONTINUED] LORazepam (ATIVAN) 1 mg tablet Take 1/2 tab in am, 1 tab in afternoon as needed for anxiety 45 tablet 0   • [DISCONTINUED] QUEtiapine (SEROquel) 100 mg tablet Take 1 tablet (100mg) daily at bedtime  You may also take 1/2 tab (50mg) daily as needed for anxiety  135 tablet 0     No current facility-administered medications on file prior to visit  Psychotherapy Provided:     Individual psychotherapy provided: Yes  Counseling was provided during the session today for 16 minutes  Supportive counseling provided  Medication changes discussed with Marilia Sykes  Medication education provided to Marilia Sykes  Coping strategies reviewed with Marilia Sykes  Educated on importance of medication and treatment compliance    Importance of follow up with family physician for medical issues reviewed with Ocala Cooler  Reassurance and supportive therapy provided  Crisis/safety plan discussed with Smiley Cooler  He will utilize crisis as needed    HPI ROS Appetite Changes and Sleep:     He reports normal sleep, normal appetite, normal energy level   Denies homicidal ideation, denies suicidal ideation    Review Of Systems:  HPI ROS:               Medication Side Effects:  denies     Depression (10 worst): decreased (Was 3-4/10)   Anxiety (10 worst): decreased (Was 3-4/10)   Safety concerns (SI, HI, etc): denies (Was denies)   Sleep: normal (Was normal)   Energy: good (Was good)   Appetite: good (Was good)     General normal    Personality no change in personality   Constitutional as noted in HPI   ENT negative   Cardiovascular negative   Respiratory negative   Gastrointestinal negative   Genitourinary negative   Musculoskeletal negative   Integumentary negative   Neurological negative   Endocrine negative   Other Symptoms none, all other systems are negative     Mental Status Evaluation:    Appearance Adequate hygiene and grooming and Good eye contact   Behavior calm and cooperative   Mood euthymic  Depression Scale - decreased of 10 (0 = No depression)  Anxiety Scale - decreased of 10 (0 = No anxiety)   Speech Normal rate and volume   Affect appropriate and mood-congruent   Thought Processes Goal directed and coherent   Thought Content Does not verbalize delusional material   Associations Tightly connected   Perceptual Disturbances Denies hallucinations and does not appear to be responding to internal stimuli   Risk Potential Suicidal/Homicidal Ideation - No evidence of suicidal or homicidal ideation and patient does not verbalize suicidal or homicidal ideation  Risk of Violence - No evidence of risk for violence found on assessment  Risk of Self Mutilation - No evidence of risk for self mutilation found on assessment   Orientation oriented to person, place, time/date and situation   Memory recent and remote memory grossly intact   Consciousness alert and awake   Attention/Concentration attention span and concentration are age appropriate   Insight intact   Judgement intact   Muscle Strength and Gait normal muscle strength and normal muscle tone, normal gait/station and normal balance   Motor Activity no abnormal movements   Language no difficulty naming common objects, no difficulty repeating a phrase, no difficulty writing a sentence   Fund of Knowledge adequate knowledge of current events  adequate fund of knowledge regarding past history  adequate fund of knowledge regarding vocabulary      Past Psychiatric History Update:     Inpatient Psychiatric Admission Since Last Encounter:   no  Changes to Outpatient Psychiatric Treatment Team:    no  Suicide Attempt Or Self Mutilation Since Last Encounter:   no  Incidence of Violent Behavior Since Last Encounter:   no    Traumatic History Update:     New Onset of Abuse Since Last Encounter:   no  Traumatic Events Since Last Encounter:   no    Past Medical History:    Past Medical History:   Diagnosis Date   • Anxiety    • Depression    • Headache(784 0) March 2020    Degenerative disk in neck may be cause     • Hypertension    • Palpitation    • Panic attack         Past Surgical History:   Procedure Laterality Date   • FRACTURE SURGERY  6/1970 and 6/1979    Left and right wrists   • KNEE SURGERY  08/1982     Allergies   Allergen Reactions   • Other Sneezing, Throat Swelling and Tongue Swelling   • Shellfish Allergy - Food Allergy Tongue Swelling     Substance Abuse History:    Social History     Substance and Sexual Activity   Alcohol Use Yes   • Alcohol/week: 2 0 standard drinks   • Types: 2 Cans of beer per week    Comment: socially     Social History     Substance and Sexual Activity   Drug Use Never     Social History:    Social History     Socioeconomic History   • Marital status: /Civil Union     Spouse name: Not on file   • Number of children: Not on file   • Years of education: Not on file   • Highest education level: Not on file   Occupational History   • Not on file   Tobacco Use   • Smoking status: Passive Smoke Exposure - Never Smoker   • Smokeless tobacco: Never   Vaping Use   • Vaping Use: Never used   Substance and Sexual Activity   • Alcohol use: Yes     Alcohol/week: 2 0 standard drinks     Types: 2 Cans of beer per week     Comment: socially   • Drug use: Never   • Sexual activity: Not Currently     Partners: Female     Birth control/protection: Female Sterilization   Other Topics Concern   • Not on file   Social History Narrative   • Not on file     Social Determinants of Health     Financial Resource Strain: Not on file   Food Insecurity: Not on file   Transportation Needs: Not on file   Physical Activity: Not on file   Stress: Not on file   Social Connections: Not on file   Intimate Partner Violence: Not on file   Housing Stability: Not on file     Family Psychiatric History:     Family History   Problem Relation Age of Onset   • Hypertension Mother    • COPD Mother    • Migraines Mother    • Stroke Father         Mini stroke in 2010     History Review: The following portions of the patient's history were reviewed and updated as appropriate: allergies, current medications, past family history, past medical history, past social history, past surgical history and problem list     OBJECTIVE:     Vital signs in last 24 hours: There were no vitals filed for this visit  Laboratory Results:   Recent Labs (last 2 months):   No visits with results within 2 Month(s) from this visit     Latest known visit with results is:   Office Visit on 03/26/2022   Component Date Value   • Ventricular Rate 03/26/2022 82    • Atrial Rate 03/26/2022 82    • RI Interval 03/26/2022 206    • QRSD Interval 03/26/2022 94    • QT Interval 03/26/2022 400    • QTC Interval 03/26/2022 467    • P Axis 03/26/2022 39    • QRS Axis 03/26/2022 -19    • T Wave Axis 03/26/2022 32      I have personally reviewed all pertinent laboratory/tests results  Suicide/Homicide Risk Assessment:    Risk of Harm to Self:  The following ratings are based on assessment at the time of the interview  Recent Specific Risk Factors include: unemployed  Demographic risk factors include: , male, age: over 48 or older  Historical Risk Factors include: chronic depression, chronic anxiety symptoms, history of traumatic experiences  Protective Factors: no current suicidal ideation, access to mental health treatment, being a parent, being , compliant with medications, compliant with mental health treatment, connection to own children, having a desire to be alive, responsibilities and duties to others, stable living environment, sense of determination, strong relationships  Based on today's assessment, Dale Colin presents the following risk of harm to self: minimal    Risk of Harm to Others: The following ratings are based on assessment at the time of the interview  Protective Factors: no current homicidal ideation  Based on today's assessment, Hunterzaianb Colin presents the following risk of harm to others: none    The following interventions are recommended: no intervention changes needed    Medications Risks/Benefits:      Risks, Benefits And Possible Side Effects Of Medications:    Discussed risks and benefits of treatment with patient including risk of suicidality, serotonin syndrome, increased QTc interval and SIADH related to treatment with antidepressants;  Risk of induction of manic symptoms in certain patient populations, risk of parkinsonian symptoms, metabolic syndrome, tardive dyskinesia and neuroleptic malignant syndrome related to treatment with antipsychotic medications and risks of misuse, abuse or dependence, sedation and respiratory depression related to treatment with benzodiazepine medications     Controlled Medication Discussion:     Dale Colin has been filling controlled prescriptions on time as prescribed according to [place] Prescription Drug Monitoring Program    Treatment Plan:    Due for update/Updated:   no  Last treatment plan done 8/29/22 by KOLBY Tillman  Treatment Plan due on 3/1/23  KOLBY Shields 12/19/22    This note was shared with patient

## 2022-12-20 ENCOUNTER — APPOINTMENT (OUTPATIENT)
Dept: PHYSICAL THERAPY | Facility: CLINIC | Age: 57
End: 2022-12-20

## 2022-12-22 ENCOUNTER — OFFICE VISIT (OUTPATIENT)
Dept: PHYSICAL THERAPY | Facility: CLINIC | Age: 57
End: 2022-12-22

## 2022-12-22 DIAGNOSIS — M54.51 VERTEBROGENIC LOW BACK PAIN: Primary | ICD-10-CM

## 2022-12-22 DIAGNOSIS — M54.50 CHRONIC BILATERAL LOW BACK PAIN WITHOUT SCIATICA: ICD-10-CM

## 2022-12-22 DIAGNOSIS — G89.29 CHRONIC BILATERAL LOW BACK PAIN WITHOUT SCIATICA: ICD-10-CM

## 2022-12-22 NOTE — PROGRESS NOTES
Daily Note     Today's date: 2022  Patient name: Kassandra Coulter  : 1965  MRN: [id number]  Referring provider: Hanane Arroyo, *  Dx:   Encounter Diagnosis     ICD-10-CM    1  Vertebrogenic low back pain  M54 51       2  Chronic bilateral low back pain without sciatica  M54 50     G89 29                      Subjective: have not really taxed his back much so doing okay  Still gets numbness in lateral thigh  Doing HEP every other day generally      Objective: See treatment diary below      Assessment: continued with manual therapy to improve both lumbar and hip mobility  Added additional hip and trunk strength  Fatigued at end of session           Plan: continue current POC     Precautions: anxiety, SOB     Daily Treatment Diary:      Initial Evaluation Date: 22  Compliance                    Visit Number 1 2                   Re-Eval  IE                 MC   Foto Captured                           Manuals       Manual traction        Joint work R hip IR Mulligan mob and lat distraction gr 3 R hip IR Mulligan mob and lat distraction, lumbar sidelying mob       flexibility        ST        Neuro Re-Ed        TA x10 x15      TA+march x10 x15      birddog        bridge x20 x20      Supine hand to knee 15x5"                       Ther Ex        NuStep/TM  TM 7'      Giancarlo test R 4x30" gentle passive overpressure 4x30"      clamshell  btb 15x10"      Paloff press  10x 30# ea      Leg press        Ham curls        Knee ext        Ankle strength - band        Standing HR        Lumbar ext        Lumbar flex                SKTC        LTR        Prone ext                Ther Activity                        Gait Training                        Modalities                          HEP - ta, ta+march, supine hand to knee, bridge - provided handout and reviewed with pt

## 2022-12-28 ENCOUNTER — APPOINTMENT (OUTPATIENT)
Dept: PHYSICAL THERAPY | Facility: CLINIC | Age: 57
End: 2022-12-28

## 2022-12-30 ENCOUNTER — APPOINTMENT (OUTPATIENT)
Dept: PHYSICAL THERAPY | Facility: CLINIC | Age: 57
End: 2022-12-30

## 2023-01-09 ENCOUNTER — OFFICE VISIT (OUTPATIENT)
Dept: PHYSICAL THERAPY | Facility: CLINIC | Age: 58
End: 2023-01-09

## 2023-01-09 DIAGNOSIS — M54.50 CHRONIC BILATERAL LOW BACK PAIN WITHOUT SCIATICA: ICD-10-CM

## 2023-01-09 DIAGNOSIS — G89.29 CHRONIC BILATERAL LOW BACK PAIN WITHOUT SCIATICA: ICD-10-CM

## 2023-01-09 DIAGNOSIS — M54.51 VERTEBROGENIC LOW BACK PAIN: Primary | ICD-10-CM

## 2023-01-09 NOTE — PROGRESS NOTES
Daily Note     Today's date: 2023  Patient name: Izabela Hardy  : 1965  MRN: [id number]  Referring provider: Matty Mtz, *  Dx:   Encounter Diagnosis     ICD-10-CM    1  Vertebrogenic low back pain  M54 51       2  Chronic bilateral low back pain without sciatica  M54 50     G89 29                      Subjective: Patient reports he monitors what he does to prevent causing pain  Objective: See treatment diary below      Assessment: Izabela Hardy appeared to have some relief from treatment today  Improved hip motion noted with treatment  Continued treatment should benefit  Plan: Continue per plan of care        Precautions: anxiety, SOB     Daily Treatment Diary:      Initial Evaluation Date: 22  Compliance                  Visit Number 1 2  3                 Re-Eval  IE                 [facility]   Foto Captured                           Manuals      Manual traction        Joint work R hip IR Mulligan mob and lat distraction gr 3 R hip IR Mulligan mob and lat distraction, lumbar sidelying mob  R hip IR Mulligan mob  Lat distraction, lumbar sidelying mob MM     flexibility        ST        Neuro Re-Ed        TA x10 x15 5"x20     TA+march x10 x15 x20     birddog        bridge x20 x20 x20     Supine hand to knee 15x5"       Ab iso 3 way   5"x15 ea             Ther Ex        NuStep/TM  TM 7' TM 8'     Giancarlo test R 4x30" gentle passive overpressure 4x30" 4x30"     clamshell  btb 15x10" btb 20x10"     Paloff press  10x 30# ea 15x 30# ea     Leg press        Ham curls        Knee ext        Ankle strength - band        Standing HR        Lumbar ext        Lumbar flex                SKTC   10"x10 ea     LTR   10"x10 ea     Prone ext                Ther Activity                        Gait Training                        Modalities                          HEP - ta, ta+march, supine hand to knee, bridge - provided handout and reviewed with pt

## 2023-01-11 ENCOUNTER — APPOINTMENT (OUTPATIENT)
Dept: PHYSICAL THERAPY | Facility: CLINIC | Age: 58
End: 2023-01-11

## 2023-01-16 ENCOUNTER — TELEMEDICINE (OUTPATIENT)
Dept: PSYCHIATRY | Facility: CLINIC | Age: 58
End: 2023-01-16

## 2023-01-16 DIAGNOSIS — F33.1 MAJOR DEPRESSIVE DISORDER, RECURRENT EPISODE, MODERATE (HCC): ICD-10-CM

## 2023-01-16 DIAGNOSIS — F41.1 GAD (GENERALIZED ANXIETY DISORDER): ICD-10-CM

## 2023-01-16 RX ORDER — LORAZEPAM 1 MG/1
TABLET ORAL
Qty: 30 TABLET | Refills: 0 | Status: SHIPPED | OUTPATIENT
Start: 2023-01-16

## 2023-01-16 RX ORDER — QUETIAPINE 200 MG/1
200 TABLET, FILM COATED, EXTENDED RELEASE ORAL
Qty: 30 TABLET | Refills: 2 | Status: SHIPPED | OUTPATIENT
Start: 2023-01-16

## 2023-01-16 RX ORDER — PROPRANOLOL HYDROCHLORIDE 10 MG/1
10 TABLET ORAL 2 TIMES DAILY
Qty: 60 TABLET | Refills: 0 | Status: SHIPPED | OUTPATIENT
Start: 2023-01-16

## 2023-01-16 NOTE — PSYCH
Virtual Regular Visit    Verification of patient location:    Patient is located in the following state in which I hold an active license [place]    Problem List Items Addressed This Visit        Other    Major depressive disorder, recurrent episode, moderate (HCC)    Relevant Medications    QUEtiapine (SEROquel XR) 200 mg 24 hr tablet    LORazepam (ATIVAN) 1 mg tablet    Other Relevant Orders    CBC and differential    Comprehensive metabolic panel    Lipid Panel with Direct LDL reflex    TSH, 3rd generation with Free T4 reflex    HEMOGLOBIN A1C W/ EAG ESTIMATION    ECG 12 lead   Other Visit Diagnoses     MIKA (generalized anxiety disorder)        Relevant Medications    QUEtiapine (SEROquel XR) 200 mg 24 hr tablet    LORazepam (ATIVAN) 1 mg tablet    propranolol (INDERAL) 10 mg tablet             Encounter provider Cuong Ram, [facility]    Provider located at    [address] [facility] [address]  [address] [facility] [address] [phone]    Recent Visits  Date Type Provider Dept   01/16/23 Lora Tomlinson [address], [address] recent visits within past 7 days and meeting all other requirements  Future Appointments  No visits were found meeting these conditions  Showing future appointments within next 150 days and meeting all other requirements         The patient was identified by name and date of birth  Kassandra Coulter was informed that this is a telemedicine visit and that [facility]  He agrees to proceed     My office door was closed  No one else was in the room  He acknowledged consent and understanding of privacy and security of the video platform  The patient has agreed to participate and understands they can discontinue the visit at any time  Patient is aware this is a billable service       HPI     Current Outpatient Medications   Medication Sig Dispense Refill   • escitalopram (LEXAPRO) 10 mg tablet TAKE 1 TABLET (10 MG TOTAL) BY MOUTH DAILY TAKE WITH 20MG PILL TO TOTAL 30MG DAILY  90 tablet 1   • escitalopram (LEXAPRO) 20 mg tablet TAKE 1 TABLET (20 MG TOTAL) BY MOUTH DAILY TAKE WITH 10MG TABLET TO TOTAL 30MG  90 tablet 1   • LORazepam (ATIVAN) 1 mg tablet Take 1/2 - 1 tab daily as needed for severe anxiety  30 tablet 0   • propranolol (INDERAL) 10 mg tablet Take 1 tablet (10 mg total) by mouth 2 (two) times a day 60 tablet 0   • QUEtiapine (SEROquel XR) 200 mg 24 hr tablet Take 1 tablet (200 mg total) by mouth daily at bedtime 30 tablet 2   • betamethasone valerate (VALISONE) 0 1 % cream  (Patient not taking: Reported on 1/16/2023)     • meloxicam (MOBIC) 15 mg tablet TAKE 1 TABLET (15 MG TOTAL) BY MOUTH DAILY  30 tablet 5     No current facility-administered medications for this visit  Review of Systems  Video Exam    There were no vitals filed for this visit  Physical Exam   As a result of this visit, I have referred the patient for further respiratory evaluation  No    I spent 30 minutes directly with the patient during this visit  VIRTUAL VISIT DISCLAIMER    Elis Agustin acknowledges that he has consented to an online visit or consultation  He understands that the online visit is based solely on information provided by him, and that, in the absence of a face-to-face physical evaluation by the physician, the diagnosis he receives is both limited and provisional in terms of accuracy and completeness  This is not intended to replace a full medical face-to-face evaluation by the physician  Elis Agustin understands and accepts these terms      MEDICATION MANAGEMENT NOTE        [facility]    Name and Date of Birth:  Elis Agustin 59 y o  1965 MRN: [id number]     Date of Visit: January 18, 2023    Allergies   Allergen Reactions   • Other Sneezing, Throat Swelling and Tongue Swelling   • Shellfish Allergy - Food Allergy Tongue Swelling       Visit Time     Visit Start Time: 4486  Visit Stop Time: 0907  Total Visit Duration: 30 minutes    SUBJECTIVE:    Kelley Elias is seen today for a follow up for Major Depressive Disorder and Generalized Anxiety Disorder  He continues to improve slowly since the last visit  Kelley Elias is a 59year old male with a hx of MDD and MIKA  He is seen virtually today for medication management follow up  He was last seen by this provider on 12/19/22  He reports today that he is doing well, and might have a lead on a new job  He rates his depression and anxiety 4 out of 10  He is concerned about finances, and does have some passive suicidal thoughts at times  He reports he does not have any intent or plan  He is calm and appropriate in conversation  He denies HI, denies AVHall  He is tolerating the medication changes and reports that he has been sleeping well  Denies any side effects  He has good appetite, and adequate concentration and focus  We discussed adverse effects of atarax, and alternative medications that he could try  We discussed propranolol for anxiety  He was in agreement to try discontinuing the atarax and trying the propranolol for his anxiety  We will also increase his Seroquel XR to 200mg HS for depression  Additionally, he will have labwork completed and another EKG done for monitoring purposes  He is in agreement with all treatment plan changes  We will follow up in one month  Seroquel PARQ completed including dizziness, sedation, GI distress, orthostatic hypotension and cardiovascular risks, metabolic syndrome, NMS, TD, EPS, Seizures, and others  Propranolol PARQ completed including dizziness, sedation, headache, blood pressure, depression    He denies any side effects from current psychiatric medications      PLAN:  Continue medications as ordered  Lexapro 30 mg p o  daily  Ativan 1 mg 1/2-1 tab daily as needed for severe anxiety  Initiate propranolol 10 mg p o  twice daily as needed for anxiety  Increase Seroquel XR to 200 mg p o  at bedtime  Discontinue Atarax  Follow up with this provider in 1 month  He will call sooner with concerns or issues if they arise prior to scheduled appointment  Labs due: CBC, CMP, hemoglobin A1c, and lipids, TSH, EKG    Aware of 24 hour and weekend coverage for urgent situations accessed by calling [facility] main practice number  Medication management every 4 weeks  Aware of need to follow up with family physician for medical issues    Diagnoses and all orders for this visit:    Major depressive disorder, recurrent episode, moderate (HCC)  -     QUEtiapine (SEROquel XR) 200 mg 24 hr tablet; Take 1 tablet (200 mg total) by mouth daily at bedtime  -     CBC and differential; Future  -     Comprehensive metabolic panel; Future  -     Lipid Panel with Direct LDL reflex; Future  -     TSH, 3rd generation with Free T4 reflex; Future  -     HEMOGLOBIN A1C W/ EAG ESTIMATION; Future  -     ECG 12 lead; Future    MIKA (generalized anxiety disorder)  -     LORazepam (ATIVAN) 1 mg tablet; Take 1/2 - 1 tab daily as needed for severe anxiety  -     propranolol (INDERAL) 10 mg tablet;  Take 1 tablet (10 mg total) by mouth 2 (two) times a day      Current Rating Scores:     Current PHQ-9   PHQ-2/9 Depression Screening    Little interest or pleasure in doing things: 2 - more than half the days  Feeling down, depressed, or hopeless: 2 - more than half the days  Trouble falling or staying asleep, or sleeping too much: 1 - several days  Feeling tired or having little energy: 1 - several days  Poor appetite or overeatin - more than half the days  Feeling bad about yourself - or that you are a failure or have let yourself or your family down: 2 - more than half the days  Trouble concentrating on things, such as reading the newspaper or watching television: 0 - not at all  Moving or speaking so slowly that other people could have noticed  Or the opposite - being so fidgety or restless that you have been moving around a lot more than usual: 0 - not at all  Thoughts that you would be better off dead, or of hurting yourself in some way: 1 - several days  PHQ-9 Score: 11   PHQ-9 Interpretation: Moderate depression        Current MIKA-7 is   MIKA-7 Flowsheet Screening    Flowsheet Row Most Recent Value   Over the last 2 weeks, how often have you been bothered by any of the following problems? Feeling nervous, anxious, or on edge 2   Not being able to stop or control worrying 2   Worrying too much about different things 2   Trouble relaxing 1   Being so restless that it is hard to sit still 1   Becoming easily annoyed or irritable 0   Feeling afraid as if something awful might happen 2   MIKA-7 Total Score 10        Current Outpatient Medications on File Prior to Visit   Medication Sig Dispense Refill   • escitalopram (LEXAPRO) 10 mg tablet TAKE 1 TABLET (10 MG TOTAL) BY MOUTH DAILY TAKE WITH 20MG PILL TO TOTAL 30MG DAILY  90 tablet 1   • escitalopram (LEXAPRO) 20 mg tablet TAKE 1 TABLET (20 MG TOTAL) BY MOUTH DAILY TAKE WITH 10MG TABLET TO TOTAL 30MG  90 tablet 1   • betamethasone valerate (VALISONE) 0 1 % cream  (Patient not taking: Reported on 1/16/2023)       No current facility-administered medications on file prior to visit  Psychotherapy Provided:     Individual psychotherapy provided: Yes  Counseling was provided during the session today for 16 minutes  Supportive counseling provided  Medication changes discussed with Dmitriy Davis  Medication education provided to Dmitriy Davis  Recent stressor including financial problems, job loss and chronic anxiety discussed with Dmitriy Davis  Coping strategies reviewed with Dmitriy Davis  Educated on importance of medication and treatment compliance  Importance of follow up with family physician for medical issues reviewed with Dmitriy Davis  Reassurance and supportive therapy provided     Crisis/safety plan discussed with Sanya Sandoval  He will utilize crisis as needed  If suicidal thoughts increase he will tell his wife, call the office, or go to the ED  HPI ROS Appetite Changes and Sleep:     He reports normal sleep, normal appetite, normal energy level   Denies homicidal ideation, Intermittent passive suicidal ideation without intent or plan    Review Of Systems:    HPI ROS:               Medication Side Effects:  denies     Depression (10 worst): 4/10 (Was decreased)   Anxiety (10 worst): 4/10 (Was decreased)   Safety concerns (SI, HI, etc): Passive SI, no plan or intent (Was denies)   Sleep: normal (Was normal)   Energy: good (Was good)   Appetite: good (Was good)     General normal    Personality no change in personality   Constitutional as noted in HPI   ENT negative   Cardiovascular negative   Respiratory negative   Gastrointestinal negative   Genitourinary negative   Musculoskeletal negative   Integumentary negative   Neurological negative   Endocrine negative   Other Symptoms none, all other systems are negative     Mental Status Evaluation:    Appearance Adequate hygiene and grooming and Good eye contact   Behavior calm and cooperative   Mood anxious and depressed  Depression Scale - 4 of 10 (0 = No depression)  Anxiety Scale - 4 of 10 (0 = No anxiety)   Speech Normal rate and volume   Affect constricted   Thought Processes Goal directed and coherent   Thought Content Does not verbalize delusional material   Associations Tightly connected   Perceptual Disturbances Denies hallucinations and does not appear to be responding to internal stimuli   Risk Potential Suicidal/Homicidal Ideation - Passive suicidal ideation without intent or plan  Risk of Violence - No evidence of risk for violence found on assessment  Risk of Self Mutilation - No evidence of risk for self mutilation found on assessment   Orientation oriented to person, place, time/date and situation   Memory recent and remote memory grossly intact   Consciousness alert and awake   Attention/Concentration attention span and concentration are age appropriate   Insight intact   Judgement intact   Muscle Strength and Gait normal muscle strength and normal muscle tone, normal gait/station and normal balance   Motor Activity no abnormal movements   Language no difficulty naming common objects, no difficulty repeating a phrase, no difficulty writing a sentence   Fund of Knowledge adequate knowledge of current events  adequate fund of knowledge regarding past history  adequate fund of knowledge regarding vocabulary      Past Psychiatric [facility]- per Dr Ramone Schmitt H&P  Previous diagnoses include Depression and Anxiety   Prior outpatient psychiatric treatment:   With PCP till now   Prior therapy:   [address] inpatient psychiatric treatment: x1  [address] suicide attempts:  None   Prior self harm:  None   Prior violence or aggression:  None    Past Psychiatric History Update:     Inpatient Psychiatric Admission Since Last Encounter:   no  Changes to Outpatient Psychiatric Treatment Team:    no  Suicide Attempt Or Self Mutilation Since Last Encounter:   no  Incidence of Violent Behavior Since Last Encounter:   no    Traumatic History Update:     New Onset of Abuse Since Last Encounter:   no  Traumatic Events Since Last Encounter:   no    Past Medical History:    Past Medical History:   Diagnosis Date   • Anxiety    • Depression    • Headache(784 0) March 2020    Degenerative disk in neck may be cause     • Hypertension    • Palpitation    • Panic attack         Past Surgical History:   Procedure Laterality Date   • FRACTURE SURGERY  6/1970 and 6/1979    Left and right wrists   • KNEE SURGERY  08/1982     Allergies   Allergen Reactions   • Other Sneezing, Throat Swelling and Tongue Swelling   • Shellfish Allergy - Food Allergy Tongue Swelling     Substance Abuse History:    Social History     Substance and Sexual Activity   Alcohol Use Yes   • Alcohol/week: 2 0 standard drinks   • Types: 2 Cans of beer per week    Comment: socially     Social History     Substance and Sexual Activity   Drug Use Never     Social History:    Social History     Socioeconomic History   • Marital status: /Civil Union     Spouse name: Not on file   • Number of children: Not on file   • Years of education: Not on file   • Highest education level: Not on file   Occupational History   • Not on file   Tobacco Use   • Smoking status: Passive Smoke Exposure - Never Smoker   • Smokeless tobacco: Never   Vaping Use   • Vaping Use: Never used   Substance and Sexual Activity   • Alcohol use: Yes     Alcohol/week: 2 0 standard drinks     Types: 2 Cans of beer per week     Comment: socially   • Drug use: Never   • Sexual activity: Not Currently     Partners: Female     Birth control/protection: Female Sterilization   Other Topics Concern   • Not on file   Social History Narrative   • Not on file     Social Determinants of Health     Financial Resource Strain: Not on file   Food Insecurity: Not on file   Transportation Needs: Not on file   Physical Activity: Not on file   Stress: Not on file   Social Connections: Not on file   Intimate Partner Violence: Not on file   Housing Stability: Not on file     Family Psychiatric History:     Family History   Problem Relation Age of Onset   • Hypertension Mother    • COPD Mother    • Migraines Mother    • Stroke Father         Mini stroke in 2010     History Review: The following portions of the patient's history were reviewed and updated as appropriate: allergies, current medications, past family history, past medical history, past social history, past surgical history and problem list     OBJECTIVE:     Vital signs in last 24 hours: There were no vitals filed for this visit  Laboratory Results:   Recent Labs (last 2 months):   No visits with results within 2 Month(s) from this visit     Latest known visit with results is:   Office Visit on 03/26/2022   Component Date Value   • Ventricular Rate 03/26/2022 82    • Atrial Rate 03/26/2022 82    • MD Interval 03/26/2022 206    • QRSD Interval 03/26/2022 94    • QT Interval 03/26/2022 400    • QTC Interval 03/26/2022 467    • P Axis 03/26/2022 39    • QRS Axis 03/26/2022 -19    • T Wave Axis 03/26/2022 32      I have personally reviewed all pertinent laboratory/tests results  Suicide/Homicide Risk Assessment:    Risk of Harm to Self:  The following ratings are based on assessment at the time of the interview  Recent Specific Risk Factors include: current depressive symptoms, current anxiety symptoms, worries about finances or work  Demographic risk factors include: , male, age: over 48 or older  Historical Risk Factors include: chronic depressive symptoms, chronic anxiety symptoms  Protective Factors: no current suicidal ideation, access to mental health treatment, being a parent, being , compliant with medications, compliant with mental health treatment, responsibilities and duties to others, stable living environment, sense of determination, strong relationships, supportive family  Based on today's assessment, Latonya Signs presents the following risk of harm to self: low    Risk of Harm to Others:   The following ratings are based on assessment at the time of the interview  Protective Factors: no current homicidal ideation  Based on today's assessment, Latonya Signs presents the following risk of harm to others: none    The following interventions are recommended: contracts for safety at present - agrees to go to ED if feeling unsafe, return in 1 month for reassessment, contracts for safety at present - agrees to call Crisis Intervention Service if feeling unsafe    Medications Risks/Benefits:      Risks, Benefits And Possible Side Effects Of Medications:    Discussed risks and benefits of treatment with patient including risk of suicidality, serotonin syndrome, increased QTc interval and SIADH related to treatment with antidepressants; Risk of induction of manic symptoms in certain patient populations, risk of parkinsonian symptoms, metabolic syndrome, tardive dyskinesia and neuroleptic malignant syndrome related to treatment with antipsychotic medications, risks of misuse, abuse or dependence, sedation and respiratory depression related to treatment with benzodiazepine medications and Risk of orthostatic hypotension with propranolol     Controlled Medication Discussion:     Franc Casey has been filling controlled prescriptions on time as prescribed according to [address] [place] [address] Drive Monitoring Program  Discussed with Franc Casey the risks of sedation, respiratory depression, impairment of ability to drive and potential for abuse and addiction related to treatment with benzodiazepine medications  He understands risk of treatment with benzodiazepine medications, agrees to not drive if feels impaired and agrees to take medications as prescribed    Treatment Plan:    Due for update/Updated:   no  Last treatment plan done 8/29/22 by KOLBY Saeed  Treatment Plan due on 3/1/23  KOLBY Piedra 01/18/23    This note was shared with patient

## 2023-02-09 ENCOUNTER — RA CDI HCC (OUTPATIENT)
Dept: OTHER | Facility: HOSPITAL | Age: 58
End: 2023-02-09

## 2023-02-13 ENCOUNTER — TELEMEDICINE (OUTPATIENT)
Dept: PSYCHIATRY | Facility: CLINIC | Age: 58
End: 2023-02-13

## 2023-02-13 DIAGNOSIS — F41.1 GAD (GENERALIZED ANXIETY DISORDER): ICD-10-CM

## 2023-02-13 DIAGNOSIS — F32.3 MAJOR DEPRESSION WITH PSYCHOTIC FEATURES (HCC): Primary | ICD-10-CM

## 2023-02-13 RX ORDER — PROPRANOLOL HYDROCHLORIDE 10 MG/1
10 TABLET ORAL 2 TIMES DAILY
Qty: 60 TABLET | Refills: 0 | Status: SHIPPED | OUTPATIENT
Start: 2023-02-13

## 2023-02-13 NOTE — BH TREATMENT PLAN
TREATMENT PLAN (Medication Management Only)        [facility]    Name and Date of Birth:  Daphney Gonzalez 59 y o  1965  Date of Treatment Plan: February 13, 2023  Diagnosis/Diagnoses:    1  MIKA (generalized anxiety disorder)      Strengths/Personal Resources for Self-Care: taking medications as prescribed  Area/Areas of need (in own words): anxiety symptoms, depressive symptoms  1  Long Term Goal: continue improvement in acceptable anxiety level  Target Date:6 months - 8/13/2023  Person/Persons responsible for completion of goal: Jose David Albright  2  Short Term Objective (s) - How will we reach this goal?:   A  Provider new recommended medication/dosage changes and/or continue medication(s): continue current medications as prescribed  B  Exercise regularly   C  visit dad an extra day every other week in the nursing home  Target Date:6 months - 8/13/2023  Person/Persons Responsible for Completion of Goal: Jose David Albright  Progress Towards Goals: continuing treatment  Treatment Modality: medication management every 1 month, medication education at every visit  Review due 180 days from date of this plan: 6 months - 8/13/2023  Expected length of service: ongoing treatment  My Physician/PA/NP and I have developed this plan together and I agree to work on the goals and objectives  I understand the treatment goals that were developed for my treatment

## 2023-02-13 NOTE — PSYCH
Virtual Regular Visit    Verification of patient location:    Patient is located in the following state in which I hold an active license [place]    Problem List Items Addressed This Visit        Other    Major depression with psychotic features ([facility] [address] ) - Primary   Other Visit Diagnoses     MIKA (generalized anxiety disorder)        Relevant Medications    propranolol (INDERAL) 10 mg tablet             Encounter provider KOLBY Bautista    Provider located at    [address] [place] [address]  [facility] [id number] [facility] [address] [phone]    Recent Visits  Date Type Provider Dept   02/13/23 Lora Tomlinson [address], [address] recent visits within past 7 days and meeting all other requirements  Future Appointments  No visits were found meeting these conditions  Showing future appointments within next 150 days and meeting all other requirements         The patient was identified by name and date of birth  Bridget Ogden was informed that this is a telemedicine visit and that the visit is being conducted throughthe [facility]  He agrees to proceed     My office door was closed  No one else was in the room  He acknowledged consent and understanding of privacy and security of the video platform  The patient has agreed to participate and understands they can discontinue the visit at any time  Patient is aware this is a billable service  HPI     Current Outpatient Medications   Medication Sig Dispense Refill   • propranolol (INDERAL) 10 mg tablet Take 1 tablet (10 mg total) by mouth 2 (two) times a day 60 tablet 0   • betamethasone valerate (VALISONE) 0 1 % cream  (Patient not taking: Reported on 1/16/2023)     • escitalopram (LEXAPRO) 10 mg tablet TAKE 1 TABLET (10 MG TOTAL) BY MOUTH DAILY TAKE WITH 20MG PILL TO TOTAL 30MG DAILY   90 tablet 1   • escitalopram (LEXAPRO) 20 mg tablet TAKE 1 TABLET (20 MG TOTAL) BY MOUTH DAILY TAKE WITH 10MG TABLET TO TOTAL 30MG  90 tablet 1   • LORazepam (ATIVAN) 1 mg tablet Take 1/2 - 1 tab daily as needed for severe anxiety  30 tablet 0   • meloxicam (MOBIC) 15 mg tablet TAKE 1 TABLET (15 MG TOTAL) BY MOUTH DAILY  30 tablet 5   • QUEtiapine (SEROquel XR) 200 mg 24 hr tablet Take 1 tablet (200 mg total) by mouth daily at bedtime 30 tablet 2     No current facility-administered medications for this visit  Review of Systems  Video Exam    There were no vitals filed for this visit  Physical Exam   As a result of this visit, I have referred the patient for further respiratory evaluation  No    I spent 30 minutes directly with the patient during this visit  VIRTUAL VISIT DISCLAIMER    Gagan Tolliver acknowledges that he has consented to an online visit or consultation  He understands that the online visit is based solely on information provided by him, and that, in the absence of a face-to-face physical evaluation by the physician, the diagnosis he receives is both limited and provisional in terms of accuracy and completeness  This is not intended to replace a full medical face-to-face evaluation by the physician  Gagan Tolliver understands and accepts these terms  MEDICATION MANAGEMENT NOTE        [place] [address] [facility]    Name and Date of Birth:  Gagan Tolliver 59 y o  1965 MRN: [id number]    Date of Visit: February 15, 2023    Allergies   Allergen Reactions   • Other Sneezing, Throat Swelling and Tongue Swelling   • Shellfish Allergy - Food Allergy Tongue Swelling       Visit Time    Visit Start Time: 1679  Visit Stop Time: 1800  Total Visit Duration: 30 minutes    SUBJECTIVE:    Jessie Campos is seen today for a follow up for Major Depressive Disorder  He continues to experience on and off depressive symptoms since the last visit  Jessie Campos was seen today for medication management  Reports he is "the same as last time"    He reports he has yet to get any job offers, but is still looking for a job  He is concerned about finances  He states that unemployment denied his claim and he had to file another appeal   He is waiting for his disability claim that he filed in September 2022  He states that he is depressed in the morning, and it takes some motivation to get out of bed  He is falling asleep okay, appetite is good, concentration is okay  He reports he is not exercising  He also reports that his siblings are putting pressure on him to visit his father in the nursing home more often  He states he goes once a week, but it is difficult for him as he does not like to see his father in this condition  He states that he feels uncomfortable going there, and it increases his anxiety  He denies any irritability  He reports passive suicidality with no plan or intent  He denies homicidal ideation  He denies any auditory or visual hallucinations  He does report anxiety and depression rated 4 out of 10  We discussed the need for him to work on other goals as the medications can help, but will not fix everything  He needs to also make changes on his end  We discussed the need to get out and take walks  We also discussed making an effort to see his father an extra day every couple weeks as he feels guilty that he has not been seeing his father more often  He agrees to these goals and will work on them for next appointment  He understands he needs to work on his non-pharmaceutical goals  We will follow up in one month  He will call sooner with concerns or issues if they arise prior to scheduled appointment  He also understands that we are going to continue the ativan taper at the next appointment  He denies any side effects from current psychiatric medications  PLAN:  Continue medications as ordered  Inderal 10 mg p o  twice daily  Lexapro 30 mg p o  daily  Ativan 1/2-1 tab daily as needed for severe anxiety  Seroquel  mg p o  at bedtime  Bloodwork due at next appointment  Goals discussed:  Small walks outside daily as able; visit dad in nursing home one extra day every other week  Ativan taper to continue at next appointment  Follow up in one month  He will call sooner with concerns or issues if they arise prior to scheduled appointment    Aware of 24 hour and weekend coverage for urgent situations accessed by calling [facility] main practice number  Medication management every 1 month  Aware of need to follow up with family physician for medical issues    Diagnoses and all orders for this visit:    Major depression with psychotic features ([facility] [address] )    MIKA (generalized anxiety disorder)  -     propranolol (INDERAL) 10 mg tablet; Take 1 tablet (10 mg total) by mouth 2 (two) times a day        Current Outpatient Medications on File Prior to Visit   Medication Sig Dispense Refill   • betamethasone valerate (VALISONE) 0 1 % cream  (Patient not taking: Reported on 1/16/2023)     • escitalopram (LEXAPRO) 10 mg tablet TAKE 1 TABLET (10 MG TOTAL) BY MOUTH DAILY TAKE WITH 20MG PILL TO TOTAL 30MG DAILY  90 tablet 1   • escitalopram (LEXAPRO) 20 mg tablet TAKE 1 TABLET (20 MG TOTAL) BY MOUTH DAILY TAKE WITH 10MG TABLET TO TOTAL 30MG  90 tablet 1   • LORazepam (ATIVAN) 1 mg tablet Take 1/2 - 1 tab daily as needed for severe anxiety  30 tablet 0   • meloxicam (MOBIC) 15 mg tablet TAKE 1 TABLET (15 MG TOTAL) BY MOUTH DAILY  30 tablet 5   • QUEtiapine (SEROquel XR) 200 mg 24 hr tablet Take 1 tablet (200 mg total) by mouth daily at bedtime 30 tablet 2     No current facility-administered medications on file prior to visit  Psychotherapy Provided:     Individual psychotherapy provided: Yes  Counseling was provided during the session today for 16 minutes  Supportive counseling provided  Medication changes discussed with Srinivasa Weeks  Medication education provided to Srinivasa Weeks    Discussed with Srinivasa Weeks coping with financial problems, job loss and ongoing anxiety  Coping strategies reviewed with Marilia Sykes  Importance of medication and treatment compliance reviewed with Marilia Sykes  Importance of follow up with family physician for medical issues reviewed with Marilia Sykes  Reassurance and supportive therapy provided  Crisis/safety plan discussed with Marilia Sykes  Will utilize crisis as needed  HPI ROS Appetite Changes and Sleep:     He reports normal sleep, normal appetite, adequate energy level   Denies homicidal ideation, Intermittent passive suicidal ideation without intent or plan    Review Of Systems:  HPI ROS:               Medication Side Effects:  denies     Depression (10 worst): 4/10 (Was 4/10)   Anxiety (10 worst): 4/10 (Was 4/10)   Safety concerns (SI, HI, etc): Passive SI, no plan or intent (Was Passive SI no plan or intent)   Sleep: normal (Was normal)   Energy: adequate (Was good)   Appetite: good (Was good)     General normal    Personality no change in personality   Constitutional as noted in HPI   ENT negative   Cardiovascular negative   Respiratory negative   Gastrointestinal negative   Genitourinary negative   Musculoskeletal negative   Integumentary negative   Neurological negative   Endocrine negative   Other Symptoms none, all other systems are negative     Mental Status Evaluation:    Appearance Adequate hygiene and grooming and Good eye contact   Behavior calm and cooperative   Mood anxious and depressed  Depression Scale - 4 of 10 (0 = No depression)  Anxiety Scale - 4 of 10 (0 = No anxiety)   Speech Normal rate and volume   Affect appropriate and mood-congruent   Thought Processes Goal directed and coherent   Thought Content Does not verbalize delusional material   Associations Tightly connected   Perceptual Disturbances Denies hallucinations and does not appear to be responding to internal stimuli   Risk Potential Suicidal/Homicidal Ideation - Passive suicidal ideation without intent or plan  Risk of Violence - No evidence of risk for violence found on assessment  Risk of Self Mutilation - No evidence of risk for self mutilation found on assessment   Orientation oriented to person, place, time/date and situation   Memory recent and remote memory grossly intact   Consciousness alert and awake   Attention/Concentration attention span and concentration are age appropriate   Insight fair   Judgement fair   Muscle Strength and Gait normal muscle strength and normal muscle tone, normal gait/station and normal balance   Motor Activity no abnormal movements   Language no difficulty naming common objects, no difficulty repeating a phrase, no difficulty writing a sentence   Fund of Knowledge adequate knowledge of current events  adequate fund of knowledge regarding past history  adequate fund of knowledge regarding vocabulary      Past Psychiatric History - per Dr Brenden Barron H&P  Previous diagnoses include Depression and Anxiety   Prior outpatient psychiatric treatment:   With PCP till now   Prior therapy:    Prior inpatient psychiatric treatment: x1  [address] suicide attempts:  None   Prior self harm:  None   Prior violence or aggression:  None    Past Psychiatric History Update:     Inpatient Psychiatric Admission Since Last Encounter:   no  Changes to Outpatient Psychiatric Treatment Team:    no  Suicide Attempt Or Self Mutilation Since Last Encounter:   no  Incidence of Violent Behavior Since Last Encounter:   no    Traumatic History Update:     New Onset of Abuse Since Last Encounter:   no  Traumatic Events Since Last Encounter:   no    Past Medical History:    Past Medical History:   Diagnosis Date   • Anxiety    • Depression    • Headache(784 0) March 2020    Degenerative disk in neck may be cause     • Hypertension    • Palpitation    • Panic attack         Past Surgical History:   Procedure Laterality Date   • FRACTURE SURGERY  6/1970 and 6/1979    Left and right wrists   • KNEE SURGERY  08/1982     Allergies   Allergen Reactions   • Other Sneezing, Throat Swelling and Tongue Swelling   • Shellfish Allergy - Food Allergy Tongue Swelling     Substance Abuse History:    Social History     Substance and Sexual Activity   Alcohol Use Yes   • Alcohol/week: 2 0 standard drinks   • Types: 2 Cans of beer per week    Comment: socially     Social History     Substance and Sexual Activity   Drug Use Never     Social History:    Social History     Socioeconomic History   • Marital status: /Civil Union     Spouse name: Not on file   • Number of children: Not on file   • Years of education: Not on file   • Highest education level: Not on file   Occupational History   • Not on file   Tobacco Use   • Smoking status: Passive Smoke Exposure - Never Smoker   • Smokeless tobacco: Never   Vaping Use   • Vaping Use: Never used   Substance and Sexual Activity   • Alcohol use: Yes     Alcohol/week: 2 0 standard drinks     Types: 2 Cans of beer per week     Comment: socially   • Drug use: Never   • Sexual activity: Not Currently     Partners: Female     Birth control/protection: Female Sterilization   Other Topics Concern   • Not on file   Social History Narrative   • Not on file     Social Determinants of Health     Financial Resource Strain: Not on file   Food Insecurity: Not on file   Transportation Needs: Not on file   Physical Activity: Not on file   Stress: Not on file   Social Connections: Not on file   Intimate Partner Violence: Not on file   Housing Stability: Not on file     Family Psychiatric History:     Family History   Problem Relation Age of Onset   • Hypertension Mother    • COPD Mother    • Migraines Mother    • Stroke Father         Mini stroke in 2010     History Review: The following portions of the patient's history were reviewed and updated as appropriate: allergies, current medications, past family history, past medical history, past social history, past surgical history and problem list     OBJECTIVE:     Vital signs in last 24 hours:    There were no vitals filed for this visit  Laboratory Results:   Recent Labs (last 2 months):   No visits with results within 2 Month(s) from this visit  Latest known visit with results is:   Office Visit on 03/26/2022   Component Date Value   • Ventricular Rate 03/26/2022 82    • Atrial Rate 03/26/2022 82    • CT Interval 03/26/2022 206    • QRSD Interval 03/26/2022 94    • QT Interval 03/26/2022 400    • QTC Interval 03/26/2022 467    • P Axis 03/26/2022 39    • QRS Axis 03/26/2022 -19    • T Wave Axis 03/26/2022 32      I have personally reviewed all pertinent laboratory/tests results  Suicide/Homicide Risk Assessment:    Risk of Harm to Self:  The following ratings are based on assessment at the time of the interview  Recent Specific Risk Factors include: current depressive symptoms, current anxiety symptoms  Demographic risk factors include: , male, age: over 48 or older  Historical Risk Factors include: chronic depressive symptoms, chronic anxiety symptoms  Protective Factors: no current suicidal ideation, access to mental health treatment, being a parent, being , compliant with medications, compliant with mental health treatment, responsibilities and duties to others, stable living environment, supportive family  Based on today's assessment, Dmitriy Davis presents the following risk of harm to self: low    Risk of Harm to Others:   The following ratings are based on assessment at the time of the interview  Protective Factors: no current homicidal ideation  Based on today's assessment, Dmitriy Davis presents the following risk of harm to others: none    The following interventions are recommended: contracts for safety at present - agrees to go to ED if feeling unsafe, return in 1 month for reassessment, contracts for safety at present - agrees to call Crisis Intervention Service if feeling unsafe    Medications Risks/Benefits:      Risks, Benefits And Possible Side Effects Of Medications:    Discussed risks and benefits of treatment with patient including risk of suicidality, serotonin syndrome, increased QTc interval and SIADH related to treatment with antidepressants; Risk of induction of manic symptoms in certain patient populations, risk of parkinsonian symptoms, metabolic syndrome, tardive dyskinesia and neuroleptic malignant syndrome related to treatment with antipsychotic medications and risks of misuse, abuse or dependence, sedation and respiratory depression related to treatment with benzodiazepine medications     Controlled Medication Discussion:     Jessie Campos has been filling controlled prescriptions on time as prescribed according to [address] [place] [address] Monitoring Program    Treatment Plan:    Due for update/Updated:   yes  Last treatment plan done 2/13/23 by KOLBY Tillman  Treatment Plan due on 8/13/23  KOLBY Shields 02/15/23    This note was shared with patient

## 2023-02-18 NOTE — LETTER
April 26, 2022     Patient: oRcio Alvarez  YOB: 1965  Date of Visit: 4/26/2022      To Whom it May Concern:    Dorn Closs is under my professional care  Immanuel Granados was seen in my office on 4/26/2022  Ankit been off work since 04/22 and is cleared to return to work on for / 27/2022    If you have any questions or concerns, please don't hesitate to call           Sincerely,          Tank Resendiz MD        CC: No Recipients 50

## 2023-03-06 DIAGNOSIS — F41.1 GAD (GENERALIZED ANXIETY DISORDER): ICD-10-CM

## 2023-03-06 RX ORDER — LORAZEPAM 1 MG/1
TABLET ORAL
Qty: 30 TABLET | Refills: 0 | Status: SHIPPED | OUTPATIENT
Start: 2023-03-06

## 2023-03-20 ENCOUNTER — TELEMEDICINE (OUTPATIENT)
Dept: PSYCHIATRY | Facility: CLINIC | Age: 58
End: 2023-03-20

## 2023-03-20 DIAGNOSIS — F33.1 MAJOR DEPRESSIVE DISORDER, RECURRENT EPISODE, MODERATE (HCC): Primary | ICD-10-CM

## 2023-03-20 DIAGNOSIS — F41.1 GAD (GENERALIZED ANXIETY DISORDER): ICD-10-CM

## 2023-03-20 RX ORDER — PROPRANOLOL HYDROCHLORIDE 10 MG/1
10 TABLET ORAL 2 TIMES DAILY PRN
Qty: 60 TABLET | Refills: 2 | Status: SHIPPED | OUTPATIENT
Start: 2023-03-20

## 2023-03-20 NOTE — PSYCH
Virtual Regular Visit    Verification of patient location:     Patient is located in the following state in which I hold an active license [place]    Problem List Items Addressed This Visit        Other    Major depressive disorder, recurrent episode, moderate ([facility] [address] ) - Primary   Other Visit Diagnoses     MIKA (generalized anxiety disorder)        Relevant Medications    propranolol (INDERAL) 10 mg tablet             Encounter provider KOLBY Belcher    Provider located at    [address] [place] [id number] [phone]    Recent Visits  Date Type Provider Dept   03/20/23 Lora Tomlinson [address], [address] recent visits within past 7 days and meeting all other requirements  Future Appointments  No visits were found meeting these conditions  Showing future appointments within next 150 days and meeting all other requirements           The patient was identified by name and date of birth  Patient was informed that this is a telemedicine visit and that the visit is being conducted throughthe [facility]  He agrees to proceed     My office door was closed  No one else was in the room  She acknowledged consent and understanding of privacy and security of the video platform  The patient has agreed to participate and understands they can discontinue the visit at any time  Patient is aware this is a billable service  HPI     Current Outpatient Medications   Medication Sig Dispense Refill   • escitalopram (LEXAPRO) 10 mg tablet TAKE 1 TABLET (10 MG TOTAL) BY MOUTH DAILY TAKE WITH 20MG PILL TO TOTAL 30MG DAILY  90 tablet 1   • escitalopram (LEXAPRO) 20 mg tablet TAKE 1 TABLET (20 MG TOTAL) BY MOUTH DAILY TAKE WITH 10MG TABLET TO TOTAL 30MG  90 tablet 1   • LORazepam (ATIVAN) 1 mg tablet Take 1/2 - 1 tab daily as needed for severe anxiety   30 tablet 0   • meloxicam (MOBIC) 15 mg tablet TAKE 1 TABLET (15 MG TOTAL) BY MOUTH DAILY  30 tablet 5   • propranolol (INDERAL) 10 mg tablet Take 1 tablet (10 mg total) by mouth 2 (two) times a day as needed (anxiety) 60 tablet 2   • QUEtiapine (SEROquel XR) 200 mg 24 hr tablet Take 1 tablet (200 mg total) by mouth daily at bedtime 30 tablet 2   • betamethasone valerate (VALISONE) 0 1 % cream  (Patient not taking: Reported on 1/16/2023)       No current facility-administered medications for this visit  Review of Systems  Video Exam    There were no vitals filed for this visit  Physical Exam   As a result of this visit, I have referred the patient for further respiratory evaluation  No    I spent 20 minutes directly with the patient during this visit  VIRTUAL VISIT DISCLAIMER    Talia Small acknowledges that he has consented to an online visit or consultation  He understands that the online visit is based solely on information provided by him, and that, in the absence of a face-to-face physical evaluation by the physician, the diagnosis he receives is both limited and provisional in terms of accuracy and completeness  This is not intended to replace a full medical face-to-face evaluation by the physician  Talia Small understands and accepts these terms  MEDICATION MANAGEMENT NOTE        [facility]    Name and Date of Birth:  Talia Small 59 y o  1965 MRN: [id number]    Date of Visit: March 22, 2023    Allergies   Allergen Reactions   • Other Sneezing, Throat Swelling and Tongue Swelling   • Shellfish Allergy - Food Allergy Tongue Swelling       Visit Time    Visit Start Time: 6806  Visit Stop Time: 8158  Total Visit Duration: 20 minutes    SUBJECTIVE:    Quita Nageotte is seen today for a follow up for Major Depressive Disorder  He continues to do fairly well since the last visit  Quita Nageotte seen virtually today for medication management follow up   He was last seen by this provider on 2/13/23  He reports that he was recently approved for SSD, so he is receiving some income at this time  He states that it "isn't enough" but admits it is better than nothing  He still has not found another job, but is looking into other options since receiving SSD  He has also applied for [place] assistance, as he is a   He reports that he is feeling a "little better" and has been taking the medications as ordered  States that the propranolol has been helping  He has been taking it scheduled instead of PRN, encouraged to take PRN  He states he still plans on going to the gym and wants to start exercising  He continues to have fleeting SI without plan or intent  He rates his depression and anxiety both 4/10  Reports good sleep, good appetite  He is getting out of the house  He finds enjoyment in his pets  He is brighter than he has been in previous appointments  He states that he will continue to work on decreasing his ativan  Will decrease to 1/2 tab daily at next fill  He denies any side effects from current psychiatric medications  PLAN:  Continue meds as ordered:  Lexapro 30mg PO Daily  Ativan 1/2 tab daily PRN for severe anxiety   Propranolol Bid PRN   Seroquel XR 200mg PO HS  Will follow up in one month  He will call sooner with concerns or issues if they arise prior to scheduled appointment    Aware of 24 hour and weekend coverage for urgent situations accessed by calling [facility] main practice number  Medication management every 1 month  Aware of need to follow up with family physician for medical issues    Diagnoses and all orders for this visit:    Major depressive disorder, recurrent episode, moderate (HCC)    MIKA (generalized anxiety disorder)  -     propranolol (INDERAL) 10 mg tablet;  Take 1 tablet (10 mg total) by mouth 2 (two) times a day as needed (anxiety)        Current Outpatient Medications on File Prior to Visit   Medication Sig Dispense Refill • escitalopram (LEXAPRO) 10 mg tablet TAKE 1 TABLET (10 MG TOTAL) BY MOUTH DAILY TAKE WITH 20MG PILL TO TOTAL 30MG DAILY  90 tablet 1   • escitalopram (LEXAPRO) 20 mg tablet TAKE 1 TABLET (20 MG TOTAL) BY MOUTH DAILY TAKE WITH 10MG TABLET TO TOTAL 30MG  90 tablet 1   • LORazepam (ATIVAN) 1 mg tablet Take 1/2 - 1 tab daily as needed for severe anxiety  30 tablet 0   • meloxicam (MOBIC) 15 mg tablet TAKE 1 TABLET (15 MG TOTAL) BY MOUTH DAILY  30 tablet 5   • QUEtiapine (SEROquel XR) 200 mg 24 hr tablet Take 1 tablet (200 mg total) by mouth daily at bedtime 30 tablet 2   • betamethasone valerate (VALISONE) 0 1 % cream  (Patient not taking: Reported on 1/16/2023)       No current facility-administered medications on file prior to visit  Psychotherapy Provided:     Individual psychotherapy provided: Yes  Counseling was provided during the session today for 16 minutes  Supportive counseling provided  Medication education provided to René Sharma  Recent stressors discussed with René Sharma including chronic anxiety  Coping strategies reviewed with René hSarma  Importance of medication and treatment compliance reviewed with René Sharma  Importance of follow up with family physician for medical issues reviewed with René Sharma  Reassurance and supportive therapy provided  Crisis/safety plan discussed with René Sharma  Patient will call prior to scheduled appointment if they have any issues or concerns  Patient understands they can access the office by calling the main number at any time if they are in crisis  They also understand they can call their county's crisis number or go to their nearest ED if suicidal ideation increases or if they develop a plan or intent  HPI ROS Appetite Changes and Sleep:     He reports normal sleep, normal appetite, normal energy level   Denies homicidal ideation, Intermittent passive suicidal ideation without intent or plan    Review Of Systems:  Medication Side Effects:  denies     Depression (10 worst): 4/10 (Was 4/10)   Anxiety (10 worst): 4/10 (Was 4/10)   Safety concerns (SI, HI, etc): Passive SI, no plan or intent (Was Passive SI no plan or intent)   Sleep: normal (Was normal)   Energy: adequate (Was good)   Appetite: good (Was good)     General normal    Personality no change in personality   Constitutional as noted in HPI   ENT negative   Cardiovascular negative   Respiratory negative   Gastrointestinal negative   Genitourinary negative   Musculoskeletal negative   Integumentary negative   Neurological negative   Endocrine negative   Other Symptoms none, all other systems are negative     Mental Status Evaluation:    Appearance Adequate hygiene and grooming   Behavior calm and cooperative   Mood anxious and depressed  Depression Scale - 4 of 10 (0 = No depression)  Anxiety Scale - 4 of 10 (0 = No anxiety)   Speech Normal rate and volume   Affect appropriate and mood-congruent   Thought Processes Goal directed and coherent   Thought Content Does not verbalize delusional material   Associations Tightly connected   Perceptual Disturbances Denies hallucinations and does not appear to be responding to internal stimuli   Risk Potential Suicidal/Homicidal Ideation - Passive suicidal ideation without intent or plan  Risk of Violence - No suicidal or homicidal ideation and No evidence of risk for violence found on assessment  Risk of Self Mutilation - No evidence of risk for self mutilation found on assessment   Orientation oriented to person, place, time/date and situation   Memory recent and remote memory grossly intact   Consciousness alert and awake   Attention/Concentration attention span and concentration are age appropriate   Insight fair   Judgement fair   Muscle Strength and Gait normal muscle strength and normal muscle tone, normal gait/station and normal balance   Motor Activity no abnormal movements   Language no difficulty naming common objects, no difficulty repeating a phrase, no difficulty writing a sentence   Fund of Knowledge adequate knowledge of current events  adequate fund of knowledge regarding past history  adequate fund of knowledge regarding vocabulary      Past Psychiatric [address]- per Dr Sharon Garcia [facility]  Previous diagnoses include Depression and Anxiety   Prior outpatient psychiatric treatment:   With PCP till now  [address] therapy:    Prior inpatient psychiatric treatment: x1  [address] suicide attempts:  None   Prior self harm:  None   Prior violence or aggression:  None       Past Psychiatric History Update:     Inpatient Psychiatric Admission Since Last Encounter:   no  Changes to Outpatient Psychiatric Treatment Team:    no  Suicide Attempt Or Self Mutilation Since Last Encounter:   no  Incidence of Violent Behavior Since Last Encounter:   no    Traumatic History Update:     New Onset of Abuse Since Last Encounter:   no  Traumatic Events Since Last Encounter:   no    Past Medical History:    Past Medical History:   Diagnosis Date   • Anxiety    • Depression    • Headache(784 0) March 2020    Degenerative disk in neck may be cause     • Hypertension    • Palpitation    • Panic attack         Past Surgical History:   Procedure Laterality Date   • FRACTURE SURGERY  6/1970 and 6/1979    Left and right wrists   • KNEE SURGERY  08/1982     Allergies   Allergen Reactions   • Other Sneezing, Throat Swelling and Tongue Swelling   • Shellfish Allergy - Food Allergy Tongue Swelling     Substance Abuse History:    Social History     Substance and Sexual Activity   Alcohol Use Yes   • Alcohol/week: 2 0 standard drinks   • Types: 2 Cans of beer per week    Comment: socially     Social History     Substance and Sexual Activity   Drug Use Never     Social History:    Social History     Socioeconomic History   • Marital status: /Civil Union     Spouse name: Not on file   • Number of children: Not on file   • Years of education: Not on file   • Highest education level: Not on file   Occupational History   • Not on file   Tobacco Use   • Smoking status: Passive Smoke Exposure - Never Smoker   • Smokeless tobacco: Never   Vaping Use   • Vaping Use: Never used   Substance and Sexual Activity   • Alcohol use: Yes     Alcohol/week: 2 0 standard drinks     Types: 2 Cans of beer per week     Comment: socially   • Drug use: Never   • Sexual activity: Not Currently     Partners: Female     Birth control/protection: Female Sterilization   Other Topics Concern   • Not on file   Social History Narrative   • Not on file     Social Determinants of Health     Financial Resource Strain: Not on file   Food Insecurity: Not on file   Transportation Needs: Not on file   Physical Activity: Not on file   Stress: Not on file   Social Connections: Not on file   Intimate Partner Violence: Not on file   Housing Stability: Not on file     Family Psychiatric History:     Family History   Problem Relation Age of Onset   • Hypertension Mother    • COPD Mother    • Migraines Mother    • Stroke Father         Mini stroke in 2010     History Review: The following portions of the patient's history were reviewed and updated as appropriate: allergies, current medications, past family history, past medical history, past social history, past surgical history and problem list     OBJECTIVE:     Vital signs in last 24 hours: There were no vitals filed for this visit  Laboratory Results:   Recent Labs (last 2 months):   No visits with results within 2 Month(s) from this visit  Latest known visit with results is:   Office Visit on 03/26/2022   Component Date Value   • Ventricular Rate 03/26/2022 82    • Atrial Rate 03/26/2022 82    • AL Interval 03/26/2022 206    • QRSD Interval 03/26/2022 94    • QT Interval 03/26/2022 400    • QTC Interval 03/26/2022 467    • P Axis 03/26/2022 39    • QRS Axis 03/26/2022 -19    • T Wave Axis 03/26/2022 32      I have personally reviewed all pertinent laboratory/tests results      Suicide/Homicide Risk Assessment:    Risk of Harm to Self:  The following ratings are based on assessment at the time of the interview  Recent Specific Risk Factors include: current depressive symptoms, current anxiety symptoms, experienced fleeting suicidal ideation  Demographic risk factors include: , male, age: over 48 or older  Historical Risk Factors include: chronic depression, chronic anxiety symptoms  Protective Factors: no current suicidal ideation, being a parent, being , compliant with medications, compliant with mental health treatment, having pets, responsibilities and duties to others, stable living environment, supportive family  Based on today's assessment, Silas Boland presents the following risk of harm to self: low    Risk of Harm to Others: The following ratings are based on assessment at the time of the interview  Protective Factors: no current homicidal ideation  Based on today's assessment, Silas Boland presents the following risk of harm to others: none    The following interventions are recommended: contracts for safety at present - agrees to go to ED if feeling unsafe, return in 1 month for reassessment, contracts for safety at present - agrees to call Crisis Intervention Service if feeling unsafe    Medications Risks/Benefits:      Risks, Benefits And Possible Side Effects Of Medications:    Discussed risks and benefits of treatment with patient including risk of suicidality, serotonin syndrome, increased QTc interval and SIADH related to treatment with antidepressants;  Risk of induction of manic symptoms in certain patient populations, risk of parkinsonian symptoms, metabolic syndrome, tardive dyskinesia and neuroleptic malignant syndrome related to treatment with antipsychotic medications and risks of misuse, abuse or dependence, sedation and respiratory depression related to treatment with benzodiazepine medications     Controlled Medication Discussion:     Silas Boland has been filling controlled prescriptions on time as prescribed according to [facility]    Treatment Plan:    Due for update/Updated:   no  Last treatment plan done 2/13/23 by KOLBY Wilkerson  Treatment Plan due on 8/13/23  KOLBY Chapman 03/22/23    This note was shared with patient

## 2023-04-03 DIAGNOSIS — F33.1 MAJOR DEPRESSIVE DISORDER, RECURRENT EPISODE, MODERATE (HCC): ICD-10-CM

## 2023-04-03 RX ORDER — QUETIAPINE 200 MG/1
200 TABLET, FILM COATED, EXTENDED RELEASE ORAL
Qty: 30 TABLET | Refills: 2 | Status: SHIPPED | OUTPATIENT
Start: 2023-04-03

## 2023-04-12 DIAGNOSIS — F41.1 GAD (GENERALIZED ANXIETY DISORDER): ICD-10-CM

## 2023-04-12 RX ORDER — LORAZEPAM 1 MG/1
TABLET ORAL
Qty: 15 TABLET | Refills: 0 | Status: SHIPPED | OUTPATIENT
Start: 2023-04-12

## 2023-05-01 ENCOUNTER — TELEMEDICINE (OUTPATIENT)
Dept: PSYCHIATRY | Facility: CLINIC | Age: 58
End: 2023-05-01

## 2023-05-01 DIAGNOSIS — F41.1 GAD (GENERALIZED ANXIETY DISORDER): ICD-10-CM

## 2023-05-01 DIAGNOSIS — F33.1 MODERATE EPISODE OF RECURRENT MAJOR DEPRESSIVE DISORDER (HCC): ICD-10-CM

## 2023-05-01 DIAGNOSIS — F39 MOOD DISORDER (HCC): ICD-10-CM

## 2023-05-01 RX ORDER — PROPRANOLOL HYDROCHLORIDE 10 MG/1
10 TABLET ORAL 2 TIMES DAILY PRN
Qty: 60 TABLET | Refills: 2 | Status: SHIPPED | OUTPATIENT
Start: 2023-05-01

## 2023-05-01 RX ORDER — ESCITALOPRAM OXALATE 10 MG/1
10 TABLET ORAL DAILY
Qty: 90 TABLET | Refills: 1 | Status: SHIPPED | OUTPATIENT
Start: 2023-05-01

## 2023-05-01 RX ORDER — ESCITALOPRAM OXALATE 20 MG/1
20 TABLET ORAL DAILY
Qty: 90 TABLET | Refills: 1 | Status: SHIPPED | OUTPATIENT
Start: 2023-05-01

## 2023-05-01 NOTE — PSYCH
Virtual Regular Visit    Verification of patient location:    Patient is located in the following state in which I hold an active license [place]    Problem List Items Addressed This Visit    None  Visit Diagnoses     Moderate episode of recurrent major depressive disorder (HCC)        Relevant Medications    escitalopram (LEXAPRO) 10 mg tablet    escitalopram (LEXAPRO) 20 mg tablet    Mood disorder (HCC)        Relevant Medications    escitalopram (LEXAPRO) 10 mg tablet    escitalopram (LEXAPRO) 20 mg tablet    MIKA (generalized anxiety disorder)        Relevant Medications    escitalopram (LEXAPRO) 10 mg tablet    escitalopram (LEXAPRO) 20 mg tablet    propranolol (INDERAL) 10 mg tablet             Encounter provider Henrik Lewis    Provider located at    [address] [place] [address]  [address] [facility] [address] [phone]    Recent Visits  Date Type Provider Dept   05/01/23 [address] [place] [address] recent visits within past 7 days and meeting all other requirements  Future Appointments  No visits were found meeting these conditions  Showing future appointments within next 150 days and meeting all other requirements           The patient was identified by name and date of birth  Patient was informed that this is a telemedicine visit and [facility]  He agrees to proceed     My office door was closed  No one else was in the room  She acknowledged consent and understanding of privacy and security of the video platform  The patient has agreed to participate and understands they can discontinue the visit at any time  Patient is aware this is a billable service       HPI     Current Outpatient Medications   Medication Sig Dispense Refill    escitalopram (LEXAPRO) 10 mg tablet Take 1 tablet (10 mg total) by mouth daily Take with 20mg pill to total 30mg daily  90 tablet 1    escitalopram (LEXAPRO) 20 mg tablet Take 1 tablet (20 mg total) by mouth daily Take with 10mg tablet to total 30mg  90 tablet 1    propranolol (INDERAL) 10 mg tablet Take 1 tablet (10 mg total) by mouth 2 (two) times a day as needed (anxiety) 60 tablet 2    betamethasone valerate (VALISONE) 0 1 % cream  (Patient not taking: Reported on 1/16/2023)      LORazepam (ATIVAN) 1 mg tablet Take 1/2 tab daily as needed for severe anxiety  15 tablet 0    meloxicam (MOBIC) 15 mg tablet TAKE 1 TABLET (15 MG TOTAL) BY MOUTH DAILY  30 tablet 5    QUEtiapine (SEROquel XR) 200 mg 24 hr tablet Take 1 tablet (200 mg total) by mouth daily at bedtime 30 tablet 2     No current facility-administered medications for this visit  Review of Systems  Video Exam    There were no vitals filed for this visit  Physical Exam   As a result of this visit, I have referred the patient for further respiratory evaluation  No    I spent 30 minutes directly with the patient during this visit  VIRTUAL VISIT DISCLAIMER    Benjamin Brannon acknowledges that he has consented to an online visit or consultation  He understands that the online visit is based solely on information provided by him, and that, in the absence of a face-to-face physical evaluation by the physician, the diagnosis he receives is both limited and provisional in terms of accuracy and completeness  This is not intended to replace a full medical face-to-face evaluation by the physician  Benjamin South understands and accepts these terms      MEDICATION MANAGEMENT NOTE        [facility]    Name and Date of Birth:  Benjamin South 59 y o  1965 MRN: [id number]    Date of Visit: May 1, 2023    Allergies   Allergen Reactions    Other Sneezing, Throat Swelling and Tongue Swelling    Shellfish Allergy - Food Allergy Tongue Swelling       Visit Time    Visit Start Time: 2914  Visit Stop Time: 1800  Total Visit Duration: 30 minutes    SUBJECTIVE:    Scar Gamez is seen today for a follow up for Major Depressive Disorder  He continues to do fairly well since the last visit  Scar Gamez was seen virtually today for medication management follow-up  He was last seen by this provider on 3/20/2023  He states today that he has had no change in his depression or anxiety  He reports he just got home from seeing his father who is living in the nursing home  He states that his dad is 80 and trying to talk his kids and to bringing him to get his 's license back  Scar Gamez was approved for Social Security disability, and is trying to get veterans benefits as well  He appears brighter, less depressed in conversation  He is more reactive during conversation, less constricted in affect  He reports he has pain in his left groin which is restricted him from starting any exercise routine as we discussed in previous appointments  He has not seen a doctor about this, but this provider did recommend he make an appointment with his PCP  Additionally, he knows he needs bloodwork done  He states that he continues to feel groggy in the am before he gets out of bed  He is sleeping and gets approximately 8-9 hours of sleep per night  His depression is rated 5/10, anxiety 5/10  He reports some fleeting SI, no plan or intent  He denies HI or auditory or visual hallucinations  He denies any side effects from current psychiatric medications  PLAN:  We discussed ways that he can motivate to get out of bed in the mornings  He will work on setting a schedule for himself the night before  We discussed the fact that he has nothing specific to get out of bed for and has to work on adjusting his life to his new schedule with not working  He understood and agreed, stated he would try working on creating a schedule for himself  We will continue to decrease his ativan  He is currently taking 0 5mg PO Daily    Next month, this provider will decrease the number of 0 5mg pills to 25 instead of 30  He will be instructed to take 1/2 - 1 tab daily PRN  Continue other medications as ordered:  Lexapro 30mg PO daily  Inderal 10mg PO BID PRN  Seroquel XR 200mg PO HS    Follow up in one month  He will call sooner with concerns or issues if they arise prior to scheduled appointment    Aware of 24 hour and weekend coverage for urgent situations accessed by calling [facility] main practice number  Medication management every 1 month  Aware of need to follow up with family physician for medical issues    Diagnoses and all orders for this visit:    Moderate episode of recurrent major depressive disorder ([facility] [address] )  -     escitalopram (LEXAPRO) 10 mg tablet; Take 1 tablet (10 mg total) by mouth daily Take with 20mg pill to total 30mg daily  Mood disorder (HCC)  -     escitalopram (LEXAPRO) 20 mg tablet; Take 1 tablet (20 mg total) by mouth daily Take with 10mg tablet to total 30mg  MIKA (generalized anxiety disorder)  -     propranolol (INDERAL) 10 mg tablet; Take 1 tablet (10 mg total) by mouth 2 (two) times a day as needed (anxiety)      Current Rating Scores:     Current PHQ-9   PHQ-2/9 Depression Screening    Little interest or pleasure in doing things: 1 - several days  Feeling down, depressed, or hopeless: 2 - more than half the days  Trouble falling or staying asleep, or sleeping too much: 2 - more than half the days  Feeling tired or having little energy: 2 - more than half the days  Poor appetite or overeatin - several days  Feeling bad about yourself - or that you are a failure or have let yourself or your family down: 2 - more than half the days  Trouble concentrating on things, such as reading the newspaper or watching television: 0 - not at all  Moving or speaking so slowly that other people could have noticed   Or the opposite - being so fidgety or restless that you have been moving around a lot more than usual: 0 - not at all  Thoughts that you would be better off dead, or of hurting yourself in some way: 1 - several days  PHQ-9 Score: 11   PHQ-9 Interpretation: Moderate depression        Current MIKA-7 is   MIKA-7 Flowsheet Screening    Flowsheet Row Most Recent Value   Over the last 2 weeks, how often have you been bothered by any of the following problems? Feeling nervous, anxious, or on edge 2   Not being able to stop or control worrying 1   Worrying too much about different things 2   Trouble relaxing 1   Being so restless that it is hard to sit still 1   Becoming easily annoyed or irritable 0   Feeling afraid as if something awful might happen 1   MIKA-7 Total Score 8        Current Outpatient Medications on File Prior to Visit   Medication Sig Dispense Refill    betamethasone valerate (VALISONE) 0 1 % cream  (Patient not taking: Reported on 1/16/2023)      LORazepam (ATIVAN) 1 mg tablet Take 1/2 tab daily as needed for severe anxiety  15 tablet 0    meloxicam (MOBIC) 15 mg tablet TAKE 1 TABLET (15 MG TOTAL) BY MOUTH DAILY  30 tablet 5    QUEtiapine (SEROquel XR) 200 mg 24 hr tablet Take 1 tablet (200 mg total) by mouth daily at bedtime 30 tablet 2     No current facility-administered medications on file prior to visit  Psychotherapy Provided:     Individual psychotherapy provided: Yes  Counseling was provided during the session today for 16 minutes  Supportive counseling provided  Medication changes discussed with Taras Mishra  Medication education provided to Taras Mishra  Recent stressor including dad in assisted living discussed with Taras Mishra  Coping strategies reviewed with Taras Mishra  Importance of medication and treatment compliance reviewed with Taras Mishra  Importance of follow up with family physician for medical issues reviewed with Taras Mishra  Reassurance and supportive therapy provided  Crisis/safety plan discussed with Taras Mishra   Patient will call prior to scheduled appointment if they have any issues or concerns  Patient understands they can access the office by calling the main number at any time if they are in crisis  They also understand they can call their [facility]'s crisis number or go to their nearest ED if suicidal ideation increases or if they develop a plan or intent  HPI ROS Appetite Changes and Sleep:     He reports normal sleep, adequate appetite, normal energy level   Denies homicidal ideation, Intermittent passive suicidal ideation without intent or plan    Review Of Systems:  HPI ROS:               Medication Side Effects:  denies     Depression (10 worst): 5/10 (Was 4/10)   Anxiety (10 worst): 5/10 (Was 4/10)   Safety concerns (SI, HI, etc): Passive SI, no plan or intent (Was Passive SI, no plan or intent)   Sleep: Good, 8-9 hours/night (Was normal)   Energy: adequate (Was adequate)   Appetite: good (Was good)     General normal    Personality no change in personality   Constitutional as noted in HPI   ENT negative   Cardiovascular negative   Respiratory negative   Gastrointestinal negative   Genitourinary negative   Musculoskeletal as noted in HPI and Left Groin Pain   Integumentary negative   Neurological negative   Endocrine negative   Other Symptoms none, all other systems are negative     Mental Status Evaluation:    Appearance Appropriately dressed and Good eye contact   Behavior calm and cooperative   Mood anxious and depressed  Depression Scale - 5 of 10 (0 = No depression)  Anxiety Scale - 5 of 10 (0 = No anxiety)   Speech Normal rate and volume   Affect mood-congruent   Thought Processes Goal directed and coherent   Thought Content Does not verbalize delusional material   Associations Tightly connected   Perceptual Disturbances Denies hallucinations and does not appear to be responding to internal stimuli   Risk Potential Suicidal/Homicidal Ideation - Passive suicidal ideation without intent or plan  Risk of Violence - No evidence of risk for violence found on assessment  Risk of Self Mutilation - No evidence of risk for self mutilation found on assessment   Orientation oriented to person, place, time/date and situation   Memory recent and remote memory grossly intact   Consciousness alert and awake   Attention/Concentration attention span and concentration are age appropriate   Insight fair   Judgement fair   Muscle Strength and Gait normal muscle strength and normal muscle tone, normal gait/station and normal balance   Motor Activity no abnormal movements   Language no difficulty naming common objects, no difficulty repeating a phrase, no difficulty writing a sentence   Fund of Knowledge adequate knowledge of current events  adequate fund of knowledge regarding past history  adequate fund of knowledge regarding vocabulary      Past Psychiatric History - per Dr Charles Ron H&P  Previous diagnoses include Depression and Anxiety   Prior outpatient psychiatric treatment:   With PCP till now   Prior therapy:    Prior inpatient psychiatric treatment: x1  [address] suicide attempts:  None   Prior self harm:  None   Prior violence or aggression:  None    Past Psychiatric History Update:     Inpatient Psychiatric Admission Since Last Encounter:   no  Changes to Outpatient Psychiatric Treatment Team:    no  Suicide Attempt Or Self Mutilation Since Last Encounter:   no  Incidence of Violent Behavior Since Last Encounter:   no    Traumatic History Update:     New Onset of Abuse Since Last Encounter:   no  Traumatic Events Since Last Encounter:   no    Past Medical History:    Past Medical History:   Diagnosis Date    Anxiety     Depression     Headache(784 0) March 2020    Degenerative disk in neck may be cause      Hypertension     Palpitation     Panic attack         Past Surgical History:   Procedure Laterality Date    FRACTURE SURGERY  6/1970 and 6/1979    Left and right wrists    KNEE SURGERY  08/1982     Allergies   Allergen Reactions    Other Sneezing, Throat Swelling and Tongue Swelling    Shellfish Allergy - Food Allergy Tongue Swelling     Substance Abuse History:    Social History     Substance and Sexual Activity   Alcohol Use Yes    Alcohol/week: 2 0 standard drinks    Types: 2 Cans of beer per week    Comment: socially     Social History     Substance and Sexual Activity   Drug Use Never     Social History:    Social History     Socioeconomic History    Marital status: /Civil Union     Spouse name: Not on file    Number of children: Not on file    Years of education: Not on file    Highest education level: Not on file   Occupational History    Not on file   Tobacco Use    Smoking status: Passive Smoke Exposure - Never Smoker    Smokeless tobacco: Never   Vaping Use    Vaping Use: Never used   Substance and Sexual Activity    Alcohol use: Yes     Alcohol/week: 2 0 standard drinks     Types: 2 Cans of beer per week     Comment: socially    Drug use: Never    Sexual activity: Not Currently     Partners: Female     Birth control/protection: Female Sterilization   Other Topics Concern    Not on file   Social History Narrative    Not on file     Social Determinants of Health     Financial Resource Strain: Not on file   Food Insecurity: Not on file   Transportation Needs: Not on file   Physical Activity: Not on file   Stress: Not on file   Social Connections: Not on file   Intimate Partner Violence: Not on file   Housing Stability: Not on file     Family Psychiatric History:     Family History   Problem Relation Age of Onset    Hypertension Mother     COPD Mother     Migraines Mother     Stroke Father         Mini stroke in 2010     History Review: The following portions of the patient's history were reviewed and updated as appropriate: allergies, current medications, past family history, past medical history, past social history, past surgical history and problem list     OBJECTIVE:     Vital signs in last 24 hours:     There were no vitals filed for this visit  Laboratory Results:   Recent Labs (last 2 months):   No visits with results within 2 Month(s) from this visit  Latest known visit with results is:   Office Visit on 03/26/2022   Component Date Value    Ventricular Rate 03/26/2022 82     Atrial Rate 03/26/2022 82     VA Interval 03/26/2022 206     QRSD Interval 03/26/2022 94     QT Interval 03/26/2022 400     QTC Interval 03/26/2022 467     P Axis 03/26/2022 39     QRS Axis 03/26/2022 -23     T Wave Axis 03/26/2022 32      I have personally reviewed all pertinent laboratory/tests results  Suicide/Homicide Risk Assessment:    Risk of Harm to Self:  The following ratings are based on assessment at the time of the interview  Recent Specific Risk Factors include: current depressive symptoms, current anxiety symptoms, passive death wishes, unemployed  Demographic risk factors include: , male, age: over 48 or older  Historical Risk Factors include: chronic depressive symptoms, chronic anxiety symptoms  Protective Factors: no current suicidal ideation, access to mental health treatment, being a parent, being , compliant with medications, compliant with mental health treatment, stable living environment, sense of determination, strong relationships  Based on today's assessment, Lashay Arizmendi presents the following risk of harm to self: low    Risk of Harm to Others: The following ratings are based on assessment at the time of the interview  Historical Risk Factors include: none    Based on today's assessment, Lashay Arizmendi presents the following risk of harm to others: none    The following interventions are recommended: contracts for safety at present - agrees to go to ED if feeling unsafe, contracts for safety at present - agrees to call Crisis Intervention Service if feeling unsafe    Medications Risks/Benefits:      Risks, Benefits And Possible Side Effects Of Medications:    Discussed risks and benefits of treatment with patient including risk of suicidality, serotonin syndrome, increased QTc interval and SIADH related to treatment with antidepressants; Risk of induction of manic symptoms in certain patient populations, risk of parkinsonian symptoms, metabolic syndrome, tardive dyskinesia and neuroleptic malignant syndrome related to treatment with antipsychotic medications, risks of misuse, abuse or dependence, sedation and respiratory depression related to treatment with benzodiazepine medications and Risk of orthostatic hypotension with inderal     Controlled Medication Discussion:     Candace Ferguson has been filling controlled prescriptions on time as prescribed according to [address] [place] [address] Drive Monitoring Program  Discussed with Candace Ferguson the risks of sedation, respiratory depression, impairment of ability to drive and potential for abuse and addiction related to treatment with benzodiazepine medications  He understands risk of treatment with benzodiazepine medications, agrees to not drive if feels impaired and agrees to take medications as prescribed    Treatment Plan:    Due for update/Updated:   no  Last treatment plan done 2/13/23 by KOLBY Disla  Treatment Plan due on 8/13/23  KOLBY Lubin 05/02/23    This note was shared with patient

## 2023-06-12 ENCOUNTER — TELEMEDICINE (OUTPATIENT)
Dept: PSYCHIATRY | Facility: CLINIC | Age: 58
End: 2023-06-12
Payer: COMMERCIAL

## 2023-06-12 DIAGNOSIS — F41.1 GAD (GENERALIZED ANXIETY DISORDER): ICD-10-CM

## 2023-06-12 DIAGNOSIS — F33.1 MAJOR DEPRESSIVE DISORDER, RECURRENT EPISODE, MODERATE (HCC): ICD-10-CM

## 2023-06-12 PROCEDURE — 99214 OFFICE O/P EST MOD 30 MIN: CPT | Performed by: NURSE PRACTITIONER

## 2023-06-12 RX ORDER — QUETIAPINE 200 MG/1
200 TABLET, FILM COATED, EXTENDED RELEASE ORAL
Qty: 30 TABLET | Refills: 2 | Status: SHIPPED | OUTPATIENT
Start: 2023-06-12

## 2023-06-12 RX ORDER — LORAZEPAM 0.5 MG/1
TABLET ORAL
Qty: 25 TABLET | Refills: 0 | Status: SHIPPED | OUTPATIENT
Start: 2023-06-12

## 2023-06-12 NOTE — PSYCH
Virtual Regular Visit    Verification of patient location:    Patient is located in the following state in which I hold an active license [place]    Problem List Items Addressed This Visit        Other    Major depressive disorder, recurrent episode, moderate (HCC)    Relevant Medications    QUEtiapine (SEROquel XR) 200 mg 24 hr tablet    LORazepam (ATIVAN) 0 5 mg tablet   Other Visit Diagnoses     MIKA (generalized anxiety disorder)        Relevant Medications    QUEtiapine (SEROquel XR) 200 mg 24 hr tablet    LORazepam (ATIVAN) 0 5 mg tablet             Encounter provider KOLBY Galdamez    Provider located at    [address] [place] [id number] [phone]    Recent Visits  Date Type Provider Dept   06/12/23 Lora Tomlinson [address] [facility], [address] recent visits within past 7 days and meeting all other requirements  Future Appointments  No visits were found meeting these conditions  Showing future appointments within next 150 days and meeting all other requirements           The patient was identified by name and date of birth  Patient was informed that this is a telemedicine visit and that [facility]  He agrees to proceed     My office door was closed  No one else was in the room  She acknowledged consent and understanding of privacy and security of the video platform  The patient has agreed to participate and understands they can discontinue the visit at any time  Patient is aware this is a billable service  HPI     Current Outpatient Medications   Medication Sig Dispense Refill   • LORazepam (ATIVAN) 0 5 mg tablet Take 1/2 - 1 tab daily as needed for severe anxiety   25 tablet 0   • QUEtiapine (SEROquel XR) 200 mg 24 hr tablet Take 1 tablet (200 mg total) by mouth daily at bedtime 30 tablet 2   • betamethasone valerate (VALISONE) 0 1 % cream  (Patient not taking: Reported on 1/16/2023)     • escitalopram (LEXAPRO) 10 mg tablet Take 1 tablet (10 mg total) by mouth daily Take with 20mg pill to total 30mg daily  90 tablet 1   • escitalopram (LEXAPRO) 20 mg tablet Take 1 tablet (20 mg total) by mouth daily Take with 10mg tablet to total 30mg  90 tablet 1   • meloxicam (MOBIC) 15 mg tablet TAKE 1 TABLET (15 MG TOTAL) BY MOUTH DAILY  30 tablet 5   • propranolol (INDERAL) 10 mg tablet Take 1 tablet (10 mg total) by mouth 2 (two) times a day as needed (anxiety) 60 tablet 2     No current facility-administered medications for this visit  Review of Systems  Video Exam    There were no vitals filed for this visit  Physical Exam   As a result of this visit, I have referred the patient for further respiratory evaluation  No    I spent 30 minutes directly with the patient during this visit  VIRTUAL VISIT DISCLAIMER    Kailyn Ching acknowledges that he has consented to an online visit or consultation  He understands that the online visit is based solely on information provided by him, and that, in the absence of a face-to-face physical evaluation by the physician, the diagnosis he receives is both limited and provisional in terms of accuracy and completeness  This is not intended to replace a full medical face-to-face evaluation by the physician  Kailyn Ching understands and accepts these terms  MEDICATION MANAGEMENT NOTE        [address]    Name and Date of Birth:  Kailyn Ching 59 y o  1965 MRN: [id number]    Date of Visit: June 14, 2023    Allergies   Allergen Reactions   • Other Sneezing, Throat Swelling and Tongue Swelling   • Shellfish Allergy - Food Allergy Tongue Swelling       Visit Time    Visit Start Time: 5072  Visit Stop Time: 7981  Total Visit Duration: 30 minutes    SUBJECTIVE:    Johnny Lay is seen today for a follow up for Major Depressive Disorder   He continues to do fairly well since the last visit  Mayra Hope seen virtually today for medication management follow up  He was last seen by this provider on 5/1/23  He reports today that there have been no changes and he continues to have some anxiety, especially in the am's when he wakes  He reports that he tried to make a schedule to keep himself occupied but has had little success  He rates his depression 5/10, anxiety 5/10  He is calm and constricted in affect  He reports passive SI at times, with no plan or intent  We will continue the ativan taper with 1/2 - 1 tab of 0 5mg PO daily PRN for severe anxiety  Next month we will decrease to 20 tabs  He verbalized understanding at this time  He will continue to try to motivate himself to be more active, though he consistently reports some physical impairment that janet him from having the ability to be more active  Reports good sleep, good appetite  He will come into the office at next visit for follow up  He denies any side effects from current psychiatric medications  PLAN:  Ativan 0 5 mg 1/2-1 tab daily as needed  Seroquel 200 mg p o  at bedtime  Lexapro 30 mg p o  daily  Propranolol 10 mg p o  twice daily as needed  Follow up in 1 month  He will call sooner with concerns or issues if they arise prior to scheduled appointment    Aware of 24 hour and weekend coverage for urgent situations accessed by calling [facility] main practice number  Medication management every 1 month  Aware of need to follow up with family physician for medical issues    Diagnoses and all orders for this visit:    Major depressive disorder, recurrent episode, moderate (HCC)  -     QUEtiapine (SEROquel XR) 200 mg 24 hr tablet; Take 1 tablet (200 mg total) by mouth daily at bedtime    MIKA (generalized anxiety disorder)  -     LORazepam (ATIVAN) 0 5 mg tablet; Take 1/2 - 1 tab daily as needed for severe anxiety          Current Outpatient Medications on File Prior to Visit   Medication Sig Dispense Refill   • betamethasone valerate (VALISONE) 0 1 % cream  (Patient not taking: Reported on 1/16/2023)     • escitalopram (LEXAPRO) 10 mg tablet Take 1 tablet (10 mg total) by mouth daily Take with 20mg pill to total 30mg daily  90 tablet 1   • escitalopram (LEXAPRO) 20 mg tablet Take 1 tablet (20 mg total) by mouth daily Take with 10mg tablet to total 30mg  90 tablet 1   • meloxicam (MOBIC) 15 mg tablet TAKE 1 TABLET (15 MG TOTAL) BY MOUTH DAILY  30 tablet 5   • propranolol (INDERAL) 10 mg tablet Take 1 tablet (10 mg total) by mouth 2 (two) times a day as needed (anxiety) 60 tablet 2     No current facility-administered medications on file prior to visit  Psychotherapy Provided:     Individual psychotherapy provided: Yes  Counseling was provided during the session today for 16 minutes  Supportive counseling provided  Medication changes discussed with Burton Reynolds  Medication education provided to Burton Reynolds  Discussed with Burton Reynolds coping with ongoing anxiety  Coping strategies reviewed with Burton Reynolds  Importance of medication and treatment compliance reviewed with Burton Reynolds  Importance of follow up with family physician for medical issues reviewed with Burton Reynolds  Reassurance and supportive therapy provided  Reoriented to reality and reassured  Crisis/safety plan discussed with Burton Reynolds  Patient will call prior to scheduled appointment if they have any issues or concerns  Patient understands they can access the office by calling the main number at any time if they are in crisis  They also understand they can call their [facility]'s crisis number or go to their nearest ED if suicidal ideation increases or if they develop a plan or intent  HPI ROS Appetite Changes and Sleep:     He reports normal sleep, normal appetite, adequate energy level   Denies homicidal ideation, Intermittent passive suicidal ideation without intent or plan    Review Of Systems:  HPI ROS: Medication Side Effects:  denies     Depression (10 worst): 5/10 (Was 5/10)   Anxiety (10 worst): 5/10 (Was 5/10)   Safety concerns (SI, HI, etc): Passive, no plan or intent (Was Passive, no plan or intent)   Sleep: Good, normal (Was good, normal)   Energy: adequate (Was adequate)   Appetite: good (Was good)     General normal    Personality no change in personality   Constitutional as noted in HPI   ENT negative   Cardiovascular negative   Respiratory negative   Gastrointestinal negative   Genitourinary negative   Musculoskeletal negative   Integumentary negative   Neurological negative   Endocrine negative   Other Symptoms none, all other systems are negative     Mental Status Evaluation:    Appearance Appropriately dressed and Good eye contact   Behavior calm and cooperative   Mood anxious and depressed  Depression Scale - 5 of 10 (0 = No depression)  Anxiety Scale - 5 of 10 (0 = No anxiety)   Speech Normal rate and volume   Affect constricted   Thought Processes Goal directed and coherent   Thought Content Does not verbalize delusional material   Associations Tightly connected   Perceptual Disturbances Denies hallucinations and does not appear to be responding to internal stimuli   Risk Potential Suicidal/Homicidal Ideation - Passive suicidal ideation without intent or plan  Risk of Violence - No evidence of risk for violence found on assessment  Risk of Self Mutilation - No evidence of risk for self mutilation found on assessment   Orientation oriented to person, place, time/date and situation   Memory recent and remote memory grossly intact   Consciousness alert and awake   Attention/Concentration attention span and concentration appear shorter than expected for age   Insight fair   Judgement fair   Muscle Strength and Gait normal muscle strength and normal muscle tone, normal gait/station and normal balance   Motor Activity no abnormal movements   Language no difficulty naming common objects, no difficulty repeating a phrase, no difficulty writing a sentence   Fund of Knowledge adequate knowledge of current events  adequate fund of knowledge regarding past history  adequate fund of knowledge regarding vocabulary      Past Psychiatric [address] [address]- per Dr Jada Lopes H&P  Previous diagnoses include Depression and Anxiety   Prior outpatient psychiatric treatment:   With PCP till now  [address] therapy:    Prior inpatient psychiatric treatment: x1  [address] suicide attempts:  None   Prior self harm:  None   Prior violence or aggression:  None       Past Psychiatric History Update:     Inpatient Psychiatric Admission Since Last Encounter:   no  Changes to Outpatient Psychiatric Treatment Team:    no  Suicide Attempt Or Self Mutilation Since Last Encounter:   no  Incidence of Violent Behavior Since Last Encounter:   no    Traumatic History Update:     New Onset of Abuse Since Last Encounter:   no  Traumatic Events Since Last Encounter:   no    Past Medical History:    Past Medical History:   Diagnosis Date   • Anxiety    • Depression    • Headache(784 0) March 2020    Degenerative disk in neck may be cause     • Hypertension    • Palpitation    • Panic attack         Past Surgical History:   Procedure Laterality Date   • FRACTURE SURGERY  6/1970 and 6/1979    Left and right wrists   • KNEE SURGERY  08/1982     Allergies   Allergen Reactions   • Other Sneezing, Throat Swelling and Tongue Swelling   • Shellfish Allergy - Food Allergy Tongue Swelling     Substance Abuse History:    Social History     Substance and Sexual Activity   Alcohol Use Yes   • Alcohol/week: 2 0 standard drinks of alcohol   • Types: 2 Cans of beer per week    Comment: socially     Social History     Substance and Sexual Activity   Drug Use Never     Social History:    Social History     Socioeconomic History   • Marital status: /Civil Union     Spouse name: Not on file   • Number of children: Not on file   • Years of education: Not on file   • Highest education level: Not on file   Occupational History   • Not on file   Tobacco Use   • Smoking status: Passive Smoke Exposure - Never Smoker   • Smokeless tobacco: Never   Vaping Use   • Vaping Use: Never used   Substance and Sexual Activity   • Alcohol use: Yes     Alcohol/week: 2 0 standard drinks of alcohol     Types: 2 Cans of beer per week     Comment: socially   • Drug use: Never   • Sexual activity: Not Currently     Partners: Female     Birth control/protection: Female Sterilization   Other Topics Concern   • Not on file   Social History Narrative   • Not on file     Social Determinants of Health     Financial Resource Strain: Not on file   Food Insecurity: Not on file   Transportation Needs: Not on file   Physical Activity: Not on file   Stress: Not on file   Social Connections: Not on file   Intimate Partner Violence: Not on file   Housing Stability: Not on file     Family Psychiatric History:     Family History   Problem Relation Age of Onset   • Hypertension Mother    • COPD Mother    • Migraines Mother    • Stroke Father         Mini stroke in 2010     History Review: The following portions of the patient's history were reviewed and updated as appropriate: allergies, current medications, past family history, past medical history, past social history, past surgical history and problem list     OBJECTIVE:     Vital signs in last 24 hours: There were no vitals filed for this visit  Laboratory Results:   Recent Labs (last 2 months):   No visits with results within 2 Month(s) from this visit     Latest known visit with results is:   Office Visit on 03/26/2022   Component Date Value   • Ventricular Rate 03/26/2022 82    • Atrial Rate 03/26/2022 82    • ME Interval 03/26/2022 206    • QRSD Interval 03/26/2022 94    • QT Interval 03/26/2022 400    • QTC Interval 03/26/2022 467    • P Axis 03/26/2022 39    • QRS Axis 03/26/2022 -19    • T Wave Axis 03/26/2022 32      I have personally reviewed all pertinent laboratory/tests results  Suicide/Homicide Risk Assessment:    Risk of Harm to Self:  The following ratings are based on assessment at the time of the interview  Recent Specific Risk Factors include: current depressive symptoms, current anxiety symptoms, unemployed  Demographic risk factors include: , male, age: over 48 or older  Historical Risk Factors include: chronic depressive symptoms, chronic anxiety symptoms  Protective Factors: no current suicidal ideation, access to mental health treatment, being a parent, being , compliant with medications, compliant with mental health treatment, connection to own children, having a desire to be alive, having a sense of purpose or meaning in life, responsibilities and duties to others, stable living environment, sense of determination, supportive family  Based on today's assessment, Atif Solomon presents the following risk of harm to self: low    Risk of Harm to Others: The following ratings are based on assessment at the time of the interview  Protective Factors: no current homicidal ideation  Based on today's assessment, Atif Solomon presents the following risk of harm to others: none    The following interventions are recommended: contracts for safety at present - agrees to go to ED if feeling unsafe, return in 1 month for reassessment, contracts for safety at present - agrees to call Crisis Intervention Service if feeling unsafe    Medications Risks/Benefits:      Risks, Benefits And Possible Side Effects Of Medications:    Discussed risks and benefits of treatment with patient including risk of suicidality, serotonin syndrome, increased QTc interval and SIADH related to treatment with antidepressants;  Risk of induction of manic symptoms in certain patient populations, risk of parkinsonian symptoms, metabolic syndrome, tardive dyskinesia and neuroleptic malignant syndrome related to treatment with antipsychotic medications and risks of misuse, abuse or dependence, sedation and respiratory depression related to treatment with benzodiazepine medications     Controlled Medication Discussion:     Johnny Lay has been filling controlled prescriptions on time as prescribed according to [place] Prescription Drug Monitoring Program    Treatment Plan:    Due for update/Updated:   no  Last treatment plan done 2/13/23 by KOLBY Caldwell  Treatment Plan due on 8/13/23  KOLBY Wilson 06/14/23    This note was shared with patient

## 2023-07-12 DIAGNOSIS — M54.50 CHRONIC BILATERAL LOW BACK PAIN WITHOUT SCIATICA: ICD-10-CM

## 2023-07-12 DIAGNOSIS — G89.29 CHRONIC BILATERAL LOW BACK PAIN WITHOUT SCIATICA: ICD-10-CM

## 2023-07-12 RX ORDER — MELOXICAM 15 MG/1
15 TABLET ORAL DAILY
Qty: 30 TABLET | Refills: 5 | OUTPATIENT
Start: 2023-07-12

## 2023-07-24 ENCOUNTER — OFFICE VISIT (OUTPATIENT)
Dept: PSYCHIATRY | Facility: CLINIC | Age: 58
End: 2023-07-24
Payer: COMMERCIAL

## 2023-07-24 VITALS — SYSTOLIC BLOOD PRESSURE: 120 MMHG | DIASTOLIC BLOOD PRESSURE: 86 MMHG | HEART RATE: 73 BPM

## 2023-07-24 DIAGNOSIS — F41.1 GAD (GENERALIZED ANXIETY DISORDER): ICD-10-CM

## 2023-07-24 DIAGNOSIS — F33.1 MAJOR DEPRESSIVE DISORDER, RECURRENT EPISODE, MODERATE (HCC): Primary | ICD-10-CM

## 2023-07-24 PROBLEM — F32.3 MAJOR DEPRESSION WITH PSYCHOTIC FEATURES (HCC): Status: RESOLVED | Noted: 2022-02-10 | Resolved: 2023-07-24

## 2023-07-24 PROCEDURE — 99214 OFFICE O/P EST MOD 30 MIN: CPT | Performed by: NURSE PRACTITIONER

## 2023-07-24 RX ORDER — LORAZEPAM 0.5 MG/1
TABLET ORAL
Qty: 20 TABLET | Refills: 0 | Status: SHIPPED | OUTPATIENT
Start: 2023-07-24

## 2023-07-24 RX ORDER — PROPRANOLOL HYDROCHLORIDE 10 MG/1
10 TABLET ORAL 2 TIMES DAILY PRN
Qty: 60 TABLET | Refills: 2 | Status: SHIPPED | OUTPATIENT
Start: 2023-07-24

## 2023-07-24 NOTE — PSYCH
Regular Visit    Problem List Items Addressed This Visit        Other    Major depressive disorder, recurrent episode, moderate (HCC) - Primary    Relevant Medications    LORazepam (ATIVAN) 0.5 mg tablet   Other Visit Diagnoses     MIKA (generalized anxiety disorder)        Relevant Medications    propranolol (INDERAL) 10 mg tablet    LORazepam (ATIVAN) 0.5 mg tablet             Encounter provider KOLBY Burris    Provider located at    [address] [address] [address] [phone]    Recent Visits  Date Type Provider Dept   07/24/23 Office Visit Rodney Muhammad, [address] [place] [address]   Showing recent visits within past 7 days and meeting all other requirements  Future Appointments  No visits were found meeting these conditions. Showing future appointments within next 150 days and meeting all other requirements       HPI     Current Outpatient Medications   Medication Sig Dispense Refill   • escitalopram (LEXAPRO) 10 mg tablet Take 1 tablet (10 mg total) by mouth daily Take with 20mg pill to total 30mg daily. 90 tablet 1   • escitalopram (LEXAPRO) 20 mg tablet Take 1 tablet (20 mg total) by mouth daily Take with 10mg tablet to total 30mg. 90 tablet 1   • LORazepam (ATIVAN) 0.5 mg tablet Take 1/2 - 1 tab daily as needed for severe anxiety. 20 tablet 0   • meloxicam (MOBIC) 15 mg tablet TAKE 1 TABLET (15 MG TOTAL) BY MOUTH DAILY. 30 tablet 5   • propranolol (INDERAL) 10 mg tablet Take 1 tablet (10 mg total) by mouth 2 (two) times a day as needed (anxiety) 60 tablet 2   • QUEtiapine (SEROquel XR) 200 mg 24 hr tablet Take 1 tablet (200 mg total) by mouth daily at bedtime 30 tablet 2   • betamethasone valerate (VALISONE) 0.1 % cream  (Patient not taking: Reported on 1/16/2023)       No current facility-administered medications for this visit.        Review of Systems    I spent 30 minutes directly with the patient during this visit      MEDICATION MANAGEMENT NOTE        [facility]    Name and Date of Birth:  Gary Cortez 59 y.o. 1965 MRN: [id number]    Date of Visit: July 24, 2023    Allergies   Allergen Reactions   • Other Sneezing, Throat Swelling and Tongue Swelling   • Shellfish Allergy - Food Allergy Tongue Swelling       Visit Time    Visit Start Time: 1500  Visit Stop Time: 5588  Total Visit Duration: 30 minutes    SUBJECTIVE:    Joe Bhat is seen today for a follow up for Major Depressive Disorder. He continues to do well since the last visit. Joe Bhat seen in the office today for medication management follow up. He was last seen by this provider on 6/12/23. He reports today that he is "hanging in there". He reports that he continues to have more depression in the am, and has been sleeping in and staying in bed longer in the mornings. He is calm and appropriate in conversation. He smiles appropriately and appears calm and relaxed in the office. We discussed perspectives and being grateful for the things he has. He has goals to lose weight. He has gone to the gym, and he has considered going on a diet. States his whole family is obese. Has occasional fleeting suicidal thoughts, but they are discarded quickly and states that he has no plan or intent. He denies HI. Denies auditory or visual hallucinations. We will continue ativan taper. Will prescribe #20 0.5mg tabs this month. Will decrease #15 next month. He is in agreement with the continued taper. Will follow up in approx 2 months. He denies any side effects from current psychiatric medications.     PLAN:  Continue medications as ordered:  Lexapro 30mg PO daily  Inderal 10mg PO BID PRN  Seroquel 200mg PO HS  Decrease Ativan 0.5mg PO 1/2 -1 tab Daily PRN #20 tabs  Follow up in 6 weeks  He will call sooner with concerns or issues if they arise prior to scheduled appointment    Aware of 24 hour and weekend coverage for urgent situations accessed by calling [facility] main practice number  Medication management every 6 weeks  Aware of need to follow up with family physician for medical issues    Diagnoses and all orders for this visit:    Major depressive disorder, recurrent episode, moderate (HCC)    MIKA (generalized anxiety disorder)  -     propranolol (INDERAL) 10 mg tablet; Take 1 tablet (10 mg total) by mouth 2 (two) times a day as needed (anxiety)  -     LORazepam (ATIVAN) 0.5 mg tablet; Take 1/2 - 1 tab daily as needed for severe anxiety. Current Outpatient Medications on File Prior to Visit   Medication Sig Dispense Refill   • escitalopram (LEXAPRO) 10 mg tablet Take 1 tablet (10 mg total) by mouth daily Take with 20mg pill to total 30mg daily. 90 tablet 1   • escitalopram (LEXAPRO) 20 mg tablet Take 1 tablet (20 mg total) by mouth daily Take with 10mg tablet to total 30mg. 90 tablet 1   • meloxicam (MOBIC) 15 mg tablet TAKE 1 TABLET (15 MG TOTAL) BY MOUTH DAILY. 30 tablet 5   • QUEtiapine (SEROquel XR) 200 mg 24 hr tablet Take 1 tablet (200 mg total) by mouth daily at bedtime 30 tablet 2   • betamethasone valerate (VALISONE) 0.1 % cream  (Patient not taking: Reported on 1/16/2023)       No current facility-administered medications on file prior to visit. Psychotherapy Provided:     Individual psychotherapy provided: Yes  Counseling was provided during the session today for 16 minutes. Supportive counseling provided. Medication changes discussed with Wandy Miranda. Medication education provided to Wandy Miranda. Discussed with Wandy Miranda coping with ongoing anxiety. Coping strategies reviewed with Wandy Miranda. Importance of medication and treatment compliance reviewed with Wandy Miranda. Importance of follow up with family physician for medical issues reviewed with Wandy Miranda. Reassurance and supportive therapy provided. Crisis/safety plan discussed with Wandy Miranda.  Patient will call prior to scheduled appointment if they have any issues or concerns. Patient understands they can access the office by calling the main number at any time if they are in crisis. They also understand they can call their [facility]'s crisis number or go to their nearest ED if suicidal ideation increases or if they develop a plan or intent. HPI ROS Appetite Changes and Sleep:     He reports adequate number of sleep hours (11-12 hours), normal appetite, normal energy level.  Denies homicidal ideation, Intermittent passive suicidal ideation without intent or plan    Review Of Systems:      HPI ROS:               Medication Side Effects:  denies     Depression (10 worst): 5/10 (Was 5/10)   Anxiety (10 worst): 5/10 (Was 5/10)   Safety concerns (SI, HI, etc): Passive no plan or intent (Was passive, no plan or intent)   Sleep: Good 11-12 hours/night (Was good, normal)   Energy: good (Was good)   Appetite: good (Was good)     General normal    Personality no change in personality   Constitutional negative   ENT negative   Cardiovascular negative   Respiratory negative   Gastrointestinal negative   Genitourinary negative   Musculoskeletal negative   Integumentary negative   Neurological negative   Endocrine negative   Other Symptoms none, all other systems are negative     Mental Status Evaluation:    Appearance Appropriately dressed and Good eye contact   Behavior calm and cooperative   Mood euthymic  Depression Scale - 5 of 10 (0 = No depression)  Anxiety Scale - 5 of 10 (0 = No anxiety)   Speech Normal rate and volume   Affect appropriate and mood-congruent   Thought Processes Goal directed and coherent   Thought Content Does not verbalize delusional material   Associations Tightly connected   Perceptual Disturbances Denies hallucinations and does not appear to be responding to internal stimuli   Risk Potential Suicidal/Homicidal Ideation - Passive suicidal ideation without intent or plan  Risk of Violence - No evidence of risk for violence found on assessment  Risk of Self Mutilation - No evidence of risk for self mutilation found on assessment   Orientation oriented to person, place, time/date and situation   Memory recent and remote memory grossly intact   Consciousness alert and awake   Attention/Concentration attention span and concentration are age appropriate   Insight intact   Judgement intact   Muscle Strength and Gait normal muscle strength and normal muscle tone, normal gait/station and normal balance   Motor Activity no abnormal movements   Language no difficulty naming common objects, no difficulty repeating a phrase, no difficulty writing a sentence   Fund of Knowledge adequate knowledge of current events  adequate fund of knowledge regarding past history  adequate fund of knowledge regarding vocabulary      Past Psychiatric History - per Dr. Ambrose Priest H&P  Previous diagnoses include Depression and Anxiety   Prior outpatient psychiatric treatment:   With PCP till now   Prior therapy:    Prior inpatient psychiatric treatment: x1  [address] suicide attempts:  None   Prior self harm:  None   Prior violence or aggression:  None    Past Psychiatric History Update:     Inpatient Psychiatric Admission Since Last Encounter:   no  Changes to Outpatient Psychiatric Treatment Team:    no  Suicide Attempt Or Self Mutilation Since Last Encounter:   no  Incidence of Violent Behavior Since Last Encounter:   no    Traumatic History Update:     New Onset of Abuse Since Last Encounter:   no  Traumatic Events Since Last Encounter:   no    Past Medical History:    Past Medical History:   Diagnosis Date   • Anxiety    • Depression    • Headache(784.0) March 2020    Degenerative disk in neck may be cause.    • Hypertension    • Palpitation    • Panic attack         Past Surgical History:   Procedure Laterality Date   • FRACTURE SURGERY  6/1970 and 6/1979    Left and right wrists   • KNEE SURGERY  08/1982     Allergies   Allergen Reactions   • Other Sneezing, Throat Swelling and Tongue Swelling   • Shellfish Allergy - Food Allergy Tongue Swelling     Substance Abuse History:    Social History     Substance and Sexual Activity   Alcohol Use Yes   • Alcohol/week: 2.0 standard drinks of alcohol   • Types: 2 Cans of beer per week    Comment: socially     Social History     Substance and Sexual Activity   Drug Use Never     Social History:    Social History     Socioeconomic History   • Marital status: /Civil Union     Spouse name: Not on file   • Number of children: Not on file   • Years of education: Not on file   • Highest education level: Not on file   Occupational History   • Not on file   Tobacco Use   • Smoking status: Passive Smoke Exposure - Never Smoker   • Smokeless tobacco: Never   Vaping Use   • Vaping Use: Never used   Substance and Sexual Activity   • Alcohol use: Yes     Alcohol/week: 2.0 standard drinks of alcohol     Types: 2 Cans of beer per week     Comment: socially   • Drug use: Never   • Sexual activity: Not Currently     Partners: Female     Birth control/protection: Female Sterilization   Other Topics Concern   • Not on file   Social History Narrative   • Not on file     Social Determinants of Health     Financial Resource Strain: Not on file   Food Insecurity: Not on file   Transportation Needs: Not on file   Physical Activity: Not on file   Stress: Not on file   Social Connections: Not on file   Intimate Partner Violence: Not on file   Housing Stability: Not on file     Family Psychiatric History:     Family History   Problem Relation Age of Onset   • Hypertension Mother    • COPD Mother    • Migraines Mother    • Stroke Father         Mini stroke in 2010     History Review: The following portions of the patient's history were reviewed and updated as appropriate: allergies, current medications, past family history, past medical history, past social history, past surgical history and problem list     OBJECTIVE:     Vital signs in last 24 hours:    Vitals:    07/24/23 1512   BP: 120/86   Pulse: 73     Laboratory Results:   Recent Labs (last 2 months):   No visits with results within 2 Month(s) from this visit. Latest known visit with results is:   Office Visit on 03/26/2022   Component Date Value   • Ventricular Rate 03/26/2022 82    • Atrial Rate 03/26/2022 82    • KS Interval 03/26/2022 206    • QRSD Interval 03/26/2022 94    • QT Interval 03/26/2022 400    • QTC Interval 03/26/2022 467    • P Axis 03/26/2022 39    • QRS Axis 03/26/2022 -19    • T Wave Axis 03/26/2022 32      I have personally reviewed all pertinent laboratory/tests results. Suicide/Homicide Risk Assessment:    Risk of Harm to Self:  The following ratings are based on assessment at the time of the interview  Recent Specific Risk Factors include: current depressive symptoms, current anxiety symptoms  Demographic risk factors include: , male, age: over 48 or older  Historical Risk Factors include: chronic depression, chronic anxiety symptoms, history of traumatic experiences  Protective Factors: no current suicidal ideation, access to mental health treatment, being a parent, being , compliant with medications, compliant with mental health treatment, having a desire to be alive, responsibilities and duties to others, restricted access to lethal means, stable living environment, sense of determination, strong relationships, supportive family  Weapons: none. The following steps have been taken to ensure weapons are properly secured: not applicable  Based on today's assessment, Erinn Layla presents the following risk of harm to self: low    Risk of Harm to Others:   The following ratings are based on assessment at the time of the interview  Protective Factors: no current homicidal ideation  Based on today's assessment, Erinn Rich presents the following risk of harm to others: none    The following interventions are recommended: contracts for safety at present - agrees to go to ED if feeling unsafe, return in 6 weeks for reassessment, contracts for safety at present - agrees to call Crisis Intervention Service if feeling unsafe    Medications Risks/Benefits:      Risks, Benefits And Possible Side Effects Of Medications:    Discussed risks and benefits of treatment with patient including risk of suicidality, serotonin syndrome, increased QTc interval and SIADH related to treatment with antidepressants; Risk of induction of manic symptoms in certain patient populations, risk of parkinsonian symptoms, metabolic syndrome, tardive dyskinesia and neuroleptic malignant syndrome related to treatment with antipsychotic medications and risks of misuse, abuse or dependence, sedation and respiratory depression related to treatment with benzodiazepine medications     Controlled Medication Discussion:     Johan Lorenzo has been filling controlled prescriptions on time as prescribed according to [address] [place] [address] Monitoring Program  Discussed with Johan Lorenzo the risks of sedation, respiratory depression, impairment of ability to drive and potential for abuse and addiction related to treatment with benzodiazepine medications. He understands risk of treatment with benzodiazepine medications, agrees to not drive if feels impaired and agrees to take medications as prescribed  Discussed with Mack Romero warning on concurrent use of benzodiazepines and opioid medications including sedation, respiratory depression, coma and death. He understands the risk of treatment with benzodiazepines in addition to opioids and wants to continue taking those medications  Discussed with Johan Lorenzo increased risk of impairment related to concurrent use of benzodiazepines and hypnotic medications including excessive sedation, psychomotor impairment and respiratory depression.  He understands the risk of treatment with benzodiazepines in addition to hypnotic medications and wants to continue taking those medications  Stella Dutton has not been filling controlled prescriptions on time as prescribed according to [address] Program    Treatment Plan:    Due for update/Updated:   yes  Last treatment plan done 7/24/23 by KOLBY Alejo. Treatment Plan due on 1/24/24. KOLBY Bourgeois 07/25/23    This note was shared with patient.

## 2023-07-24 NOTE — BH TREATMENT PLAN
TREATMENT PLAN (Medication Management Only)        [address] [facility]    Name and Date of Birth:  Dennis Torres 59 y.o. 1965  Date of Treatment Plan: July 24, 2023  Diagnosis/Diagnoses:    1. MIKA (generalized anxiety disorder)      Strengths/Personal Resources for Self-Care: taking medications as prescribed. Area/Areas of need (in own words): depressive symptoms  1. Long Term Goal: continue improvement in depression. Target Date:6 months - 1/24/2024  Person/Persons responsible for completion of goal: Lynnea Essex  2. Short Term Objective (s) - How will we reach this goal?:   A. Provider new recommended medication/dosage changes and/or continue medication(s): continue current medications as prescribed, continue ativan taper. Pavan Gilmanby a healthy diet . C. Exercise regularly . Target Date:6 months - 1/24/2024  Person/Persons Responsible for Completion of Goal: Lynnea Essex  Progress Towards Goals: continuing treatment, improving gradually  Treatment Modality: medication management every 3 months, medication education at every visit  Review due 180 days from date of this plan: 6 months - 1/24/2024  Expected length of service: ongoing treatment  My Physician/PA/NP and I have developed this plan together and I agree to work on the goals and objectives. I understand the treatment goals that were developed for my treatment.

## 2023-08-05 DIAGNOSIS — M54.50 CHRONIC BILATERAL LOW BACK PAIN WITHOUT SCIATICA: ICD-10-CM

## 2023-08-05 DIAGNOSIS — G89.29 CHRONIC BILATERAL LOW BACK PAIN WITHOUT SCIATICA: ICD-10-CM

## 2023-08-07 RX ORDER — MELOXICAM 15 MG/1
15 TABLET ORAL DAILY
Qty: 30 TABLET | Refills: 5 | OUTPATIENT
Start: 2023-08-07

## 2023-08-24 DIAGNOSIS — F41.1 GAD (GENERALIZED ANXIETY DISORDER): ICD-10-CM

## 2023-08-28 RX ORDER — PROPRANOLOL HYDROCHLORIDE 10 MG/1
10 TABLET ORAL 2 TIMES DAILY PRN
Qty: 60 TABLET | Refills: 2 | OUTPATIENT
Start: 2023-08-28

## 2023-08-28 RX ORDER — LORAZEPAM 0.5 MG/1
TABLET ORAL
Qty: 15 TABLET | Refills: 0 | Status: SHIPPED | OUTPATIENT
Start: 2023-08-28

## 2023-09-04 DIAGNOSIS — G89.29 CHRONIC BILATERAL LOW BACK PAIN WITHOUT SCIATICA: ICD-10-CM

## 2023-09-04 DIAGNOSIS — M54.50 CHRONIC BILATERAL LOW BACK PAIN WITHOUT SCIATICA: ICD-10-CM

## 2023-09-05 RX ORDER — MELOXICAM 15 MG/1
15 TABLET ORAL DAILY
Qty: 90 TABLET | Refills: 0 | Status: SHIPPED | OUTPATIENT
Start: 2023-09-05

## 2023-09-11 ENCOUNTER — TELEMEDICINE (OUTPATIENT)
Dept: PSYCHIATRY | Facility: CLINIC | Age: 58
End: 2023-09-11
Payer: COMMERCIAL

## 2023-09-11 DIAGNOSIS — F33.1 MAJOR DEPRESSIVE DISORDER, RECURRENT EPISODE, MODERATE (HCC): ICD-10-CM

## 2023-09-11 PROCEDURE — 99214 OFFICE O/P EST MOD 30 MIN: CPT | Performed by: NURSE PRACTITIONER

## 2023-09-11 RX ORDER — QUETIAPINE 200 MG/1
200 TABLET, FILM COATED, EXTENDED RELEASE ORAL
Qty: 30 TABLET | Refills: 2 | Status: SHIPPED | OUTPATIENT
Start: 2023-09-11

## 2023-09-11 NOTE — BH TREATMENT PLAN
TREATMENT PLAN (Medication Management Only)        [address]    Name and Date of Birth:  Hanh Johnson 59 y.o. 1965  Date of Treatment Plan: September 11, 2023  Diagnosis/Diagnoses:    1. Major depressive disorder, recurrent episode, moderate (HCC)      Strengths/Personal Resources for Self-Care: taking medications as prescribed. Area/Areas of need (in own words): anxiety symptoms  1. Long Term Goal: continue improvement in acceptable anxiety level. Target Date:6 months - 3/11/2024  Person/Persons responsible for completion of goal: Cat Ledbetter  2. Short Term Objective (s) - How will we reach this goal?:   A. Provider new recommended medication/dosage changes and/or continue medication(s): continue current medications as prescribed. B. Take walks regularly. C. Eat a healthy diet . Target Date:6 months - 3/11/2024  Person/Persons Responsible for Completion of Goal: Cat Ledbetter  Progress Towards Goals: improving gradually  Treatment Modality: medication management every 2 months, medication education at every visit  Review due 180 days from date of this plan: 6 months - 3/11/2024  Expected length of service: ongoing treatment  My Physician/PA/NP and I have developed this plan together and I agree to work on the goals and objectives. I understand the treatment goals that were developed for my treatment.

## 2023-09-11 NOTE — PSYCH
Virtual Regular Visit    Verification of patient location:    Patient is located in the following state in which I hold an active license [place]    Problem List Items Addressed This Visit        Other    Major depressive disorder, recurrent episode, moderate (HCC)    Relevant Medications    QUEtiapine (SEROquel XR) 200 mg 24 hr tablet          Encounter provider KOLBY Johnson    Provider located at    [address] [facility]  [address] [facility] [address] [phone]    Recent Visits  Date Type Provider Dept   09/11/23 2100 [address] recent visits within past 7 days and meeting all other requirements  Future Appointments  No visits were found meeting these conditions. Showing future appointments within next 150 days and meeting all other requirements           The patient was identified by name and date of birth. Patient was informed that this is a telemedicine visit and [facility] He agrees to proceed. .  My office door was closed. No one else was in the room. She acknowledged consent and understanding of privacy and security of the video platform. The patient has agreed to participate and understands they can discontinue the visit at any time. Patient is aware this is a billable service. HPI     Current Outpatient Medications   Medication Sig Dispense Refill   • escitalopram (LEXAPRO) 10 mg tablet Take 1 tablet (10 mg total) by mouth daily Take with 20mg pill to total 30mg daily. 90 tablet 1   • escitalopram (LEXAPRO) 20 mg tablet Take 1 tablet (20 mg total) by mouth daily Take with 10mg tablet to total 30mg. 90 tablet 1   • LORazepam (ATIVAN) 0.5 mg tablet Take 1/2 - 1 tab daily as needed for severe anxiety. 15 tablet 0   • meloxicam (MOBIC) 15 mg tablet TAKE 1 TABLET (15 MG TOTAL) BY MOUTH DAILY.  90 tablet 0   • propranolol (INDERAL) 10 mg tablet Take 1 tablet (10 mg total) by mouth 2 (two) times a day as needed (anxiety) 60 tablet 2   • QUEtiapine (SEROquel XR) 200 mg 24 hr tablet Take 1 tablet (200 mg total) by mouth daily at bedtime 30 tablet 2   • betamethasone valerate (VALISONE) 0.1 % cream  (Patient not taking: Reported on 1/16/2023)       No current facility-administered medications for this visit. Review of Systems  Video Exam    There were no vitals filed for this visit. Physical Exam   As a result of this visit, I have referred the patient for further respiratory evaluation. No    I spent 30 minutes directly with the patient during this visit  VIRTUAL VISIT DISCLAIMER    Cristy Kumar acknowledges that he has consented to an online visit or consultation. He understands that the online visit is based solely on information provided by him, and that, in the absence of a face-to-face physical evaluation by the physician, the diagnosis he receives is both limited and provisional in terms of accuracy and completeness. This is not intended to replace a full medical face-to-face evaluation by the physician. Cristy Kumar understands and accepts these terms. MEDICATION MANAGEMENT NOTE        [facility] [address] [facility]    Name and Date of Birth:  Cristy Kumar 59 y.o. 1965 MRN: [id number]    Date of Visit: September 11, 2023    Allergies   Allergen Reactions   • Other Sneezing, Throat Swelling and Tongue Swelling   • Shellfish Allergy - Food Allergy Tongue Swelling       Visit Time    Visit Start Time: 0919  Visit Stop Time: 3856  Total Visit Duration: 30 minutes    SUBJECTIVE:    Kassie Bell is seen today for a follow up for Major Depressive Disorder. He continues to do fairly well since the last visit. Kassie Bell seen virtually today for medication management follow up. He was last seen by this provider on 7/24/23.   Kassie Bell reports today that he is doing "ok" and feels his depression and anxiety have decreased in the am.  He states that he has been social and visiting with his family. He was able to organize his [employer] benefits and will be receiving money from them as well as his disability so he will be making as much money as he was when he was working. He feels that this has relieved some of his stress. He wishes he could lose weight but has not yet tried to lose any weight and understands that this takes work and time. He admits that he does not yet have the motivation to do what it takes to lose the weight yet. He is going to see his doctor this month because he has been have what he feels are hypoglycemic episodes. He denies any active SI, states he continues to have passive SI with no plan or intent. Denies HI, denies auditory or visual hallucinations. He is calm and appropriate in conversation. Sleeping and eating well. No med changes will be made at this time, but the ativan taper will continue. He was encouraged to continue to decrease his usage and we will decrease the number of tabs again in October. He denies any side effects from current psychiatric medications. PLAN:  Ativan 0.5 mg 1/2-1 tab daily as needed  Seroquel 200 mg p.o. at bedtime  Lexapro 30 mg p.o. daily  Propranolol 10 mg p.o. twice daily as needed  Follow up in 2 months  He will call sooner with concerns or issues if they arise prior to scheduled appointment    Aware of 24 hour and weekend coverage for urgent situations accessed by calling [facility] main practice number  Medication management every 2 months  Aware of need to follow up with family physician for medical issues    Diagnoses and all orders for this visit:    Major depressive disorder, recurrent episode, moderate (HCC)  -     QUEtiapine (SEROquel XR) 200 mg 24 hr tablet;  Take 1 tablet (200 mg total) by mouth daily at bedtime      Patient education on serotonin syndrome symptoms completed which included the following:  symptoms routinely occur when starting a new drug or increasing the dose of a drug you're already taking. Signs and symptoms include: agitation,restlessness,confusion,rapid heart rate, high blood pressure, dilated pupils, loss of muscle coordination, muscle twitching or rigidity, heavy sweating, diarrhea, headache, shivering, goose bumps. The patient was also informed that severe serotonin syndrome can be life-threatening and needs to seek immediate medical care, these sign and symptoms include: high fever, seizures, irregular heartbeat, and unconsciousness. The patient verbalized understanding. Seroquel PARQ completed including dizziness, sedation, GI distress, orthostatic hypotension and cardiovascular risks, metabolic syndrome, NMS, TD, EPS, Seizures, and others    I discussed benzodiazapine plan and treatment with Pt. Risks, benefits, and possible side effects of medications explained to patient and patient verbalizes understanding. Discussed with patient the risks of sedation, respiratory depression, impairment of ability to drive and potential for abuse and addiction related to treatment with benzodiazepine medications. The patient understands risk of treatment with benzodiazepine medications, agrees to not drive if feels impaired and agrees to take medications as prescribed      Current Outpatient Medications on File Prior to Visit   Medication Sig Dispense Refill   • escitalopram (LEXAPRO) 10 mg tablet Take 1 tablet (10 mg total) by mouth daily Take with 20mg pill to total 30mg daily. 90 tablet 1   • escitalopram (LEXAPRO) 20 mg tablet Take 1 tablet (20 mg total) by mouth daily Take with 10mg tablet to total 30mg. 90 tablet 1   • LORazepam (ATIVAN) 0.5 mg tablet Take 1/2 - 1 tab daily as needed for severe anxiety. 15 tablet 0   • meloxicam (MOBIC) 15 mg tablet TAKE 1 TABLET (15 MG TOTAL) BY MOUTH DAILY.  90 tablet 0   • propranolol (INDERAL) 10 mg tablet Take 1 tablet (10 mg total) by mouth 2 (two) times a day as needed (anxiety) 60 tablet 2   • betamethasone valerate (VALISONE) 0.1 % cream  (Patient not taking: Reported on 1/16/2023)       No current facility-administered medications on file prior to visit. Psychotherapy Provided:     Individual psychotherapy provided: Yes  Counseling was provided during the session today for 16 minutes. Supportive counseling provided. Medication changes discussed with Gene Esposito. Medication education provided to Gene Esposito. Discussed with Gene Esposito coping with ongoing anxiety. Coping strategies reviewed with Gene Esposito. Importance of medication and treatment compliance reviewed with Gene Esposito. Importance of follow up with family physician for medical issues reviewed with Gene Esposito. Reassurance and supportive therapy provided. Reoriented to reality and reassured. Crisis/safety plan discussed with Gene Esposito. Patient will call prior to scheduled appointment if they have any issues or concerns. Patient understands they can access the office by calling the main number at any time if they are in crisis. They also understand they can call their [facility]'s crisis number or go to their nearest ED if suicidal ideation increases or if they develop a plan or intent. HPI ROS Appetite Changes and Sleep:     He reports normal sleep, normal appetite, adequate energy level.  Denies homicidal ideation, Intermittent passive suicidal ideation without intent or plan    Review Of Systems:  HPI ROS:               Medication Side Effects:   denies   Depression (10 worst): 4/10 5/10   Anxiety (10 worst): 4/10 5/10   Safety concerns (SI, HI, etc): Passive no plan or intent Passive, no plan or intent   Sleep: Good, normal Good, normal   Energy: adequate adequate   Appetite: good good     General normal    Personality no change in personality   Constitutional as noted in HPI   ENT negative   Cardiovascular negative   Respiratory negative   Gastrointestinal negative   Genitourinary negative   Musculoskeletal negative   Integumentary negative   Neurological negative   Endocrine negative   Other Symptoms none, all other systems are negative     Mental Status Evaluation:    Appearance Appropriately dressed and Good eye contact   Behavior calm and cooperative   Mood euthymic, improved  Depression Scale - 4 of 10 (0 = No depression)  Anxiety Scale - 4 of 10 (0 = No anxiety)   Speech Normal rate and volume   Affect appropriate   Thought Processes Goal directed and coherent   Thought Content Does not verbalize delusional material   Associations Tightly connected   Perceptual Disturbances Denies hallucinations and does not appear to be responding to internal stimuli   Risk Potential Suicidal/Homicidal Ideation - Passive suicidal ideation without intent or plan  Risk of Violence - No evidence of risk for violence found on assessment  Risk of Self Mutilation - No evidence of risk for self mutilation found on assessment   Orientation oriented to person, place, time/date and situation   Memory recent and remote memory grossly intact   Consciousness alert and awake   Attention/Concentration attention span and concentration appear shorter than expected for age   Insight fair   Judgement fair   Muscle Strength and Gait normal muscle strength and normal muscle tone, normal gait/station and normal balance   Motor Activity no abnormal movements   Language no difficulty naming common objects, no difficulty repeating a phrase, no difficulty writing a sentence   Fund of Knowledge adequate knowledge of current events  adequate fund of knowledge regarding past history  adequate fund of knowledge regarding vocabulary      Past Psychiatric History - per Dr. Karen Marquis H&P  Previous diagnoses include Depression and Anxiety   Prior outpatient psychiatric treatment:   With PCP till now  [address] therapy:    Prior inpatient psychiatric treatment: x1  [address] suicide attempts:  None   Prior self harm:  None   Prior violence or aggression:  None       Past Psychiatric History Update:     Inpatient Psychiatric Admission Since Last Encounter:   no  Changes to Outpatient Psychiatric Treatment Team:    no  Suicide Attempt Or Self Mutilation Since Last Encounter:   no  Incidence of Violent Behavior Since Last Encounter:   no    Traumatic History Update:     New Onset of Abuse Since Last Encounter:   no  Traumatic Events Since Last Encounter:   no    Past Medical History:    Past Medical History:   Diagnosis Date   • Anxiety    • Depression    • Headache(784.0) March 2020    Degenerative disk in neck may be cause. • Hypertension    • Palpitation    • Panic attack         Past Surgical History:   Procedure Laterality Date   • FRACTURE SURGERY  6/1970 and 6/1979    Left and right wrists   • KNEE SURGERY  08/1982     Allergies   Allergen Reactions   • Other Sneezing, Throat Swelling and Tongue Swelling   • Shellfish Allergy - Food Allergy Tongue Swelling     Substance Abuse History:    Social History     Substance and Sexual Activity   Alcohol Use Yes   • Alcohol/week: 2.0 standard drinks of alcohol   • Types: 2 Cans of beer per week    Comment: socially     Social History     Substance and Sexual Activity   Drug Use Never     Social History:    Social History     Socioeconomic History   • Marital status: /Civil Union     Spouse name: Not on file   • Number of children: Not on file   • Years of education: Not on file   • Highest education level: Not on file   Occupational History   • Not on file   Tobacco Use   • Smoking status: Passive Smoke Exposure - Never Smoker   • Smokeless tobacco: Never   Vaping Use   • Vaping Use: Never used   Substance and Sexual Activity   • Alcohol use:  Yes     Alcohol/week: 2.0 standard drinks of alcohol     Types: 2 Cans of beer per week     Comment: socially   • Drug use: Never   • Sexual activity: Not Currently     Partners: Female     Birth control/protection: Female Sterilization   Other Topics Concern • Not on file   Social History Narrative   • Not on file     Social Determinants of Health     Financial Resource Strain: Not on file   Food Insecurity: Not on file   Transportation Needs: Not on file   Physical Activity: Not on file   Stress: Not on file   Social Connections: Not on file   Intimate Partner Violence: Not on file   Housing Stability: Not on file     Family Psychiatric History:     Family History   Problem Relation Age of Onset   • Hypertension Mother    • COPD Mother    • Migraines Mother    • Stroke Father         Mini stroke in 2010     History Review: The following portions of the patient's history were reviewed and updated as appropriate: allergies, current medications, past family history, past medical history, past social history, past surgical history and problem list     OBJECTIVE:     Vital signs in last 24 hours: There were no vitals filed for this visit. Laboratory Results:   Recent Labs (last 2 months):   No visits with results within 2 Month(s) from this visit. Latest known visit with results is:   Office Visit on 03/26/2022   Component Date Value   • Ventricular Rate 03/26/2022 82    • Atrial Rate 03/26/2022 82    • GA Interval 03/26/2022 206    • QRSD Interval 03/26/2022 94    • QT Interval 03/26/2022 400    • QTC Interval 03/26/2022 467    • P Axis 03/26/2022 39    • QRS Axis 03/26/2022 -19    • T Wave Axis 03/26/2022 32      I have personally reviewed all pertinent laboratory/tests results.     Suicide/Homicide Risk Assessment:    Risk of Harm to Self:  The following ratings are based on assessment at the time of the interview  Recent Specific Risk Factors include: current depressive symptoms, current anxiety symptoms, unemployed  Demographic risk factors include: , male, age: over 48 or older  Historical Risk Factors include: chronic depressive symptoms, chronic anxiety symptoms  Protective Factors: no current suicidal ideation, access to mental health treatment, being a parent, being , compliant with medications, compliant with mental health treatment, connection to own children, having a desire to be alive, having a sense of purpose or meaning in life, responsibilities and duties to others, stable living environment, sense of determination, supportive family  Based on today's assessment, Kassy Sharma presents the following risk of harm to self: low    Risk of Harm to Others: The following ratings are based on assessment at the time of the interview  Protective Factors: no current homicidal ideation  Based on today's assessment, Kassy Sharma presents the following risk of harm to others: none    The following interventions are recommended: contracts for safety at present - agrees to go to ED if feeling unsafe, return in 2 months for reassessment, contracts for safety at present - agrees to call Crisis Intervention Service if feeling unsafe    Medications Risks/Benefits:      Risks, Benefits And Possible Side Effects Of Medications:    Discussed risks and benefits of treatment with patient including risk of suicidality, serotonin syndrome, increased QTc interval and SIADH related to treatment with antidepressants; Risk of induction of manic symptoms in certain patient populations, risk of parkinsonian symptoms, metabolic syndrome, tardive dyskinesia and neuroleptic malignant syndrome related to treatment with antipsychotic medications and risks of misuse, abuse or dependence, sedation and respiratory depression related to treatment with benzodiazepine medications     Controlled Medication Discussion:     Kassy Sharma has been filling controlled prescriptions on time as prescribed according to [address] [place] [address] Monitoring Program    Treatment Plan:    Due for update/Updated:   no  Last treatment plan done 7/24/23 by KOLBY Noe. Treatment Plan due on 1/24/24. KOLBY Craft 09/12/23    This note was shared with patient.

## 2023-09-15 ENCOUNTER — APPOINTMENT (OUTPATIENT)
Dept: LAB | Facility: HOSPITAL | Age: 58
End: 2023-09-15
Payer: COMMERCIAL

## 2023-09-15 DIAGNOSIS — F33.1 MAJOR DEPRESSIVE DISORDER, RECURRENT EPISODE, MODERATE (HCC): ICD-10-CM

## 2023-09-15 DIAGNOSIS — R73.09 IMPAIRED GLUCOSE TOLERANCE TEST: ICD-10-CM

## 2023-09-15 DIAGNOSIS — E78.5 HYPERLIPIDEMIA, UNSPECIFIED HYPERLIPIDEMIA TYPE: ICD-10-CM

## 2023-09-15 DIAGNOSIS — Z12.5 SPECIAL SCREENING FOR MALIGNANT NEOPLASM OF PROSTATE: ICD-10-CM

## 2023-09-15 LAB
ALBUMIN SERPL BCP-MCNC: 3.8 G/DL (ref 3.5–5)
ALP SERPL-CCNC: 58 U/L (ref 34–104)
ALT SERPL W P-5'-P-CCNC: 28 U/L (ref 7–52)
ANION GAP SERPL CALCULATED.3IONS-SCNC: -5 MMOL/L
AST SERPL W P-5'-P-CCNC: 20 U/L (ref 13–39)
BASOPHILS # BLD AUTO: 0.15 THOUSANDS/ÂΜL (ref 0–0.1)
BASOPHILS NFR BLD AUTO: 2 % (ref 0–1)
BILIRUB SERPL-MCNC: 0.66 MG/DL (ref 0.2–1)
BUN SERPL-MCNC: 17 MG/DL (ref 5–25)
CALCIUM SERPL-MCNC: 8.6 MG/DL (ref 8.4–10.2)
CHLORIDE SERPL-SCNC: 110 MMOL/L (ref 96–108)
CHOLEST SERPL-MCNC: 154 MG/DL
CO2 SERPL-SCNC: 26 MMOL/L (ref 21–32)
CREAT SERPL-MCNC: 0.88 MG/DL (ref 0.6–1.3)
EOSINOPHIL # BLD AUTO: 0.55 THOUSAND/ÂΜL (ref 0–0.61)
EOSINOPHIL NFR BLD AUTO: 8 % (ref 0–6)
ERYTHROCYTE [DISTWIDTH] IN BLOOD BY AUTOMATED COUNT: 12.6 % (ref 11.6–15.1)
EST. AVERAGE GLUCOSE BLD GHB EST-MCNC: 131 MG/DL
GFR SERPL CREATININE-BSD FRML MDRD: 93 ML/MIN/1.73SQ M
GLUCOSE P FAST SERPL-MCNC: 99 MG/DL (ref 65–99)
HBA1C MFR BLD: 6.2 %
HCT VFR BLD AUTO: 47.5 % (ref 36.5–49.3)
HDLC SERPL-MCNC: 42 MG/DL
HGB BLD-MCNC: 15.9 G/DL (ref 12–17)
IMM GRANULOCYTES # BLD AUTO: 0.02 THOUSAND/UL (ref 0–0.2)
IMM GRANULOCYTES NFR BLD AUTO: 0 % (ref 0–2)
LDLC SERPL CALC-MCNC: 84 MG/DL (ref 0–100)
LYMPHOCYTES # BLD AUTO: 1.99 THOUSANDS/ÂΜL (ref 0.6–4.47)
LYMPHOCYTES NFR BLD AUTO: 30 % (ref 14–44)
MCH RBC QN AUTO: 31.3 PG (ref 26.8–34.3)
MCHC RBC AUTO-ENTMCNC: 33.5 G/DL (ref 31.4–37.4)
MCV RBC AUTO: 94 FL (ref 82–98)
MONOCYTES # BLD AUTO: 0.63 THOUSAND/ÂΜL (ref 0.17–1.22)
MONOCYTES NFR BLD AUTO: 10 % (ref 4–12)
NEUTROPHILS # BLD AUTO: 3.2 THOUSANDS/ÂΜL (ref 1.85–7.62)
NEUTS SEG NFR BLD AUTO: 50 % (ref 43–75)
NRBC BLD AUTO-RTO: 0 /100 WBCS
PLATELET # BLD AUTO: 197 THOUSANDS/UL (ref 149–390)
PMV BLD AUTO: 11.9 FL (ref 8.9–12.7)
POTASSIUM SERPL-SCNC: 3.9 MMOL/L (ref 3.5–5.3)
PROT SERPL-MCNC: 6.5 G/DL (ref 6.4–8.4)
PSA SERPL-MCNC: 2.18 NG/ML (ref 0–4)
RBC # BLD AUTO: 5.08 MILLION/UL (ref 3.88–5.62)
SODIUM SERPL-SCNC: 131 MMOL/L (ref 135–147)
T4 FREE SERPL-MCNC: 0.99 NG/DL (ref 0.61–1.12)
TRIGL SERPL-MCNC: 139 MG/DL
TSH SERPL DL<=0.05 MIU/L-ACNC: 0.02 UIU/ML (ref 0.45–4.5)
WBC # BLD AUTO: 6.54 THOUSAND/UL (ref 4.31–10.16)

## 2023-09-15 PROCEDURE — 80053 COMPREHEN METABOLIC PANEL: CPT

## 2023-09-15 PROCEDURE — 85025 COMPLETE CBC W/AUTO DIFF WBC: CPT

## 2023-09-15 PROCEDURE — 83036 HEMOGLOBIN GLYCOSYLATED A1C: CPT

## 2023-09-15 PROCEDURE — 84439 ASSAY OF FREE THYROXINE: CPT

## 2023-09-15 PROCEDURE — 80061 LIPID PANEL: CPT

## 2023-09-15 PROCEDURE — 84443 ASSAY THYROID STIM HORMONE: CPT

## 2023-09-15 PROCEDURE — 36415 COLL VENOUS BLD VENIPUNCTURE: CPT

## 2023-09-15 PROCEDURE — G0103 PSA SCREENING: HCPCS

## 2023-09-29 ENCOUNTER — HOSPITAL ENCOUNTER (OUTPATIENT)
Dept: GASTROENTEROLOGY | Facility: HOSPITAL | Age: 58
Setting detail: OUTPATIENT SURGERY
Discharge: HOME | End: 2023-09-29
Attending: HOSPITALIST | Admitting: HOSPITALIST
Payer: COMMERCIAL

## 2023-09-29 ENCOUNTER — ANESTHESIA EVENT (OUTPATIENT)
Dept: GASTROENTEROLOGY | Facility: HOSPITAL | Age: 58
End: 2023-09-29

## 2023-09-29 ENCOUNTER — ANESTHESIA (OUTPATIENT)
Dept: ANESTHESIOLOGY | Facility: HOSPITAL | Age: 58
End: 2023-09-29

## 2023-09-29 ENCOUNTER — ANESTHESIA EVENT (OUTPATIENT)
Dept: ANESTHESIOLOGY | Facility: HOSPITAL | Age: 58
End: 2023-09-29

## 2023-09-29 ENCOUNTER — ANESTHESIA (OUTPATIENT)
Dept: GASTROENTEROLOGY | Facility: HOSPITAL | Age: 58
End: 2023-09-29

## 2023-09-29 VITALS
HEIGHT: 75 IN | WEIGHT: 300 LBS | OXYGEN SATURATION: 97 % | HEART RATE: 65 BPM | DIASTOLIC BLOOD PRESSURE: 87 MMHG | SYSTOLIC BLOOD PRESSURE: 123 MMHG | TEMPERATURE: 97.4 F | RESPIRATION RATE: 20 BRPM | BODY MASS INDEX: 37.3 KG/M2

## 2023-09-29 DIAGNOSIS — Z12.11 ENCOUNTER FOR SCREENING FOR MALIGNANT NEOPLASM OF COLON: ICD-10-CM

## 2023-09-29 RX ORDER — SODIUM CHLORIDE, SODIUM LACTATE, POTASSIUM CHLORIDE, CALCIUM CHLORIDE 600; 310; 30; 20 MG/100ML; MG/100ML; MG/100ML; MG/100ML
INJECTION, SOLUTION INTRAVENOUS CONTINUOUS PRN
Status: DISCONTINUED | OUTPATIENT
Start: 2023-09-29 | End: 2023-09-29

## 2023-09-29 RX ORDER — LIDOCAINE HYDROCHLORIDE 20 MG/ML
INJECTION, SOLUTION EPIDURAL; INFILTRATION; INTRACAUDAL; PERINEURAL AS NEEDED
Status: DISCONTINUED | OUTPATIENT
Start: 2023-09-29 | End: 2023-09-29

## 2023-09-29 RX ORDER — PROPOFOL 10 MG/ML
INJECTION, EMULSION INTRAVENOUS AS NEEDED
Status: DISCONTINUED | OUTPATIENT
Start: 2023-09-29 | End: 2023-09-29

## 2023-09-29 RX ADMIN — PROPOFOL 200 MG: 10 INJECTION, EMULSION INTRAVENOUS at 11:27

## 2023-09-29 RX ADMIN — PROPOFOL 50 MG: 10 INJECTION, EMULSION INTRAVENOUS at 11:37

## 2023-09-29 RX ADMIN — PROPOFOL 50 MG: 10 INJECTION, EMULSION INTRAVENOUS at 11:40

## 2023-09-29 RX ADMIN — Medication 40 MG: at 11:32

## 2023-09-29 RX ADMIN — PROPOFOL 50 MG: 10 INJECTION, EMULSION INTRAVENOUS at 11:32

## 2023-09-29 RX ADMIN — PROPOFOL 30 MG: 10 INJECTION, EMULSION INTRAVENOUS at 11:29

## 2023-09-29 RX ADMIN — LIDOCAINE HYDROCHLORIDE 100 MG: 20 INJECTION, SOLUTION EPIDURAL; INFILTRATION; INTRACAUDAL; PERINEURAL at 11:27

## 2023-09-29 RX ADMIN — PROPOFOL 20 MG: 10 INJECTION, EMULSION INTRAVENOUS at 11:30

## 2023-09-29 RX ADMIN — SODIUM CHLORIDE, SODIUM LACTATE, POTASSIUM CHLORIDE, AND CALCIUM CHLORIDE: .6; .31; .03; .02 INJECTION, SOLUTION INTRAVENOUS at 11:25

## 2023-09-29 NOTE — ANESTHESIA POSTPROCEDURE EVALUATION
Post-Op Assessment Note    CV Status:  Stable    Pain management: adequate     Mental Status:  Awake and alert   Hydration Status:  Stable   PONV Controlled:  None   Airway Patency:  Patent   Two or more mitigation strategies used for obstructive sleep apnea   Post Op Vitals Reviewed: Yes      Staff: CRNA         No notable events documented.     /73 (09/29/23 1145)    Temp 97.6 °F (36.4 °C) (09/29/23 1145)    Pulse 84 (09/29/23 1145)   Resp 20 (09/29/23 1145)    SpO2 96 % (09/29/23 1145)

## 2023-09-29 NOTE — H&P
Procedure(s):  Colonoscopy with indication(s) of CRC screening      Endoscopy Pre-Procedure Assessment:  Prior to the procedure, the patient is identified. The patient's history, medications, and allergies have been reviewed. The patient is competent. The risks and benefits of the procedure procedure and the planned sedation have been discussed with the patient. All questions have been answered and informed consent for the procedure has been obtained. Vitals:    09/29/23 1118   BP: 119/77   Pulse: 75   Resp: 18   Temp: (!) 97.4 °F (36.3 °C)   SpO2: 96%       Physical Exam:  Physical Exam  Cardiovascular:      Rate and Rhythm: Normal rate. Pulmonary:      Effort: Pulmonary effort is normal.   Abdominal:      Palpations: Abdomen is soft. Neurological:      Mental Status: He is alert. Psychiatric:         Mood and Affect: Mood normal.          Consent: We have discussed the procedure in detail. We reviewed risks, benefits and alternative as well as protentional complications including and not limited to medication side effect , infection, bleeding, perforation and the potential need for surgery, ICU admission, CPR, as well as the need for blood product transfusion. Patient verbalized understanding and agreement. All patient questions were answered. After reviewed the risks and benefits, the patient is deemed in satisfactory condition to undergo the procedure. The anesthesia plan is to use monitored anesthesia care (MAC).       09/29/23

## 2023-09-29 NOTE — ANESTHESIA PREPROCEDURE EVALUATION
Procedure:  PRE-OP ONLY    Relevant Problems   CARDIO   (+) PVC (premature ventricular contraction)      MUSCULOSKELETAL   (+) Chronic bilateral low back pain without sciatica      NEURO/PSYCH   (+) Chronic bilateral low back pain without sciatica   (+) Depression with suicidal ideation   (+) Major depressive disorder, recurrent episode, moderate (HCC)   (+) Tension type headache      PULMONARY   (+) CHEVY (obstructive sleep apnea)      Sinus rhythm with occasional Premature ventricular complexes  Low voltage QRS  Cannot rule out Anterior infarct , age undetermined  Abnormal ECG      Physical Exam    Airway    Mallampati score: II  TM Distance: >3 FB  Neck ROM: full     Dental   No notable dental hx     Cardiovascular  Cardiovascular exam normal    Pulmonary  Pulmonary exam normal     Other Findings        Anesthesia Plan  ASA Score- 2     Anesthesia Type- IV sedation with anesthesia with ASA Monitors. Additional Monitors:   Airway Plan:           Plan Factors-Exercise tolerance (METS): >4 METS. Chart reviewed. EKG reviewed. Imaging results reviewed. Existing labs reviewed. Patient summary reviewed. Patient is not a current smoker. Patient not instructed to abstain from smoking on day of procedure. Patient did not smoke on day of surgery. Obstructive sleep apnea risk education given perioperatively. Induction- intravenous. Postoperative Plan-     Informed Consent- Anesthetic plan and risks discussed with patient. I personally reviewed this patient with the CRNA. Discussed and agreed on the Anesthesia Plan with the CRNA. Jack Le

## 2023-10-06 ENCOUNTER — VBI (OUTPATIENT)
Dept: ADMINISTRATIVE | Facility: OTHER | Age: 58
End: 2023-10-06

## 2023-10-10 ENCOUNTER — HOSPITAL ENCOUNTER (EMERGENCY)
Facility: HOSPITAL | Age: 58
Discharge: HOME | End: 2023-10-11
Attending: EMERGENCY MEDICINE | Admitting: EMERGENCY MEDICINE
Payer: COMMERCIAL

## 2023-10-10 VITALS
DIASTOLIC BLOOD PRESSURE: 105 MMHG | RESPIRATION RATE: 18 BRPM | WEIGHT: 300 LBS | OXYGEN SATURATION: 98 % | BODY MASS INDEX: 37.3 KG/M2 | TEMPERATURE: 98.2 F | HEART RATE: 76 BPM | HEIGHT: 75 IN | SYSTOLIC BLOOD PRESSURE: 155 MMHG

## 2023-10-10 DIAGNOSIS — K08.89 PAIN, DENTAL: Primary | ICD-10-CM

## 2023-10-10 PROCEDURE — 99282 EMERGENCY DEPT VISIT SF MDM: CPT

## 2023-10-10 NOTE — Clinical Note
Anam Finney was seen and treated in our emergency department on 10/10/2023. Diagnosis:     Negar Miramontes  may return to work on return date. He may return on this date: 10/12/2023         If you have any questions or concerns, please don't hesitate to call.       Mortimer Barter, MD    ______________________________           _______________          _______________  Hospital Representative                              Date                                Time

## 2023-10-10 NOTE — Clinical Note
Seferino  was seen and treated in our emergency department on 10/10/2023. Diagnosis:     Michael Barrera  may return to work on return date. He may return on this date: 10/12/2023         If you have any questions or concerns, please don't hesitate to call.       Dolores Ramirez MD    ______________________________           _______________          _______________  Hospital Representative                              Date                                Time

## 2023-10-11 PROCEDURE — 99284 EMERGENCY DEPT VISIT MOD MDM: CPT | Performed by: EMERGENCY MEDICINE

## 2023-10-11 PROCEDURE — 64450 NJX AA&/STRD OTHER PN/BRANCH: CPT | Performed by: EMERGENCY MEDICINE

## 2023-10-11 RX ORDER — AMOXICILLIN 250 MG/1
500 CAPSULE ORAL ONCE
Status: COMPLETED | OUTPATIENT
Start: 2023-10-11 | End: 2023-10-11

## 2023-10-11 RX ORDER — AMOXICILLIN 500 MG/1
500 CAPSULE ORAL 3 TIMES DAILY
Qty: 21 CAPSULE | Refills: 0 | Status: SHIPPED | OUTPATIENT
Start: 2023-10-11 | End: 2023-10-18

## 2023-10-11 RX ADMIN — AMOXICILLIN 500 MG: 250 CAPSULE ORAL at 00:25

## 2023-10-11 NOTE — ED PROVIDER NOTES
History  Chief Complaint   Patient presents with   • Dental Pain     Right lower dental pain. Dentist is out until Monday. Pt took 800 mg Ibuprofen & 600 mg  Tylenol @ 1700. Pt took wife arthritis pain reliever at 2230. History provided by:  Medical records and patient  Dental Pain  Location:  Lower  Lower teeth location:  31/RL 2nd molar  Quality:  Aching and dull  Severity:  Moderate  Onset quality:  Gradual  Duration:  2 months  Timing:  Intermittent  Progression:  Waxing and waning  Chronicity:  Chronic  Context comment:  Patient has been dealing with chronic right lower dental pain, seen by dentist previously for similar, felt to be from grinding, placed on ibuprofen, no relief, return of pain this evening. Patient made appointment with dentist tomorrow morning at noon  Relieved by:  Nothing  Worsened by:  Cold food/drink, hot food/drink, touching and pressure  Ineffective treatments:  NSAIDs and acetaminophen  Associated symptoms: no fever and no headaches        Prior to Admission Medications   Prescriptions Last Dose Informant Patient Reported? Taking? LORazepam (ATIVAN) 0.5 mg tablet   No No   Sig: Take 1/2 - 1 tab daily as needed for severe anxiety. Patient taking differently: Take 0.5 mg by mouth if needed for anxiety Take 1/2 - 1 tab daily as needed for severe anxiety. QUEtiapine (SEROquel XR) 200 mg 24 hr tablet   No No   Sig: Take 1 tablet (200 mg total) by mouth daily at bedtime   escitalopram (LEXAPRO) 10 mg tablet   No No   Sig: Take 1 tablet (10 mg total) by mouth daily Take with 20mg pill to total 30mg daily. Patient taking differently: Take 10 mg by mouth daily before lunch Take with 20mg pill to total 30mg daily. escitalopram (LEXAPRO) 20 mg tablet   No No   Sig: Take 1 tablet (20 mg total) by mouth daily Take with 10mg tablet to total 30mg. Patient taking differently: Take 20 mg by mouth every morning Take with 10mg tablet to total 30mg.    meloxicam (MOBIC) 15 mg tablet   No No   Sig: TAKE 1 TABLET (15 MG TOTAL) BY MOUTH DAILY. Patient taking differently: Take 15 mg by mouth every morning   propranolol (INDERAL) 10 mg tablet   No No   Sig: Take 1 tablet (10 mg total) by mouth 2 (two) times a day as needed (anxiety)      Facility-Administered Medications: None       Past Medical History:   Diagnosis Date   • Anxiety    • CPAP (continuous positive airway pressure) dependence    • Depression    • Headache(784.0) March 2020    Degenerative disk in neck may be cause. • Hypertension    • Palpitation    • Panic attack    • PVC (premature ventricular contraction)    • Sleep apnea        Past Surgical History:   Procedure Laterality Date   • ANAL EXAMINATION UNDER ANESTHESIA     • FRACTURE SURGERY  6/1970 and 6/1979    Left and right wrists   • KNEE SURGERY  08/1982       Family History   Problem Relation Age of Onset   • Hypertension Mother    • COPD Mother    • Migraines Mother    • Stroke Father         Mini stroke in 2010     I have reviewed and agree with the history as documented. E-Cigarette/Vaping   • E-Cigarette Use Never User      E-Cigarette/Vaping Substances   • Nicotine No    • THC No    • CBD No    • Flavoring No    • Other No    • Unknown No      Social History     Tobacco Use   • Smoking status: Never     Passive exposure: Yes   • Smokeless tobacco: Never   Vaping Use   • Vaping Use: Never used   Substance Use Topics   • Alcohol use: Yes     Alcohol/week: 3.0 - 4.0 standard drinks of alcohol     Types: 3 - 4 Cans of beer per week   • Drug use: Never       Review of Systems   Constitutional: Negative for appetite change, chills, fatigue and fever. HENT: Positive for dental problem. Negative for ear pain, rhinorrhea, sore throat and trouble swallowing. Eyes: Negative for pain, discharge and visual disturbance. Respiratory: Negative for cough, chest tightness and shortness of breath. Cardiovascular: Negative for chest pain and palpitations.    Gastrointestinal: Negative for abdominal pain, nausea and vomiting. Endocrine: Negative for polydipsia, polyphagia and polyuria. Genitourinary: Negative for difficulty urinating, dysuria, hematuria and testicular pain. Musculoskeletal: Negative for arthralgias and back pain. Skin: Negative for color change and rash. Allergic/Immunologic: Negative for immunocompromised state. Neurological: Negative for dizziness, seizures, syncope, weakness and headaches. Hematological: Negative for adenopathy. Psychiatric/Behavioral: Negative for confusion and dysphoric mood. All other systems reviewed and are negative. Physical Exam  Physical Exam  Vitals and nursing note reviewed. Constitutional:       General: He is not in acute distress. Appearance: Normal appearance. He is not ill-appearing, toxic-appearing or diaphoretic. HENT:      Head: Normocephalic and atraumatic. Nose: Nose normal. No congestion or rhinorrhea. Mouth/Throat:      Mouth: Mucous membranes are moist.      Pharynx: Oropharynx is clear. No oropharyngeal exudate or posterior oropharyngeal erythema. Comments: Mild dental decay of the right lower molars, there is some tenderness to percussion of the right second lower molar, there is no gingival erythema or swelling, no gross abscess noted. No trismus. Eyes:      General:         Right eye: No discharge. Left eye: No discharge. Cardiovascular:      Rate and Rhythm: Normal rate and regular rhythm. Pulses: Normal pulses. Heart sounds: Normal heart sounds. Pulmonary:      Effort: Pulmonary effort is normal. No respiratory distress. Musculoskeletal:         General: Normal range of motion. Cervical back: Normal range of motion and neck supple. Skin:     General: Skin is warm and dry. Capillary Refill: Capillary refill takes less than 2 seconds. Neurological:      General: No focal deficit present.       Mental Status: He is alert and oriented to person, place, and time. Cranial Nerves: No cranial nerve deficit. Sensory: No sensory deficit. Motor: No weakness. Coordination: Coordination normal.      Gait: Gait normal.      Deep Tendon Reflexes: Reflexes normal.   Psychiatric:         Mood and Affect: Mood normal.         Behavior: Behavior normal.         Thought Content: Thought content normal.         Judgment: Judgment normal.         Vital Signs  ED Triage Vitals [10/10/23 2351]   Temperature Pulse Respirations Blood Pressure SpO2   98.2 °F (36.8 °C) 76 18 (!) 155/105 98 %      Temp Source Heart Rate Source Patient Position - Orthostatic VS BP Location FiO2 (%)   Temporal Monitor Sitting Right arm --      Pain Score       --           Vitals:    10/10/23 2351   BP: (!) 155/105   Pulse: 76   Patient Position - Orthostatic VS: Sitting         Visual Acuity      ED Medications  Medications   amoxicillin (AMOXIL) capsule 500 mg (has no administration in time range)       Diagnostic Studies  Results Reviewed     None                 No orders to display              Procedures  Nerve block    Date/Time: 10/11/2023 12:10 AM    Performed by: Shawnee Munson MD  Authorized by: Shawnee Munson MD    Patient location:  ED  [place] Protocol:  Consent: Verbal consent obtained. Risks and benefits: risks, benefits and alternatives were discussed  Consent given by: patient  Time out: Immediately prior to procedure a "time out" was called to verify the correct patient, procedure, equipment, support staff and site/side marked as required. Timeout called at: 10/11/2023 12:10 AM.  Patient identity confirmed: verbally with patient, provided demographic data, hospital-assigned identification number and arm band      Indications:     Indications:  Pain relief  Location:     Body area:  Head    Head nerve blocked: Inferior alveolar.     Laterality:  Right  Pre-procedure details:     Preparation: Patient was prepped and draped in usual sterile fashion    Procedure details (see MAR for exact dosages): Block needle gauge:  25 G    Anesthetic injected:  Lidocaine 1% w/o epi    Injection procedure:  Anatomic landmarks identified, anatomic landmarks palpated, incremental injection, introduced needle and negative aspiration for blood  Post-procedure details:     Dressing:  None    Outcome:  Anesthesia achieved    Patient tolerance of procedure: Tolerated well, no immediate complications             ED Course                                             Medical Decision Making  0005: Patient appears well, vital signs reviewed. Patient has right lower dental pain unrelieved by NSAIDs. There is no gross evidence of abscess at this time. Patient has dental appointment today. I will give dental block for relief to bridge him until his appointment. I will empirically start on antibiotics. There is no findings to suggest malignant infection. Pain, dental: acute illness or injury           Disposition  Final diagnoses:   Pain, dental     Time reflects when diagnosis was documented in both MDM as applicable and the Disposition within this note     Time User Action Codes Description Comment    10/11/2023 12:13 AM Kyleigh November Add [K08.89] Pain, dental       ED Disposition     ED Disposition   Discharge    Condition   Stable    Date/Time   Wed Oct 11, 2023 12:13 AM    Comment   [facility] discharge to home/self care. Follow-up Information     Follow up With Specialties Details Why Contact Info    Dentist  Today as scheduled           Patient's Medications   Discharge Prescriptions    AMOXICILLIN (AMOXIL) 500 MG CAPSULE    Take 1 capsule (500 mg total) by mouth 3 (three) times a day for 7 days       Start Date: 10/11/2023End Date: 10/18/2023       Order Dose: 500 mg       Quantity: 21 capsule    Refills: 0       No discharge procedures on file.     PDMP Review       Value Time User    PDMP Reviewed  Yes 8/28/2023  5:42 PM Jyoti Adorno [facility]          ED Provider  Electronically Signed by           Alexus Camarena MD  10/11/23 7741

## 2023-10-12 ENCOUNTER — VBI (OUTPATIENT)
Dept: FAMILY MEDICINE CLINIC | Facility: CLINIC | Age: 58
End: 2023-10-12

## 2023-10-12 NOTE — TELEPHONE ENCOUNTER
10/12/23 2:24 PM    Patient contacted post ED visit, first outreach attempt made. Message was left for patient to return a call to the VBI Department at [facility]: Phone [phone]. Thank you.   Conrado Suresh  [facility]

## 2023-10-12 NOTE — LETTER
[facility]  [address] [place] [id number]  [phone]    Date: 10/19/23  Jenna Quevedo [place] [id number]    Dear Annalise Badillo: Thank you for choosing [facility] emergency department for care. Your primary care provider wants to make sure that your ongoing medical care is being addressed. If you require follow up care as a result of your emergency department visit, there are a few things the practice would like you to know. As part of the network's continuing commitment to caring for our patients, we have added more same day appointments and have extended office hours to meet your medical needs. After hours, on-call physicians are available via your primary care provider's main office line. We encourage you to contact our office prior to seeking treatment to discuss your symptoms with the medical staff. Together, we can determine the correct course of action. A majority of non-emergent conditions such as: common cold, flu-like symptoms, fevers, strains/sprains, dislocations, minor burns, cuts and animal bites can be treated at [address] [place] [address] facilities. Diagnostic testing is available at some sites. Of course, if you are experiencing a life threatening medical emergency call 911 or proceed directly to the nearest emergency room.     Your nearest [address] [place] [address] facility is conveniently located at:    [address] [place] [address]  [phone]  SKIP THE WAIT  Conveniently offered at most [facility] Emergency [facility] Now locations  [address] [place] [address] Ave your spot online at [email or web address] or on the [address] [place] [address] Drive    Sincerely,    [address] [facility]IMARY [facility]  Dept: [phone]

## 2023-10-13 NOTE — TELEPHONE ENCOUNTER
10/13/23 11:44 AM    Patient contacted post ED visit, second outreach attempt made. Message was left for patient to return a call to the VBI Department at [facility]: Phone [phone]. Thank you.   Regan Jalloh  [facility]

## 2023-10-19 NOTE — TELEPHONE ENCOUNTER
10/19/23 9:28 AM    Patient contacted post ED visit, phone outreaches were unsuccessful and a MyChart letter has been sent to the patient as follow-up. Thank you.   Conrado Suresh  [facility]

## 2023-10-25 ENCOUNTER — APPOINTMENT (OUTPATIENT)
Dept: LAB | Facility: HOSPITAL | Age: 58
End: 2023-10-25
Payer: COMMERCIAL

## 2023-10-25 DIAGNOSIS — E87.1 HYPOSMOLALITY SYNDROME: ICD-10-CM

## 2023-10-25 DIAGNOSIS — R94.6 NONSPECIFIC ABNORMAL RESULTS OF THYROID FUNCTION STUDY: ICD-10-CM

## 2023-10-25 LAB
ALBUMIN SERPL BCP-MCNC: 4.1 G/DL (ref 3.5–5)
ALP SERPL-CCNC: 64 U/L (ref 34–104)
ALT SERPL W P-5'-P-CCNC: 25 U/L (ref 7–52)
ANION GAP SERPL CALCULATED.3IONS-SCNC: 8 MMOL/L
AST SERPL W P-5'-P-CCNC: 17 U/L (ref 13–39)
BASOPHILS # BLD AUTO: 0.15 THOUSANDS/ÂΜL (ref 0–0.1)
BASOPHILS NFR BLD AUTO: 2 % (ref 0–1)
BILIRUB SERPL-MCNC: 0.7 MG/DL (ref 0.2–1)
BUN SERPL-MCNC: 15 MG/DL (ref 5–25)
CALCIUM SERPL-MCNC: 8.9 MG/DL (ref 8.4–10.2)
CHLORIDE SERPL-SCNC: 103 MMOL/L (ref 96–108)
CO2 SERPL-SCNC: 27 MMOL/L (ref 21–32)
CREAT SERPL-MCNC: 0.94 MG/DL (ref 0.6–1.3)
EOSINOPHIL # BLD AUTO: 0.93 THOUSAND/ÂΜL (ref 0–0.61)
EOSINOPHIL NFR BLD AUTO: 13 % (ref 0–6)
ERYTHROCYTE [DISTWIDTH] IN BLOOD BY AUTOMATED COUNT: 12.7 % (ref 11.6–15.1)
GFR SERPL CREATININE-BSD FRML MDRD: 87 ML/MIN/1.73SQ M
GLUCOSE P FAST SERPL-MCNC: 103 MG/DL (ref 65–99)
HCT VFR BLD AUTO: 48.6 % (ref 36.5–49.3)
HGB BLD-MCNC: 16.1 G/DL (ref 12–17)
IMM GRANULOCYTES # BLD AUTO: 0.04 THOUSAND/UL (ref 0–0.2)
IMM GRANULOCYTES NFR BLD AUTO: 1 % (ref 0–2)
LYMPHOCYTES # BLD AUTO: 2.27 THOUSANDS/ÂΜL (ref 0.6–4.47)
LYMPHOCYTES NFR BLD AUTO: 31 % (ref 14–44)
MCH RBC QN AUTO: 31 PG (ref 26.8–34.3)
MCHC RBC AUTO-ENTMCNC: 33.1 G/DL (ref 31.4–37.4)
MCV RBC AUTO: 94 FL (ref 82–98)
MONOCYTES # BLD AUTO: 0.56 THOUSAND/ÂΜL (ref 0.17–1.22)
MONOCYTES NFR BLD AUTO: 8 % (ref 4–12)
NEUTROPHILS # BLD AUTO: 3.28 THOUSANDS/ÂΜL (ref 1.85–7.62)
NEUTS SEG NFR BLD AUTO: 45 % (ref 43–75)
NRBC BLD AUTO-RTO: 0 /100 WBCS
PLATELET # BLD AUTO: 225 THOUSANDS/UL (ref 149–390)
PMV BLD AUTO: 11.4 FL (ref 8.9–12.7)
POTASSIUM SERPL-SCNC: 4.5 MMOL/L (ref 3.5–5.3)
PROT SERPL-MCNC: 7 G/DL (ref 6.4–8.4)
RBC # BLD AUTO: 5.2 MILLION/UL (ref 3.88–5.62)
SODIUM SERPL-SCNC: 138 MMOL/L (ref 135–147)
T3FREE SERPL-MCNC: 2.79 PG/ML (ref 2.5–3.9)
T4 FREE SERPL-MCNC: 0.46 NG/DL (ref 0.61–1.12)
TSH SERPL DL<=0.05 MIU/L-ACNC: 2.04 UIU/ML (ref 0.45–4.5)
WBC # BLD AUTO: 7.23 THOUSAND/UL (ref 4.31–10.16)

## 2023-10-25 PROCEDURE — 84481 FREE ASSAY (FT-3): CPT

## 2023-10-25 PROCEDURE — 36415 COLL VENOUS BLD VENIPUNCTURE: CPT

## 2023-10-25 PROCEDURE — 80053 COMPREHEN METABOLIC PANEL: CPT

## 2023-10-25 PROCEDURE — 84439 ASSAY OF FREE THYROXINE: CPT

## 2023-10-25 PROCEDURE — 85025 COMPLETE CBC W/AUTO DIFF WBC: CPT

## 2023-10-25 PROCEDURE — 84443 ASSAY THYROID STIM HORMONE: CPT

## 2023-10-31 DIAGNOSIS — F39 MOOD DISORDER (HCC): ICD-10-CM

## 2023-10-31 DIAGNOSIS — F33.1 MODERATE EPISODE OF RECURRENT MAJOR DEPRESSIVE DISORDER (HCC): ICD-10-CM

## 2023-10-31 RX ORDER — ESCITALOPRAM OXALATE 20 MG/1
20 TABLET ORAL DAILY
Qty: 90 TABLET | Refills: 1 | Status: SHIPPED | OUTPATIENT
Start: 2023-10-31

## 2023-10-31 RX ORDER — ESCITALOPRAM OXALATE 10 MG/1
10 TABLET ORAL DAILY
Qty: 90 TABLET | Refills: 1 | Status: SHIPPED | OUTPATIENT
Start: 2023-10-31

## 2023-11-13 ENCOUNTER — TELEMEDICINE (OUTPATIENT)
Dept: PSYCHIATRY | Facility: CLINIC | Age: 58
End: 2023-11-13
Payer: COMMERCIAL

## 2023-11-13 DIAGNOSIS — F41.1 GAD (GENERALIZED ANXIETY DISORDER): ICD-10-CM

## 2023-11-13 DIAGNOSIS — F33.1 MAJOR DEPRESSIVE DISORDER, RECURRENT EPISODE, MODERATE (HCC): Primary | ICD-10-CM

## 2023-11-13 PROCEDURE — 99214 OFFICE O/P EST MOD 30 MIN: CPT | Performed by: NURSE PRACTITIONER

## 2023-11-13 RX ORDER — PROPRANOLOL HYDROCHLORIDE 10 MG/1
10 TABLET ORAL 2 TIMES DAILY PRN
Qty: 60 TABLET | Refills: 2 | Status: SHIPPED | OUTPATIENT
Start: 2023-11-13

## 2023-11-13 RX ORDER — QUETIAPINE 150 MG/1
150 TABLET, FILM COATED, EXTENDED RELEASE ORAL
Qty: 30 TABLET | Refills: 1 | Status: SHIPPED | OUTPATIENT
Start: 2023-11-13

## 2023-11-13 NOTE — PSYCH
Virtual Regular Visit    Verification of patient location:    Patient is located in the following state in which I hold an active license [place]    Problem List Items Addressed This Visit     Major depressive disorder, recurrent episode, moderate (HCC) - Primary    Relevant Medications    QUEtiapine (SEROquel XR) 150 mg 24 hr tablet   Other Visit Diagnoses     MIKA (generalized anxiety disorder)        Relevant Medications    QUEtiapine (SEROquel XR) 150 mg 24 hr tablet    propranolol (INDERAL) 10 mg tablet             Encounter provider KOLBY Simental    Provider located at    [address] [address] [facility] [address] [phone]    Recent Visits  No visits were found meeting these conditions. Showing recent visits within past 7 days and meeting all other requirements  Today's Visits  Date Type Provider Dept   11/13/23 Telemedicine Glenn Arora [address] [place] [address]  today's visits and meeting all other requirements  Future Appointments  No visits were found meeting these conditions. Showing future appointments within next 150 days and meeting all other requirements           The patient was identified by name and date of birth. Patient was informed that this is a telemedicine visit and [facility] He agrees to proceed. .  My office door was closed. KOLBY Hallman student was present in the room for the appointment. He acknowledged consent and understanding of privacy and security of the video platform. The patient has agreed to participate and understands they can discontinue the visit at any time. Patient is aware this is a billable service.      HPI     Current Outpatient Medications   Medication Sig Dispense Refill   • propranolol (INDERAL) 10 mg tablet Take 1 tablet (10 mg total) by mouth 2 (two) times a day as needed (anxiety) 60 tablet 2   • QUEtiapine (SEROquel XR) 150 mg 24 hr tablet Take 1 tablet (150 mg total) by mouth daily at bedtime 30 tablet 1   • escitalopram (LEXAPRO) 10 mg tablet TAKE 1 TABLET (10 MG TOTAL) BY MOUTH DAILY TAKE WITH 20MG PILL TO TOTAL 30MG DAILY. 90 tablet 1   • escitalopram (LEXAPRO) 20 mg tablet TAKE 1 TABLET (20 MG TOTAL) BY MOUTH DAILY TAKE WITH 10MG TABLET TO TOTAL 30MG. 90 tablet 1   • meloxicam (MOBIC) 15 mg tablet TAKE 1 TABLET (15 MG TOTAL) BY MOUTH DAILY. (Patient taking differently: Take 15 mg by mouth every morning) 90 tablet 0     No current facility-administered medications for this visit. Review of Systems  Video Exam    There were no vitals filed for this visit. Physical Exam   As a result of this visit, I have referred the patient for further respiratory evaluation. No    I spent 30 minutes directly with the patient during this visit  VIRTUAL VISIT DISCLAIMER    Allie Farmer acknowledges that he has consented to an online visit or consultation. He understands that the online visit is based solely on information provided by him, and that, in the absence of a face-to-face physical evaluation by the physician, the diagnosis he receives is both limited and provisional in terms of accuracy and completeness. This is not intended to replace a full medical face-to-face evaluation by the physician. Allie Farmer understands and accepts these terms. MEDICATION MANAGEMENT NOTE        [address] [place] [address]    Name and Date of Birth:  Allie Farmer 59 y.o. 1965 MRN: [id number]    Date of Visit: November 13, 2023    Allergies   Allergen Reactions   • Shellfish Allergy - Food Allergy Anaphylaxis and Tongue Swelling       Visit Time    Visit Start Time: 9896  Visit Stop Time: 8213  Total Visit Duration:  30 minutes    SUBJECTIVE:    Pippa Palomino is seen today for a follow up for Major Depressive Disorder. He continues to do fairly well since the last visit. Grace Lara seen virtually today for medication management follow up. He was last seen by this provider on 9/11/23. Grace Lara reports today that he is doing "all right". He has had some tooth issues and is dealing with the this appropriately. He reports that his family will be coming over for Thanksgiving and they are getting dinner from [place]. He states that he still feels he struggles more in the am than in the afternoons. He feels more comfortable in the house than leaving the house, but these feelings have not stopped him from actually going anywhere or doing anything. He rates his anxiety 4/10, depression 4/10. He has stopped the ativan entirely. He denies SI/HI. Denies auditory and visual hallucinations. He reports good sleep and good appetite. His TSH indicates some hypothyroid, which could potentially be caused by the seroquel. We will decrease the seroquel to see if his TSH changes, and see if his mood changes. We discussed risk vs benefit and the potential for maybe having to have the hypothyroid be treated and staying on the seroquel if his depression and anxiety increase with the decrease in seroquel. He verbalized understanding. Appointment with his doctor is on 12/18. We will discuss their findings on 12/19. He denies any side effects from current psychiatric medications.     PLAN:  D/C ativan   Decrease Seroquel  mg p.o. at bedtime  Lexapro 30 mg p.o. daily  Propranolol 10 mg p.o. twice daily as needed  Follow up in 1 months  He will call sooner with concerns or issues if they arise prior to scheduled appointment    Aware of 24 hour and weekend coverage for urgent situations accessed by calling [facility] main practice number  Medication management every 1 month  Aware of need to follow up with family physician for medical issues    Diagnoses and all orders for this visit:    Major depressive disorder, recurrent episode, moderate (HCC)  -     QUEtiapine (SEROquel XR) 150 mg 24 hr tablet; Take 1 tablet (150 mg total) by mouth daily at bedtime    MIKA (generalized anxiety disorder)  -     propranolol (INDERAL) 10 mg tablet; Take 1 tablet (10 mg total) by mouth 2 (two) times a day as needed (anxiety)      Patient education on serotonin syndrome symptoms completed which included the following:  symptoms routinely occur when starting a new drug or increasing the dose of a drug you're already taking. Signs and symptoms include: agitation,restlessness,confusion,rapid heart rate, high blood pressure, dilated pupils, loss of muscle coordination, muscle twitching or rigidity, heavy sweating, diarrhea, headache, shivering, goose bumps. The patient was also informed that severe serotonin syndrome can be life-threatening and needs to seek immediate medical care, these sign and symptoms include: high fever, seizures, irregular heartbeat, and unconsciousness. The patient verbalized understanding. Seroquel PARQ completed including dizziness, sedation, GI distress, orthostatic hypotension and cardiovascular risks, metabolic syndrome, NMS, TD, EPS, Seizures, and others    Current Outpatient Medications on File Prior to Visit   Medication Sig Dispense Refill   • escitalopram (LEXAPRO) 10 mg tablet TAKE 1 TABLET (10 MG TOTAL) BY MOUTH DAILY TAKE WITH 20MG PILL TO TOTAL 30MG DAILY. 90 tablet 1   • escitalopram (LEXAPRO) 20 mg tablet TAKE 1 TABLET (20 MG TOTAL) BY MOUTH DAILY TAKE WITH 10MG TABLET TO TOTAL 30MG. 90 tablet 1   • meloxicam (MOBIC) 15 mg tablet TAKE 1 TABLET (15 MG TOTAL) BY MOUTH DAILY. (Patient taking differently: Take 15 mg by mouth every morning) 90 tablet 0   • [DISCONTINUED] LORazepam (ATIVAN) 0.5 mg tablet Take 1/2 - 1 tab daily as needed for severe anxiety.  (Patient taking differently: Take 0.5 mg by mouth if needed for anxiety Take 1/2 - 1 tab daily as needed for severe anxiety.) 15 tablet 0   • [DISCONTINUED] propranolol (INDERAL) 10 mg tablet Take 1 tablet (10 mg total) by mouth 2 (two) times a day as needed (anxiety) 60 tablet 2   • [DISCONTINUED] QUEtiapine (SEROquel XR) 200 mg 24 hr tablet Take 1 tablet (200 mg total) by mouth daily at bedtime 30 tablet 2     No current facility-administered medications on file prior to visit. Psychotherapy Provided:     Individual psychotherapy provided: Yes  Counseling was provided during the session today for 16 minutes. Supportive counseling provided. Medication changes discussed with Barbie Wolff. Medication education provided to Barbie Wolff. Discussed with Barbie Wolff coping with ongoing anxiety. Coping strategies reviewed with Barbie Wolff. Importance of medication and treatment compliance reviewed with Barbie Wolff. Importance of follow up with family physician for medical issues reviewed with Barbie Wolff. Reassurance and supportive therapy provided. Reoriented to reality and reassured. Crisis/safety plan discussed with Barbie Wolff. Patient will call prior to scheduled appointment if they have any issues or concerns. Patient understands they can access the office by calling the main number at any time if they are in crisis. They also understand they can call their [facility]'s crisis number or go to their nearest ED if suicidal ideation increases or if they develop a plan or intent. HPI ROS Appetite Changes and Sleep:     He reports normal sleep, normal appetite, adequate energy level.  Denies homicidal ideation, denies suicidal ideation    Review Of Systems:  HPI ROS:               Medication Side Effects: denies    Depression (10 worst): 4/10 4/10   Anxiety (10 worst): 4/10 4/10   Safety concerns (SI, HI, etc): denies Passive no plan or intent   Sleep: good Good, normal   Energy: good adequate   Appetite: good good     General normal    Personality no change in personality   Constitutional as noted in HPI   ENT negative   Cardiovascular negative   Respiratory negative   Gastrointestinal negative   Genitourinary negative Musculoskeletal negative   Integumentary negative   Neurological negative   Endocrine negative   Other Symptoms none, all other systems are negative     Mental Status Evaluation:    Appearance Appropriately dressed and Good eye contact   Behavior calm and cooperative   Mood euthymic, improved  Depression Scale - 4 of 10 (0 = No depression)  Anxiety Scale - 4 of 10 (0 = No anxiety)   Speech Normal rate and volume   Affect appropriate   Thought Processes Goal directed and coherent   Thought Content Does not verbalize delusional material   Associations Tightly connected   Perceptual Disturbances Denies hallucinations and does not appear to be responding to internal stimuli   Risk Potential Suicidal/Homicidal Ideation - No evidence of suicidal or homicidal ideation and patient does not verbalize suicidal or homicidal ideation  Risk of Violence - No evidence of risk for violence found on assessment  Risk of Self Mutilation - No evidence of risk for self mutilation found on assessment   Orientation oriented to person, place, time/date and situation   Memory recent and remote memory grossly intact   Consciousness alert and awake   Attention/Concentration attention span and concentration appear shorter than expected for age   Insight fair   Judgement fair   Muscle Strength and Gait normal muscle strength and normal muscle tone, normal gait/station and normal balance   Motor Activity no abnormal movements   Language no difficulty naming common objects, no difficulty repeating a phrase, no difficulty writing a sentence   Fund of Knowledge adequate knowledge of current events  adequate fund of knowledge regarding past history  adequate fund of knowledge regarding vocabulary      Past Psychiatric History - per Dr. Meredith Magana H&P  Previous diagnoses include Depression and Anxiety   Prior outpatient psychiatric treatment:   With PCP till now   Prior therapy:    Prior inpatient psychiatric treatment: x1   Prior suicide attempts: None   Prior self harm:  None   Prior violence or aggression:  None       Past Psychiatric History Update:     Inpatient Psychiatric Admission Since Last Encounter:   no  Changes to Outpatient Psychiatric Treatment Team:    no  Suicide Attempt Or Self Mutilation Since Last Encounter:   no  Incidence of Violent Behavior Since Last Encounter:   no    Traumatic History Update:     New Onset of Abuse Since Last Encounter:   no  Traumatic Events Since Last Encounter:   no    Past Medical History:    Past Medical History:   Diagnosis Date   • Anxiety    • CPAP (continuous positive airway pressure) dependence    • Depression    • Headache(784.0) March 2020    Degenerative disk in neck may be cause. • Hypertension    • Palpitation    • Panic attack    • PVC (premature ventricular contraction)    • Sleep apnea         Past Surgical History:   Procedure Laterality Date   • ANAL EXAMINATION UNDER ANESTHESIA     • FRACTURE SURGERY  6/1970 and 6/1979    Left and right wrists   • KNEE SURGERY  08/1982     Allergies   Allergen Reactions   • Shellfish Allergy - Food Allergy Anaphylaxis and Tongue Swelling     Substance Abuse History:    Social History     Substance and Sexual Activity   Alcohol Use Yes   • Alcohol/week: 3.0 - 4.0 standard drinks of alcohol   • Types: 3 - 4 Cans of beer per week     Social History     Substance and Sexual Activity   Drug Use Never     Social History:    Social History     Socioeconomic History   • Marital status: /Civil Union     Spouse name: Not on file   • Number of children: Not on file   • Years of education: Not on file   • Highest education level: Not on file   Occupational History   • Not on file   Tobacco Use   • Smoking status: Never     Passive exposure: Yes   • Smokeless tobacco: Never   Vaping Use   • Vaping Use: Never used   Substance and Sexual Activity   • Alcohol use:  Yes     Alcohol/week: 3.0 - 4.0 standard drinks of alcohol     Types: 3 - 4 Cans of beer per week   • Drug use: Never   • Sexual activity: Not Currently     Partners: Female     Birth control/protection: Female Sterilization   Other Topics Concern   • Not on file   Social History Narrative   • Not on file     Social Determinants of Health     Financial Resource Strain: Not on file   Food Insecurity: Not on file   Transportation Needs: Not on file   Physical Activity: Not on file   Stress: Not on file   Social Connections: Not on file   Intimate Partner Violence: Not on file   Housing Stability: Not on file     Family Psychiatric History:     Family History   Problem Relation Age of Onset   • Hypertension Mother    • COPD Mother    • Migraines Mother    • Stroke Father         Mini stroke in 2010     History Review: The following portions of the patient's history were reviewed and updated as appropriate: allergies, current medications, past family history, past medical history, past social history, past surgical history and problem list     OBJECTIVE:     Vital signs in last 24 hours: There were no vitals filed for this visit.   Laboratory Results:   Recent Labs (last 2 months):   Appointment on 10/25/2023   Component Date Value   • Sodium 10/25/2023 138    • Potassium 10/25/2023 4.5    • Chloride 10/25/2023 103    • CO2 10/25/2023 27    • ANION GAP 10/25/2023 8    • BUN 10/25/2023 15    • Creatinine 10/25/2023 0.94    • Glucose, Fasting 10/25/2023 103 (H)    • Calcium 10/25/2023 8.9    • AST 10/25/2023 17    • ALT 10/25/2023 25    • Alkaline Phosphatase 10/25/2023 64    • Total Protein 10/25/2023 7.0    • Albumin 10/25/2023 4.1    • Total Bilirubin 10/25/2023 0.70    • eGFR 10/25/2023 87    • WBC 10/25/2023 7.23    • RBC 10/25/2023 5.20    • Hemoglobin 10/25/2023 16.1    • Hematocrit 10/25/2023 48.6    • MCV 10/25/2023 94    • MCH 10/25/2023 31.0    • MCHC 10/25/2023 33.1    • RDW 10/25/2023 12.7    • MPV 10/25/2023 11.4    • Platelets 85/31/3387 225    • nRBC 10/25/2023 0    • Neutrophils Relative 10/25/2023 45    • Immat GRANS % 10/25/2023 1    • Lymphocytes Relative 10/25/2023 31    • Monocytes Relative 10/25/2023 8    • Eosinophils Relative 10/25/2023 13 (H)    • Basophils Relative 10/25/2023 2 (H)    • Neutrophils Absolute 10/25/2023 3.28    • Immature Grans Absolute 10/25/2023 0.04    • Lymphocytes Absolute 10/25/2023 2.27    • Monocytes Absolute 10/25/2023 0.56    • Eosinophils Absolute 10/25/2023 0.93 (H)    • Basophils Absolute 10/25/2023 0.15 (H)    • T3, Free 10/25/2023 2.79    • TSH 3RD GENERATON 10/25/2023 2.045    • Free T4 10/25/2023 0.46 (L)    Appointment on 09/15/2023   Component Date Value   • WBC 09/15/2023 6.54    • RBC 09/15/2023 5.08    • Hemoglobin 09/15/2023 15.9    • Hematocrit 09/15/2023 47.5    • MCV 09/15/2023 94    • MCH 09/15/2023 31.3    • MCHC 09/15/2023 33.5    • RDW 09/15/2023 12.6    • MPV 09/15/2023 11.9    • Platelets 04/89/4461 197    • nRBC 09/15/2023 0    • Neutrophils Relative 09/15/2023 50    • Immat GRANS % 09/15/2023 0    • Lymphocytes Relative 09/15/2023 30    • Monocytes Relative 09/15/2023 10    • Eosinophils Relative 09/15/2023 8 (H)    • Basophils Relative 09/15/2023 2 (H)    • Neutrophils Absolute 09/15/2023 3.20    • Immature Grans Absolute 09/15/2023 0.02    • Lymphocytes Absolute 09/15/2023 1.99    • Monocytes Absolute 09/15/2023 0.63    • Eosinophils Absolute 09/15/2023 0.55    • Basophils Absolute 09/15/2023 0.15 (H)    • Sodium 09/15/2023 131 (L)    • Potassium 09/15/2023 3.9    • Chloride 09/15/2023 110 (H)    • CO2 09/15/2023 26    • ANION GAP 09/15/2023 -5    • BUN 09/15/2023 17    • Creatinine 09/15/2023 0.88    • Glucose, Fasting 09/15/2023 99    • Calcium 09/15/2023 8.6    • AST 09/15/2023 20    • ALT 09/15/2023 28    • Alkaline Phosphatase 09/15/2023 58    • Total Protein 09/15/2023 6.5    • Albumin 09/15/2023 3.8    • Total Bilirubin 09/15/2023 0.66    • eGFR 09/15/2023 93    • Cholesterol 09/15/2023 154    • Triglycerides 09/15/2023 139    • HDL, Direct 09/15/2023 42    • LDL Calculated 09/15/2023 84    • TSH 3RD GENERATON 09/15/2023 0.019 (L)    • Hemoglobin A1C 09/15/2023 6.2 (H)    • EAG 09/15/2023 131    • PSA 09/15/2023 2.18    • Free T4 09/15/2023 0.99      I have personally reviewed all pertinent laboratory/tests results. Suicide/Homicide Risk Assessment:    Risk of Harm to Self:  The following ratings are based on assessment at the time of the interview  Recent Specific Risk Factors include: current depressive symptoms, current anxiety symptoms, unemployed  Demographic risk factors include: , male, age: over 48 or older  Historical Risk Factors include: chronic depressive symptoms, chronic anxiety symptoms  Protective Factors: no current suicidal ideation, access to mental health treatment, being a parent, being , compliant with medications, compliant with mental health treatment, connection to own children, having a desire to be alive, having a sense of purpose or meaning in life, responsibilities and duties to others, stable living environment, sense of determination, supportive family  Based on today's assessment, Barbie Wolff presents the following risk of harm to self: low    Risk of Harm to Others: The following ratings are based on assessment at the time of the interview  Protective Factors: no current homicidal ideation  Based on today's assessment, Barbie Wolff presents the following risk of harm to others: none    The following interventions are recommended: contracts for safety at present - agrees to go to ED if feeling unsafe, return in 1 month for reassessment, contracts for safety at present - agrees to call Crisis Intervention Service if feeling unsafe    Medications Risks/Benefits:      Risks, Benefits And Possible Side Effects Of Medications:    Discussed risks and benefits of treatment with patient including risk of suicidality, serotonin syndrome, increased QTc interval and SIADH related to treatment with antidepressants;  Risk of induction of manic symptoms in certain patient populations and risk of parkinsonian symptoms, metabolic syndrome, tardive dyskinesia and neuroleptic malignant syndrome related to treatment with antipsychotic medications     Controlled Medication Discussion:     Claudette Alpers has been filling controlled prescriptions on time as prescribed according to [employer] [facility] Prescription Drug Monitoring Program    Treatment Plan:    Due for update/Updated:   no  Last treatment plan done 7/24/23 by KOLBY Grover. Treatment Plan due on 1/24/24. KOLBY Darnell 11/13/23    This note was shared with patient.

## 2023-12-01 DIAGNOSIS — G89.29 CHRONIC BILATERAL LOW BACK PAIN WITHOUT SCIATICA: ICD-10-CM

## 2023-12-01 DIAGNOSIS — M54.50 CHRONIC BILATERAL LOW BACK PAIN WITHOUT SCIATICA: ICD-10-CM

## 2023-12-01 RX ORDER — MELOXICAM 15 MG/1
15 TABLET ORAL DAILY
Qty: 90 TABLET | Refills: 1 | Status: SHIPPED | OUTPATIENT
Start: 2023-12-01

## 2023-12-19 ENCOUNTER — TELEMEDICINE (OUTPATIENT)
Dept: PSYCHIATRY | Facility: CLINIC | Age: 58
End: 2023-12-19
Payer: COMMERCIAL

## 2023-12-19 DIAGNOSIS — F33.0 MDD (MAJOR DEPRESSIVE DISORDER), RECURRENT EPISODE, MILD (HCC): Primary | ICD-10-CM

## 2023-12-19 PROCEDURE — 99214 OFFICE O/P EST MOD 30 MIN: CPT | Performed by: NURSE PRACTITIONER

## 2023-12-19 NOTE — PSYCH
Virtual Regular Visit    Verification of patient location:    Patient is located in the following state in which I hold an active license [place]    Problem List Items Addressed This Visit     MDD (major depressive disorder), recurrent episode, mild (HCC) - Primary              Encounter provider KOLBY Monae    Provider located at    [facility]  [address] [facility] [place] [address] [phone]    Recent Visits  No visits were found meeting these conditions.  Showing recent visits within past 7 days and meeting all other requirements  Today's Visits  Date Type Provider Dept   12/19/23 Telemedicine KOLBY Monae  Psychiatric [facility]   Showing today's visits and meeting all other requirements  Future Appointments  No visits were found meeting these conditions.  Showing future appointments within next 150 days and meeting all other requirements           The patient was identified by name and date of birth. Patient was informed that this is a telemedicine visit and that the visit is being conducted throughthe Epic Embedded platform. He agrees to proceed..  My office door was closed. No one else was present in the room.  He acknowledged consent and understanding of privacy and security of the video platform. The patient has agreed to participate and understands they can discontinue the visit at any time.    Patient is aware this is a billable service.     HPI     Current Outpatient Medications   Medication Sig Dispense Refill   • escitalopram (LEXAPRO) 10 mg tablet TAKE 1 TABLET (10 MG TOTAL) BY MOUTH DAILY TAKE WITH 20MG PILL TO TOTAL 30MG DAILY. 90 tablet 1   • escitalopram (LEXAPRO) 20 mg tablet TAKE 1 TABLET (20 MG TOTAL) BY MOUTH DAILY TAKE WITH 10MG TABLET TO TOTAL 30MG. 90 tablet 1   • meloxicam (MOBIC) 15 mg tablet TAKE 1 TABLET (15 MG TOTAL) BY MOUTH DAILY. 90 tablet 1   • propranolol (INDERAL) 10 mg tablet Take 1 tablet (10 mg  total) by mouth 2 (two) times a day as needed (anxiety) 60 tablet 2   • QUEtiapine (SEROquel XR) 150 mg 24 hr tablet Take 1 tablet (150 mg total) by mouth daily at bedtime 30 tablet 1     No current facility-administered medications for this visit.       Review of Systems  Video Exam    There were no vitals filed for this visit.    Physical Exam   As a result of this visit, I have referred the patient for further respiratory evaluation. No    I spent 30 minutes directly with the patient during this visit  VIRTUAL VISIT DISCLAIMER    Peterson Salinas acknowledges that he has consented to an online visit or consultation. He understands that the online visit is based solely on information provided by him, and that, in the absence of a face-to-face physical evaluation by the physician, the diagnosis he receives is both limited and provisional in terms of accuracy and completeness. This is not intended to replace a full medical face-to-face evaluation by the physician. Peterson Salinas understands and accepts these terms.    MEDICATION MANAGEMENT NOTE        [facility]    Name and Date of Birth:  Peterson Salinas 58 y.o. 1965 MRN: [id number]    Date of Visit: December 19, 2023    Allergies   Allergen Reactions   • Shellfish Allergy - Food Allergy Anaphylaxis and Tongue Swelling       Visit Time    Visit Start Time: 1230  Visit Stop Time: 1300  Total Visit Duration:  30 minutes    SUBJECTIVE:    Peterson is seen today for a follow up for Major Depressive Disorder. He continues to do fairly well since the last visit. Peterson seen virtually today for medication management follow up. He was last seen by this provider on 11/13/23.  He reports that he has been doing well for the past month. He has not been using any ativan and this medication has effectively been d/c.  He reports that he did wear a heart monitor and the report came back that he was good.  He reports  that his bp at the cardiologist yesterday was 110/80.  He states that his anxiety is still highest in the mornings.  He is able to overcome this anxiety and go about his day.  He rates his anxiety 4/10.  Depression is 3-4/10.  He denies SI/HI.  Denies auditory and visual hallucinations.  He is sleeping well and eating well. This provider did give him encouragement and provided feedback on his progress. He was happy with this feedback and will follow up in February. No medication changes made at this time.    He denies any side effects from current psychiatric medications.    PLAN:  Continue Seroquel  mg p.o. at bedtime  Lexapro 30 mg p.o. daily  Propranolol 10 mg p.o. twice daily as needed  Follow up in 6 weeks  He will call sooner with concerns or issues if they arise prior to scheduled appointment    Aware of 24 hour and weekend coverage for urgent situations accessed by calling [facility] main practice number  Medication management every 6 weeks  Aware of need to follow up with family physician for medical issues    Diagnoses and all orders for this visit:    MDD (major depressive disorder), recurrent episode, mild (HCC)      Patient education on serotonin syndrome symptoms completed which included the following:  symptoms routinely occur when starting a new drug or increasing the dose of a drug you're already taking. Signs and symptoms include: agitation,restlessness,confusion,rapid heart rate, high blood pressure, dilated pupils, loss of muscle coordination, muscle twitching or rigidity, heavy sweating, diarrhea, headache, shivering, goose bumps.  The patient was also informed that severe serotonin syndrome can be life-threatening and needs to seek immediate medical care, these sign and symptoms include: high fever, seizures, irregular heartbeat, and unconsciousness.  The patient verbalized understanding.    Seroquel PARQ completed including dizziness, sedation, GI distress,  orthostatic hypotension and cardiovascular risks, metabolic syndrome, NMS, TD, EPS, Seizures, and others    Current Outpatient Medications on File Prior to Visit   Medication Sig Dispense Refill   • escitalopram (LEXAPRO) 10 mg tablet TAKE 1 TABLET (10 MG TOTAL) BY MOUTH DAILY TAKE WITH 20MG PILL TO TOTAL 30MG DAILY. 90 tablet 1   • escitalopram (LEXAPRO) 20 mg tablet TAKE 1 TABLET (20 MG TOTAL) BY MOUTH DAILY TAKE WITH 10MG TABLET TO TOTAL 30MG. 90 tablet 1   • meloxicam (MOBIC) 15 mg tablet TAKE 1 TABLET (15 MG TOTAL) BY MOUTH DAILY. 90 tablet 1   • propranolol (INDERAL) 10 mg tablet Take 1 tablet (10 mg total) by mouth 2 (two) times a day as needed (anxiety) 60 tablet 2   • QUEtiapine (SEROquel XR) 150 mg 24 hr tablet Take 1 tablet (150 mg total) by mouth daily at bedtime 30 tablet 1     No current facility-administered medications on file prior to visit.       Psychotherapy Provided:     Individual psychotherapy provided: Yes  Counseling was provided during the session today for 16 minutes.  Supportive counseling provided.  Medication changes discussed with Peterson.  Medication education provided to Peterson.  Discussed with Peterson coping with ongoing anxiety.   Coping strategies reviewed with Peterson.   Importance of medication and treatment compliance reviewed with Peterson.  Importance of follow up with family physician for medical issues reviewed with Peterson.  Reassurance and supportive therapy provided.   Reoriented to reality and reassured.   Crisis/safety plan discussed with Peterson. Patient will call prior to scheduled appointment if they have any issues or concerns.  Patient understands they can access the office by calling the main number at any time if they are in crisis.  They also understand they can call their [facility]'s crisis number or go to their nearest ED if suicidal ideation increases or if they develop a plan or intent.     HPI ROS Appetite Changes and Sleep:     He reports normal sleep,  normal appetite, adequate energy level. Denies homicidal ideation, denies suicidal ideation    Review Of Systems:  HPI ROS:               Medication Side Effects:  denies   Depression (10 worst): 3-4/10 4/10   Anxiety (10 worst): 4/10 4/10   Safety concerns (SI, HI, etc): denies denies   Sleep: good good   Energy: good good   Appetite: good good     General normal    Personality no change in personality   Constitutional as noted in HPI   ENT negative   Cardiovascular negative   Respiratory negative   Gastrointestinal negative   Genitourinary negative   Musculoskeletal negative   Integumentary negative   Neurological negative   Endocrine negative   Other Symptoms none, all other systems are negative     Mental Status Evaluation:    Appearance Appropriately dressed and Good eye contact   Behavior calm and cooperative   Mood euthymic, improved  Depression Scale - 3-4 of 10 (0 = No depression)  Anxiety Scale - 4 of 10 (0 = No anxiety)   Speech Normal rate and volume   Affect appropriate   Thought Processes Goal directed and coherent   Thought Content Does not verbalize delusional material   Associations Tightly connected   Perceptual Disturbances Denies hallucinations and does not appear to be responding to internal stimuli   Risk Potential Suicidal/Homicidal Ideation - No evidence of suicidal or homicidal ideation and patient does not verbalize suicidal or homicidal ideation  Risk of Violence - No evidence of risk for violence found on assessment  Risk of Self Mutilation - No evidence of risk for self mutilation found on assessment   Orientation oriented to person, place, time/date and situation   Memory recent and remote memory grossly intact   Consciousness alert and awake   Attention/Concentration attention span and concentration appear shorter than expected for age   Insight fair   Judgement fair and improved   Muscle Strength and Gait normal muscle strength and normal muscle tone, normal gait/station and normal  balance   Motor Activity no abnormal movements   Language no difficulty naming common objects, no difficulty repeating a phrase, no difficulty writing a sentence   Fund of Knowledge adequate knowledge of current events  adequate fund of knowledge regarding past history  adequate fund of knowledge regarding vocabulary      Past Psychiatric History - per Dr. Salcido's H&P  Previous diagnoses include Depression and Anxiety   Prior outpatient psychiatric treatment:   With PCP till now   Prior therapy:    Prior inpatient psychiatric treatment: x1   Prior suicide attempts:  None   Prior self harm:  None   Prior violence or aggression:  None       Past Psychiatric History Update:     Inpatient Psychiatric Admission Since Last Encounter:   no  Changes to Outpatient Psychiatric Treatment Team:    no  Suicide Attempt Or Self Mutilation Since Last Encounter:   no  Incidence of Violent Behavior Since Last Encounter:   no    Traumatic History Update:     New Onset of Abuse Since Last Encounter:   no  Traumatic Events Since Last Encounter:   no    Past Medical History:    Past Medical History:   Diagnosis Date   • Anxiety    • CPAP (continuous positive airway pressure) dependence    • Depression    • Headache(784.0) March 2020    Degenerative disk in neck may be cause.   • Hypertension    • Palpitation    • Panic attack    • PVC (premature ventricular contraction)    • Sleep apnea         Past Surgical History:   Procedure Laterality Date   • ANAL EXAMINATION UNDER ANESTHESIA     • FRACTURE SURGERY  6/1970 and 6/1979    Left and right wrists   • KNEE SURGERY  08/1982     Allergies   Allergen Reactions   • Shellfish Allergy - Food Allergy Anaphylaxis and Tongue Swelling     Substance Abuse History:    Social History     Substance and Sexual Activity   Alcohol Use Yes   • Alcohol/week: 3.0 - 4.0 standard drinks of alcohol   • Types: 3 - 4 Cans of beer per week     Social History     Substance and Sexual Activity   Drug Use Never      Social History:    Social History     Socioeconomic History   • Marital status: /Civil Union     Spouse name: Not on file   • Number of children: Not on file   • Years of education: Not on file   • Highest education level: Not on file   Occupational History   • Not on file   Tobacco Use   • Smoking status: Never     Passive exposure: Yes   • Smokeless tobacco: Never   Vaping Use   • Vaping status: Never Used   Substance and Sexual Activity   • Alcohol use: Yes     Alcohol/week: 3.0 - 4.0 standard drinks of alcohol     Types: 3 - 4 Cans of beer per week   • Drug use: Never   • Sexual activity: Not Currently     Partners: Female     Birth control/protection: Female Sterilization   Other Topics Concern   • Not on file   Social History Narrative   • Not on file     Social Determinants of Health     Financial Resource Strain: Not on file   Food Insecurity: Not on file   Transportation Needs: Not on file   Physical Activity: Not on file   Stress: Not on file   Social Connections: Not on file   Intimate Partner Violence: Not on file   Housing Stability: Not on file     Family Psychiatric History:     Family History   Problem Relation Age of Onset   • Hypertension Mother    • COPD Mother    • Migraines Mother    • Stroke Father         Mini stroke in 2010     History Review:The following portions of the patient's history were reviewed and updated as appropriate: allergies, current medications, past family history, past medical history, past social history, past surgical history and problem list     OBJECTIVE:     Vital signs in last 24 hours:    There were no vitals filed for this visit.  Laboratory Results:   Recent Labs (last 2 months):   Appointment on 10/25/2023   Component Date Value   • Sodium 10/25/2023 138    • Potassium 10/25/2023 4.5    • Chloride 10/25/2023 103    • CO2 10/25/2023 27    • ANION GAP 10/25/2023 8    • BUN 10/25/2023 15    • Creatinine 10/25/2023 0.94    • Glucose, Fasting 10/25/2023 103  (H)    • Calcium 10/25/2023 8.9    • AST 10/25/2023 17    • ALT 10/25/2023 25    • Alkaline Phosphatase 10/25/2023 64    • Total Protein 10/25/2023 7.0    • Albumin 10/25/2023 4.1    • Total Bilirubin 10/25/2023 0.70    • eGFR 10/25/2023 87    • WBC 10/25/2023 7.23    • RBC 10/25/2023 5.20    • Hemoglobin 10/25/2023 16.1    • Hematocrit 10/25/2023 48.6    • MCV 10/25/2023 94    • MCH 10/25/2023 31.0    • MCHC 10/25/2023 33.1    • RDW 10/25/2023 12.7    • MPV 10/25/2023 11.4    • Platelets 10/25/2023 225    • nRBC 10/25/2023 0    • Neutrophils Relative 10/25/2023 45    • Immat GRANS % 10/25/2023 1    • Lymphocytes Relative 10/25/2023 31    • Monocytes Relative 10/25/2023 8    • Eosinophils Relative 10/25/2023 13 (H)    • Basophils Relative 10/25/2023 2 (H)    • Neutrophils Absolute 10/25/2023 3.28    • Immature Grans Absolute 10/25/2023 0.04    • Lymphocytes Absolute 10/25/2023 2.27    • Monocytes Absolute 10/25/2023 0.56    • Eosinophils Absolute 10/25/2023 0.93 (H)    • Basophils Absolute 10/25/2023 0.15 (H)    • T3, Free 10/25/2023 2.79    • TSH 3RD GENERATON 10/25/2023 2.045    • Free T4 10/25/2023 0.46 (L)      I have personally reviewed all pertinent laboratory/tests results.    Suicide/Homicide Risk Assessment:    Risk of Harm to Self:  The following ratings are based on assessment at the time of the interview  Recent Specific Risk Factors include: current depressive symptoms, current anxiety symptoms, unemployed  Demographic risk factors include: , male, age: over 50 or older  Historical Risk Factors include: chronic depressive symptoms, chronic anxiety symptoms  Protective Factors: no current suicidal ideation, access to mental health treatment, being a parent, being , compliant with medications, compliant with mental health treatment, connection to own children, having a desire to be alive, having a sense of purpose or meaning in life, responsibilities and duties to others, stable living  environment, sense of determination, supportive family  Based on today's assessment, Peterson presents the following risk of harm to self: low    Risk of Harm to Others:  The following ratings are based on assessment at the time of the interview  Protective Factors: no current homicidal ideation  Based on today's assessment, Peterson presents the following risk of harm to others: none    The following interventions are recommended: contracts for safety at present - agrees to go to ED if feeling unsafe, return in 6 weeks for reassessment, contracts for safety at present - agrees to call Crisis Intervention Service if feeling unsafe    Medications Risks/Benefits:      Risks, Benefits And Possible Side Effects Of Medications:    Discussed risks and benefits of treatment with patient including risk of suicidality, serotonin syndrome, increased QTc interval and SIADH related to treatment with antidepressants; Risk of induction of manic symptoms in certain patient populations and risk of parkinsonian symptoms, metabolic syndrome, tardive dyskinesia and neuroleptic malignant syndrome related to treatment with antipsychotic medications     Controlled Medication Discussion:     Patient is not on any controlled substances at this time.    Treatment Plan:    Due for update/Updated:   no  Last treatment plan done 7/24/23 by KOLBY Lake.  Treatment Plan due on 1/24/24.    KOLBY Monae 12/19/23    This note was shared with patient.

## 2023-12-20 NOTE — TELEPHONE ENCOUNTER
Left message for the patient letting him know that he does need to schedule a follow up and let him know that the medication was sent to the pharmacy  SUBJECTIVE  HPI Hilary is 17 year old female who presents w/ c/o eye redness  this has been present for 1 day . Other symptoms include  nasal congestion . No nausea, vomiting, diarrhea, constipation, urinary difficulties. No numbness and tingling in the extremities or blurred vision. No chest pain or SOB or wheezing. ROS negative otherwise      Chart/previous notes reviewed:  Yes      OTC meds tried: [+]    Past Medical History:   Diagnosis Date    Anxiety age 13    COVID     11/20, 1/22    Depression age 13    Exercise-induced asthma 07/30/2009    Headache disorder 03/07/2014    History of pineal cyst age 6    diagnosed with MRI for HA    Nexplanon in place 10/06/2020      Past Surgical History:   Procedure Laterality Date    No past surgeries        Social History     Tobacco Use    Smoking status: Never    Smokeless tobacco: Never   Vaping Use    Vaping Use: Former   Substance Use Topics    Alcohol use: Yes    Drug use: Yes     Comment: tried THC      Current Outpatient Medications   Medication Sig Dispense Refill    albuterol 108 (90 Base) MCG/ACT inhaler INHALE 2 PUFFS BY MOUTH INTO THE LUNGS EVERY 4 HOURS AS NEEDED 13.4 each 1    tiZANidine (ZANAFLEX) 4 MG tablet Take 1 tablet by mouth every 8 hours as needed (muscle spasm). 15 tablet 0    albuterol 108 (90 Base) MCG/ACT inhaler Take 2 inhalations every 4 hours as needed 1 each 0    albuterol (VENTOLIN) (2.5 MG/3ML) 0.083% nebulizer solution Take 1 nebulizer treatment every 4 hours as needed 375 mL 0     No current facility-administered medications for this visit.        OBJECTIVE  Vitals:    12/20/23 1233   BP: 100/66   Pulse: 74   Resp: 16   Temp: 97.6 °F (36.4 °C)   SpO2: 100%   LMP: 11/20/2023      Gen: Alert, well hydrated, in no apparent distress  Voice: clear    EAR: nomal canals and TM's  EYES:injected  bilateral  NOSE: mild mucosal swelling  THROAT: No erythema, edema or exudates  NECK: supple without cervical adenopathy. No meningismus  HEART: regular  rate and rhythm without murmur, rub or gallop  LUNG: clear to auscultation without ronchi, wheezing or crackles  ABDOMEN: soft, non-tender, non-distended. Bowel sounds present and active without rigidity. No organomegaly  SKIN:color, texture, turgor are normal  Vascular: intact           ASSESSMENT  URI  Conjunctivitis    PLAN  Underwent discussion with Hilary regarding this clinical situation. symptomatic measures were given and meds discussed  F/U: Hilary will follow up with her primary care physician as needed or as directed but may return to the [facility] if needed. If symptoms become severe may go to the ER.    Thiago Meza, DO

## 2024-01-20 DIAGNOSIS — F33.1 MAJOR DEPRESSIVE DISORDER, RECURRENT EPISODE, MODERATE (HCC): ICD-10-CM

## 2024-01-22 RX ORDER — QUETIAPINE 150 MG/1
150 TABLET, FILM COATED, EXTENDED RELEASE ORAL
Qty: 30 TABLET | Refills: 1 | Status: SHIPPED | OUTPATIENT
Start: 2024-01-22

## 2024-02-06 ENCOUNTER — TELEMEDICINE (OUTPATIENT)
Dept: PSYCHIATRY | Facility: CLINIC | Age: 59
End: 2024-02-06
Payer: COMMERCIAL

## 2024-02-06 DIAGNOSIS — F41.1 GAD (GENERALIZED ANXIETY DISORDER): ICD-10-CM

## 2024-02-06 DIAGNOSIS — F33.1 MAJOR DEPRESSIVE DISORDER, RECURRENT EPISODE, MODERATE (HCC): ICD-10-CM

## 2024-02-06 DIAGNOSIS — F33.1 MODERATE EPISODE OF RECURRENT MAJOR DEPRESSIVE DISORDER (HCC): ICD-10-CM

## 2024-02-06 PROCEDURE — 99214 OFFICE O/P EST MOD 30 MIN: CPT | Performed by: NURSE PRACTITIONER

## 2024-02-06 RX ORDER — QUETIAPINE 150 MG/1
150 TABLET, FILM COATED, EXTENDED RELEASE ORAL
Qty: 30 TABLET | Refills: 1 | Status: SHIPPED | OUTPATIENT
Start: 2024-02-06

## 2024-02-06 RX ORDER — ESCITALOPRAM OXALATE 10 MG/1
5 TABLET ORAL DAILY
Start: 2024-02-06

## 2024-02-06 RX ORDER — BUSPIRONE HYDROCHLORIDE 5 MG/1
5 TABLET ORAL 2 TIMES DAILY
Qty: 60 TABLET | Refills: 1 | Status: SHIPPED | OUTPATIENT
Start: 2024-02-06

## 2024-02-06 RX ORDER — PROPRANOLOL HYDROCHLORIDE 10 MG/1
10 TABLET ORAL 2 TIMES DAILY PRN
Qty: 60 TABLET | Refills: 2 | Status: SHIPPED | OUTPATIENT
Start: 2024-02-06

## 2024-02-06 NOTE — BH CRISIS PLAN
"Client Name: Peterson Salinas       Client YOB: 1965    Corey Safety Plan    Creation Date: 2/6/24 Update Date: 2/6/24   Created By: KOLBY Monae Last Updated By: KOLBY Monae      Step 1: Warning Signs:   Warning Signs   Start to get lightheaded and dizzy   Suicidal thoughts increase            Step 2: Internal Coping Strategies:   Internal Coping Strategies   Surf the internet            Step 3: People and social settings that provide distraction:   Name Contact Information   Be with wife and daughter in phone          Step 4: People whom I can ask for help during a crisis:    Name Contact Information    Wife, Brie in phone      Step 5: Professionals or agencies I can contact during a crisis:    Clinican/Agency Name Phone Emergency Contact    [facility]  KOLBY Lake      Crisis Phone Numbers:   Suicide Prevention Lifeline: Call or Text  309 Crisis Text Line: Text HOME to [phone]   Please note: Some [facility] do not have a separate number for Child/Adolescent specific crisis. If your county is not listed under Child/Adolescent, please call the adult number for your county      Adult Crisis Numbers: Child/Adolescent Crisis Numbers   [facility]: [phone] [facility]: [phone]   [facility]: [phone] [facility]: [phone]   [facility]: [phone] [place]: [phone]   [facility]: [phone] [facility]: [phone]   [facility]: [phone]   [facility]: [phone]   [facility]: [phone]   [facility]: [phone] (daytime) [phone] (after hours, weekends, holidays)      Step 6: Making the environment safer (plan for lethal means safety):   Patient did not identify any lethal methods: Yes     Optional: What is most important to me and worth living for?   \"My family\"     Corey Safety Plan. Ivory [place]" Julio. Used with permission of the authors.

## 2024-02-06 NOTE — BH TREATMENT PLAN
TREATMENT PLAN (Medication Management Only)        [facility]    Name and Date of Birth:  Peterson Salinas 58 y.o. 1965  Date of Treatment Plan: February 6, 2024  Diagnosis/Diagnoses:    1. MIKA (generalized anxiety disorder)    2. Major depressive disorder, recurrent episode, moderate (HCC)    3. Moderate episode of recurrent major depressive disorder (HCC)      Strengths/Personal Resources for Self-Care: motivation for treatment.  Area/Areas of need (in own words): depressive symptoms  1. Long Term Goal: continue improvement in depression.  Target Date:6 months - 8/6/2024  Person/Persons responsible for completion of goal: Peterson  2.  Short Term Objective (s) - How will we reach this goal?:   A. Provider new recommended medication/dosage changes and/or continue medication(s): Medication changes: I have changed Peterson Salinas's QUEtiapine and escitalopram. I am also having him start on busPIRone. Additionally, I am having him maintain his escitalopram, meloxicam, and propranolol..  B. Get regular sleep every night .  C. Think positively.  Target Date:6 months - 8/6/2024  Person/Persons Responsible for Completion of Goal: Peterson  Progress Towards Goals: continuing treatment, improving  Treatment Modality: medication management every 1 month, medication education at every visit  Review due 180 days from date of this plan: 6 months - 8/6/2024  Expected length of service: ongoing treatment  My Physician/PA/NP and I have developed this plan together and I agree to work on the goals and objectives. I understand the treatment goals that were developed for my treatment.

## 2024-02-06 NOTE — PSYCH
Virtual Regular Visit    Verification of patient location:    Patient is located in the following state in which I hold an active license [place]    Problem List Items Addressed This Visit    None  Visit Diagnoses     MIKA (generalized anxiety disorder)        Relevant Medications    propranolol (INDERAL) 10 mg tablet    QUEtiapine (SEROquel XR) 150 mg 24 hr tablet    busPIRone (BUSPAR) 5 mg tablet    escitalopram (LEXAPRO) 10 mg tablet    Major depressive disorder, recurrent episode, moderate (HCC)        Relevant Medications    QUEtiapine (SEROquel XR) 150 mg 24 hr tablet    busPIRone (BUSPAR) 5 mg tablet    escitalopram (LEXAPRO) 10 mg tablet    Moderate episode of recurrent major depressive disorder (HCC)        Relevant Medications    QUEtiapine (SEROquel XR) 150 mg 24 hr tablet    busPIRone (BUSPAR) 5 mg tablet    escitalopram (LEXAPRO) 10 mg tablet                 Encounter provider KOLBY Monae    Provider located at    [place] [address] [facility] [address] [phone]    Recent Visits  Date Type Provider Dept   02/06/24 Telemedicine KOLBY Monae  [facility]   Showing recent visits within past 7 days and meeting all other requirements  Future Appointments  No visits were found meeting these conditions.  Showing future appointments within next 150 days and meeting all other requirements           The patient was identified by name and date of birth. Patient was informed that this is a telemedicine visit and that the visit is being conducted throughthe Epic Embedded platform. He agrees to proceed..  My office door was closed. No one else was present in the room.  He acknowledged consent and understanding of privacy and security of the video platform. The patient has agreed to participate and understands they can discontinue the visit at any time.    Patient is aware this is a billable service.     HPI      Current Outpatient Medications   Medication Sig Dispense Refill   • busPIRone (BUSPAR) 5 mg tablet Take 1 tablet (5 mg total) by mouth 2 (two) times a day 60 tablet 1   • escitalopram (LEXAPRO) 10 mg tablet Take 0.5 tablets (5 mg total) by mouth daily Take with 20mg pill to total 30mg daily.     • escitalopram (LEXAPRO) 20 mg tablet TAKE 1 TABLET (20 MG TOTAL) BY MOUTH DAILY TAKE WITH 10MG TABLET TO TOTAL 30MG. 90 tablet 1   • meloxicam (MOBIC) 15 mg tablet TAKE 1 TABLET (15 MG TOTAL) BY MOUTH DAILY. 90 tablet 1   • propranolol (INDERAL) 10 mg tablet Take 1 tablet (10 mg total) by mouth 2 (two) times a day as needed (anxiety) 60 tablet 2   • QUEtiapine (SEROquel XR) 150 mg 24 hr tablet Take 1 tablet (150 mg total) by mouth daily at bedtime 30 tablet 1     No current facility-administered medications for this visit.       Review of Systems  Video Exam    There were no vitals filed for this visit.    Physical Exam   As a result of this visit, I have referred the patient for further respiratory evaluation. No    I spent 30 minutes directly with the patient during this visit  VIRTUAL VISIT DISCLAIMER    Peterson Salinas acknowledges that he has consented to an online visit or consultation. He understands that the online visit is based solely on information provided by him, and that, in the absence of a face-to-face physical evaluation by the physician, the diagnosis he receives is both limited and provisional in terms of accuracy and completeness. This is not intended to replace a full medical face-to-face evaluation by the physician. Peterson Salinas understands and accepts these terms.    MEDICATION MANAGEMENT NOTE        [facility]    Name and Date of Birth:  Peterson Salinas 58 y.o. 1965 MRN: [id number]    Date of Visit: February 6, 2024    Allergies   Allergen Reactions   • Shellfish Allergy - Food Allergy Anaphylaxis, Tongue Swelling and Swelling    • Shellfish-Derived Products - Food Allergy Anaphylaxis       Visit Time    Visit Start Time: 1300  Visit Stop Time: 1330  Total Visit Duration:  30 minutes    SUBJECTIVE:    Peterson is seen today for a follow up for Major Depressive Disorder. He continues to do fairly well since the last visit. Peterson seen virtually today for medication management follow up. He was last seen by this provider on 12/19/23.  He states that he has been doing fairly well. He is very conversational and reports that he has been productive over the past couple months, getting his dental work completed, and checking out different life insurance policies since his has been discontinued after his benefits with the [facility] were stopped.  He is euthymic in affect.  Good eye contact.  He states that he has more depression  in the am which decreases as the day goes on, and his anxiety is lower in the am which increases throughout the day.  He reports fleeting SI, which is very occasional.  He denies any active thoughts of intent or plans.  He denies HI, denies auditory or visual hallucinations. He is calm and appropriate, friendly.  He states that he did renew his concealed weapon permit, but does not currently have possession of his weapon.  He states that his brother does have his gun.  He agreed to keep it there, but states that he might go target shooting with his brother, and will allow his brother to continue to have possession and remain the keeper of his gun at this time. At this time, we will decrease his lexapro by 5mg to 25mg PO daily as he may be experiencing some additional activation due to the high dose of lexapro, and we will initiate buspar to help with the anxiety he is experiencing throughout the day.  He continues to not take any benzodiazepines.  We will follow up in one month.  He is agreeable at this time.    He denies any side effects from current psychiatric medications.    PLAN:  Continue Seroquel  mg p.o. at  bedtime  Decrease Lexapro to 25mg p.o. daily  Propranolol 10 mg p.o. twice daily as needed  Initiate buspar 5mg PO BID  Follow up in 4 weeks  He will call sooner with concerns or issues if they arise prior to scheduled appointment    Aware of 24 hour and weekend coverage for urgent situations accessed by calling [facility] main practice number  Medication management every 4 weeks  Aware of need to follow up with family physician for medical issues    Diagnoses and all orders for this visit:    MIKA (generalized anxiety disorder)  -     propranolol (INDERAL) 10 mg tablet; Take 1 tablet (10 mg total) by mouth 2 (two) times a day as needed (anxiety)  -     busPIRone (BUSPAR) 5 mg tablet; Take 1 tablet (5 mg total) by mouth 2 (two) times a day    Major depressive disorder, recurrent episode, moderate (HCC)  -     QUEtiapine (SEROquel XR) 150 mg 24 hr tablet; Take 1 tablet (150 mg total) by mouth daily at bedtime    Moderate episode of recurrent major depressive disorder (HCC)  -     escitalopram (LEXAPRO) 10 mg tablet; Take 0.5 tablets (5 mg total) by mouth daily Take with 20mg pill to total 30mg daily.      Patient education on serotonin syndrome symptoms completed which included the following:  symptoms routinely occur when starting a new drug or increasing the dose of a drug you're already taking. Signs and symptoms include: agitation,restlessness,confusion,rapid heart rate, high blood pressure, dilated pupils, loss of muscle coordination, muscle twitching or rigidity, heavy sweating, diarrhea, headache, shivering, goose bumps.  The patient was also informed that severe serotonin syndrome can be life-threatening and needs to seek immediate medical care, these sign and symptoms include: high fever, seizures, irregular heartbeat, and unconsciousness.  The patient verbalized understanding.    Seroquel PARQ completed including dizziness, sedation, GI distress, orthostatic hypotension and  cardiovascular risks, metabolic syndrome, NMS, TD, EPS, Seizures, and others    Current Outpatient Medications on File Prior to Visit   Medication Sig Dispense Refill   • escitalopram (LEXAPRO) 20 mg tablet TAKE 1 TABLET (20 MG TOTAL) BY MOUTH DAILY TAKE WITH 10MG TABLET TO TOTAL 30MG. 90 tablet 1   • meloxicam (MOBIC) 15 mg tablet TAKE 1 TABLET (15 MG TOTAL) BY MOUTH DAILY. 90 tablet 1     No current facility-administered medications on file prior to visit.       Psychotherapy Provided:     Individual psychotherapy provided: Yes  Counseling was provided during the session today for 16 minutes.  Supportive counseling provided.  Medication changes discussed with Peterson.  Medication education provided to Peterson.  Discussed with Peterson coping with ongoing anxiety.   Coping strategies reviewed with Peterson.   Importance of medication and treatment compliance reviewed with Peterson.  Importance of follow up with family physician for medical issues reviewed with Peterson.  Reassurance and supportive therapy provided.   Reoriented to reality and reassured.   Crisis/safety plan discussed with Peterson. Patient will call prior to scheduled appointment if they have any issues or concerns.  Patient understands they can access the office by calling the main number at any time if they are in crisis.  They also understand they can call their [facility]'s crisis number or go to their nearest ED if suicidal ideation increases or if they develop a plan or intent.     HPI ROS Appetite Changes and Sleep:     He reports normal sleep, normal appetite, adequate energy level. Denies homicidal ideation, Intermittent passive suicidal ideation without intent or plan    Review Of Systems:  HPI ROS:               Medication Side Effects: denies    Depression (10 worst): 5/10 3-4/10   Anxiety (10 worst): 4/10 4/10   Safety concerns (SI, HI, etc): Fleeting SI at times, without plan or intent denies   Sleep: good good   Energy: good good   Appetite:  good good     General normal    Personality no change in personality   Constitutional as noted in HPI   ENT negative   Cardiovascular negative   Respiratory negative   Gastrointestinal negative   Genitourinary negative   Musculoskeletal negative   Integumentary negative   Neurological negative   Endocrine negative   Other Symptoms none, all other systems are negative     Mental Status Evaluation:    Appearance Appropriately dressed and Good eye contact   Behavior calm and cooperative, friendly and conversational   Mood euthymic, improved  Depression Scale - 5 of 10 (0 = No depression)  Anxiety Scale - 4 of 10 (0 = No anxiety)   Speech Normal rate and volume   Affect appropriate   Thought Processes Goal directed and coherent   Thought Content Does not verbalize delusional material   Associations Tightly connected   Perceptual Disturbances Denies hallucinations and does not appear to be responding to internal stimuli   Risk Potential Suicidal/Homicidal Ideation - Passive suicidal ideation without intent or plan  Risk of Violence - No evidence of risk for violence found on assessment  Risk of Self Mutilation - No evidence of risk for self mutilation found on assessment   Orientation oriented to person, place, time/date and situation   Memory recent and remote memory grossly intact   Consciousness alert and awake   Attention/Concentration attention span and concentration are age appropriate   Insight fair   Judgement fair and improved   Muscle Strength and Gait normal muscle strength and normal muscle tone, normal gait/station and normal balance   Motor Activity no abnormal movements   Language no difficulty naming common objects, no difficulty repeating a phrase, no difficulty writing a sentence   Fund of Knowledge adequate knowledge of current events  adequate fund of knowledge regarding past history  adequate fund of knowledge regarding vocabulary      Past Psychiatric History - per Dr. Salcido's H&P  Previous  diagnoses include Depression and Anxiety   Prior outpatient psychiatric treatment:   With PCP till now   Prior therapy:    Prior inpatient psychiatric treatment: x1   Prior suicide attempts:  None   Prior self harm:  None   Prior violence or aggression:  None       Past Psychiatric History Update:     Inpatient Psychiatric Admission Since Last Encounter:   no  Changes to Outpatient Psychiatric Treatment Team:    no  Suicide Attempt Or Self Mutilation Since Last Encounter:   no  Incidence of Violent Behavior Since Last Encounter:   no    Traumatic History Update:     New Onset of Abuse Since Last Encounter:   no  Traumatic Events Since Last Encounter:   no    Past Medical History:    Past Medical History:   Diagnosis Date   • Anxiety    • CPAP (continuous positive airway pressure) dependence    • Depression    • Headache(784.0) March 2020    Degenerative disk in neck may be cause.   • Hypertension    • Palpitation    • Panic attack    • PVC (premature ventricular contraction)    • Sleep apnea         Past Surgical History:   Procedure Laterality Date   • ANAL EXAMINATION UNDER ANESTHESIA     • FRACTURE SURGERY  6/1970 and 6/1979    Left and right wrists   • KNEE SURGERY  08/1982     Allergies   Allergen Reactions   • Shellfish Allergy - Food Allergy Anaphylaxis, Tongue Swelling and Swelling   • Shellfish-Derived Products - Food Allergy Anaphylaxis     Substance Abuse History:    Social History     Substance and Sexual Activity   Alcohol Use Yes   • Alcohol/week: 3.0 - 4.0 standard drinks of alcohol   • Types: 3 - 4 Cans of beer per week     Social History     Substance and Sexual Activity   Drug Use Never     Social History:    Social History     Socioeconomic History   • Marital status: /Civil Union     Spouse name: Not on file   • Number of children: Not on file   • Years of education: Not on file   • Highest education level: Not on file   Occupational History   • Not on file   Tobacco Use   • Smoking  status: Never     Passive exposure: Yes   • Smokeless tobacco: Never   Vaping Use   • Vaping status: Never Used   Substance and Sexual Activity   • Alcohol use: Yes     Alcohol/week: 3.0 - 4.0 standard drinks of alcohol     Types: 3 - 4 Cans of beer per week   • Drug use: Never   • Sexual activity: Not Currently     Partners: Female     Birth control/protection: Female Sterilization   Other Topics Concern   • Not on file   Social History Narrative   • Not on file     Social Determinants of Health     Financial Resource Strain: Not on file   Food Insecurity: Not on file   Transportation Needs: Not on file   Physical Activity: Not on file   Stress: Not on file   Social Connections: Not on file   Intimate Partner Violence: Not on file   Housing Stability: Not on file     Family Psychiatric History:     Family History   Problem Relation Age of Onset   • Hypertension Mother    • COPD Mother    • Migraines Mother    • Stroke Father         Mini stroke in 2010     History Review:The following portions of the patient's history were reviewed and updated as appropriate: allergies, current medications, past family history, past medical history, past social history, past surgical history and problem list     OBJECTIVE:     Vital signs in last 24 hours:    There were no vitals filed for this visit.  Laboratory Results:   Recent Labs (last 2 months):   No visits with results within 2 Month(s) from this visit.   Latest known visit with results is:   Appointment on 10/25/2023   Component Date Value   • Sodium 10/25/2023 138    • Potassium 10/25/2023 4.5    • Chloride 10/25/2023 103    • CO2 10/25/2023 27    • ANION GAP 10/25/2023 8    • BUN 10/25/2023 15    • Creatinine 10/25/2023 0.94    • Glucose, Fasting 10/25/2023 103 (H)    • Calcium 10/25/2023 8.9    • AST 10/25/2023 17    • ALT 10/25/2023 25    • Alkaline Phosphatase 10/25/2023 64    • Total Protein 10/25/2023 7.0    • Albumin 10/25/2023 4.1    • Total Bilirubin 10/25/2023  0.70    • eGFR 10/25/2023 87    • WBC 10/25/2023 7.23    • RBC 10/25/2023 5.20    • Hemoglobin 10/25/2023 16.1    • Hematocrit 10/25/2023 48.6    • MCV 10/25/2023 94    • MCH 10/25/2023 31.0    • MCHC 10/25/2023 33.1    • RDW 10/25/2023 12.7    • MPV 10/25/2023 11.4    • Platelets 10/25/2023 225    • nRBC 10/25/2023 0    • Neutrophils Relative 10/25/2023 45    • Immat GRANS % 10/25/2023 1    • Lymphocytes Relative 10/25/2023 31    • Monocytes Relative 10/25/2023 8    • Eosinophils Relative 10/25/2023 13 (H)    • Basophils Relative 10/25/2023 2 (H)    • Neutrophils Absolute 10/25/2023 3.28    • Immature Grans Absolute 10/25/2023 0.04    • Lymphocytes Absolute 10/25/2023 2.27    • Monocytes Absolute 10/25/2023 0.56    • Eosinophils Absolute 10/25/2023 0.93 (H)    • Basophils Absolute 10/25/2023 0.15 (H)    • T3, Free 10/25/2023 2.79    • TSH 3RD GENERATON 10/25/2023 2.045    • Free T4 10/25/2023 0.46 (L)      I have personally reviewed all pertinent laboratory/tests results.    Suicide/Homicide Risk Assessment:    Risk of Harm to Self:  The following ratings are based on assessment at the time of the interview  Recent Specific Risk Factors include: current depressive symptoms, current anxiety symptoms, experienced fleeting suicidal ideation, unemployed  Demographic risk factors include: , male, age: over 50 or older  Historical Risk Factors include: chronic depressive symptoms, chronic anxiety symptoms  Protective Factors: access to mental health treatment, being a parent, being , compliant with medications, compliant with mental health treatment, connection to own children, having a desire to be alive, having a sense of purpose or meaning in life, responsibilities and duties to others, stable living environment, sense of determination, supportive family  Based on today's assessmentPeterson presents the following risk of harm to self: low    Risk of Harm to Others:  The following ratings are based on  assessment at the time of the interview  Protective Factors: no current homicidal ideation  Based on today's assessment, Peterson presents the following risk of harm to others: none    The following interventions are recommended: contracts for safety at present - agrees to go to ED if feeling unsafe, return in 4 weeks for reassessment, contracts for safety at present - agrees to call Crisis Intervention Service if feeling unsafe    Medications Risks/Benefits:      Risks, Benefits And Possible Side Effects Of Medications:    Discussed risks and benefits of treatment with patient including risk of suicidality, serotonin syndrome, increased QTc interval and SIADH related to treatment with antidepressants; Risk of induction of manic symptoms in certain patient populations and risk of parkinsonian symptoms, metabolic syndrome, tardive dyskinesia and neuroleptic malignant syndrome related to treatment with antipsychotic medications     Controlled Medication Discussion:     Patient is not on any controlled substances at this time.    Treatment Plan:    Due for update/Updated:   no  Last treatment plan done 2/6/24 by KOLBY Lake.  Treatment Plan due on 8/6/24.    KOLBY Monae 02/07/24    This note was shared with patient.

## 2024-02-29 ENCOUNTER — TELEPHONE (OUTPATIENT)
Dept: EMERGENCY DEPT | Facility: HOSPITAL | Age: 59
End: 2024-02-29

## 2024-02-29 ENCOUNTER — HOSPITAL ENCOUNTER (EMERGENCY)
Facility: HOSPITAL | Age: 59
Discharge: HOME | End: 2024-02-29
Attending: EMERGENCY MEDICINE
Payer: COMMERCIAL

## 2024-02-29 ENCOUNTER — APPOINTMENT (EMERGENCY)
Dept: RADIOLOGY | Facility: HOSPITAL | Age: 59
End: 2024-02-29
Payer: COMMERCIAL

## 2024-02-29 VITALS
RESPIRATION RATE: 20 BRPM | SYSTOLIC BLOOD PRESSURE: 147 MMHG | TEMPERATURE: 97 F | OXYGEN SATURATION: 98 % | DIASTOLIC BLOOD PRESSURE: 88 MMHG | HEART RATE: 76 BPM

## 2024-02-29 DIAGNOSIS — S80.00XA KNEE CONTUSION: ICD-10-CM

## 2024-02-29 DIAGNOSIS — S83.92XA KNEE SPRAIN, BILATERAL: ICD-10-CM

## 2024-02-29 DIAGNOSIS — S83.91XA KNEE SPRAIN, BILATERAL: ICD-10-CM
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
## 2024-02-29 PROCEDURE — 73560 X-RAY EXAM OF KNEE 1 OR 2: CPT

## 2024-02-29 PROCEDURE — 96372 THER/PROPH/DIAG INJ SC/IM: CPT

## 2024-02-29 PROCEDURE — 99284 EMERGENCY DEPT VISIT MOD MDM: CPT | Performed by: EMERGENCY MEDICINE

## 2024-02-29 PROCEDURE — 99284 EMERGENCY DEPT VISIT MOD MDM: CPT

## 2024-02-29 RX ORDER — OXYCODONE HYDROCHLORIDE AND ACETAMINOPHEN 5; 325 MG/1; MG/1
1 TABLET ORAL ONCE
Status: COMPLETED | OUTPATIENT
Start: 2024-02-29 | End: 2024-02-29

## 2024-02-29 RX ORDER — OXYCODONE HYDROCHLORIDE AND ACETAMINOPHEN 5; 325 MG/1; MG/1
1 TABLET ORAL EVERY 8 HOURS PRN
Qty: 6 TABLET | Refills: 0 | Status: SHIPPED | OUTPATIENT
Start: 2024-02-29 | End: 2024-03-02

## 2024-02-29 RX ORDER — OXYCODONE HYDROCHLORIDE AND ACETAMINOPHEN 5; 325 MG/1; MG/1
1 TABLET ORAL EVERY 8 HOURS PRN
Qty: 6 TABLET | Refills: 0 | Status: SHIPPED | OUTPATIENT
Start: 2024-02-29 | End: 2024-02-29 | Stop reason: SDUPTHER

## 2024-02-29 RX ADMIN — OXYCODONE HYDROCHLORIDE AND ACETAMINOPHEN 1 TABLET: 5; 325 TABLET ORAL at 01:32

## 2024-02-29 RX ADMIN — MORPHINE SULFATE 2 MG: 2 INJECTION, SOLUTION INTRAMUSCULAR; INTRAVENOUS at 00:30

## 2024-03-01 ENCOUNTER — VBI (OUTPATIENT)
Dept: ADMINISTRATIVE | Facility: OTHER | Age: 59
End: 2024-03-01

## 2024-03-01 NOTE — LETTER
[facility]  [address] [place] [address]    Date: 03/06/24  Peterson Salinas  [address] [address]    Dear Peterson:                                                                                                                              Thank you for choosing [facility] emergency department for care.  Your primary care provider wants to make sure that your ongoing medical care is being addressed. If you require follow up care as a result of your emergency department visit, there are a few things the practice would like you to know.                As part of the network's continuing commitment to caring for our patients, we have added more same day appointments and have extended office hours to meet your medical needs. After hours, on-call physicians are available via your primary care provider's main office line.               We encourage you to contact our office prior to seeking treatment to discuss your symptoms with the medical staff.  Together, we can determine the correct course of action.  A majority of non-emergent conditions such as: common cold, flu-like symptoms, fevers, strains/sprains, dislocations, minor burns, cuts and animal bites can be treated at [facility] facilities. Diagnostic testing is available at some sites.               Of course, if you are experiencing a life threatening medical emergency call 911 or proceed directly to the nearest emergency room.    Your nearest [facility] facility is conveniently located at:    [address] [address] [phone]  SKIP THE WAIT  Conveniently offered at most [facility] locations  Prosser your spot online at [email or web address] or on the [facility]    Sincerely,    [facility]  No information on file.

## 2024-03-01 NOTE — TELEPHONE ENCOUNTER
03/01/24 9:28 AM    Patient contacted post ED visit, second outreach attempt made. Message was left for patient to return a call to the V[facility] Department at [facility]: Phone [phone].    Thank you.  Lyudmila Eubanks  [facility]

## 2024-03-04 NOTE — TELEPHONE ENCOUNTER
03/04/24 4:50 PM    Patient contacted post ED visit, second outreach attempt made. Message was left for patient to return a call to the VBI Department at [facility]: Phone [phone].    Thank you.  Lyudmila Eubanks  [facility]

## 2024-03-05 NOTE — TELEPHONE ENCOUNTER
03/05/24 4:07 PM    Patient contacted post ED visit, second outreach attempt made. Message was left for patient to return a call to the VBI Department at [facility]: Phone [phone].    Thank you.  Lyudmila Eubanks  [facility]

## 2024-03-06 ENCOUNTER — OFFICE VISIT (OUTPATIENT)
Dept: OBGYN CLINIC | Facility: CLINIC | Age: 59
End: 2024-03-06
Payer: COMMERCIAL

## 2024-03-06 VITALS
HEIGHT: 75 IN | DIASTOLIC BLOOD PRESSURE: 80 MMHG | BODY MASS INDEX: 37.3 KG/M2 | TEMPERATURE: 97.8 F | HEART RATE: 76 BPM | WEIGHT: 300 LBS | SYSTOLIC BLOOD PRESSURE: 124 MMHG

## 2024-03-06 DIAGNOSIS — S76.112A RUPTURE OF LEFT QUADRICEPS MUSCLE, INITIAL ENCOUNTER: ICD-10-CM

## 2024-03-06 DIAGNOSIS — E66.9 OBESITY, CLASS II, BMI 35-39.9: ICD-10-CM

## 2024-03-06 DIAGNOSIS — M25.562 ACUTE PAIN OF BOTH KNEES: Primary | ICD-10-CM

## 2024-03-06 DIAGNOSIS — S76.111A RUPTURE OF RIGHT QUADRICEPS MUSCLE, INITIAL ENCOUNTER: ICD-10-CM

## 2024-03-06 DIAGNOSIS — M25.561 ACUTE PAIN OF BOTH KNEES: Primary | ICD-10-CM

## 2024-03-06 PROCEDURE — 99204 OFFICE O/P NEW MOD 45 MIN: CPT | Performed by: ORTHOPAEDIC SURGERY

## 2024-03-06 RX ORDER — LEVOTHYROXINE SODIUM 0.03 MG/1
TABLET ORAL
COMMUNITY
Start: 2024-02-06

## 2024-03-06 RX ORDER — IBUPROFEN 800 MG/1
TABLET ORAL EVERY 6 HOURS PRN
COMMUNITY
End: 2024-03-15

## 2024-03-06 RX ORDER — OXYCODONE HYDROCHLORIDE AND ACETAMINOPHEN 5; 325 MG/1; MG/1
1 TABLET ORAL DAILY PRN
Status: ON HOLD | COMMUNITY
End: 2024-03-12 | Stop reason: ALTCHOICE

## 2024-03-06 NOTE — TELEPHONE ENCOUNTER
03/06/24 4:03 PM    Patient contacted post ED visit, third outreach attempt made. Message was left for patient to return a call to either the VBI Department at [facility]: Phone [phone] or the PCP office.     Thank you.  Lyudmila Eubanks  [facility]

## 2024-03-06 NOTE — PROGRESS NOTES
ASSESSMENT/PLAN:    Diagnoses and all orders for this visit:    Acute pain of both knees  -     MRI knee right  wo contrast; Future  -     MRI knee left  wo contrast; Future  -     Walker  -     T-Rom  Post Op Knee Brace  -     T-Rom  Post Op Knee Brace    Rupture of left quadriceps muscle, initial encounter      Rupture of right quadriceps muscle, initial encounter    Obesity, Class II, BMI 35-39.9      Plan: I have placed 2 T ROM brace is locked in extension and have ordered bilateral knee MRIs to assess the extent of quadricep tendon injury.  The T ROM brace is should remain in place at all times, removed carefully for cleansing if he chooses.  Once the MRIs have been completed, I will see him in follow-up and discuss treatment.  In all likelihood, he will need bilateral knee quadricep tendon repair.  This may certainly require postoperative overnight hospitalization and eventual placement at rehab/ECF.  In the interim, he is to minimize weightbearing with the T ROM's in place and was provided with a walker to assist with pivot to stand transfer from wheelchair to bed and toilet as needed.    After consideration, and determining that the MRI could be scheduled for 3/11/2024, I have scheduled him for quad tendon repair to be performed on 3/12/2024.  Patient has been informed.    Return for After MRI is completed.      _____________________________________________________  CHIEF COMPLAINT:  Chief Complaint   Patient presents with    Right Knee - Pain    Left Knee - Pain         SUBJECTIVE:  Peterson Salinas is a 58 y.o. year old male who presents for evaluation of bilateral knee complaints.  He states that he was walking out of his house around 11 PM on 2/28/2024.  A package had been left, he stepped on this causing him to fall forward and he injured both lower extremities.  He was unable to arise and was transported by ambulance to the emergency room at [facility].  He was evaluated, x-rays of both  knees were obtained and he was discharged without any specific treatment.  Since the injury, he has remained bedridden except to scoot down the stairs on his buttocks and mobilize around the house in a wheelchair.  He indicates the inability to extend his knees.  He continues to complain of anterior knee pain as well as anterolateral thigh pain.  He notes if he tries to kneel he has increasing pain.  He denies lower extremity paresthesias.    PAST MEDICAL HISTORY:  Past Medical History:   Diagnosis Date    Anxiety     CPAP (continuous positive airway pressure) dependence     Depression     Headache(784.0) March 2020    Degenerative disk in neck may be cause.    Hypertension     Palpitation     Panic attack     PVC (premature ventricular contraction)     Sleep apnea        PAST SURGICAL HISTORY:  Past Surgical History:   Procedure Laterality Date    ANAL EXAMINATION UNDER ANESTHESIA      FRACTURE SURGERY  6/1970 and 6/1979    Left and right wrists    KNEE SURGERY  08/1982       FAMILY HISTORY:  Family History   Problem Relation Age of Onset    Hypertension Mother     COPD Mother     Migraines Mother     Stroke Father         Mini stroke in 2010       SOCIAL HISTORY:  Social History     Tobacco Use    Smoking status: Never     Passive exposure: Yes    Smokeless tobacco: Never   Vaping Use    Vaping status: Never Used   Substance Use Topics    Alcohol use: Not Currently     Alcohol/week: 3.0 - 4.0 standard drinks of alcohol     Types: 3 - 4 Cans of beer per week    Drug use: Never       MEDICATIONS:    Current Outpatient Medications:     busPIRone (BUSPAR) 5 mg tablet, Take 1 tablet (5 mg total) by mouth 2 (two) times a day, Disp: 60 tablet, Rfl: 1    escitalopram (LEXAPRO) 10 mg tablet, Take 0.5 tablets (5 mg total) by mouth daily Take with 20mg pill to total 30mg daily., Disp: , Rfl:     escitalopram (LEXAPRO) 20 mg tablet, TAKE 1 TABLET (20 MG TOTAL) BY MOUTH DAILY TAKE WITH 10MG TABLET TO TOTAL 30MG., Disp: 90  "tablet, Rfl: 1    ibuprofen (MOTRIN) 800 mg tablet, Take by mouth every 6 (six) hours as needed for mild pain, Disp: , Rfl:     levothyroxine 25 mcg tablet, Take by mouth, Disp: , Rfl:     meloxicam (MOBIC) 15 mg tablet, TAKE 1 TABLET (15 MG TOTAL) BY MOUTH DAILY., Disp: 90 tablet, Rfl: 1    oxyCODONE-acetaminophen (PERCOCET) 5-325 mg per tablet, Take 1 tablet by mouth daily as needed for moderate pain, Disp: , Rfl:     propranolol (INDERAL) 10 mg tablet, Take 1 tablet (10 mg total) by mouth 2 (two) times a day as needed (anxiety), Disp: 60 tablet, Rfl: 2    QUEtiapine (SEROquel XR) 150 mg 24 hr tablet, Take 1 tablet (150 mg total) by mouth daily at bedtime, Disp: 30 tablet, Rfl: 1    ALLERGIES:  Allergies   Allergen Reactions    Shellfish Allergy - Food Allergy Anaphylaxis, Tongue Swelling and Swelling    Shellfish-Derived Products - Food Allergy Anaphylaxis       Review of systems:   Constitutional: Negative for fatigue, fever or loss of apetite.   HENT: Negative.    Respiratory: Negative for shortness of breath, dyspnea.    Cardiovascular: Negative for chest pain/tightness.   Gastrointestinal: Negative for abdominal pain, N/V.   Endocrine: Negative for cold/heat intolerance, unexplained weight loss/gain.   Genitourinary: Negative for flank pain, dysuria, hematuria.   Musculoskeletal: Positive as in the HPI  Skin: Negative for rash.    Neurological: Positive as in the HPI  Psychiatric/Behavioral: Negative for agitation.  _____________________________________________________  PHYSICAL EXAMINATION:    Blood pressure 124/80, pulse 76, temperature 97.8 °F (36.6 °C), temperature source Temporal, height 6' 3\" (1.905 m), weight 136 kg (300 lb).    General: well developed and well nourished, alert, oriented times 3, and appears comfortable  Psychiatric: Normal  HEENT: Benign  Cardiovascular: Regular    Pulmonary: No wheezing or stridor  Abdomen: Soft, Nontender  Skin: No masses, erythema, lacerations, fluctation, " ulcerations  Neurovascular: Gross motor and sensory exams are intact throughout excluding the bilateral lower extremity exam as will be documented below.  Pulses are palpable.    MUSCULOSKELETAL EXAMINATION:    The bilateral knee exam demonstrates patient sitting in a wheelchair with the knees flexed at about 90 degrees.  There is ecchymosis noted about both knees as well as distal thigh.  There is no significant effusion noted.  I am able to passively extend the knee without complaints but he cannot maintain the knee in extension and does not demonstrate any active extension from the 90 degree flexed position.  There is a palpable defect noted within the distal quadricep tendon on the left with questionable defect on the right.  The remainder of the lower extremity examination bilaterally is benign.      _____________________________________________________  STUDIES REVIEWED:  X-rays demonstrate degenerative changes of both knees.  Otherwise, there are no acute changes noted.    The report was reviewed.    The ER note was reviewed.    PROCEDURES:  Procedures      David Castellano

## 2024-03-06 NOTE — H&P (VIEW-ONLY)
ASSESSMENT/PLAN:    Diagnoses and all orders for this visit:    Acute pain of both knees  -     MRI knee right  wo contrast; Future  -     MRI knee left  wo contrast; Future  -     Walker  -     T-Rom  Post Op Knee Brace  -     T-Rom  Post Op Knee Brace    Rupture of left quadriceps muscle, initial encounter      Rupture of right quadriceps muscle, initial encounter    Obesity, Class II, BMI 35-39.9      Plan: I have placed 2 T ROM brace is locked in extension and have ordered bilateral knee MRIs to assess the extent of quadricep tendon injury.  The T ROM brace is should remain in place at all times, removed carefully for cleansing if he chooses.  Once the MRIs have been completed, I will see him in follow-up and discuss treatment.  In all likelihood, he will need bilateral knee quadricep tendon repair.  This may certainly require postoperative overnight hospitalization and eventual placement at rehab/ECF.  In the interim, he is to minimize weightbearing with the T ROM's in place and was provided with a walker to assist with pivot to stand transfer from wheelchair to bed and toilet as needed.    After consideration, and determining that the MRI could be scheduled for 3/11/2024, I have scheduled him for quad tendon repair to be performed on 3/12/2024.  Patient has been informed.    Return for After MRI is completed.      _____________________________________________________  CHIEF COMPLAINT:  Chief Complaint   Patient presents with    Right Knee - Pain    Left Knee - Pain         SUBJECTIVE:  Peterson Salinas is a 58 y.o. year old male who presents for evaluation of bilateral knee complaints.  He states that he was walking out of his house around 11 PM on 2/28/2024.  A package had been left, he stepped on this causing him to fall forward and he injured both lower extremities.  He was unable to arise and was transported by ambulance to the emergency room at [facility].  He was evaluated, x-rays of both  knees were obtained and he was discharged without any specific treatment.  Since the injury, he has remained bedridden except to scoot down the stairs on his buttocks and mobilize around the house in a wheelchair.  He indicates the inability to extend his knees.  He continues to complain of anterior knee pain as well as anterolateral thigh pain.  He notes if he tries to kneel he has increasing pain.  He denies lower extremity paresthesias.    PAST MEDICAL HISTORY:  Past Medical History:   Diagnosis Date    Anxiety     CPAP (continuous positive airway pressure) dependence     Depression     Headache(784.0) March 2020    Degenerative disk in neck may be cause.    Hypertension     Palpitation     Panic attack     PVC (premature ventricular contraction)     Sleep apnea        PAST SURGICAL HISTORY:  Past Surgical History:   Procedure Laterality Date    ANAL EXAMINATION UNDER ANESTHESIA      FRACTURE SURGERY  6/1970 and 6/1979    Left and right wrists    KNEE SURGERY  08/1982       FAMILY HISTORY:  Family History   Problem Relation Age of Onset    Hypertension Mother     COPD Mother     Migraines Mother     Stroke Father         Mini stroke in 2010       SOCIAL HISTORY:  Social History     Tobacco Use    Smoking status: Never     Passive exposure: Yes    Smokeless tobacco: Never   Vaping Use    Vaping status: Never Used   Substance Use Topics    Alcohol use: Not Currently     Alcohol/week: 3.0 - 4.0 standard drinks of alcohol     Types: 3 - 4 Cans of beer per week    Drug use: Never       MEDICATIONS:    Current Outpatient Medications:     busPIRone (BUSPAR) 5 mg tablet, Take 1 tablet (5 mg total) by mouth 2 (two) times a day, Disp: 60 tablet, Rfl: 1    escitalopram (LEXAPRO) 10 mg tablet, Take 0.5 tablets (5 mg total) by mouth daily Take with 20mg pill to total 30mg daily., Disp: , Rfl:     escitalopram (LEXAPRO) 20 mg tablet, TAKE 1 TABLET (20 MG TOTAL) BY MOUTH DAILY TAKE WITH 10MG TABLET TO TOTAL 30MG., Disp: 90  "tablet, Rfl: 1    ibuprofen (MOTRIN) 800 mg tablet, Take by mouth every 6 (six) hours as needed for mild pain, Disp: , Rfl:     levothyroxine 25 mcg tablet, Take by mouth, Disp: , Rfl:     meloxicam (MOBIC) 15 mg tablet, TAKE 1 TABLET (15 MG TOTAL) BY MOUTH DAILY., Disp: 90 tablet, Rfl: 1    oxyCODONE-acetaminophen (PERCOCET) 5-325 mg per tablet, Take 1 tablet by mouth daily as needed for moderate pain, Disp: , Rfl:     propranolol (INDERAL) 10 mg tablet, Take 1 tablet (10 mg total) by mouth 2 (two) times a day as needed (anxiety), Disp: 60 tablet, Rfl: 2    QUEtiapine (SEROquel XR) 150 mg 24 hr tablet, Take 1 tablet (150 mg total) by mouth daily at bedtime, Disp: 30 tablet, Rfl: 1    ALLERGIES:  Allergies   Allergen Reactions    Shellfish Allergy - Food Allergy Anaphylaxis, Tongue Swelling and Swelling    Shellfish-Derived Products - Food Allergy Anaphylaxis       Review of systems:   Constitutional: Negative for fatigue, fever or loss of apetite.   HENT: Negative.    Respiratory: Negative for shortness of breath, dyspnea.    Cardiovascular: Negative for chest pain/tightness.   Gastrointestinal: Negative for abdominal pain, N/V.   Endocrine: Negative for cold/heat intolerance, unexplained weight loss/gain.   Genitourinary: Negative for flank pain, dysuria, hematuria.   Musculoskeletal: Positive as in the HPI  Skin: Negative for rash.    Neurological: Positive as in the HPI  Psychiatric/Behavioral: Negative for agitation.  _____________________________________________________  PHYSICAL EXAMINATION:    Blood pressure 124/80, pulse 76, temperature 97.8 °F (36.6 °C), temperature source Temporal, height 6' 3\" (1.905 m), weight 136 kg (300 lb).    General: well developed and well nourished, alert, oriented times 3, and appears comfortable  Psychiatric: Normal  HEENT: Benign  Cardiovascular: Regular    Pulmonary: No wheezing or stridor  Abdomen: Soft, Nontender  Skin: No masses, erythema, lacerations, fluctation, " ulcerations  Neurovascular: Gross motor and sensory exams are intact throughout excluding the bilateral lower extremity exam as will be documented below.  Pulses are palpable.    MUSCULOSKELETAL EXAMINATION:    The bilateral knee exam demonstrates patient sitting in a wheelchair with the knees flexed at about 90 degrees.  There is ecchymosis noted about both knees as well as distal thigh.  There is no significant effusion noted.  I am able to passively extend the knee without complaints but he cannot maintain the knee in extension and does not demonstrate any active extension from the 90 degree flexed position.  There is a palpable defect noted within the distal quadricep tendon on the left with questionable defect on the right.  The remainder of the lower extremity examination bilaterally is benign.      _____________________________________________________  STUDIES REVIEWED:  X-rays demonstrate degenerative changes of both knees.  Otherwise, there are no acute changes noted.    The report was reviewed.    The ER note was reviewed.    PROCEDURES:  Procedures      David Castellano

## 2024-03-07 ENCOUNTER — TELEPHONE (OUTPATIENT)
Dept: OBGYN CLINIC | Facility: CLINIC | Age: 59
End: 2024-03-07

## 2024-03-07 RX ORDER — ACETAMINOPHEN 325 MG/1
975 TABLET ORAL ONCE
Status: CANCELLED | OUTPATIENT
Start: 2024-03-12

## 2024-03-07 RX ORDER — CHLORHEXIDINE GLUCONATE ORAL RINSE 1.2 MG/ML
15 SOLUTION DENTAL ONCE
Status: CANCELLED | OUTPATIENT
Start: 2024-03-07 | End: 2024-03-07

## 2024-03-07 RX ORDER — CEFAZOLIN SODIUM 2 G/50ML
2000 SOLUTION INTRAVENOUS ONCE
Status: CANCELLED | OUTPATIENT
Start: 2024-03-12

## 2024-03-07 RX ORDER — SODIUM CHLORIDE, SODIUM LACTATE, POTASSIUM CHLORIDE, CALCIUM CHLORIDE 600; 310; 30; 20 MG/100ML; MG/100ML; MG/100ML; MG/100ML
125 INJECTION, SOLUTION INTRAVENOUS CONTINUOUS
Status: CANCELLED | OUTPATIENT
Start: 2024-03-12

## 2024-03-07 NOTE — TELEPHONE ENCOUNTER
I called Peterson and got his voice mail I did not leave message Spoke directly to  spouse, Brie and relayed message from authorization department She will get a hold of her  to call central scheduling directly to schedule bilateral MRI's so authorization can be started

## 2024-03-07 NOTE — TELEPHONE ENCOUNTER
Caller: Pieter    Doctor: Nona    Reason for call: Confirm that the MRI Time works for him    Call back#: N/A

## 2024-03-07 NOTE — TELEPHONE ENCOUNTER
Caller: Patient    Doctor: Nona    Reason for call:     Patient is calling to let the dr know he scheduled the MRI for bilat knees on 3/13/24, states this is the soonest he can get.  Please advise if dr is ok with this.    Call back#: [phone]

## 2024-03-08 ENCOUNTER — TELEPHONE (OUTPATIENT)
Dept: OBGYN CLINIC | Facility: MEDICAL CENTER | Age: 59
End: 2024-03-08

## 2024-03-08 ENCOUNTER — TELEPHONE (OUTPATIENT)
Dept: OBGYN CLINIC | Facility: CLINIC | Age: 59
End: 2024-03-08

## 2024-03-08 NOTE — TELEPHONE ENCOUNTER
03/08/24 3:17 PM    Patient contacted post ED visit, [facility] department spoke with patient/caregiver and outreach was successful.    Thank you.  Lyudmila Eubanks  [facility]

## 2024-03-08 NOTE — TELEPHONE ENCOUNTER
Called patient and left voicemail regarding labs he needs to get done prior to his possible surgery for 3/12/24 and to schedule his postop appointment.

## 2024-03-08 NOTE — TELEPHONE ENCOUNTER
Pt contacted Call Center requested refill of their medication.    Peterson is asking for medication. His surgery is on 3/12        Medication Name:   oxyCODONE-acetaminophen (PERCOCET) 5-325 mg per tablet            Frequency of Med: Take 1 tablet by mouth daily as needed for moderate pain       Remaining Medication: 0      Pharmacy and Location:   [facility]/pharmacy #[id number] [place] [address]  Phone: [phone]  Fax: [phone]  SHIREEN #: [id number]           Pt. Preferred Callback Phone Number: [phone]     Thank you.      PLEASE ADVISE PATIENTS:    REFILL REQUESTS WILL BE PROCESSED WITHIN 24-48 HOURS.

## 2024-03-11 ENCOUNTER — APPOINTMENT (OUTPATIENT)
Dept: LAB | Facility: HOSPITAL | Age: 59
End: 2024-03-11
Payer: COMMERCIAL

## 2024-03-11 ENCOUNTER — HOSPITAL ENCOUNTER (OUTPATIENT)
Dept: MRI IMAGING | Facility: HOSPITAL | Age: 59
Discharge: HOME | End: 2024-03-11
Attending: ORTHOPAEDIC SURGERY
Payer: COMMERCIAL

## 2024-03-11 ENCOUNTER — TELEPHONE (OUTPATIENT)
Dept: OBGYN CLINIC | Facility: HOSPITAL | Age: 59
End: 2024-03-11

## 2024-03-11 ENCOUNTER — ANESTHESIA EVENT (OUTPATIENT)
Dept: PERIOP | Facility: HOSPITAL | Age: 59
DRG: 501 | End: 2024-03-11
Payer: COMMERCIAL

## 2024-03-11 DIAGNOSIS — M25.562 ACUTE PAIN OF BOTH KNEES: ICD-10-CM

## 2024-03-11 DIAGNOSIS — S76.111A RUPTURE OF RIGHT QUADRICEPS MUSCLE, INITIAL ENCOUNTER: ICD-10-CM

## 2024-03-11 DIAGNOSIS — S76.112A RUPTURE OF LEFT QUADRICEPS MUSCLE, INITIAL ENCOUNTER: ICD-10-CM

## 2024-03-11 DIAGNOSIS — M25.561 ACUTE PAIN OF BOTH KNEES: ICD-10-CM

## 2024-03-11 DIAGNOSIS — S76.112A RUPTURE OF LEFT QUADRICEPS MUSCLE, INITIAL ENCOUNTER: Primary | ICD-10-CM

## 2024-03-11 LAB
ALBUMIN SERPL BCP-MCNC: 4 G/DL (ref 3.5–5)
ALP SERPL-CCNC: 54 U/L (ref 34–104)
ALT SERPL W P-5'-P-CCNC: 27 U/L (ref 7–52)
ANION GAP SERPL CALCULATED.3IONS-SCNC: 9 MMOL/L
AST SERPL W P-5'-P-CCNC: 21 U/L (ref 13–39)
BILIRUB SERPL-MCNC: 0.86 MG/DL (ref 0.2–1)
BUN SERPL-MCNC: 13 MG/DL (ref 5–25)
CALCIUM SERPL-MCNC: 8.9 MG/DL (ref 8.4–10.2)
CHLORIDE SERPL-SCNC: 104 MMOL/L (ref 96–108)
CO2 SERPL-SCNC: 27 MMOL/L (ref 21–32)
CREAT SERPL-MCNC: 0.9 MG/DL (ref 0.6–1.3)
ERYTHROCYTE [DISTWIDTH] IN BLOOD BY AUTOMATED COUNT: 12.7 % (ref 11.6–15.1)
GFR SERPL CREATININE-BSD FRML MDRD: 92 ML/MIN/1.73SQ M
GLUCOSE P FAST SERPL-MCNC: 102 MG/DL (ref 65–99)
HCT VFR BLD AUTO: 45.9 % (ref 36.5–49.3)
HGB BLD-MCNC: 14.9 G/DL (ref 12–17)
MCH RBC QN AUTO: 30.5 PG (ref 26.8–34.3)
MCHC RBC AUTO-ENTMCNC: 32.5 G/DL (ref 31.4–37.4)
MCV RBC AUTO: 94 FL (ref 82–98)
PLATELET # BLD AUTO: 253 THOUSANDS/UL (ref 149–390)
PMV BLD AUTO: 10.9 FL (ref 8.9–12.7)
POTASSIUM SERPL-SCNC: 3.7 MMOL/L (ref 3.5–5.3)
PROT SERPL-MCNC: 6.6 G/DL (ref 6.4–8.4)
RBC # BLD AUTO: 4.88 MILLION/UL (ref 3.88–5.62)
SODIUM SERPL-SCNC: 140 MMOL/L (ref 135–147)
WBC # BLD AUTO: 7.46 THOUSAND/UL (ref 4.31–10.16)

## 2024-03-11 PROCEDURE — 73721 MRI JNT OF LWR EXTRE W/O DYE: CPT

## 2024-03-11 PROCEDURE — 80053 COMPREHEN METABOLIC PANEL: CPT

## 2024-03-11 PROCEDURE — 85027 COMPLETE CBC AUTOMATED: CPT

## 2024-03-11 PROCEDURE — 36415 COLL VENOUS BLD VENIPUNCTURE: CPT

## 2024-03-11 RX ORDER — OXYCODONE HYDROCHLORIDE 5 MG/1
TABLET ORAL
Qty: 30 TABLET | Refills: 0 | Status: SHIPPED | OUTPATIENT
Start: 2024-03-11

## 2024-03-11 NOTE — TELEPHONE ENCOUNTER
Caller: Pateint    Doctor: Nona    Reason for call: Patient calling to see if he can be admitted tonight instead of tomorrow for his surgery.    Unable to reach surgery scheduler.     Call back#: [phone]

## 2024-03-11 NOTE — ANESTHESIA PREPROCEDURE EVALUATION
Procedure:  REPAIR TENDON QUADRICEPS (Bilateral: Thigh)    Relevant Problems   CARDIO   (+) PVC (premature ventricular contraction)      MUSCULOSKELETAL   (+) Chronic bilateral low back pain without sciatica   (+) Rupture of left quadriceps muscle   (+) Rupture of left quadriceps muscle, initial encounter   (+) Rupture of right quadriceps muscle   (+) Rupture of right quadriceps muscle, initial encounter      NEURO/PSYCH   (+) Chronic bilateral low back pain without sciatica   (+) Depression with suicidal ideation   (+) MDD (major depressive disorder), recurrent episode, mild (HCC)   (+) Tension type headache      PULMONARY   (+) CHEVY (obstructive sleep apnea)       3/26/22     Sinus rhythm with occasional Premature ventricular complexes  Low voltage QRS  Cannot rule out Anterior infarct , age undetermined  Abnormal ECG  When compared with ECG of 10-MAY-2021 10:07,  Premature ventricular complexes are now Present      Physical Exam    Airway    Mallampati score: II  TM Distance: >3 FB  Neck ROM: full     Dental   No notable dental hx     Cardiovascular  Cardiovascular exam normal    Pulmonary  Pulmonary exam normal     Other Findings    HTN    palpitations    Anesthesia Plan  ASA Score- 3     Anesthesia Type- general with ASA Monitors.         Additional Monitors:     Airway Plan: ETT.           Plan Factors-Exercise tolerance (METS): >4 METS.    Chart reviewed. EKG reviewed. Imaging results reviewed. Existing labs reviewed. Patient summary reviewed.    Patient is not a current smoker.      Obstructive sleep apnea risk education given perioperatively.        Induction- intravenous.    Postoperative Plan-     Informed Consent- Anesthetic plan and risks discussed with patient.  I personally reviewed this patient with the CRNA. Discussed and agreed on the Anesthesia Plan with the CRNA..

## 2024-03-12 ENCOUNTER — ANESTHESIA (OUTPATIENT)
Dept: PERIOP | Facility: HOSPITAL | Age: 59
DRG: 501 | End: 2024-03-12
Payer: COMMERCIAL

## 2024-03-12 ENCOUNTER — HOSPITAL ENCOUNTER (INPATIENT)
Facility: HOSPITAL | Age: 59
LOS: 1 days | DRG: 501 | End: 2024-03-15
Attending: ORTHOPAEDIC SURGERY | Admitting: ORTHOPAEDIC SURGERY
Payer: COMMERCIAL

## 2024-03-12 DIAGNOSIS — R45.851 DEPRESSION WITH SUICIDAL IDEATION: ICD-10-CM

## 2024-03-12 DIAGNOSIS — F32.A DEPRESSION WITH SUICIDAL IDEATION: ICD-10-CM

## 2024-03-12 DIAGNOSIS — F33.0 MDD (MAJOR DEPRESSIVE DISORDER), RECURRENT EPISODE, MILD (HCC): Primary | ICD-10-CM

## 2024-03-12 DIAGNOSIS — S76.112A RUPTURE OF LEFT QUADRICEPS MUSCLE, INITIAL ENCOUNTER: ICD-10-CM

## 2024-03-12 DIAGNOSIS — S76.111D RUPTURE OF RIGHT QUADRICEPS MUSCLE, SUBSEQUENT ENCOUNTER: ICD-10-CM

## 2024-03-12 PROBLEM — R26.2 AMBULATORY DYSFUNCTION: Status: ACTIVE | Noted: 2024-03-12

## 2024-03-12 PROBLEM — E03.9 HYPOTHYROIDISM: Status: ACTIVE | Noted: 2024-03-12

## 2024-03-12 PROCEDURE — 0LMM0ZZ REATTACHMENT OF LEFT UPPER LEG TENDON, OPEN APPROACH: ICD-10-PCS | Performed by: ORTHOPAEDIC SURGERY

## 2024-03-12 PROCEDURE — 99222 1ST HOSP IP/OBS MODERATE 55: CPT

## 2024-03-12 PROCEDURE — C1713 ANCHOR/SCREW BN/BN,TIS/BN: HCPCS | Performed by: ORTHOPAEDIC SURGERY

## 2024-03-12 PROCEDURE — 97167 OT EVAL HIGH COMPLEX 60 MIN: CPT

## 2024-03-12 PROCEDURE — 27385 REPAIR OF THIGH MUSCLE: CPT | Performed by: PHYSICIAN ASSISTANT

## 2024-03-12 PROCEDURE — 97163 PT EVAL HIGH COMPLEX 45 MIN: CPT

## 2024-03-12 PROCEDURE — 0LQL0ZZ REPAIR RIGHT UPPER LEG TENDON, OPEN APPROACH: ICD-10-PCS | Performed by: ORTHOPAEDIC SURGERY

## 2024-03-12 PROCEDURE — 27385 REPAIR OF THIGH MUSCLE: CPT | Performed by: ORTHOPAEDIC SURGERY

## 2024-03-12 PROCEDURE — C9290 INJ, BUPIVACAINE LIPOSOME: HCPCS | Performed by: ANESTHESIOLOGY

## 2024-03-12 DEVICE — BIOCOMPOSITE, MENISCAL ROOT REPAIR KIT
Type: IMPLANTABLE DEVICE | Site: FEMUR | Status: FUNCTIONAL
Brand: ARTHREX®

## 2024-03-12 RX ORDER — SODIUM CHLORIDE, SODIUM LACTATE, POTASSIUM CHLORIDE, CALCIUM CHLORIDE 600; 310; 30; 20 MG/100ML; MG/100ML; MG/100ML; MG/100ML
125 INJECTION, SOLUTION INTRAVENOUS CONTINUOUS
Status: DISCONTINUED | OUTPATIENT
Start: 2024-03-12 | End: 2024-03-14

## 2024-03-12 RX ORDER — HYDROMORPHONE HCL/PF 1 MG/ML
SYRINGE (ML) INJECTION AS NEEDED
Status: DISCONTINUED | OUTPATIENT
Start: 2024-03-12 | End: 2024-03-12

## 2024-03-12 RX ORDER — ONDANSETRON 2 MG/ML
4 INJECTION INTRAMUSCULAR; INTRAVENOUS ONCE AS NEEDED
Status: COMPLETED | OUTPATIENT
Start: 2024-03-12 | End: 2024-03-12

## 2024-03-12 RX ORDER — CHLORHEXIDINE GLUCONATE ORAL RINSE 1.2 MG/ML
15 SOLUTION DENTAL ONCE
Status: COMPLETED | OUTPATIENT
Start: 2024-03-12 | End: 2024-03-12

## 2024-03-12 RX ORDER — MIDAZOLAM HYDROCHLORIDE 2 MG/2ML
INJECTION, SOLUTION INTRAMUSCULAR; INTRAVENOUS AS NEEDED
Status: DISCONTINUED | OUTPATIENT
Start: 2024-03-12 | End: 2024-03-12

## 2024-03-12 RX ORDER — ONDANSETRON 2 MG/ML
4 INJECTION INTRAMUSCULAR; INTRAVENOUS EVERY 6 HOURS PRN
Status: DISCONTINUED | OUTPATIENT
Start: 2024-03-12 | End: 2024-03-15 | Stop reason: HOSPADM

## 2024-03-12 RX ORDER — LANOLIN ALCOHOL/MO/W.PET/CERES
3 CREAM (GRAM) TOPICAL
Status: DISCONTINUED | OUTPATIENT
Start: 2024-03-12 | End: 2024-03-15 | Stop reason: HOSPADM

## 2024-03-12 RX ORDER — MAGNESIUM HYDROXIDE 1200 MG/15ML
LIQUID ORAL AS NEEDED
Status: DISCONTINUED | OUTPATIENT
Start: 2024-03-12 | End: 2024-03-12 | Stop reason: HOSPADM

## 2024-03-12 RX ORDER — ESCITALOPRAM OXALATE 10 MG/1
20 TABLET ORAL DAILY
Status: DISCONTINUED | OUTPATIENT
Start: 2024-03-13 | End: 2024-03-15 | Stop reason: HOSPADM

## 2024-03-12 RX ORDER — CEFAZOLIN SODIUM 2 G/50ML
2000 SOLUTION INTRAVENOUS EVERY 8 HOURS
Status: COMPLETED | OUTPATIENT
Start: 2024-03-12 | End: 2024-03-12

## 2024-03-12 RX ORDER — FENTANYL CITRATE 50 UG/ML
INJECTION, SOLUTION INTRAMUSCULAR; INTRAVENOUS AS NEEDED
Status: DISCONTINUED | OUTPATIENT
Start: 2024-03-12 | End: 2024-03-12

## 2024-03-12 RX ORDER — PROPRANOLOL HYDROCHLORIDE 10 MG/1
10 TABLET ORAL 2 TIMES DAILY PRN
Status: DISCONTINUED | OUTPATIENT
Start: 2024-03-12 | End: 2024-03-15 | Stop reason: HOSPADM

## 2024-03-12 RX ORDER — LIDOCAINE HYDROCHLORIDE 10 MG/ML
INJECTION, SOLUTION EPIDURAL; INFILTRATION; INTRACAUDAL; PERINEURAL AS NEEDED
Status: DISCONTINUED | OUTPATIENT
Start: 2024-03-12 | End: 2024-03-12

## 2024-03-12 RX ORDER — BUPIVACAINE HYDROCHLORIDE 5 MG/ML
INJECTION, SOLUTION EPIDURAL; INTRACAUDAL
Status: COMPLETED | OUTPATIENT
Start: 2024-03-12 | End: 2024-03-12

## 2024-03-12 RX ORDER — CEFAZOLIN SODIUM 2 G/50ML
2000 SOLUTION INTRAVENOUS ONCE
Status: COMPLETED | OUTPATIENT
Start: 2024-03-12 | End: 2024-03-12

## 2024-03-12 RX ORDER — DEXAMETHASONE SODIUM PHOSPHATE 10 MG/ML
INJECTION, SOLUTION INTRAMUSCULAR; INTRAVENOUS AS NEEDED
Status: DISCONTINUED | OUTPATIENT
Start: 2024-03-12 | End: 2024-03-12

## 2024-03-12 RX ORDER — QUETIAPINE FUMARATE 50 MG/1
150 TABLET, EXTENDED RELEASE ORAL
Status: DISCONTINUED | OUTPATIENT
Start: 2024-03-12 | End: 2024-03-15 | Stop reason: HOSPADM

## 2024-03-12 RX ORDER — EPHEDRINE SULFATE 50 MG/ML
INJECTION INTRAVENOUS AS NEEDED
Status: DISCONTINUED | OUTPATIENT
Start: 2024-03-12 | End: 2024-03-12

## 2024-03-12 RX ORDER — DOCUSATE SODIUM 100 MG/1
100 CAPSULE, LIQUID FILLED ORAL DAILY
Status: DISCONTINUED | OUTPATIENT
Start: 2024-03-12 | End: 2024-03-14

## 2024-03-12 RX ORDER — BUSPIRONE HYDROCHLORIDE 5 MG/1
5 TABLET ORAL 2 TIMES DAILY
Status: DISCONTINUED | OUTPATIENT
Start: 2024-03-12 | End: 2024-03-15 | Stop reason: HOSPADM

## 2024-03-12 RX ORDER — HYDROMORPHONE HCL/PF 1 MG/ML
0.5 SYRINGE (ML) INJECTION
Status: COMPLETED | OUTPATIENT
Start: 2024-03-12 | End: 2024-03-12

## 2024-03-12 RX ORDER — ESCITALOPRAM OXALATE 5 MG/1
5 TABLET ORAL DAILY
Status: DISCONTINUED | OUTPATIENT
Start: 2024-03-13 | End: 2024-03-12

## 2024-03-12 RX ORDER — OXYCODONE HYDROCHLORIDE 5 MG/1
2.5 TABLET ORAL EVERY 4 HOURS PRN
Status: DISCONTINUED | OUTPATIENT
Start: 2024-03-12 | End: 2024-03-15 | Stop reason: HOSPADM

## 2024-03-12 RX ORDER — CHLORHEXIDINE GLUCONATE ORAL RINSE 1.2 MG/ML
15 SOLUTION DENTAL ONCE
Status: DISCONTINUED | OUTPATIENT
Start: 2024-03-12 | End: 2024-03-12

## 2024-03-12 RX ORDER — ONDANSETRON 2 MG/ML
INJECTION INTRAMUSCULAR; INTRAVENOUS AS NEEDED
Status: DISCONTINUED | OUTPATIENT
Start: 2024-03-12 | End: 2024-03-12

## 2024-03-12 RX ORDER — KETOROLAC TROMETHAMINE 30 MG/ML
INJECTION, SOLUTION INTRAMUSCULAR; INTRAVENOUS AS NEEDED
Status: DISCONTINUED | OUTPATIENT
Start: 2024-03-12 | End: 2024-03-12

## 2024-03-12 RX ORDER — ROCURONIUM BROMIDE 10 MG/ML
INJECTION, SOLUTION INTRAVENOUS AS NEEDED
Status: DISCONTINUED | OUTPATIENT
Start: 2024-03-12 | End: 2024-03-12

## 2024-03-12 RX ORDER — ACETAMINOPHEN 325 MG/1
975 TABLET ORAL ONCE
Status: COMPLETED | OUTPATIENT
Start: 2024-03-12 | End: 2024-03-12

## 2024-03-12 RX ORDER — OXYCODONE HYDROCHLORIDE 5 MG/1
5 TABLET ORAL EVERY 4 HOURS PRN
Status: DISCONTINUED | OUTPATIENT
Start: 2024-03-12 | End: 2024-03-15 | Stop reason: HOSPADM

## 2024-03-12 RX ORDER — PROPOFOL 10 MG/ML
INJECTION, EMULSION INTRAVENOUS AS NEEDED
Status: DISCONTINUED | OUTPATIENT
Start: 2024-03-12 | End: 2024-03-12

## 2024-03-12 RX ORDER — LEVOTHYROXINE SODIUM 0.03 MG/1
25 TABLET ORAL
Status: DISCONTINUED | OUTPATIENT
Start: 2024-03-13 | End: 2024-03-15 | Stop reason: HOSPADM

## 2024-03-12 RX ORDER — ESCITALOPRAM OXALATE 10 MG/1
10 TABLET ORAL DAILY
Status: DISCONTINUED | OUTPATIENT
Start: 2024-03-13 | End: 2024-03-15 | Stop reason: HOSPADM

## 2024-03-12 RX ORDER — FENTANYL CITRATE/PF 50 MCG/ML
50 SYRINGE (ML) INJECTION
Status: DISCONTINUED | OUTPATIENT
Start: 2024-03-12 | End: 2024-03-12

## 2024-03-12 RX ORDER — ASPIRIN 325 MG
325 TABLET ORAL 2 TIMES DAILY
Status: DISCONTINUED | OUTPATIENT
Start: 2024-03-12 | End: 2024-03-15 | Stop reason: HOSPADM

## 2024-03-12 RX ADMIN — BUPIVACAINE 20 ML: 13.3 INJECTION, SUSPENSION, LIPOSOMAL INFILTRATION at 07:40

## 2024-03-12 RX ADMIN — PROPOFOL 250 MG: 10 INJECTION, EMULSION INTRAVENOUS at 07:52

## 2024-03-12 RX ADMIN — OXYCODONE HYDROCHLORIDE 5 MG: 5 TABLET ORAL at 22:13

## 2024-03-12 RX ADMIN — FENTANYL CITRATE 50 MCG: 50 INJECTION INTRAMUSCULAR; INTRAVENOUS at 10:33

## 2024-03-12 RX ADMIN — Medication 3 MG: at 22:50

## 2024-03-12 RX ADMIN — EPHEDRINE SULFATE 5 MG: 50 INJECTION, SOLUTION INTRAVENOUS at 08:11

## 2024-03-12 RX ADMIN — SUGAMMADEX 200 MG: 100 INJECTION, SOLUTION INTRAVENOUS at 09:49

## 2024-03-12 RX ADMIN — DOCUSATE SODIUM 100 MG: 100 CAPSULE, LIQUID FILLED ORAL at 14:03

## 2024-03-12 RX ADMIN — PROPOFOL 150 MG: 10 INJECTION, EMULSION INTRAVENOUS at 08:27

## 2024-03-12 RX ADMIN — BUPIVACAINE HYDROCHLORIDE 30 ML: 5 INJECTION, SOLUTION EPIDURAL; INTRACAUDAL; PERINEURAL at 07:40

## 2024-03-12 RX ADMIN — ACETAMINOPHEN 325MG 975 MG: 325 TABLET ORAL at 06:51

## 2024-03-12 RX ADMIN — HYDROMORPHONE HYDROCHLORIDE 0.5 MG: 1 INJECTION, SOLUTION INTRAMUSCULAR; INTRAVENOUS; SUBCUTANEOUS at 09:44

## 2024-03-12 RX ADMIN — CEFAZOLIN SODIUM 2000 MG: 2 SOLUTION INTRAVENOUS at 07:45

## 2024-03-12 RX ADMIN — CEFAZOLIN SODIUM 2000 MG: 2 SOLUTION INTRAVENOUS at 22:50

## 2024-03-12 RX ADMIN — ONDANSETRON 4 MG: 2 INJECTION INTRAMUSCULAR; INTRAVENOUS at 07:52

## 2024-03-12 RX ADMIN — MORPHINE SULFATE 2 MG: 2 INJECTION, SOLUTION INTRAMUSCULAR; INTRAVENOUS at 22:57

## 2024-03-12 RX ADMIN — KETOROLAC TROMETHAMINE 30 MG: 30 INJECTION, SOLUTION INTRAMUSCULAR; INTRAVENOUS at 09:37

## 2024-03-12 RX ADMIN — SODIUM CHLORIDE, SODIUM LACTATE, POTASSIUM CHLORIDE, AND CALCIUM CHLORIDE 125 ML/HR: .6; .31; .03; .02 INJECTION, SOLUTION INTRAVENOUS at 14:02

## 2024-03-12 RX ADMIN — ONDANSETRON 4 MG: 2 INJECTION INTRAMUSCULAR; INTRAVENOUS at 10:27

## 2024-03-12 RX ADMIN — LIDOCAINE HYDROCHLORIDE 50 MG: 10 INJECTION, SOLUTION EPIDURAL; INFILTRATION; INTRACAUDAL; PERINEURAL at 07:52

## 2024-03-12 RX ADMIN — QUETIAPINE FUMARATE 150 MG: 50 TABLET, EXTENDED RELEASE ORAL at 22:16

## 2024-03-12 RX ADMIN — ASPIRIN 325 MG ORAL TABLET 325 MG: 325 PILL ORAL at 14:02

## 2024-03-12 RX ADMIN — FENTANYL CITRATE 50 MCG: 50 INJECTION INTRAMUSCULAR; INTRAVENOUS at 10:43

## 2024-03-12 RX ADMIN — HYDROMORPHONE HYDROCHLORIDE 0.5 MG: 1 INJECTION, SOLUTION INTRAMUSCULAR; INTRAVENOUS; SUBCUTANEOUS at 11:20

## 2024-03-12 RX ADMIN — MIDAZOLAM 2 MG: 1 INJECTION INTRAMUSCULAR; INTRAVENOUS at 07:40

## 2024-03-12 RX ADMIN — HYDROMORPHONE HYDROCHLORIDE 0.5 MG: 1 INJECTION, SOLUTION INTRAMUSCULAR; INTRAVENOUS; SUBCUTANEOUS at 08:27

## 2024-03-12 RX ADMIN — PROPOFOL 150 MG: 10 INJECTION, EMULSION INTRAVENOUS at 09:44

## 2024-03-12 RX ADMIN — FENTANYL CITRATE 100 MCG: 50 INJECTION INTRAMUSCULAR; INTRAVENOUS at 07:45

## 2024-03-12 RX ADMIN — HYDROMORPHONE HYDROCHLORIDE 0.5 MG: 1 INJECTION, SOLUTION INTRAMUSCULAR; INTRAVENOUS; SUBCUTANEOUS at 10:58

## 2024-03-12 RX ADMIN — SODIUM CHLORIDE, SODIUM LACTATE, POTASSIUM CHLORIDE, AND CALCIUM CHLORIDE 125 ML/HR: .6; .31; .03; .02 INJECTION, SOLUTION INTRAVENOUS at 06:51

## 2024-03-12 RX ADMIN — BUSPIRONE HYDROCHLORIDE 5 MG: 5 TABLET ORAL at 17:41

## 2024-03-12 RX ADMIN — SODIUM CHLORIDE, SODIUM LACTATE, POTASSIUM CHLORIDE, AND CALCIUM CHLORIDE: .6; .31; .03; .02 INJECTION, SOLUTION INTRAVENOUS at 08:12

## 2024-03-12 RX ADMIN — SUGAMMADEX 200 MG: 100 INJECTION, SOLUTION INTRAVENOUS at 10:05

## 2024-03-12 RX ADMIN — CHLORHEXIDINE GLUCONATE 0.12% ORAL RINSE 15 ML: 1.2 LIQUID ORAL at 06:50

## 2024-03-12 RX ADMIN — CEFAZOLIN SODIUM 2000 MG: 2 SOLUTION INTRAVENOUS at 14:02

## 2024-03-12 RX ADMIN — ROCURONIUM BROMIDE 50 MG: 10 INJECTION, SOLUTION INTRAVENOUS at 07:52

## 2024-03-12 RX ADMIN — OXYCODONE HYDROCHLORIDE 5 MG: 5 TABLET ORAL at 17:50

## 2024-03-12 RX ADMIN — ASPIRIN 325 MG ORAL TABLET 325 MG: 325 PILL ORAL at 22:13

## 2024-03-12 RX ADMIN — SODIUM CHLORIDE, SODIUM LACTATE, POTASSIUM CHLORIDE, AND CALCIUM CHLORIDE 125 ML/HR: .6; .31; .03; .02 INJECTION, SOLUTION INTRAVENOUS at 22:14

## 2024-03-12 RX ADMIN — PHENYLEPHRINE HYDROCHLORIDE 50 MCG/MIN: 10 INJECTION INTRAVENOUS at 08:11

## 2024-03-12 RX ADMIN — PROPRANOLOL HYDROCHLORIDE 10 MG: 10 TABLET ORAL at 22:13

## 2024-03-12 RX ADMIN — DEXAMETHASONE SODIUM PHOSPHATE 10 MG: 10 INJECTION, SOLUTION INTRAMUSCULAR; INTRAVENOUS at 07:52

## 2024-03-12 NOTE — INTERVAL H&P NOTE
H&P reviewed. After examining the patient I find no changes in the patients condition since the H&P had been written.    Vitals:    03/12/24 0641   BP: 130/80   Pulse: 73   Resp: 18   Temp: (!) 97.1 °F (36.2 °C)   SpO2: 93%

## 2024-03-12 NOTE — PLAN OF CARE
Problem: PAIN - ADULT  Goal: Verbalizes/displays adequate comfort level or baseline comfort level  Description: Interventions:  - Encourage patient to monitor pain and request assistance  - Assess pain using appropriate pain scale  - Administer analgesics based on type and severity of pain and evaluate response  - Implement non-pharmacological measures as appropriate and evaluate response  - Consider cultural and social influences on pain and pain management  - Notify physician/advanced practitioner if interventions unsuccessful or patient reports new pain  Outcome: Progressing     Problem: INFECTION - ADULT  Goal: Absence or prevention of progression during hospitalization  Description: INTERVENTIONS:  - Assess and monitor for signs and symptoms of infection  - Monitor lab/diagnostic results  - Monitor all insertion sites, i.e. indwelling lines, tubes, and drains  - Monitor endotracheal if appropriate and nasal secretions for changes in amount and color  - [place] appropriate cooling/warming therapies per order  - Administer medications as ordered  - Instruct and encourage patient and family to use good hand hygiene technique  - Identify and instruct in appropriate isolation precautions for identified infection/condition  Outcome: Progressing     Problem: SAFETY ADULT  Goal: Patient will remain free of falls  Description: INTERVENTIONS:  - Educate patient/family on patient safety including physical limitations  - Instruct patient to call for assistance with activity   - Consult OT/PT to assist with strengthening/mobility   - Keep Call bell within reach  - Keep bed low and locked with side rails adjusted as appropriate  - Keep care items and personal belongings within reach  - Initiate and maintain comfort rounds  - Make Fall Risk Sign visible to staff  - Offer Toileting every . Hours, in advance of need  - Initiate/Maintain .alarm  - Obtain necessary fall risk management equipment: .  - Apply yellow socks and  bracelet for high fall risk patients  - Consider moving patient to room near nurses station  Outcome: Progressing  Goal: Maintain or return to baseline ADL function  Description: INTERVENTIONS:  -  Assess patient's ability to carry out ADLs; assess patient's baseline for ADL function and identify physical deficits which impact ability to perform ADLs (bathing, care of mouth/teeth, toileting, grooming, dressing, etc.)  - Assess/evaluate cause of self-care deficits   - Assess range of motion  - Assess patient's mobility; develop plan if impaired  - Assess patient's need for assistive devices and provide as appropriate  - Encourage maximum independence but intervene and supervise when necessary  - Involve family in performance of ADLs  - Assess for home care needs following discharge   - Consider OT consult to assist with ADL evaluation and planning for discharge  - Provide patient education as appropriate  Outcome: Progressing  Goal: Maintains/Returns to pre admission functional level  Description: INTERVENTIONS:  - Perform AM-PAC 6 Click Basic Mobility/ Daily Activity assessment daily.  - Set and communicate daily mobility goal to care team and patient/family/caregiver.   - Collaborate with rehabilitation services on mobility goals if consulted  - Perform Range of Motion . times a day.  - Reposition patient every . hours.  - Dangle patient . times a day  - Stand patient . times a day  - Ambulate patient . times a day  - Out of bed to chair .. times a day   - Out of bed for meals . times a day  - Out of bed for toileting  - Record patient progress and toleration of activity level   Outcome: Progressing     Problem: DISCHARGE PLANNING  Goal: Discharge to home or other facility with appropriate resources  Description: INTERVENTIONS:  - Identify barriers to discharge w/patient and caregiver  - Arrange for needed discharge resources and transportation as appropriate  - Identify discharge learning needs (meds, wound care,  etc.)  - Arrange for interpretive services to assist at discharge as needed  - Refer to Case Management Department for coordinating discharge planning if the patient needs post-hospital services based on physician/advanced practitioner order or complex needs related to functional status, cognitive ability, or social support system  Outcome: Progressing     Problem: Knowledge Deficit  Goal: Patient/family/caregiver demonstrates understanding of disease process, treatment plan, medications, and discharge instructions  Description: Complete learning assessment and assess knowledge base.  Interventions:  - Provide teaching at level of understanding  - Provide teaching via preferred learning methods  Outcome: Progressing

## 2024-03-12 NOTE — ASSESSMENT & PLAN NOTE
S/p bilateral repair tendon quadriceps on 3/12  Procedure went well. Orthopedics placed patient in bilateral TROM brace locked in extension  TROM brace should remain in place at all times, removed carefully for cleansing  Pain management  PT/OT consult  Fall precautions  CM for assistance with discharge planning  Further management and dvt prophylaxis per primary service

## 2024-03-12 NOTE — ANESTHESIA PROCEDURE NOTES
Peripheral Block    Patient location during procedure: holding area  Start time: 3/12/2024 7:15 AM  Reason for block: at surgeon's request and post-op pain management  Staffing  Performed by: Randy Nath MD  Authorized by: Randy Nath MD    Preanesthetic Checklist  Completed: patient identified, IV checked, site marked, risks and benefits discussed, surgical consent, monitors and equipment checked, pre-op evaluation and timeout performed  Peripheral Block  Patient position: supine  Prep: ChloraPrep  Patient monitoring: continuous pulse oximetry, frequent blood pressure checks and heart rate  Block type: Adductor Canal  Laterality: bilateral  Injection technique: single-shot  Procedures: ultrasound guided, Ultrasound guidance required for the procedure to increase accuracy and safety of medication placement and decrease risk of complications.bupivacaine (PF) (MARCAINE) 0.5 % injection 20 mL - Perineural   30 mL - 3/12/2024 7:40:00 AM  bupivacaine liposomal (EXPAREL) 1.3 % injection 20 mL - Perineural   20 mL - 3/12/2024 7:40:00 AM  Needle  Needle type: Stimuplex   Needle gauge: 20 G  Needle length: 4 in  Needle localization: ultrasound guidance  Assessment  Injection assessment: frequent aspiration, injected with ease, negative aspiration, no paresthesia on injection, incremental injection, needle tip visualized at all times, negative for heart rate change and no symptoms of intraneural/intravenous injection  Paresthesia pain: immediately resolved  Post-procedure:  site cleaned  patient tolerated the procedure well with no immediate complications

## 2024-03-12 NOTE — ASSESSMENT & PLAN NOTE
Presented to ED on 2/28 with fall that occurred and bilateral lower extremity pain. Inability to ambulate. Followed up with ortho outpatient and MRI done showing bilateral rupture of quadriceps muscles. S/p repair on 3/12 with orthopedics  Baseline ambulation patient walks well on own prior to accident. Post op: TROM brace should be in place.   PT/OT evaluations appreciated   Case management consult to assist with discharge disposition planning

## 2024-03-12 NOTE — ASSESSMENT & PLAN NOTE
Currently taking Lexapro 30 mg daily, Seroquel 150 mg daily at bedtime, BuSpar 5 mg twice daily  Also takes propranolol 10 mg twice daily as needed for anxiety  Continue home medications and recommending continuing outpatient follow-up with psychiatry

## 2024-03-12 NOTE — ANESTHESIA POSTPROCEDURE EVALUATION
Post-Op Assessment Note    CV Status:  Stable  Pain Score: 0    Pain management: adequate       Mental Status:  Alert and awake   Hydration Status:  Euvolemic   PONV Controlled:  Controlled   Airway Patency:  Patent     Post Op Vitals Reviewed: Yes    No anethesia notable event occurred.    Staff: CRNA             BP (!) 177/86 (03/12/24 1012)    Temp 98.2 °F (36.8 °C) (03/12/24 1012)    Pulse 78 (03/12/24 1012)   Resp 14 (03/12/24 1012)    SpO2 95 % (03/12/24 1012)

## 2024-03-12 NOTE — OCCUPATIONAL THERAPY NOTE
Occupational Therapy Evaluation     Patient Name: Peterson Salinas  Today's Date: 3/12/2024  Problem List  Principal Problem:    Rupture of left quadriceps muscle  Active Problems:    MDD (major depressive disorder), recurrent episode, mild (HCC)    Chronic bilateral low back pain without sciatica    CHEVY (obstructive sleep apnea)    Rupture of right quadriceps muscle    Ambulatory dysfunction    Hypothyroidism    Past Medical History  Past Medical History:   Diagnosis Date    Anxiety     CPAP (continuous positive airway pressure) dependence     Depression     Headache(784.0) March 2020    Degenerative disk in neck may be cause.    Hypertension     Palpitation     Panic attack     PVC (premature ventricular contraction)     Sleep apnea      Past Surgical History  Past Surgical History:   Procedure Laterality Date    ANAL EXAMINATION UNDER ANESTHESIA      COLONOSCOPY      FRACTURE SURGERY  6/1970 and 6/1979    Left and right wrists    KNEE SURGERY  08/1982    MULTIPLE TOOTH EXTRACTIONS Bilateral     QUADRICEPS TENDON REPAIR Bilateral 3/12/2024    Procedure: REPAIR TENDON QUADRICEPS;  Surgeon: David Castellano;  Location: [facility];  Service: Orthopedics        03/12/24 142   Note Type   Note type Evaluation   Pain Assessment   Pain Assessment Tool 0-10   Pain Score 5   Pain Location/Orientation Orientation: Bilateral;Location: Knee   Restrictions/Precautions   Weight Bearing Precautions Per Order Yes   RLE Weight Bearing Per Order PWB   LLE Weight Bearing Per Order WBAT   Braces or Orthoses Knee brace;Other (Comment)  (B TROM braces locked in extension)   Other Precautions O2;Chair Alarm;Bed Alarm;Fall Risk;Pain  (1 L O2 NC upon start of session)   Home Living   Type of Home House  (2 +3 EMMY no HR)   Home Layout Two level;Bed/bath upstairs  (FF + 3 steps to 2nd floor no HRs (just  at top of stairs))   Bathroom Shower/Tub Tub/shower unit   Bathroom Toilet Standard   Bathroom Equipment Commode  (Reports having  "grab bars in shower and around toilet, but are \"not useful\". Commode in attic)   Home Equipment Wheelchair-manual  (2 W/Cs, one upstairs and one downstairs. Reports there were orders for a walker but he never got it)   Additional Comments Pt has been \"sliding\" from W/C <> bed/toilet and down/up steps on buttocks to access his home environment since falling 2 weeks ago.   Prior Function   Level of [place] Independent with ADLs;Independent with functional mobility;Independent with IADLS   Lives With Spouse   Receives Help From Family   IADLs Independent with driving;Independent with meal prep;Independent with medication management   Falls in the last 6 months 1 to 4   Vocational Retired   Comments Before his fall, pt was independent with ADLs, IADLs, and functional mobility.   Subjective   Subjective \"I can't walk at all\"   ADL   UB Dressing Assistance 5  Supervision/Setup   LB Dressing Assistance 1  Total Assistance   LB Dressing Deficit Don/doff R sock;Don/doff L sock   Additional Comments Max/total assist for LB ADLs at this time (limitations of TROM braces locking BLEs in extension). UB ADLs with setup and increased time.   Bed Mobility   Supine to Sit 3  Moderate assistance   Additional items Assist x 1;Increased time required;Verbal cues;LE management;Other;Comment  (Trunk management)   Additional Comments Pt received supine in bed upon start of session. Pt supine > sit EOB with mod assist x 1 for LE management and trunk management.   Transfers   Sliding Board transfer   (Moderate/minimal assist)   Additional items Assist x 1;Increased time required;Verbal cues   Additional Comments Drop arm recliner brought alongside EOB. Pt participated in sliding board transfer from bed to recliner chair with initally moderate assistance, progressing to min assist x 1 and increased time to complete. Further functional mobility/transfers deferred due to fatigue/pain. At end of session, pt seated in chair with chair alarm " on, call bell in reach, and all needs met.   Balance   Static Sitting Good   Dynamic Sitting Fair +   Activity Tolerance   Activity Tolerance Patient limited by fatigue;Patient limited by pain   Medical Staff Made Aware Milli AGUILAR   Nurse Made Aware RN, Diane NOE Assessment   RUE Assessment WFL   LUE Assessment   LUE Assessment WFL   Hand Function   Gross Motor Coordination Functional   Fine Motor Coordination Functional   Sensation   Light Touch No apparent deficits   Cognition   Overall Cognitive Status WFL   Arousal/Participation Alert;Cooperative   Attention Within functional limits   Orientation Level Oriented X4   Memory Within functional limits   Following Commands Follows one step commands without difficulty   Assessment   Limitation Decreased ADL status;Decreased endurance;Decreased self-care trans;Decreased high-level ADLs   Prognosis Good   Assessment Pt is a 58 y.o. male, admitted to [facility]/12/[facility]24 d/t experiencing B repair tendon quadriceps. Pt with PMHx impacting their performance during ADL tasks, including: HTN, anxiety, depression, panic attack, PVC. Prior to his fall 2/29/24, pt was performing ADLs without physical assistance. IADLs without physical assistance. Functional transfers/ambulation without physical assistance. Cognitive status PTA was WFL. OT order placed to assess Pt's ADLs, cognitive status, and performance during functional tasks in order to maximize safety and independence while making most appropriate d/c recommendations. PT/OT co-evaluation completed at this time d/t significant mobility deficits and safety concerns. Pt's clinical presentation is currently unstable/unpredictable given new onset deficits that affect Pt's occupational performance and ability to safely return to PLOF including decrease activity tolerance, decrease standing balance, decrease performance during ADL tasks, increased pain, decrease generalized strength, decrease activity engagement, decrease  performance during functional transfers, steps to enter home, limited family support, high fall risk, and limited insight to deficits combined with medical complications of pain impacting overall mobility status, decreased skin integrity, fear/retreat, and need for input for mobility technique/safety. Personal factors affecting Pt at time of initial evaluation include: step(s) to enter environment, multi-level environment, limited home support, inability to perform ADLs, new need for AD, inability to ambulate household distances, inability to navigate community distances, limited insight into impairments, decreased initiation and engagement, and recent fall(s)/fall history. Pt will benefit from continued skilled OT services to address deficits as defined above and to maximize level independence/participation during ADLs and functional tasks to facilitate return toward PLOF and improved quality of life. From an occupational therapy standpoint, Level I (Maximum Resource Intensity) is recommended upon d/c.   Plan   Treatment Interventions ADL retraining;Functional transfer training;UE strengthening/ROM;Endurance training;Patient/family training;Equipment evaluation/education;Compensatory technique education;Continued evaluation;Energy conservation;Activityengagement   Goal Expiration Date 03/26/24   OT Frequency 3-5x/wk   Discharge Recommendation   Rehab Resource Intensity Level, OT I (Maximum Resource Intensity)   AM-PAC Daily Activity Inpatient   Lower Body Dressing 1   Bathing 2   Toileting 2   Upper Body Dressing 3   Grooming 3   Eating 4   Daily Activity Raw Score 15   Daily Activity Standardized Score (Calc for Raw Score >=11) 34.69   AM-PAC Applied Cognition Inpatient   Following a Speech/Presentation 4   Understanding Ordinary Conversation 4   Taking Medications 4   Remembering Where Things Are Placed or Put Away 4   Remembering List of 4-5 Errands 4   Taking Care of Complicated Tasks 4   Applied Cognition Raw  Score 24   Applied Cognition Standardized Score 62.21     The patient's raw score on the AM-PAC Daily Activity Inpatient Short Form is 15. A raw score of less than 19 suggests the patient may benefit from discharge to post-acute rehabilitation services. Please refer to the recommendation of the Occupational Therapist for safe discharge planning.    Pt goals to be met by 3/26/2024:    Pt will demonstrate ability to complete grooming/hygiene tasks @ mod I after set-up.  Pt will demonstrate ability to complete supine<>sit @ mod I in order to increase safety and independence during ADL tasks.  Pt will demonstrate ability to complete UB ADLs including washing/dressing @ mod I in order to increase performance and participation during meaningful tasks  Pt will demonstrate ability to complete LB dressing @ mod I in order to increase safety and independence during meaningful tasks.   Pt will demonstrate ability to lor/doff socks/shoes while sitting EOB/chair @ mod I in order to increase safety and independence during meaningful tasks.   Pt will demonstrate ability to complete toileting tasks including CM and pericare @ mod I in order to increase safety and independence during meaningful tasks.  Pt will demonstrate ability to complete EOB, chair, toilet/commode transfers @ mod I in order to increase performance and participation during functional tasks.  Pt will demonstrate ability to stand for 10 minutes while maintaining F+ balance with use of RW for UB support PRN.  Pt will demonstrate ability to tolerate 20 minute OT session with no vc'ing for deep breathing or use of energy conservation techniques in order to increase activity tolerance during functional tasks.   Pt will demonstrate Good carryover of use of energy conservation/compensatory strategies during ADLs and functional tasks in order to increase safety and reduce risk for falls.   Pt will demonstrate Good attention and participation in continued evaluation of  functional ambulation house hold distances in order to assist with safe d/c planning.  Pt will attend to continued cognitive assessments 100% of the time in order to provide most appropriate d/c recommendations.   Pt will follow 100% simple 2-step commands and be A&O x4 consistently with environmental cues to increase participation in functional activities.   Pt will identify 3 areas of interest/hobbies and 1 intervention on how to incorporate into daily life in order to increase interaction with environment and peers as well as increase participation in meaningful tasks.   Pt will demonstrate 100% carryover of BUE HEP in order to increase BUE MS and increase performance during functional tasks upon d/c home.    Ambrocio Alexis MS, OTR/L

## 2024-03-12 NOTE — DISCHARGE INSTR - AVS FIRST PAGE
Follow up in orthopedic office in 1 week.    Maintain knee braces at all times.  Must keep knees straight, braces locked in extension.  No knee flexion permitted.    Aspirin 325 mg 1 tablet twice daily to prevent blood clots.    For pain control take Tylenol 1,000 mg every 8 hours around the clock.  Supplement with the narcotic pain medication, only if needed every 4-6 hours.  Ice bag to the surgical area for 20-30 minutes 4-6 times per day with elevation for swelling and pain control.  A prescription was sent to your pharmacy.     Crutches or walker to assist in weight bearing.  Weightbearing as tolerated on the left lower extremity and flatfoot weightbearing on the right lower extremity and braces.

## 2024-03-12 NOTE — OP NOTE
OPERATIVE REPORT  PATIENT NAME: Peterson Salinas    :  1965  MRN: [id number]  Pt Location: [facility] OR ROOM 02    SURGERY DATE: 3/12/2024    Surgeons and Role:     * David Castellano - Primary     * Miguel Ángel Fernando PA-C - Assisting    Preop Diagnosis:  Acute pain of both knees [M25.561, M25.562]  Rupture of left quadriceps muscle, initial encounter [S76.112A]  Rupture of right quadriceps muscle, initial encounter [S76.111A]    Post-Op Diagnosis Codes:     * Acute pain of both knees [M25.561, M25.562]     * Rupture of left quadriceps muscle, initial encounter [S76.112A]     * Rupture of right quadriceps muscle, initial encounter [S76.111A]    Procedure(s):  Bilateral - REPAIR TENDON QUADRICEPS    Tourniquet time: 44 minutes at 300 mmHg left lower extremity, 39 minutes at 300 mmHg right lower extremity    Estimated Blood Loss:   Minimal    Liquids 1500 cc    Anesthesia Type:   General w/ adductor canal block    Operative Indications:  Acute pain of both knees [M25.561, M25.562]  Rupture of left quadriceps muscle, initial encounter [S76.112A]  Rupture of right quadriceps muscle, initial encounter [S76.111A]  Peterson is a 58-year-old male who fell injuring his bilateral lower extremities on 2024.  He describes tripping over a package that had been left outside of his house, falling forward and injuring the lower extremities.  He was unable to bear weight.  He was seen in the emergency room at [facility] and discharged.  He was seen in my office on 3/6/2024.  He was noted to have clinical evidence of bilateral quadricep tendon injuries.  An MRI was obtained confirming the diagnosis of bilateral quadricep tendon ruptures.  He was informed of the risk, benefits, options and alternatives of treatment and planned to proceed with bilateral quadricep tendon repair.    The preoperative MRIs did demonstrate additional intra-articular pathology which was determined to not require urgent treatment at this  time.    Operative Findings:  At the time of the procedure, the patient's left quadricep was ruptured at the patellar insertion.  This was repaired utilizing intraosseous anchor system and retinacular repair.  At the conclusion of the procedure, the knee could be flexed to nearly 90 degrees without tension at the repair site.    The right knee tendon rupture was noted to be a few centimeters proximal to the bony insertion.  This was repaired with a soft tissue technique including retinacular repair and.  At the conclusion of the procedure, the knee could be flexed to nearly 90 degrees without tension at the repair site.    Complications:   None    Procedure and Technique:  Peterson was taken to the operating room and administered a general anesthetic after he had been administered a abductor canal block anesthetic in the preoperative area.  Bilateral lower extremity tourniquets were applied at the proximal thigh.  The lower extremities were prepped and draped in standard fashion.  Preoperative antibiotics had been administered.  A timeout was performed.    Attention was initially drawn to the left side.  This limb was elevated, exsanguinated with an Esmarch bandage and the tourniquet then inflated to 300 mmHg.  An anterior incision was made extending from the mid patella proximally.  Sharp dissection carried down through the skin and subcutaneous tissues.  Upon dividing the subcutaneous tissues the traumatic hematoma was immediately evacuated.  The tendon was inspected and noted to be ruptured from the patellar insertion.  The knee was cleansed of debris.  The tendon edge was cleaned of the roughened surface and the exposed portion of the proximal patella decorticated with a rasp and curette.  Fiber suture was placed into the quadricep tendon in standard fashion with a locking [place] type stitch.  2 Ethibond sutures were placed in the retinacular tissues medially and laterally.  The patella was then prepared for the  insertion of 2 swivel lock anchors measuring 4.75 mm.  These were inserted in standard fashion securing the fiber tape sutures.  The retinacular tissues were then secured by tying the previously placed Ethibond sutures.  The patella tendon was then secured additionally with fiber sutures placed over the anterior aspect of the tendon at the patellar insertion.  The knee was carefully flexed and at approximately 90 degrees of flexion there was no tension at the repair site.    The wound was irrigated with saline solution.  Subcutaneous tissues were approximated with 2-0 Vicryl and the skin secured with staples.  Dressings were applied consisting of bacitracin, Adaptic, gauze, Webril and Ace bandages.  The tourniquet was deflated after total tourniquet time of 44 minutes.    The right lower extremity was then elevated, exsanguinated with an Esmarch bandage and the tourniquet inflated to 300 mmHg.  An anterior incision was made extending from the mid patella proximally.  Sharp dissection was carried down through the skin and subcutaneous tissues.  Once again, the traumatic hematoma was evacuated.  After inspection of the tendon, it was noted that this rupture had occurred 3 to 4 cm proximal to the patella.  Sutures of 2 Ethibond were placed into the retinacular tissues medially and laterally.  Again suture tape was placed in the quadricep tendon utilizing a [place] type stitch both proximal and distal to the rupture and then the stitches secured beginning posteriorly and the retinacular tissue and extending anteriorly to the patellar tendon.  Additional fiber tape was utilized to secure the repair as needed.  The wound was irrigated with saline solution.  The knee could be flexed to approximately 90 degrees without tension at the repair site.  Subcutaneous tissues were approximated with 2-0 Vicryl and the skin secured with staples.  Dressings were applied consisting of bacitracin, Adaptic, gauze and Webril.  The  tourniquet was deflated after total tourniquet time of 39 minutes and Ace bandages were used to wrap the knee.    Bilateral T ROM braces were applied with the range of motion locked in extension.  The patient was then awakened from the general anesthetic and transferred to the recovery room in stable and satisfactory postoperative condition.   I was present for the entire procedure., A qualified resident physician was not available., and A physician assistant was required during the procedure for retraction, tissue handling, dissection and suturing.    Patient Disposition:  [facility]        SIGNATURE: David Castellano  DATE: March 12, 2024  TIME: 9:58 AM

## 2024-03-12 NOTE — PLAN OF CARE
Problem: PHYSICAL THERAPY ADULT  Goal: Performs mobility at highest level of function for planned discharge setting.  See evaluation for individualized goals.  Description: Treatment/Interventions: ADL retraining, Functional transfer training, Elevations, LE strengthening/ROM, Therapeutic exercise, Endurance training, Patient/family training, Equipment eval/education, Bed mobility, Gait training, Compensatory technique education, Spoke to nursing, Spoke to case management, OT          See flowsheet documentation for full assessment, interventions and recommendations.  Note: Prognosis: Good  Problem List: Decreased strength, Decreased range of motion, Decreased endurance, Impaired balance, Decreased mobility, Impaired judgement, Decreased safety awareness, Obesity, Decreased skin integrity, Orthopedic restrictions, Pain  Assessment: Pt is a 58 y.o. male seen for PT evaluation s/p admission to [facility] on 3/12/2024 with Rupture of left quadriceps muscle.  Order placed for PT services.  Upon evaluation: Pt is presenting with impaired functional mobility due to pain, decreased strength, decreased ROM, decreased endurance, impaired balance, decreased safety awareness, orthopedic restrictions, fall risk, LE edema, and impaired skin integrity requiring  moderate assistance for bed mobility and minimal to moderate assistance for transfers with transfer board. Pt's clinical presentation is currently unpredictable given the functional mobility deficits above, especially weakness, decreased ROM, edema of extremities, decreased skin integrity, decreased endurance, impaired balance, pain, decreased activity tolerance, decreased functional mobility tolerance, and decreased safety awareness, c/o lightheadedness, coupled with fall risks as indicated by AM-PAC 6-Clicks: 11/24 as well as hx of falls, impaired balance, polypharmacy, decreased safety awareness, and obesity and combined with medical complications  of pain impacting overall mobility status, abnormal blood sugars, new onset O2 use, need for input for mobility technique/safety, and rupture of bilateral quadriceps tendon s/p repair .  Pt's PMHx and comorbidities that may affect physical performance and progress include:  major depressive disorder, CHEVY, chronic bilateral LBP without sciatica, anxiety, HA, HTN, h/o panic attacks, CHEVY . Personal factors affecting pt at time of IE include: unable to perform caregiver support/tasks, inaccessible home environment, step(s) to enter environment, multi-level environment, limited home support, inability to perform IADLs, inability to perform ADLs, inability to navigate level surfaces without external assistance, inability to ambulate household distances, and recent fall(s)/fall history. Pt will benefit from continued skilled PT services to address deficits as defined above and to maximize level of functional mobility to facilitate return toward PLOF and improved QOL. From PT/mobility standpoint, recommendation at time of d/c would be Level I (Maximum Resource Intensity) in order to reduce fall risk and maximize pt's functional independence and consistency with mobility. Recommend trial with walker next 1-2 sessions and ther ex next 1-2 sessions.  Barriers to Discharge: Decreased caregiver support, Inaccessible home environment  Barriers to Discharge Comments: requires assistance wiht mobility EMMY home, 2 SH wiht 2nd floor bathroom, lfamily available to help but limited.  Rehab Resource Intensity Level, PT: I (Maximum Resource Intensity)    See flowsheet documentation for full assessment.

## 2024-03-12 NOTE — CONSULTS
[facility]  Consult  Name: Peterson Salinas 58 y.o. male I MRN: [id number]  Unit/Bed#: -01 I Date of Admission: 3/12/2024   Date of Service: 3/12/2024 I Hospital Day: 0    Inpatient consult to Internal Medicine  Consult performed by: Fallon Mattson PA-C  Consult ordered by: Miguel Ángel Fernando PA-C          Assessment/Plan   * Rupture of left quadriceps muscle  Assessment & Plan  S/p bilateral repair tendon quadriceps on 3/12  Procedure went well. Orthopedics placed patient in bilateral TROM brace locked in extension  TROM brace should remain in place at all times, removed carefully for cleansing  Pain management  PT/OT consult  Fall precautions  CM for assistance with discharge planning  Further management and dvt prophylaxis per primary service    Rupture of right quadriceps muscle  Assessment & Plan  See management under principal problem.     MDD (major depressive disorder), recurrent episode, mild (HCC)  Assessment & Plan  Currently taking Lexapro 30 mg daily, Seroquel 150 mg daily at bedtime, BuSpar 5 mg twice daily  Also takes propranolol 10 mg twice daily as needed for anxiety  Continue home medications and recommending continuing outpatient follow-up with psychiatry    Hypothyroidism  Assessment & Plan  Currently on Synthroid 25 mcg daily, continue    Ambulatory dysfunction  Assessment & Plan  Presented to ED on 2/28 with fall that occurred and bilateral lower extremity pain. Inability to ambulate. Followed up with ortho outpatient and MRI done showing bilateral rupture of quadriceps muscles. S/p repair on 3/12 with orthopedics  Baseline ambulation patient walks well on own prior to accident. Post op: TROM brace should be in place.   PT/OT evaluations appreciated   Case management consult to assist with discharge disposition planning    CHEVY (obstructive sleep apnea)  Assessment & Plan  Continue CPAP    Chronic bilateral low back pain without sciatica  Assessment &  Plan  Follows with PCP for this and taking Mobic, continue  Continue outpatient follow-up on discharge         VTE Prophylaxis: VTE Score: 4 Moderate Risk (Score 3-4) - Pharmacological DVT Prophylaxis Ordered: aspirin 325 mg bid.    Mobility:      Mobility to be assessed    Recommendations for Discharge:  Continue home medications, follow up with psychiatry outpatient, follow up with PCP in 1 week, follow up with orthopedics as recommended on discharge    Total Time Spent on Date of Encounter in care of patient: 48 mins. This time was spent on one or more of the following: performing physical exam; counseling and coordination of care; obtaining or reviewing history; documenting in the medical record; reviewing/ordering tests, medications or procedures; communicating with other healthcare professionals and discussing with patient's family/caregivers.    Collaboration of Care: Were Recommendations Directly Discussed with Primary Treatment Team? Yes    Education and Discussions with Family / Patient: Updated  (wife) at bedside.     History of Present Illness:  Peterson Salinas is a 58 y.o. male who is originally admitted to the orthopedic surgery service due to s/p bilateral repair tendon quadriceps. We are consulted for medication management of hypothyroidism, depression, CHEVY.  Patient initially seen in the ED after falling and injuring bilateral lower extremities on 2/28.  Tripped on a pack steps outside of the house and fell forward injuring bilateral lower extremities.  Unable to evaluate.  Seen in the ED on discharge.  Followed up with orthopedics on 3/6 and had clinical evidence of bilateral quadricep tendon injuries.  MRI was obtained which confirmed diagnosis.  Patient presented for surgery on 3/12.  States that pain is currently controlled.  No nausea, vomiting, diarrhea, constipation, fever, chills, chest pain, shortness of breath, abdominal pain.  He is going to be admitted overnight for pain  management and PT OT evaluations.    Review of Systems:  Review of Systems   Constitutional:  Positive for activity change (decreased activity secondary to bilateral tendon rupture). Negative for appetite change, chills, fatigue, fever and unexpected weight change.   HENT: Negative.     Eyes: Negative.    Respiratory:  Negative for cough, chest tightness, shortness of breath and wheezing.    Cardiovascular:  Negative for chest pain, palpitations and leg swelling.   Gastrointestinal:  Negative for abdominal distention, abdominal pain, constipation, diarrhea, nausea and vomiting.   Endocrine: Negative.    Genitourinary:  Negative for difficulty urinating, dysuria, frequency, hematuria and urgency.   Musculoskeletal:  Positive for arthralgias, back pain (chronic), gait problem and myalgias.   Skin: Negative.    Allergic/Immunologic: Negative.    Neurological:  Negative for dizziness, syncope, weakness, light-headedness and headaches.   Hematological: Negative.    Psychiatric/Behavioral: Negative.         Past Medical and Surgical History:   Past Medical History:   Diagnosis Date    Anxiety     CPAP (continuous positive airway pressure) dependence     Depression     Headache(784.0) March 2020    Degenerative disk in neck may be cause.    Hypertension     Palpitation     Panic attack     PVC (premature ventricular contraction)     Sleep apnea        Past Surgical History:   Procedure Laterality Date    ANAL EXAMINATION UNDER ANESTHESIA      COLONOSCOPY      FRACTURE SURGERY  6/1970 and 6/1979    Left and right wrists    KNEE SURGERY  08/1982    MULTIPLE TOOTH EXTRACTIONS Bilateral     QUADRICEPS TENDON REPAIR Bilateral 3/12/2024    Procedure: REPAIR TENDON QUADRICEPS;  Surgeon: David Castellano;  Location:  MAIN OR;  Service: Orthopedics       Meds/Allergies:  all medications and allergies reviewed    Allergies:   Allergies   Allergen Reactions    Shellfish Allergy - Food Allergy Anaphylaxis, Tongue Swelling and Swelling  "   Shellfish-Derived Products - Food Allergy Anaphylaxis       Social History:  Marital Status: /Civil Union  Substance Use History:   Social History     Substance and Sexual Activity   Alcohol Use Not Currently    Alcohol/week: 3.0 - 4.0 standard drinks of alcohol    Types: 3 - 4 Cans of beer per week     Social History     Tobacco Use   Smoking Status Never    Passive exposure: Yes   Smokeless Tobacco Never     Social History     Substance and Sexual Activity   Drug Use Never       Family History:  Family History   Problem Relation Age of Onset    Hypertension Mother     COPD Mother     Migraines Mother     Stroke Father         Mini stroke in 2010       Physical Exam:   Vitals:   Blood Pressure: 127/87 (03/12/24 1243)  Pulse: 72 (03/12/24 1243)  Temperature: (!) 96.6 °F (35.9 °C) (03/12/24 1243)  Temp Source: Temporal (03/12/24 0641)  Respirations: 16 (03/12/24 1243)  Height: 6' 3\" (190.5 cm) (03/12/24 0641)  Weight - Scale: 136 kg (300 lb) (03/12/24 0641)  SpO2: 92 % (03/12/24 1243)    Physical Exam  Vitals reviewed.   Constitutional:       General: He is not in acute distress.     Appearance: He is obese. He is not ill-appearing or toxic-appearing.   HENT:      Head: Normocephalic and atraumatic.      Mouth/Throat:      Mouth: Mucous membranes are moist.   Cardiovascular:      Rate and Rhythm: Normal rate and regular rhythm.      Heart sounds: No murmur heard.  Pulmonary:      Effort: No respiratory distress.      Breath sounds: No stridor. No wheezing, rhonchi or rales.   Abdominal:      General: Bowel sounds are normal. There is no distension.      Palpations: Abdomen is soft. There is no mass.      Tenderness: There is no abdominal tenderness.   Musculoskeletal:         General: Tenderness present.      Right lower leg: No edema.      Left lower leg: No edema.      Comments: Minimally tender to palpation bilateral knees, able to wiggle toes, sensation in tact bilaterally   Skin:     General: Skin is " warm and dry.      Comments: ACE bandages in place bilateral lower extremities with TROM braces present.    Neurological:      General: No focal deficit present.      Mental Status: He is alert and oriented to person, place, and time.   Psychiatric:         Mood and Affect: Mood normal.         Behavior: Behavior normal.          Additional Data:   Lab Results:    Results from last 7 days   Lab Units 03/11/24  1048   WBC Thousand/uL 7.46   HEMOGLOBIN g/dL 14.9   HEMATOCRIT % 45.9   PLATELETS Thousands/uL 253     Results from last 7 days   Lab Units 03/11/24  1048   SODIUM mmol/L 140   POTASSIUM mmol/L 3.7   CHLORIDE mmol/L 104   CO2 mmol/L 27   BUN mg/dL 13   CREATININE mg/dL 0.90   ANION GAP mmol/L 9   CALCIUM mg/dL 8.9   ALBUMIN g/dL 4.0   TOTAL BILIRUBIN mg/dL 0.86   ALK PHOS U/L 54   ALT U/L 27   AST U/L 21             Lab Results   Component Value Date/Time    HGBA1C 6.2 (H) 09/15/2023 12:43 PM    HGBA1C 5.6 05/05/2021 04:34 AM               Imaging: Reviewed radiology reports from this admission including: MRI right and left knee done outpatient on 3/11  No orders to display       EKG, Pathology, and Other Studies Reviewed on Admission:   EKG: No EKG obtained.    ** Please Note: This note may have been constructed using a voice recognition system. **

## 2024-03-13 LAB
ANION GAP SERPL CALCULATED.3IONS-SCNC: 5 MMOL/L (ref 4–13)
BUN SERPL-MCNC: 15 MG/DL (ref 5–25)
CALCIUM SERPL-MCNC: 8.1 MG/DL (ref 8.4–10.2)
CHLORIDE SERPL-SCNC: 106 MMOL/L (ref 96–108)
CO2 SERPL-SCNC: 26 MMOL/L (ref 21–32)
CREAT SERPL-MCNC: 0.89 MG/DL (ref 0.6–1.3)
ERYTHROCYTE [DISTWIDTH] IN BLOOD BY AUTOMATED COUNT: 12.7 % (ref 11.6–15.1)
GFR SERPL CREATININE-BSD FRML MDRD: 92 ML/MIN/1.73SQ M
GLUCOSE P FAST SERPL-MCNC: 127 MG/DL (ref 65–99)
GLUCOSE SERPL-MCNC: 127 MG/DL (ref 65–140)
HCT VFR BLD AUTO: 38.2 % (ref 36.5–49.3)
HGB BLD-MCNC: 12.9 G/DL (ref 12–17)
MAGNESIUM SERPL-MCNC: 1.8 MG/DL (ref 1.9–2.7)
MCH RBC QN AUTO: 31.4 PG (ref 26.8–34.3)
MCHC RBC AUTO-ENTMCNC: 33.8 G/DL (ref 31.4–37.4)
MCV RBC AUTO: 93 FL (ref 82–98)
PLATELET # BLD AUTO: 208 THOUSANDS/UL (ref 149–390)
PMV BLD AUTO: 11.3 FL (ref 8.9–12.7)
POTASSIUM SERPL-SCNC: 4.1 MMOL/L (ref 3.5–5.3)
RBC # BLD AUTO: 4.11 MILLION/UL (ref 3.88–5.62)
SODIUM SERPL-SCNC: 137 MMOL/L (ref 135–147)
WBC # BLD AUTO: 13.58 THOUSAND/UL (ref 4.31–10.16)

## 2024-03-13 PROCEDURE — 97530 THERAPEUTIC ACTIVITIES: CPT

## 2024-03-13 PROCEDURE — 83735 ASSAY OF MAGNESIUM: CPT

## 2024-03-13 PROCEDURE — 99232 SBSQ HOSP IP/OBS MODERATE 35: CPT

## 2024-03-13 PROCEDURE — 99024 POSTOP FOLLOW-UP VISIT: CPT | Performed by: PHYSICIAN ASSISTANT

## 2024-03-13 PROCEDURE — 97535 SELF CARE MNGMENT TRAINING: CPT

## 2024-03-13 PROCEDURE — 80048 BASIC METABOLIC PNL TOTAL CA: CPT

## 2024-03-13 PROCEDURE — 85027 COMPLETE CBC AUTOMATED: CPT

## 2024-03-13 RX ORDER — MAGNESIUM SULFATE HEPTAHYDRATE 40 MG/ML
2 INJECTION, SOLUTION INTRAVENOUS ONCE
Status: COMPLETED | OUTPATIENT
Start: 2024-03-13 | End: 2024-03-13

## 2024-03-13 RX ADMIN — MAGNESIUM SULFATE HEPTAHYDRATE 2 G: 40 INJECTION, SOLUTION INTRAVENOUS at 08:28

## 2024-03-13 RX ADMIN — PROPRANOLOL HYDROCHLORIDE 10 MG: 10 TABLET ORAL at 20:03

## 2024-03-13 RX ADMIN — OXYCODONE HYDROCHLORIDE 2.5 MG: 5 TABLET ORAL at 15:35

## 2024-03-13 RX ADMIN — SODIUM CHLORIDE, SODIUM LACTATE, POTASSIUM CHLORIDE, AND CALCIUM CHLORIDE 125 ML/HR: .6; .31; .03; .02 INJECTION, SOLUTION INTRAVENOUS at 19:27

## 2024-03-13 RX ADMIN — LEVOTHYROXINE SODIUM 25 MCG: 25 TABLET ORAL at 08:28

## 2024-03-13 RX ADMIN — BUSPIRONE HYDROCHLORIDE 5 MG: 5 TABLET ORAL at 08:20

## 2024-03-13 RX ADMIN — BUSPIRONE HYDROCHLORIDE 5 MG: 5 TABLET ORAL at 17:09

## 2024-03-13 RX ADMIN — MORPHINE SULFATE 2 MG: 2 INJECTION, SOLUTION INTRAMUSCULAR; INTRAVENOUS at 22:43

## 2024-03-13 RX ADMIN — SODIUM CHLORIDE, SODIUM LACTATE, POTASSIUM CHLORIDE, AND CALCIUM CHLORIDE 125 ML/HR: .6; .31; .03; .02 INJECTION, SOLUTION INTRAVENOUS at 06:44

## 2024-03-13 RX ADMIN — ASPIRIN 325 MG ORAL TABLET 325 MG: 325 PILL ORAL at 08:20

## 2024-03-13 RX ADMIN — ESCITALOPRAM OXALATE 10 MG: 10 TABLET ORAL at 08:20

## 2024-03-13 RX ADMIN — MORPHINE SULFATE 2 MG: 2 INJECTION, SOLUTION INTRAMUSCULAR; INTRAVENOUS at 11:21

## 2024-03-13 RX ADMIN — OXYCODONE HYDROCHLORIDE 5 MG: 5 TABLET ORAL at 05:22

## 2024-03-13 RX ADMIN — OXYCODONE HYDROCHLORIDE 2.5 MG: 5 TABLET ORAL at 21:43

## 2024-03-13 RX ADMIN — MORPHINE SULFATE 2 MG: 2 INJECTION, SOLUTION INTRAMUSCULAR; INTRAVENOUS at 16:34

## 2024-03-13 RX ADMIN — QUETIAPINE FUMARATE 150 MG: 50 TABLET, EXTENDED RELEASE ORAL at 21:44

## 2024-03-13 RX ADMIN — MORPHINE SULFATE 2 MG: 2 INJECTION, SOLUTION INTRAMUSCULAR; INTRAVENOUS at 06:50

## 2024-03-13 RX ADMIN — DOCUSATE SODIUM 100 MG: 100 CAPSULE, LIQUID FILLED ORAL at 08:20

## 2024-03-13 RX ADMIN — OXYCODONE HYDROCHLORIDE 5 MG: 5 TABLET ORAL at 10:31

## 2024-03-13 RX ADMIN — Medication 3 MG: at 21:44

## 2024-03-13 RX ADMIN — ESCITALOPRAM OXALATE 20 MG: 10 TABLET ORAL at 08:20

## 2024-03-13 RX ADMIN — ASPIRIN 325 MG ORAL TABLET 325 MG: 325 PILL ORAL at 21:44

## 2024-03-13 NOTE — PLAN OF CARE
Problem: PHYSICAL THERAPY ADULT  Goal: Performs mobility at highest level of function for planned discharge setting.  See evaluation for individualized goals.  Description: Treatment/Interventions: ADL retraining, Functional transfer training, Elevations, LE strengthening/ROM, Therapeutic exercise, Endurance training, Patient/family training, Equipment eval/education, Bed mobility, Gait training, Compensatory technique education, Spoke to nursing, Spoke to case management, OT          See flowsheet documentation for full assessment, interventions and recommendations.  Outcome: Progressing  Note: Prognosis: Good  Problem List: Decreased strength, Decreased range of motion, Decreased endurance, Decreased mobility, Impaired balance, Decreased safety awareness, Obesity, Decreased skin integrity, Orthopedic restrictions, Pain  Assessment: Pt seen for PT treatment session this date with interventions consisting of Therapeutic exercise consisting of: AROM 20 reps B LE in sitting position and therapeutic activity consisting of training: supine<>sit transfers and sliding board transfers . Pt agreeable to PT treatment session upon arrival, pt found supine in bed w/ HOB elevated, in no apparent distress. In comparison to previous session, pt with improvements in pt requires decreased assistance for sliding board transfers, completed assigned therex, pt limited by pain, fatigue, self . Post session: pt returned back to recliner, all needs in reach, and RN notified of session findings/recommendations Continue to recommend  Level I Resource Intensity  at time of d/c in order to maximize pt's functional independence and safety w/ mobility. Pt continues to be functioning below baseline level, and remains limited 2* factors listed above and including decreased strength, balance, mobility, ROM, pain, and WBS . PT will continue to see pt while here in order to address the deficits listed above and provide interventions consistent w/  POC in effort to achieve STGs.  Barriers to Discharge: Inaccessible home environment, Decreased caregiver support  Barriers to Discharge Comments: pt requires assistance for mobility, EMMY home, limited mobility  Rehab Resource Intensity Level, PT: I (Maximum Resource Intensity)    See flowsheet documentation for full assessment.

## 2024-03-13 NOTE — ASSESSMENT & PLAN NOTE
S/p bilateral repair tendon quadriceps on 3/12  Procedure went well. Orthopedics placed patient in bilateral TROM brace locked in extension  TROM brace should remain in place at all times for minimum 3 weeks, removed carefully for cleansing  Pain management  PT/OT consult - maximum resource intensity   Fall precautions  Aspirin 325 BID for DVT prophylaxis

## 2024-03-13 NOTE — PLAN OF CARE
Problem: OCCUPATIONAL THERAPY ADULT  Goal: Performs self-care activities at highest level of function for planned discharge setting.  See evaluation for individualized goals.  Description: Treatment Interventions: ADL retraining, Functional transfer training, UE strengthening/ROM, Endurance training, Patient/family training, Equipment evaluation/education, Compensatory technique education, Continued evaluation, Energy conservation, Activityengagement     See flowsheet documentation for full assessment, interventions and recommendations.   Outcome: Progressing  Note: Limitation: Decreased ADL status, Decreased endurance, Decreased self-care trans, Decreased high-level ADLs  Prognosis: Good  Assessment: Peterson was seen for a follow up treatment session focused on improving safety and independence with commode transfers with transfer board. Pt required steadying assistance for scooting and board placement. Transferred to recliner post toileting activities. Continue acute OT to progress to IND PLOF. The patient's raw score on the AM-PAC Daily Activity Inpatient Short Form is 16. A raw score of less than 19 suggests the patient may benefit from discharge to post-acute rehabilitation services. Please refer to the recommendation of the Occupational Therapist for safe discharge planning.        Rehab Resource Intensity Level, OT: I (Maximum Resource Intensity)

## 2024-03-13 NOTE — PROGRESS NOTES
[facility]  Progress Note  Name: Peterson Salinas I  MRN: [id number]  Unit/Bed#: -01 I Date of Admission: 3/12/2024   Date of Service: 3/13/2024 I Hospital Day: 0    Assessment/Plan   * Rupture of left quadriceps muscle  Assessment & Plan  S/p bilateral repair tendon quadriceps on 3/12  Procedure went well. Orthopedics placed patient in bilateral TROM brace locked in extension  TROM brace should remain in place at all times for minimum 3 weeks, removed carefully for cleansing  Pain management  PT/OT consult - maximum resource intensity   Fall precautions  Aspirin 325 BID for DVT prophylaxis    Hypothyroidism  Assessment & Plan  Currently on Synthroid 25 mcg daily, continue    Ambulatory dysfunction  Assessment & Plan  Presented to ED on 2/28 with fall that occurred and bilateral lower extremity pain. Inability to ambulate. Followed up with ortho outpatient and MRI done showing bilateral rupture of quadriceps muscles. S/p repair on 3/12 with orthopedics  Baseline ambulation patient walks well on own prior to accident. Post op: TROM brace should be in place.   PT/OT evaluations appreciated - maximum resource intensity     Rupture of right quadriceps muscle  Assessment & Plan  See management under principal problem.     CHEVY (obstructive sleep apnea)  Assessment & Plan  Continue CPAP    Chronic bilateral low back pain without sciatica  Assessment & Plan  Follows with PCP for this and taking Mobic, continue  Continue outpatient follow-up on discharge    MDD (major depressive disorder), recurrent episode, mild (HCC)  Assessment & Plan  Currently taking Lexapro 30 mg daily, Seroquel 150 mg daily at bedtime, BuSpar 5 mg twice daily  Also takes propranolol 10 mg twice daily as needed for anxiety  Continue home medications and recommending continuing outpatient follow-up with psychiatry               VTE Pharmacologic Prophylaxis: VTE Score: 4 Moderate Risk (Score 3-4) - Pharmacological  DVT Prophylaxis Ordered: other medication  BID.    Mobility:   Basic Mobility Inpatient Raw Score: 11  JH-HLM Goal: 4: Move to chair/commode  JH-HLM Achieved: 4: Move to chair/commode  HLM Goal achieved. Continue to encourage appropriate mobility.    Patient Centered Rounds: I performed bedside rounds with nursing staff today.   Discussions with Specialists or Other Care Team Provider: nursing, orthopedics and cm    Education and Discussions with Family / Patient:  primary to update.     Total Time Spent on Date of Encounter in care of patient: . This time was spent on one or more of the following: performing physical exam; counseling and coordination of care; obtaining or reviewing history; documenting in the medical record; reviewing/ordering tests, medications or procedures; communicating with other healthcare professionals and discussing with patient's family/caregivers.    Current Length of Stay: 0 day(s)  Current Patient Status: Outpatient Surgery   Certification Statement: The patient will continue to require additional inpatient hospital stay due to monitor hgb and safe discharge planning  Discharge Plan: [facility] is following this patient on consult. They are not yet medically stable for discharge secondary to monitoring cbc.    Code Status: Level 1 - Full Code    Subjective:   Patient states that pain is being controlled. Expresses concern about going home due to his condition. Agreeable to looking into nursing facilities     Objective:     Vitals:   Temp (24hrs), Av.4 °F (36.3 °C), Min:96.6 °F (35.9 °C), Max:97.9 °F (36.6 °C)    Temp:  [96.6 °F (35.9 °C)-97.9 °F (36.6 °C)] 97.3 °F (36.3 °C)  HR:  [67-80] 67  Resp:  [13-21] 19  BP: (119-173)/(72-94) 125/72  SpO2:  [89 %-98 %] 90 %  Body mass index is 37.5 kg/m².     Input and Output Summary (last 24 hours):     Intake/Output Summary (Last 24 hours) at 3/13/2024 1024  Last data filed at 3/13/2024 0828  Gross per 24 hour   Intake 1460 ml   Output 1400  ml   Net 60 ml       Physical Exam:   Physical Exam  Vitals reviewed.   Constitutional:       General: He is not in acute distress.     Appearance: Normal appearance. He is obese. He is not ill-appearing.   HENT:      Head: Normocephalic and atraumatic.      Nose: Nose normal.      Mouth/Throat:      Mouth: Mucous membranes are moist.      Pharynx: Oropharynx is clear.   Eyes:      Extraocular Movements: Extraocular movements intact.      Conjunctiva/sclera: Conjunctivae normal.   Cardiovascular:      Rate and Rhythm: Normal rate and regular rhythm.      Pulses: Normal pulses.      Heart sounds: Normal heart sounds. No murmur heard.  Pulmonary:      Effort: Pulmonary effort is normal. No respiratory distress.      Breath sounds: Normal breath sounds. No wheezing.   Abdominal:      General: Abdomen is flat. Bowel sounds are normal. There is no distension.      Palpations: Abdomen is soft.      Tenderness: There is no abdominal tenderness. There is no guarding.   Musculoskeletal:         General: Normal range of motion.      Cervical back: Normal range of motion.      Right lower leg: No edema.      Left lower leg: No edema.      Comments: TROM in place   Skin:     General: Skin is warm.   Neurological:      General: No focal deficit present.      Mental Status: He is alert and oriented to person, place, and time. Mental status is at baseline.      Motor: No weakness.      Comments: Sensation intact bilaterally   Psychiatric:         Mood and Affect: Mood normal.         Behavior: Behavior normal.         Thought Content: Thought content normal.         Judgment: Judgment normal.          Additional Data:     Labs:  Results from last 7 days   Lab Units 03/13/24  0506   WBC Thousand/uL 13.58*   HEMOGLOBIN g/dL 12.9   HEMATOCRIT % 38.2   PLATELETS Thousands/uL 208     Results from last 7 days   Lab Units 03/13/24  0506 03/11/24  1048   SODIUM mmol/L 137 140   POTASSIUM mmol/L 4.1 3.7   CHLORIDE mmol/L 106 104   CO2  mmol/L 26 27   BUN mg/dL 15 13   CREATININE mg/dL 0.89 0.90   ANION GAP mmol/L 5 9   CALCIUM mg/dL 8.1* 8.9   ALBUMIN g/dL  --  4.0   TOTAL BILIRUBIN mg/dL  --  0.86   ALK PHOS U/L  --  54   ALT U/L  --  27   AST U/L  --  21   GLUCOSE RANDOM mg/dL 127  --                        Lines/Drains:  Invasive Devices       Peripheral Intravenous Line  Duration             Peripheral IV 03/12/24 Dorsal (posterior);Right Hand 1 day                          Imaging: No pertinent imaging reviewed.    Recent Cultures (last 7 days):         Last 24 Hours Medication List:   Current Facility-Administered Medications   Medication Dose Route Frequency Provider Last Rate    aspirin  325 mg Oral BID Miguel Ángel Taj Cottrellr, PA-C      busPIRone  5 mg Oral BID Miguel Ángel Taj Cottrellr, PA-C      docusate sodium  100 mg Oral Daily Miguel Ángel Taj Hernandezyler, PA-C      escitalopram  10 mg Oral Daily Fallon Mattson, PA-TOVA      escitalopram  20 mg Oral Daily Miguel Ángel Taj Cottrellr, PA-C      lactated ringers  1,000 mL Intravenous Once PRN Miguel Ángel Taj Fernando, PA-C      And    lactated ringers  1,000 mL Intravenous Once PRN Miguel Ángel Taj Seylemaddy, PA-C      lactated ringers  125 mL/hr Intravenous Continuous Miguel Ángel Taj Hernandezyler, PA-C 125 mL/hr (03/13/24 0644)    levothyroxine  25 mcg Oral Early Morning Miguel Ángel Fernando, PA-C      magnesium sulfate  2 g Intravenous Once PRETTY Bills-C 2 g (03/13/24 0828)    melatonin  3 mg Oral HS Tommy Lawler DO      morphine injection  2 mg Intravenous Q4H PRN Miguel Ángelpatricia Fernando, PA-C      ondansetron  4 mg Intravenous Q6H PRN Miguel Ángel Fernando, PA-C      oxyCODONE  2.5 mg Oral Q4H PRN PRETTY Velazquez-TOVA      Or    oxyCODONE  5 mg Oral Q4H PRN Fallon Mattson, PA-TOVA      propranolol  10 mg Oral BID PRN Miguel Ángel Jay Seylemaddy, PA-C      QUEtiapine  150 mg Oral HS Miguel Ángel Taj Fernando, PA-C      sodium chloride  1,000 mL Intravenous Once PRN Miguel Ángel Fernando PA-C      And    sodium chloride  1,000 mL Intravenous Once PRN Miguel Ángel Fernando PA-C           Today, Patient Was Seen By: Gay Armando PA-C    **Please Note: This note may have been constructed using a voice recognition system.**

## 2024-03-13 NOTE — CASE MANAGEMENT
Case Management Discharge Planning Note    Patient name Peterson Salinas  Location /-01 MRN [id number]  : 1965 Date 3/13/2024       Current Admission Date: 3/12/2024  Current Admission Diagnosis:Rupture of left quadriceps muscle   Patient Active Problem List    Diagnosis Date Noted    Ambulatory dysfunction 2024    Hypothyroidism 2024    Acute pain of both knees 2024    Rupture of left quadriceps muscle 2024    Rupture of right quadriceps muscle 2024    Rupture of right quadriceps muscle, initial encounter 2024    Rupture of left quadriceps muscle, initial encounter 2024    Bluish skin discoloration 2022    Tension type headache 2022    Obesity, Class II, BMI 35-39.9 2022    CHEVY (obstructive sleep apnea) 2022    Chronic bilateral low back pain without sciatica 2022    Bilateral carpal tunnel syndrome 2021    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 2021    Weight gain 2021    MDD (major depressive disorder), recurrent episode, mild (HCC) 2021    Lightheadedness 2021    Benign paroxysmal positional vertigo 2021    Medical clearance for psychiatric admission 2021    PVC (premature ventricular contraction) 2021    Depression with suicidal ideation 2021    Dizziness     Palpitations       LOS (days): 0  Geometric Mean LOS (GMLOS) (days):   Days to GMLOS:     OBJECTIVE:            Current admission status: Outpatient Surgery   Preferred Pharmacy:   [facility]/pharmacy #[id number] [place] [address]  Phone: [phone] Fax: [phone]    Primary Care Provider: Billy Mckeon DO    Primary Insurance: [employer]  Secondary Insurance:     DISCHARGE DETAILS:    Went back to talk to patient about his dc plans of rehab:    [facility] [place], patient has not finished his enrollment to  obtain any care there at this time. [facility] is aware [facility] not possible at this time.  Re educated on LOC for dc: Acute vs Subacute, he is aware Auth is needed. Participating at 2-3 hours is mandated daily. Since he in not up taking any steps, just pivot at this time. He is aware insurance may denied and we will need to pursue a Subacute Level of care- he is aware [facility] STR to home.    Discharge planning discussed with:: patient  Freedom of Choice: Yes  Comments - Freedom of Choice: Discussed with patient the needs to place in STR referrals for subacute rehabilatation, as his ambulating is minimal. He understtodd, wanted to assure that [facility] was one of hte Subacute choies. Therefore a blanket referral was made for Subacute rehab including [facility]. He is aware [facility] is declining at this time. Acute rehab placed to [facility] to see if they feel he is appropriate for Acute Rehab.  CM contacted family/caregiver?: No- see comments (declined)          Other Referral/Resources/Interventions Provided:  Interventions: Short Term Rehab  Referral Comments: [facility] referrals made for STR.  Was made- will follow up on referrals for acceptance and then patient will need auth from his insurance.     Treatment Team Recommendation: Short Term Rehab  Discharge Destination Plan::  (TBD STR vs Acute Rehab)  Transport at Discharge : Wheelchair van

## 2024-03-13 NOTE — ARC ADMISSION
Problem: Discharge Planning  Goal: Discharge to home or other facility with appropriate resources  2/23/2024 0140 by Katia Cyr RN  Outcome: Progressing     Problem: Safety - Adult  Goal: Free from fall injury  2/23/2024 0140 by Katia Cyr RN  Outcome: Progressing     Problem: Skin/Tissue Integrity  Goal: Absence of new skin breakdown  Description: 1.  Monitor for areas of redness and/or skin breakdown  2.  Assess vascular access sites hourly  3.  Every 4-6 hours minimum:  Change oxygen saturation probe site  4.  Every 4-6 hours:  If on nasal continuous positive airway pressure, respiratory therapy assess nares and determine need for appliance change or resting period.  2/23/2024 0140 by Katia Cyr RN  Outcome: Progressing     Problem: ABCDS Injury Assessment  Goal: Absence of physical injury  2/23/2024 0140 by Katia Cyr RN  Outcome: Progressing     Problem: Pain  Goal: Verbalizes/displays adequate comfort level or baseline comfort level  2/23/2024 0140 by Katia Cyr RN  Outcome: Progressing     Problem: Chronic Conditions and Co-morbidities  Goal: Patient's chronic conditions and co-morbidity symptoms are monitored and maintained or improved  2/23/2024 0140 by Katia Cyr RN  Outcome: Progressing      Referral received for patient consideration of [facility] placement for rehab.  Will review and will update CM with determination when known.

## 2024-03-13 NOTE — PLAN OF CARE
Problem: PAIN - ADULT  Goal: Verbalizes/displays adequate comfort level or baseline comfort level  Description: Interventions:  - Encourage patient to monitor pain and request assistance  - Assess pain using appropriate pain scale  - Administer analgesics based on type and severity of pain and evaluate response  - Implement non-pharmacological measures as appropriate and evaluate response  - Consider cultural and social influences on pain and pain management  - Notify physician/advanced practitioner if interventions unsuccessful or patient reports new pain  Outcome: Progressing     Problem: INFECTION - ADULT  Goal: Absence or prevention of progression during hospitalization  Description: INTERVENTIONS:  - Assess and monitor for signs and symptoms of infection  - Monitor lab/diagnostic results  - Monitor all insertion sites, i.e. indwelling lines, tubes, and drains  - Monitor endotracheal if appropriate and nasal secretions for changes in amount and color  - [place] appropriate cooling/warming therapies per order  - Administer medications as ordered  - Instruct and encourage patient and family to use good hand hygiene technique  - Identify and instruct in appropriate isolation precautions for identified infection/condition  Outcome: Progressing     Problem: SAFETY ADULT  Goal: Patient will remain free of falls  Description: INTERVENTIONS:  - Educate patient/family on patient safety including physical limitations  - Instruct patient to call for assistance with activity   - Consult OT/PT to assist with strengthening/mobility   - Keep Call bell within reach  - Keep bed low and locked with side rails adjusted as appropriate  - Keep care items and personal belongings within reach  - Initiate and maintain comfort rounds  - Make Fall Risk Sign visible to staff    - Apply yellow socks and bracelet for high fall risk patients  - Consider moving patient to room near nurses station  Outcome: Progressing  Goal: Maintain or  return to baseline ADL function  Description: INTERVENTIONS:  -  Assess patient's ability to carry out ADLs; assess patient's baseline for ADL function and identify physical deficits which impact ability to perform ADLs (bathing, care of mouth/teeth, toileting, grooming, dressing, etc.)  - Assess/evaluate cause of self-care deficits   - Assess range of motion  - Assess patient's mobility; develop plan if impaired  - Assess patient's need for assistive devices and provide as appropriate  - Encourage maximum independence but intervene and supervise when necessary  - Involve family in performance of ADLs  - Assess for home care needs following discharge   - Consider OT consult to assist with ADL evaluation and planning for discharge  - Provide patient education as appropriate  Outcome: Progressing  Goal: Maintains/Returns to pre admission functional level  Description: INTERVENTIONS:  - Perform AM-PAC 6 Click Basic Mobility/ Daily Activity assessment daily.  - Set and communicate daily mobility goal to care team and patient/family/caregiver.   - Collaborate with rehabilitation services on mobility goals if consulted    - Out of bed for toileting  - Record patient progress and toleration of activity level   Outcome: Progressing     Problem: DISCHARGE PLANNING  Goal: Discharge to home or other facility with appropriate resources  Description: INTERVENTIONS:  - Identify barriers to discharge w/patient and caregiver  - Arrange for needed discharge resources and transportation as appropriate  - Identify discharge learning needs (meds, wound care, etc.)  - Arrange for interpretive services to assist at discharge as needed  - Refer to Case Management Department for coordinating discharge planning if the patient needs post-hospital services based on physician/advanced practitioner order or complex needs related to functional status, cognitive ability, or social support system  Outcome: Progressing     Problem: Knowledge  Deficit  Goal: Patient/family/caregiver demonstrates understanding of disease process, treatment plan, medications, and discharge instructions  Description: Complete learning assessment and assess knowledge base.  Interventions:  - Provide teaching at level of understanding  - Provide teaching via preferred learning methods  Outcome: Progressing     Problem: Prexisting or High Potential for Compromised Skin Integrity  Goal: Skin integrity is maintained or improved  Description: INTERVENTIONS:  - Identify patients at risk for skin breakdown  - Assess and monitor skin integrity  - Assess and monitor nutrition and hydration status  - Monitor labs   - Assess for incontinence   - Turn and reposition patient  - Assist with mobility/ambulation  - Relieve pressure over bony prominences  - Avoid friction and shearing  - Provide appropriate hygiene as needed including keeping skin clean and dry  - Evaluate need for skin moisturizer/barrier cream  - Collaborate with interdisciplinary team   - Patient/family teaching  - Consider wound care consult   Outcome: Progressing

## 2024-03-13 NOTE — ASSESSMENT & PLAN NOTE
Presented to ED on 2/28 with fall that occurred and bilateral lower extremity pain. Inability to ambulate. Followed up with ortho outpatient and MRI done showing bilateral rupture of quadriceps muscles. S/p repair on 3/12 with orthopedics  Baseline ambulation patient walks well on own prior to accident. Post op: TROM brace should be in place.   PT/OT evaluations appreciated - maximum resource intensity

## 2024-03-13 NOTE — PHYSICAL THERAPY NOTE
PHYSICAL THERAPY TREATMENT NOTE  NAME:  Peterson Salinas  DATE: 03/13/24    Length Of Stay: 0  Performed at least 2 patient identifiers during session: Name and Birthday  Treatment time: 5101-5596  Treatment length: 30 min       03/13/24 1122   Note Type   Note Type Treatment   Pain Assessment   Pain Assessment Tool 0-10   Pain Score 4   Pain Location/Orientation Orientation: Bilateral;Location: Leg   Restrictions/Precautions   Weight Bearing Precautions Per Order Yes   RLE Weight Bearing Per Order PWB   LLE Weight Bearing Per Order WBAT   Braces or Orthoses Other (Comment)  (B/L TROM braces, locked in extension)   Other Precautions Chair Alarm;Bed Alarm;Multiple lines;Fall Risk;WBS   General   Chart Reviewed Yes   Response to Previous Treatment Patient with no complaints from previous session.   Cognition   Overall Cognitive Status WFL   Orientation Level Oriented X4   Following Commands Follows one step commands without difficulty   Bed Mobility   Supine to Sit 4  Minimal assistance   Additional items Assist x 1;Increased time required;Verbal cues;LE management   Additional Comments HOB flat, bedrails, pt able to progress LEs manually, however eventually requiring min A with VC for technique. Pt reporting dizziness seated EOB /83 with sx resoving in several min seated rest   Transfers   Sliding Board transfer   (steadying assist)   Additional items Increased time required;Verbal cues   Additional Comments Several sliding board transfers completed throughout session with steadying assist, VC and manual cues for sliding board placement. Pt declining to trial STS transfer this session despite encouragement   Balance   Static Sitting Normal   Dynamic Sitting Fair +   Endurance Deficit   Endurance Deficit Yes   Endurance Deficit Description fatigue, pain   Activity Tolerance   Activity Tolerance Patient limited by fatigue;Patient limited by pain   Medical Staff Made Aware Georgette BRYSON   Nurse Made Aware RN,  Kayleigh   Exercises   Ankle Pumps Sitting;20 reps;AROM;Bilateral   Assessment   Prognosis Good   Problem List Decreased strength;Decreased range of motion;Decreased endurance;Decreased mobility;Impaired balance;Decreased safety awareness;Obesity;Decreased skin integrity;Orthopedic restrictions;Pain   Barriers to Discharge Inaccessible home environment;Decreased caregiver support   Barriers to Discharge Comments pt requires assistance for mobility, EMMY home, limited mobility   Goals   PT Treatment Day 1   Plan   Treatment/Interventions Functional transfer training;LE strengthening/ROM;Therapeutic exercise;Elevations;Endurance training;Patient/family training;Equipment eval/education;Bed mobility;Gait training;Compensatory technique education;Spoke to nursing;OT   Progress Progressing toward goals   PT Frequency 4-6x/wk   Discharge Recommendation   Rehab Resource Intensity Level, PT I (Maximum Resource Intensity)   AM-PAC Basic Mobility Inpatient   Turning in Flat Bed Without Bedrails 3   Lying on Back to Sitting on Edge of Flat Bed Without Bedrails 3   Moving Bed to Chair 3   Standing Up From Chair Using Arms 1   Walk in Room 1   Climb 3-5 Stairs With Railing 1   Basic Mobility Inpatient Raw Score 12   Basic Mobility Standardized Score 32.23   Highest Level Of Mobility   -HL Goal 4: Move to chair/commode   JH-HLM Achieved 4: Move to chair/commode   Education   Education Provided Mobility training;Assistive device   Patient Demonstrates acceptance/verbal understanding   End of Consult   Patient Position at End of Consult Bedside chair;Bed/Chair alarm activated;All needs within reach     The patient's AM-PAC Basic Mobility Inpatient Short Form Raw Score is 12. A Raw score of less than or equal to 16 suggests the patient may benefit from discharge to post-acute rehabilitation services. Please also refer to the recommendation of the Physical Therapist for safe discharge planning.      Assessment: Pt seen for PT  treatment session this date with interventions consisting of Therapeutic exercise consisting of: AROM 20 reps B LE in sitting position and therapeutic activity consisting of training: supine<>sit transfers and sliding board transfers . Pt agreeable to PT treatment session upon arrival, pt found supine in bed w/ HOB elevated, in no apparent distress. In comparison to previous session, pt with improvements in pt requires decreased assistance for sliding board transfers, completed assigned therex, pt limited by pain, fatigue, self . Post session: pt returned back to recliner, all needs in reach, and RN notified of session findings/recommendations Continue to recommend  Level I Resource Intensity  at time of d/c in order to maximize pt's functional independence and safety w/ mobility. Pt continues to be functioning below baseline level, and remains limited 2* factors listed above and including decreased strength, balance, mobility, ROM, pain, and WBS . PT will continue to see pt while here in order to address the deficits listed above and provide interventions consistent w/ POC in effort to achieve STGs.     Carla West, PT,DPT

## 2024-03-13 NOTE — PLAN OF CARE
Problem: PAIN - ADULT  Goal: Verbalizes/displays adequate comfort level or baseline comfort level  Description: Interventions:  - Encourage patient to monitor pain and request assistance  - Assess pain using appropriate pain scale  - Administer analgesics based on type and severity of pain and evaluate response  - Implement non-pharmacological measures as appropriate and evaluate response  - Consider cultural and social influences on pain and pain management  - Notify physician/advanced practitioner if interventions unsuccessful or patient reports new pain  Outcome: Progressing     Problem: INFECTION - ADULT  Goal: Absence or prevention of progression during hospitalization  Description: INTERVENTIONS:  - Assess and monitor for signs and symptoms of infection  - Monitor lab/diagnostic results  - Monitor all insertion sites, i.e. indwelling lines, tubes, and drains  - Monitor endotracheal if appropriate and nasal secretions for changes in amount and color  - [place] appropriate cooling/warming therapies per order  - Administer medications as ordered  - Instruct and encourage patient and family to use good hand hygiene technique  - Identify and instruct in appropriate isolation precautions for identified infection/condition  Outcome: Progressing     Problem: SAFETY ADULT  Goal: Patient will remain free of falls  Description: INTERVENTIONS:  - Educate patient/family on patient safety including physical limitations  - Instruct patient to call for assistance with activity   - Consult OT/PT to assist with strengthening/mobility   - Keep Call bell within reach  - Keep bed low and locked with side rails adjusted as appropriate  - Keep care items and personal belongings within reach  - Initiate and maintain comfort rounds  - Make Fall Risk Sign visible to staff  - Offer Toileting every 2 Hours, in advance of need  - Initiate/Maintain bed/chair alarm  - Obtain necessary fall risk management equipment:   - Apply yellow  socks and bracelet for high fall risk patients  - Consider moving patient to room near nurses station  Outcome: Progressing  Goal: Maintain or return to baseline ADL function  Description: INTERVENTIONS:  -  Assess patient's ability to carry out ADLs; assess patient's baseline for ADL function and identify physical deficits which impact ability to perform ADLs (bathing, care of mouth/teeth, toileting, grooming, dressing, etc.)  - Assess/evaluate cause of self-care deficits   - Assess range of motion  - Assess patient's mobility; develop plan if impaired  - Assess patient's need for assistive devices and provide as appropriate  - Encourage maximum independence but intervene and supervise when necessary  - Involve family in performance of ADLs  - Assess for home care needs following discharge   - Consider OT consult to assist with ADL evaluation and planning for discharge  - Provide patient education as appropriate  Outcome: Progressing  Goal: Maintains/Returns to pre admission functional level  Description: INTERVENTIONS:  - Perform AM-PAC 6 Click Basic Mobility/ Daily Activity assessment daily.  - Set and communicate daily mobility goal to care team and patient/family/caregiver.   - Collaborate with rehabilitation services on mobility goals if consulted  - Perform Range of Motion 3 times a day.  - Reposition patient every 2 hours.  - Dangle patient 3 times a day  - Stand patient 3 times a day  - Ambulate patient 3 times a day  - Out of bed to chair 3 times a day   - Out of bed for meals 3 times a day  - Out of bed for toileting  - Record patient progress and toleration of activity level   Outcome: Progressing     Problem: DISCHARGE PLANNING  Goal: Discharge to home or other facility with appropriate resources  Description: INTERVENTIONS:  - Identify barriers to discharge w/patient and caregiver  - Arrange for needed discharge resources and transportation as appropriate  - Identify discharge learning needs (meds,  wound care, etc.)  - Arrange for interpretive services to assist at discharge as needed  - Refer to Case Management Department for coordinating discharge planning if the patient needs post-hospital services based on physician/advanced practitioner order or complex needs related to functional status, cognitive ability, or social support system  Outcome: Progressing     Problem: Knowledge Deficit  Goal: Patient/family/caregiver demonstrates understanding of disease process, treatment plan, medications, and discharge instructions  Description: Complete learning assessment and assess knowledge base.  Interventions:  - Provide teaching at level of understanding  - Provide teaching via preferred learning methods  Outcome: Progressing     Problem: Prexisting or High Potential for Compromised Skin Integrity  Goal: Skin integrity is maintained or improved  Description: INTERVENTIONS:  - Identify patients at risk for skin breakdown  - Assess and monitor skin integrity  - Assess and monitor nutrition and hydration status  - Monitor labs   - Assess for incontinence   - Turn and reposition patient  - Assist with mobility/ambulation  - Relieve pressure over bony prominences  - Avoid friction and shearing  - Provide appropriate hygiene as needed including keeping skin clean and dry  - Evaluate need for skin moisturizer/barrier cream  - Collaborate with interdisciplinary team   - Patient/family teaching  - Consider wound care consult   Outcome: Progressing

## 2024-03-13 NOTE — PROGRESS NOTES
Orthopedics   Peterson Salinas 58 y.o. male MRN: [id number]  Unit/Bed#: -01      Subjective:  58 y.o.male post operative day 1 bilateral repairs left tendon repaired to patella and right tendon repaired intersubstance.  Tolerating his braces which are locked in extension.  Denies calf pain chest pain or shortness of breath.  Expected anterior knee and patellar type pain.  Able to get from the hospital bed to the chair PT yesterday.    Labs:  0   Lab Value Date/Time    HCT 38.2 03/13/2024 0506    HCT 45.9 03/11/2024 1048    HCT 48.6 10/25/2023 1241    HGB 12.9 03/13/2024 0506    HGB 14.9 03/11/2024 1048    HGB 16.1 10/25/2023 1241    INR 0.91 11/04/2020 1913    WBC 13.58 (H) 03/13/2024 0506    WBC 7.46 03/11/2024 1048    WBC 7.23 10/25/2023 1241     Meds:    Current Facility-Administered Medications:     aspirin tablet 325 mg, 325 mg, Oral, BID, Miguel Ángel Fernando PA-C, 325 mg at 03/13/24 0820    busPIRone (BUSPAR) tablet 5 mg, 5 mg, Oral, BID, Miguel Ángel Fernando PA-C, 5 mg at 03/13/24 0820    docusate sodium (COLACE) capsule 100 mg, 100 mg, Oral, Daily, Miguel Ángel Fernando PA-C, 100 mg at 03/13/24 0820    escitalopram (LEXAPRO) tablet 10 mg, 10 mg, Oral, Daily, PRETTY Velazquez-TOVA, 10 mg at 03/13/24 0820    escitalopram (LEXAPRO) tablet 20 mg, 20 mg, Oral, Daily, PRETTY Anderson-TOVA, 20 mg at 03/13/24 0820    lactated ringers bolus 1,000 mL, 1,000 mL, Intravenous, Once PRN **AND** lactated ringers bolus 1,000 mL, 1,000 mL, Intravenous, Once PRN, Miguel Ángel Fernando PA-C    lactated ringers infusion, 125 mL/hr, Intravenous, Continuous, Miguel Ángel Fernando PA-C, Last Rate: 125 mL/hr at 03/13/24 0644, 125 mL/hr at 03/13/24 0644    levothyroxine tablet 25 mcg, 25 mcg, Oral, Early Morning, Miguel Ángel Fernando PA-C, 25 mcg at 03/13/24 0828    magnesium sulfate 2 g/50 mL IVPB (premix) 2 g, 2 g, Intravenous, Once, Gay Armando PA-C, Last Rate: 25 mL/hr at 03/13/24 0828, 2 g at 03/13/24 0828    melatonin tablet 3 mg,  "3 mg, Oral, HS, Tommy Lawler, DO, 3 mg at 03/12/24 2250    morphine injection 2 mg, 2 mg, Intravenous, Q4H PRN, Miguel Ángel Cottrellr, PA-C, 2 mg at 03/13/24 0650    ondansetron (ZOFRAN) injection 4 mg, 4 mg, Intravenous, Q6H PRN, Miguel Ángel Vang Seyler, PA-C    oxyCODONE (ROXICODONE) IR tablet 2.5 mg, 2.5 mg, Oral, Q4H PRN **OR** oxyCODONE (ROXICODONE) IR tablet 5 mg, 5 mg, Oral, Q4H PRN, Fallon Mattson PA-C, 5 mg at 03/13/24 0522    propranolol (INDERAL) tablet 10 mg, 10 mg, Oral, BID PRN, Miguel Ángel Vang Seyler, PA-C, 10 mg at 03/12/24 2213    QUEtiapine (SEROquel XR) 24 hr tablet 150 mg, 150 mg, Oral, HS, Miguel Ángel Taj Seyler, PA-C, 150 mg at 03/12/24 2216    sodium chloride 0.9 % bolus 1,000 mL, 1,000 mL, Intravenous, Once PRN **AND** sodium chloride 0.9 % bolus 1,000 mL, 1,000 mL, Intravenous, Once PRN, Miguel Ángel Vang Seyler, PA-C    Blood Culture:   No results found for: \"BLOODCX\"    Wound Culture:   No results found for: \"WOUNDCULT\"    Ins and Outs:  I/O last 24 hours:  In: 3010 [P.O.:460; I.V.:2500; IV Piggyback:50]  Out: 900 [Urine:900]    Physical Exam:  Vitals:    03/13/24 0828   BP:    Pulse:    Resp:    Temp:    SpO2: 90%     bilateral Lower Extremity extremity:  Bulky dressing clean dry and intact to both knees with bilateral TROM brace is in place locked in extension.  TTP bilateral anterior thigh and knee as expected  Sensation intact to saphenous, sural, tibial, superficial peroneal nerve, and deep peroneal  Motor intact to +FHL/EHL, +ankle dorsi/plantar flexion  2+ DP pulse, symmetric bilaterally  Digits warm and well perfused.  No calf pain, negative Homans' sign.    Assessment: 58 y.o.male post operative day 1 bilateral tendon repairs left repaired to bone right repaired intersubstance.  Doing well.    Plan:  Patient be out of bed with physical therapy but no bending of the knees for minimum of 3 weeks.  DVT prophylaxis aspirin 325 twice daily  Analgesics as needed.  PT/OT  Dispo: Ok for discharge from ortho " perspective when bed available at a nursing facility. Short-term rehab is likely not possible due to the 2-week maximum time and the patient would not be able to bend his knees for a minimum of 3 weeks.  Other possibility would be patient going home but would need home PT, hospital bed, bariatric commode.  Will continue to assess for acute blood loss anemia.  Medical team to replace magnesium prn.    Miguel Ángel Fernando PA-C

## 2024-03-13 NOTE — CASE MANAGEMENT
Case Management Assessment & Discharge Planning Note    Patient name Peterson Salinas  Location /-01 MRN [id number]  : 1965 Date 3/13/2024       Current Admission Date: 3/12/2024  Current Admission Diagnosis:Rupture of left quadriceps muscle   Patient Active Problem List    Diagnosis Date Noted    Ambulatory dysfunction 2024    Hypothyroidism 2024    Acute pain of both knees 2024    Rupture of left quadriceps muscle 2024    Rupture of right quadriceps muscle 2024    Rupture of right quadriceps muscle, initial encounter 2024    Rupture of left quadriceps muscle, initial encounter 2024    Bluish skin discoloration 2022    Tension type headache 2022    Obesity, Class II, BMI 35-39.9 2022    CHEVY (obstructive sleep apnea) 2022    Chronic bilateral low back pain without sciatica 2022    Bilateral carpal tunnel syndrome 2021    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 2021    Weight gain 2021    MDD (major depressive disorder), recurrent episode, mild (HCC) 2021    Lightheadedness 2021    Benign paroxysmal positional vertigo 2021    Medical clearance for psychiatric admission 2021    PVC (premature ventricular contraction) 2021    Depression with suicidal ideation 2021    Dizziness     Palpitations       LOS (days): 0  Geometric Mean LOS (GMLOS) (days):   Days to GMLOS:     OBJECTIVE:              Current admission status: Outpatient Surgery  Referral Reason:  (dc planning)    Preferred Pharmacy:   [facility]/pharmacy #[id number] [address] [address]  Phone: [phone] Fax: [phone]    Primary Care Provider: Billy Mckeon DO    Primary Insurance: [employer]  Secondary Insurance:     CM met with patient at the bedside,baseline information  was obtained. CM discussed the role of CM in helping the  patient develop a discharge plan and assist the patient in carry out their plan.      ASSESSMENT:  Active Health Care Proxies       Brie Salinas Health Care Representative - Spouse   Primary Phone: [phone] (Mobile)                 Advance Directives  Does patient have a Health Care POA?: No  Was patient offered paperwork?: No  Does patient currently have a Health Care decision maker?: Yes, please see Health Care Proxy section  Does patient have Advance Directives?: No  Was patient offered paperwork?: No  Primary Contact: Brie Salinas (Spouse)  [phone]         Readmission Root Cause  30 Day Readmission: No    Patient Information  Admitted from:: Home  Mental Status: Alert  During Assessment patient was accompanied by: Not accompanied during assessment  Assessment information provided by:: Patient  Primary Caregiver: Self  Support Systems: Spouse/significant other, Children  County [facility] Residence: [facility]  What [facility] do you live in?: [place]  Home entry access options. Select all that apply.: Stairs  Number of steps to enter home.: 4  Do the steps have railings?: No  Type of Current Residence: 2 story home  Upon entering residence, is there a bedroom on the main floor (no further steps)?: No  A bedroom is located on the following floor levels of residence (select all that apply):: 2nd Floor  Upon entering residence, is there a bathroom on the main floor (no further steps)?: No  Indicate which floors of current residence have a bathroom (select all the apply):: 2nd Floor  Number of steps to 2nd floor from main floor: One Flight (Per patient 14)  Living Arrangements: Lives w/ Spouse/significant other, Lives w/ Daughter (lives with 2 daughters, 22/35 yrs old)  Is patient a ?: Yes  [facility] with [facility]: No (signed [facility])    Activities of Daily Living Prior to Admission  Functional Status: Assistance  Completes ADLs independently?: No  Level of ADL dependence:  Assistance  Ambulates independently?: No  Level of ambulatory dependence: Assistance  Does patient use assisted devices?: Yes  Assisted Devices (DME) used: Wheelchair  Does patient currently own DME?: Yes  What DME does the patient currently own?: Wheelchair, Walker  Does patient have a history of Outpatient Therapy (PT/OT)?: No  Does the patient have a history of Short-Term Rehab?: No  Does patient have a history of HHC?: No  Does patient currently have HHC?: No         Patient Information Continued  Income Source: SSI/SSD  Does patient have prescription coverage?: Yes  Does patient receive dialysis treatments?: No  Does patient have a history of substance abuse?: No  Does patient have a history of Mental Health Diagnosis?: Yes (depression and anxiety on medications, has a MH Counselor in [place])  Is patient receiving treatment for mental health?: Yes  Has patient received inpatient treatment related to mental health in the last 2 years?: No (May 2021)         Means of Transportation  Means of Transport to Appts:: Drives Self      Social Determinants of Health (SDOH)      Flowsheet Row Most Recent Value   Housing Stability    In the last 12 months, how many places have you lived? 1   In the last 12 months, was there a time when you did not have a steady place to sleep or slept in a shelter (including now)? N   Transportation Needs    In the past 12 months, has lack of transportation kept you from medical appointments or from getting medications? no   In the past 12 months, has lack of transportation kept you from meetings, work, or from getting things needed for daily living? No   Food Insecurity    Within the past 12 months, you worried that your food would run out before you got the money to buy more. Never true   Within the past 12 months, the food you bought just didn't last and you didn't have money to get more. Never true   Utilities    In the past 12 months has the electric, gas, oil, or water company  threatened to shut off services in your home? No          Patient has been navigating the steps at his home, on his butt.    DISCHARGE DETAILS:    Discharge planning discussed with:: patient  Freedom of Choice: Yes  Comments - Freedom of Choice: Discussed Acute Rehab for dc: locations discussed: 1st choice is [facility] and 2nd is [facility], was informed where [facility] is. Discussed STR at Skilled facility. Patient is not interested in this. [facility] however unsure if he is [facility] for care there, he has signed up in August however has not seen a PCP. Patient is aware insurance auth is necessary for Acute Rehab stay.  CM contacted family/caregiver?: No- see comments (declined)           Requested Home Health Care         Is the patient interested in HHC at discharge?: No    DME Referral Provided  Referral made for DME?: No    Other Referral/Resources/Interventions Provided:  Interventions: Acute Rehab  Referral Comments: Aidin referral made to [facility]/ [facility]  If accepted will initiate the auth         Treatment Team Recommendation: Acute Rehab  Discharge Destination Plan:: Acute Rehab  Transport at Discharge : Crow demarco

## 2024-03-13 NOTE — OCCUPATIONAL THERAPY NOTE
Occupational Therapy Progress Note     Patient Name: Peterson Salinas  Today's Date: 3/13/2024  Problem List  Principal Problem:    Rupture of left quadriceps muscle  Active Problems:    MDD (major depressive disorder), recurrent episode, mild (HCC)    Chronic bilateral low back pain without sciatica    CHEVY (obstructive sleep apnea)    Rupture of right quadriceps muscle    Ambulatory dysfunction    Hypothyroidism          03/13/24 1121   OT Last Visit   OT Visit Date 03/13/24   Note Type   Note Type Treatment   Pain Assessment   Pain Assessment Tool 0-10   Pain Score 6   Pain Location/Orientation Orientation: Left;Location: Leg   Restrictions/Precautions   Weight Bearing Precautions Per Order Yes   RLE Weight Bearing Per Order PWB   LLE Weight Bearing Per Order WBAT   Braces or Orthoses Other (Comment)  (B/L TROM braces, locked in extension)   Other Precautions Chair Alarm;Bed Alarm;Multiple lines;Fall Risk;WBS   ADL   Toileting Assistance  4  Minimal Assistance   Toileting Deficit Supervison/safety;Verbal cueing;Perineal hygiene  (seated on commode; laterally leaning to complete hygiene)   Bed Mobility   Supine to Sit 4  Minimal assistance   Additional items Assist x 1;Increased time required;Verbal cues;LE management   Transfers   Sliding Board transfer   (steadying assist)   Additional items Increased time required;Verbal cues  (EOB to bariatric drop arm commode to the left side)   Additional Comments Cued for safe transfer techniques and lateral leaning technique to place board   Toilet Transfers   Toilet Transfer From Transfer board   Toilet Transfer Type To   Toilet Transfer to Drop-arm extra wide bedside commode   Toilet Transfer Technique Lateral   Toilet Transfers Contact guard   Cognition   Overall Cognitive Status WFL   Orientation Level Oriented X4   Following Commands Follows one step commands without difficulty   Activity Tolerance   Activity Tolerance Patient limited by pain;Patient limited by  fatigue   Medical Staff Made Aware PT, Carla Frederick Peterson was seen for a follow up treatment session focused on improving safety and independence with commode transfers with transfer board. Pt required steadying assistance for scooting and board placement. Transferred to recliner post toileting activities. Continue acute OT to progress to IND PLOF.   Plan   Treatment Interventions ADL retraining;Functional transfer training;UE strengthening/ROM;Endurance training;Patient/family training;Equipment evaluation/education;Compensatory technique education;Continued evaluation;Energy conservation;Activityengagement   Goal Expiration Date 03/26/24   OT Treatment Day 1   OT Frequency 3-5x/wk   Discharge Recommendation   Rehab Resource Intensity Level, OT I (Maximum Resource Intensity)   AM-PAC Daily Activity Inpatient   Lower Body Dressing 1   Bathing 2   Toileting 3   Upper Body Dressing 3   Grooming 3   Eating 4   Daily Activity Raw Score 16   Daily Activity Standardized Score (Calc for Raw Score >=11) 35.96   AM-PAC Applied Cognition Inpatient   Following a Speech/Presentation 4   Understanding Ordinary Conversation 4   Taking Medications 4   Remembering Where Things Are Placed or Put Away 4   Remembering List of 4-5 Errands 4   Taking Care of Complicated Tasks 4   Applied Cognition Raw Score 24   Applied Cognition Standardized Score 62.21     Assessment:  The patient's raw score on the AM-PAC Daily Activity Inpatient Short Form is 15. A raw score of less than 19 suggests the patient may benefit from discharge to post-acute rehabilitation services. Please refer to the recommendation of the Occupational Therapist for safe discharge planning. Pt seen for co-evaluation with skilled Physical Therapy due to clinically unstable presentation, medical complexity, fall risk, functional balance, limited activity tolerance which is a decline from PLOF and may impact overall safety.      Goals to be met by  3/26/2024:  Pt will demonstrate ability to complete grooming/hygiene tasks @ Mod I after set-up.  Pt will demonstrate ability to complete supine<>sit @ Mod I in order to increase safety and independence during ADL tasks.  Pt will demonstrate ability to complete UB ADLs including washing/dressing @ Mod I in order to increase performance and participation during meaningful tasks  Pt will demonstrate ability to complete LB dressing @ Mod I with LHAE PRN in order to increase safety and independence during meaningful tasks.   Pt will demonstrate ability to lor/doff socks/shoes while sitting @ Mod I in order to increase safety and independence during meaningful tasks.   Pt will demonstrate ability to complete toileting tasks including CM and pericare @ Mod I in order to increase safety and independence during meaningful tasks.  Pt will demonstrate ability to complete EOB, chair, toilet/commode transfers @ Mod I in order to increase performance and participation during functional tasks.  Pt will demonstrate ability to stand for 5-10 minutes while maintaining Fair+ balance with use of least restrictive AD for UB support PRN.  Pt will demonstrate ability to tolerate 30-35 minute OT session with no vc'ing for deep breathing or use of energy conservation techniques in order to increase activity tolerance during functional tasks.   Pt will demonstrate Good carryover of use of energy conservation/compensatory strategies during ADLs and functional tasks in order to increase safety and reduce risk for falls.   Pt will demonstrate Good attention and participation in continued evaluation of functional ambulation house hold distances in order to assist with safe d/c planning.  Pt will attend to continued cognitive assessments 100% of the time in order to provide most appropriate d/c recommendations.   Pt will follow 100% simple 2-step commands and be A&O x4 consistently with environmental cues to increase participation in functional  activities.   Pt will identify 3 areas of interest/hobbies and 1 intervention on how to incorporate into daily life in order to increase interaction with environment and peers as well as increase participation in meaningful tasks.   Pt will demonstrate 100% carryover of BUE HEP in order to increase BUE MS and increase performance during functional tasks upon d/c home.      Georgette Ovalles MS, OTR/L     Georgette Ovalles, OTR/L

## 2024-03-14 LAB
ERYTHROCYTE [DISTWIDTH] IN BLOOD BY AUTOMATED COUNT: 13 % (ref 11.6–15.1)
HCT VFR BLD AUTO: 37.9 % (ref 36.5–49.3)
HGB BLD-MCNC: 12.7 G/DL (ref 12–17)
MAGNESIUM SERPL-MCNC: 1.9 MG/DL (ref 1.9–2.7)
MCH RBC QN AUTO: 31.7 PG (ref 26.8–34.3)
MCHC RBC AUTO-ENTMCNC: 33.5 G/DL (ref 31.4–37.4)
MCV RBC AUTO: 95 FL (ref 82–98)
PLATELET # BLD AUTO: 193 THOUSANDS/UL (ref 149–390)
PMV BLD AUTO: 10.9 FL (ref 8.9–12.7)
RBC # BLD AUTO: 4.01 MILLION/UL (ref 3.88–5.62)
WBC # BLD AUTO: 8.52 THOUSAND/UL (ref 4.31–10.16)

## 2024-03-14 PROCEDURE — 83735 ASSAY OF MAGNESIUM: CPT

## 2024-03-14 PROCEDURE — 97530 THERAPEUTIC ACTIVITIES: CPT

## 2024-03-14 PROCEDURE — 97116 GAIT TRAINING THERAPY: CPT

## 2024-03-14 PROCEDURE — 99232 SBSQ HOSP IP/OBS MODERATE 35: CPT

## 2024-03-14 PROCEDURE — 97535 SELF CARE MNGMENT TRAINING: CPT

## 2024-03-14 PROCEDURE — 99024 POSTOP FOLLOW-UP VISIT: CPT | Performed by: ORTHOPAEDIC SURGERY

## 2024-03-14 PROCEDURE — 85027 COMPLETE CBC AUTOMATED: CPT

## 2024-03-14 RX ORDER — DOCUSATE SODIUM 100 MG/1
100 CAPSULE, LIQUID FILLED ORAL 2 TIMES DAILY
Status: DISCONTINUED | OUTPATIENT
Start: 2024-03-14 | End: 2024-03-15 | Stop reason: HOSPADM

## 2024-03-14 RX ADMIN — Medication 3 MG: at 21:35

## 2024-03-14 RX ADMIN — PROPRANOLOL HYDROCHLORIDE 10 MG: 10 TABLET ORAL at 21:36

## 2024-03-14 RX ADMIN — SODIUM CHLORIDE, SODIUM LACTATE, POTASSIUM CHLORIDE, AND CALCIUM CHLORIDE 125 ML/HR: .6; .31; .03; .02 INJECTION, SOLUTION INTRAVENOUS at 11:45

## 2024-03-14 RX ADMIN — ESCITALOPRAM OXALATE 20 MG: 10 TABLET ORAL at 08:45

## 2024-03-14 RX ADMIN — ESCITALOPRAM OXALATE 10 MG: 10 TABLET ORAL at 08:48

## 2024-03-14 RX ADMIN — BUSPIRONE HYDROCHLORIDE 5 MG: 5 TABLET ORAL at 17:24

## 2024-03-14 RX ADMIN — DOCUSATE SODIUM 100 MG: 100 CAPSULE, LIQUID FILLED ORAL at 08:46

## 2024-03-14 RX ADMIN — QUETIAPINE FUMARATE 150 MG: 50 TABLET, EXTENDED RELEASE ORAL at 21:36

## 2024-03-14 RX ADMIN — ASPIRIN 325 MG ORAL TABLET 325 MG: 325 PILL ORAL at 08:45

## 2024-03-14 RX ADMIN — SODIUM CHLORIDE, SODIUM LACTATE, POTASSIUM CHLORIDE, AND CALCIUM CHLORIDE 125 ML/HR: .6; .31; .03; .02 INJECTION, SOLUTION INTRAVENOUS at 03:43

## 2024-03-14 RX ADMIN — ASPIRIN 325 MG ORAL TABLET 325 MG: 325 PILL ORAL at 21:35

## 2024-03-14 RX ADMIN — OXYCODONE HYDROCHLORIDE 5 MG: 5 TABLET ORAL at 14:14

## 2024-03-14 RX ADMIN — BUSPIRONE HYDROCHLORIDE 5 MG: 5 TABLET ORAL at 08:46

## 2024-03-14 RX ADMIN — MORPHINE SULFATE 2 MG: 2 INJECTION, SOLUTION INTRAMUSCULAR; INTRAVENOUS at 21:35

## 2024-03-14 RX ADMIN — LEVOTHYROXINE SODIUM 25 MCG: 25 TABLET ORAL at 08:46

## 2024-03-14 RX ADMIN — OXYCODONE HYDROCHLORIDE 5 MG: 5 TABLET ORAL at 05:18

## 2024-03-14 RX ADMIN — OXYCODONE HYDROCHLORIDE 5 MG: 5 TABLET ORAL at 20:17

## 2024-03-14 NOTE — PROGRESS NOTES
Orthopedics   Peterson Salinas 58 y.o. male MRN: [id number]  Unit/Bed#: -01      Subjective:  58 y.o.male post operative day 2 bilateral distal quad tendon repairs left repaired back to patella tendon right repaired intersubstance.  Patient notes slight paresthesia continuously secondary to midline incisions.  He denies calf pain.  He notes he had moderate pain in both knees last evening.  His left knee Feels like he is being jabbed in his left knee cap.    Labs:  0   Lab Value Date/Time    HCT 37.9 03/14/2024 0449    HCT 38.2 03/13/2024 0506    HCT 45.9 03/11/2024 1048    HGB 12.7 03/14/2024 0449    HGB 12.9 03/13/2024 0506    HGB 14.9 03/11/2024 1048    INR 0.91 11/04/2020 1913    WBC 8.52 03/14/2024 0449    WBC 13.58 (H) 03/13/2024 0506    WBC 7.46 03/11/2024 1048     Meds:    Current Facility-Administered Medications:     aspirin tablet 325 mg, 325 mg, Oral, BID, Miguel Ángel Fernando PA-C, 325 mg at 03/13/24 2144    busPIRone (BUSPAR) tablet 5 mg, 5 mg, Oral, BID, Miguel Ángel Fernando PA-C, 5 mg at 03/13/24 1709    docusate sodium (COLACE) capsule 100 mg, 100 mg, Oral, Daily, Miguel Ángel Fernando PA-C, 100 mg at 03/13/24 0820    escitalopram (LEXAPRO) tablet 10 mg, 10 mg, Oral, Daily, Fallon Mattson PA-C, 10 mg at 03/13/24 0820    escitalopram (LEXAPRO) tablet 20 mg, 20 mg, Oral, Daily, Miguel Ángel Fernando PA-C, 20 mg at 03/13/24 0820    lactated ringers infusion, 125 mL/hr, Intravenous, Continuous, Miguel Ángel Fernando PA-C, Last Rate: 125 mL/hr at 03/14/24 0343, 125 mL/hr at 03/14/24 0343    levothyroxine tablet 25 mcg, 25 mcg, Oral, Early Morning, Miguel Ángel Fernando PA-C, 25 mcg at 03/13/24 0828    melatonin tablet 3 mg, 3 mg, Oral, HS, Tommy Lawler DO, 3 mg at 03/13/24 2144    morphine injection 2 mg, 2 mg, Intravenous, Q4H PRN, Miguel Ángel Fernando PA-C, 2 mg at 03/13/24 2243    ondansetron (ZOFRAN) injection 4 mg, 4 mg, Intravenous, Q6H PRN, Miguel Ángel Fernando PA-C    oxyCODONE (ROXICODONE) IR tablet 2.5 mg,  "2.5 mg, Oral, Q4H PRN, 2.5 mg at 03/13/24 2143 **OR** oxyCODONE (ROXICODONE) IR tablet 5 mg, 5 mg, Oral, Q4H PRN, Fallon Mattson PA-C, 5 mg at 03/14/24 0518    propranolol (INDERAL) tablet 10 mg, 10 mg, Oral, BID PRN, Miguel Ángel Fernando PA-C, 10 mg at 03/13/24 2003    QUEtiapine (SEROquel XR) 24 hr tablet 150 mg, 150 mg, Oral, HS, Miguel Ángel Fernando PA-C, 150 mg at 03/13/24 2144    Blood Culture:   No results found for: \"BLOODCX\"    Wound Culture:   No results found for: \"WOUNDCULT\"    Ins and Outs:  I/O last 24 hours:  In: 1660 [P.O.:660; I.V.:1000]  Out: 1300 [Urine:1300]  Physical Exam:  Vitals:    03/14/24 0728   BP: 137/84   Pulse: 69   Resp: 18   Temp: (!) 97.3 °F (36.3 °C)   SpO2: 95%     Bilateral Lower Extremity extremity:  6 inch Ace wrap dressings clean dry and intact.  Braces intact locked in extension.  TTP to palpation distal quad tendons bilaterally.  Is no calf pain negative Homans' sign.  He has good plantar dorsiflexion.  Distal perfusion is intact.  Thighs are soft nontender.     Good plantar dorsiflexion bilateral ankles    Assessment: 58 y.o.male post operative day 2 bilateral wad tendon repairs left back to patellar bone and right repaired intersubstance to itself.  Doing ok.    Plan:  Patient be out of bed with physical therapy but no bending of the knees for minimum of 3 weeks.  DVT prophylaxis aspirin 325 mg twice daily  Analgesics oxycodone  PT/OT  Dispo: Ok for discharge from ortho perspective awaiting placement      Miguel Ángel Fernando PA-C             "

## 2024-03-14 NOTE — UTILIZATION REVIEW
Initial Clinical Review    Elective outpatient procedure, converted to inpatient admission due to need for Acute rehab placement.        Admission: Date/Time/Statement:   Admission Orders (From admission, onward)       Ordered        03/14/24 1537  Inpatient Admission  Once                          Orders Placed This Encounter   Procedures    Inpatient Admission     Standing Status:   Standing     Number of Occurrences:   1     Order Specific Question:   Level of Care     Answer:   Med Surg [16]     Order Specific Question:   Estimated length of stay     Answer:   More than 2 Midnights     Order Specific Question:   Certification     Answer:   I certify that inpatient services are medically necessary for this patient for a duration of greater than two midnights. See H&P and MD Progress Notes for additional information about the patient's course of treatment.       Initial Presentation: 58 y.o. male Admitted 3/12/24 for outpatient surgical procedure - bilateral repair tendon quadriceps  Presented to ED on 2/28 with fall that occurred and bilateral lower extremity pain. Inability to ambulate. Followed up with ortho outpatient and MRI done showing bilateral rupture of quadriceps muscles. S/p repair on 3/12 with orthopedics  Baseline ambulation patient walks well on own prior to accident. Post op: TROM brace should be in place.       Date: 3/14/23 - CHANGED TO INPATIENT   POD #2 bilateral repair tendon quadriceps on 3/12   Procedure went well. Orthopedics placed patient in bilateral TROM brace locked in extension; TROM brace should remain in place at all times for minimum 3 weeks, removed carefully for cleansing; pain 2-8/10; PT recommending rehab   Plan: cont ivf; pain / nausea control (see below); CM consulted for placement; cont asa daily     Per CM - working on placement at Acute rehab - awaiting auth from insurance company for [facility] [place]     Date: 3/15/24    Day 2 -  INPATIENT   POD #3 bilateral repair tendon quadriceps on 3/12   Patient notes continued pain about the knees but is improving some. Notes some increased ambulation distance minimally. Patient has been maintained in the hospital not for medical reasons but awaiting placement. Cont asa; pain control (see below); PT/ OT tx; monitor labs      ED Triage Vitals   Temperature Pulse Respirations Blood Pressure SpO2   03/12/24 0641 03/12/24 0641 03/12/24 0641 03/12/24 0641 03/12/24 0641   (!) 97.1 °F (36.2 °C) 73 18 130/80 93 %      Temp Source Heart Rate Source Patient Position - Orthostatic VS BP Location FiO2 (%)   03/12/24 0641 03/12/24 0641 03/12/24 1443 03/12/24 1443 --   Temporal Monitor Lying Right arm       Pain Score       03/12/24 0641       2          Wt Readings from Last 1 Encounters:   03/12/24 136 kg (300 lb)     Additional Vital Signs:   Date/Time Temp Pulse Resp BP MAP (mmHg) SpO2 O2 Device Patient Position - Orthostatic VS   03/15/24 07:27:56 97.3 °F (36.3 °C) Abnormal  70 18 116/65 82 93 % -- --   03/14/24 2310 -- -- -- -- -- 85 % Abnormal  -- --   03/14/24 22:15:31 97.5 °F (36.4 °C) 75 -- 129/82 98 93 % -- --   03/14/24 2136 -- 79 -- 130/83 -- -- -- --   03/14/24 2015 -- -- -- -- -- 92 % None (Room air)      Date/Time Temp Pulse Resp BP MAP (mmHg) SpO2 Calculated FIO2 (%) - Nasal Cannula O2 Flow Rate (L/min) Nasal Cannula O2 Flow Rate (L/min) O2 Device Patient Position - Orthostatic VS   03/14/24 14:47:06 97.5 °F (36.4 °C) 76 14 131/83 99 94 % -- -- -- -- --   03/14/24 0845 -- -- -- -- -- 92 % -- -- -- None (Room air) --   03/14/24 07:28:06 97.3 °F (36.3 °C) Abnormal  69 18 137/84 102 95 % -- -- -- -- --   03/13/24 22:38:22 97.3 °F (36.3 °C) Abnormal  69 18 137/85 102 91 % -- -- -- CPAP Lying   03/13/24 20:03:59 -- 74 -- 136/86 103 91 % -- -- -- -- --   03/13/24 1930 -- -- -- -- -- -- -- -- -- None (Room air) --   03/13/24 1535 97.2 °F (36.2 °C) Abnormal  70 18 137/84 102 94 % -- -- -- None (Room air)  Sitting   03/13/24 1500 -- -- 18 -- -- -- -- -- -- None (Room air) Sitting   03/13/24 11:33:31 -- 75 -- 138/82 101 91 % -- -- -- -- --   03/13/24 0828 -- -- -- -- -- 90 % -- -- -- None (Room air) --   03/13/24 0650 97.3 °F (36.3 °C) Abnormal  -- -- -- -- -- -- -- -- -- --   03/13/24 06:43:53 97.3 °F (36.3 °C) Abnormal  67 19 125/72 90 92 % -- -- -- -- --   03/13/24 0300 -- -- -- 123/73 90 94 % -- -- -- None (Room air) --   03/12/24 21:51:23 97.5 °F (36.4 °C) 75 -- 125/77 93 93 % -- -- -- -- --   03/12/24 19:08:22 97.2 °F (36.2 °C) Abnormal  74 -- 124/77 93 98 % -- -- -- -- --   03/12/24 17:57:28 97.2 °F (36.2 °C) Abnormal  78 17 123/76 92 93 % 28 -- 2 L/min Nasal cannula Sitting   03/12/24 1543 97.7 °F (36.5 °C) -- 18 121/75 -- -- 28 -- 2 L/min Nasal cannula Sitting   03/12/24 14:54:19 -- 76 -- 124/78 93 89 % Abnormal  -- -- -- -- --   03/12/24 1443 97.9 °F (36.6 °C) -- 18 -- -- -- -- -- -- -- Lying   03/12/24 13:45:13 97 °F (36.1 °C) Abnormal  77 16 119/80 93 96 % -- -- -- -- --   03/12/24 1328 -- -- -- -- -- 96 % -- 2 L/min -- Nasal cannula --   03/12/24 12:43:19 96.6 °F (35.9 °C) Abnormal  72 16 127/87 100 92 % -- -- -- -- --   03/12/24 1127 97.6 °F (36.4 °C) 73 16 149/90 115 95 % -- 2 L/min -- Nasal cannula --   03/12/24 1112 97.6 °F (36.4 °C) 75 14 153/92 117 90 % -- 2 L/min -- Nasal cannula  --   O2 Device: applied at 03/12/24 1112   03/12/24 1057 -- 79 20 143/84 107 97 % -- -- -- None (Room air) --   03/12/24 1053 -- 80 21 143/84 107 95 % -- -- -- -- --   03/12/24 1043 -- 75 17 154/88 116 98 % -- -- -- -- --   03/12/24 1042 -- 75 17 154/88 116 97 % -- 6 L/min -- Simple mask --   03/12/24 1030 -- 76 13 173/92 Abnormal  125 98 % -- -- -- -- --   03/12/24 1027 -- 79 19 168/94 125 96 % -- 6 L/min -- Simple mask --   03/12/24 1012 98.2 °F (36.8 °C) 78 14 177/86 Abnormal  119 95 % -- 6 L/min -- Simple mask --   03/12/24 0641 97.1 °F (36.2 °C) Abnormal  73 18 130/80 -- 93 % -- -- -- None (Room air)             Results from last 7 days   Lab Units 03/14/24  0449 03/13/24  0506 03/11/24  1048   WBC Thousand/uL 8.52 13.58* 7.46   HEMOGLOBIN g/dL 12.7 12.9 14.9   HEMATOCRIT % 37.9 38.2 45.9   PLATELETS Thousands/uL 193 208 253        Results from last 7 days   Lab Units 03/14/24  0449 03/13/24  0506 03/11/24  1048   SODIUM mmol/L  --  137 140   POTASSIUM mmol/L  --  4.1 3.7   CHLORIDE mmol/L  --  106 104   CO2 mmol/L  --  26 27   ANION GAP mmol/L  --  5 9   BUN mg/dL  --  15 13   CREATININE mg/dL  --  0.89 0.90   EGFR ml/min/1.73sq m  --  92 92   CALCIUM mg/dL  --  8.1* 8.9   MAGNESIUM mg/dL 1.9 1.8*  --      Results from last 7 days   Lab Units 03/11/24  1048   AST U/L 21   ALT U/L 27   ALK PHOS U/L 54   TOTAL PROTEIN g/dL 6.6   ALBUMIN g/dL 4.0   TOTAL BILIRUBIN mg/dL 0.86        Results from last 7 days   Lab Units 03/13/24  0506   GLUCOSE RANDOM mg/dL 127       Present on Admission:   Rupture of left quadriceps muscle   MDD (major depressive disorder), recurrent episode, mild (HCC)   Chronic bilateral low back pain without sciatica   CHEVY (obstructive sleep apnea)   Rupture of right quadriceps muscle      Admitting Diagnosis: Acute pain of both knees [M25.561, M25.562]  Rupture of left quadriceps muscle, initial encounter [S76.112A]  Rupture of right quadriceps muscle, initial encounter [S76.111A]    Age/Sex: 58 y.o. male    Admission Orders: SCDs; I/S; partial wt bearing; regular diet    Scheduled Medications:  aspirin, 325 mg, Oral, BID  busPIRone, 5 mg, Oral, BID  docusate sodium, 100 mg, Oral, BID  escitalopram, 10 mg, Oral, Daily  escitalopram, 20 mg, Oral, Daily  levothyroxine, 25 mcg, Oral, Early Morning  melatonin, 3 mg, Oral, HS  QUEtiapine, 150 mg, Oral, HS    magnesium sulfate 2 g/50 mL IVPB (premix) 2 g  Dose: 2 g  Freq: Once Route: IV  Last Dose: 2 g (03/13/24 0828)  Start: 03/13/24 0830 End: 03/13/24 1028       Continuous IV Infusions:  lactated ringers, 125 mL/hr, Intravenous, Continuous      PRN  Meds:  morphine injection, 2 mg, Intravenous, Q4H PRN  (3/13 rec'd x4)  (3/14 recd x1)   ondansetron, 4 mg, Intravenous, Q6H PRN  oxyCODONE, 2.5 mg, Oral, Q4H PRN  (3/13 rec'd x1)    Or  oxyCODONE, 5 mg, Oral, Q4H PRN  (3/13 rec'd x2)   (3/14 rec'd x3)   propranolol, 10 mg, Oral, BID PRN   (3/13 rec'd x1)   (3/14 recd x1)         IP CONSULT TO INTERNAL MEDICINE  IP CONSULT TO CASE MANAGEMENT    Network Utilization Review Department  ATTENTION: Please call with any questions or concerns to [phone] and carefully listen to the prompts so that you are directed to the right person. All voicemails are confidential.   For Discharge needs, contact Care Management DC Support Team at [phone] opt. 2  Send all requests for admission clinical reviews, approved or denied determinations and any other requests to dedicated fax number below belonging to the [facility] where the patient is receiving treatment. List of dedicated fax numbers for the Facilities:  FACILITY NAME UR FAX NUMBER   ADMISSION DENIALS (Administrative/Medical Necessity) [phone]   DISCHARGE SUPPORT TEAM (NETWORK) [phone]   PARENT CHILD HEALTH (Maternity/NICU/Pediatrics) [phone]   [facility] [phone]   [facility] [phone]   [facility] [phone]   [facility] [phone]   [facility] [phone]   [facility] [phone]   [facility] [phone]   [facility] [phone]   [facility] [phone]   [facility] [phone]   [facility] [phone]   [facility] [phone]

## 2024-03-14 NOTE — PROGRESS NOTES
Patient:  PRESTON MORRIS    MRN:  [id number]    Benji[place] Request ID:  [id number]    Level of care reserved:  Inpatient Rehab Facility    Partner Reserved:  [facility] [place] [address] [phone]    Clinical needs requested:    Geography searched:  40 miles around [id number]    Start of Service:    Request sent:  6:31am EDT on 3/13/2024 by Juani Starr    Partner reserved:  1:25pm EDT on 3/14/2024 by Meghana Garcia    Choice list shared:  1:23pm EDT on 3/14/2024 by Meghana Garcia

## 2024-03-14 NOTE — OCCUPATIONAL THERAPY NOTE
Occupational Therapy Progress Note     Patient Name: Peterson Salinas  Today's Date: 3/14/2024  Problem List  Principal Problem:    Rupture of left quadriceps muscle  Active Problems:    MDD (major depressive disorder), recurrent episode, mild (HCC)    Chronic bilateral low back pain without sciatica    CHEVY (obstructive sleep apnea)    Rupture of right quadriceps muscle    Ambulatory dysfunction    Hypothyroidism       03/14/24 1116   Note Type   Note Type Treatment   Pain Assessment   Pain Assessment Tool 0-10   Pain Score 2   Pain Location/Orientation Orientation: Bilateral;Location: Leg   Restrictions/Precautions   Weight Bearing Precautions Per Order Yes   RLE Weight Bearing Per Order PWB  (Foot flat touch down WB)   LLE Weight Bearing Per Order WBAT   Braces or Orthoses Other (Comment)  (B TROM braces locked in extension)   Other Precautions Chair Alarm;Bed Alarm;Fall Risk;Pain;Multiple lines;WBS   ADL   Toileting Assistance  5  Supervision/Setup   Toileting Deficit Perineal hygiene;Use of bedpan/urinal setup   Toileting Comments Pt participated in pericare while seated on commode with supervision/setup.   Bed Mobility   Supine to Sit 5  Supervision   Additional items Increased time required;Verbal cues   Additional Comments Pt received supine in bed upon start of session. Pt supine > sit EOB with supervision.   Transfers   Sit to Stand 3  Moderate assistance   Additional items Assist x 1;Increased time required;Verbal cues   Stand to Sit 4  Minimal assistance   Additional items Assist x 1;Increased time required;Verbal cues   Stand pivot 3  Moderate assistance   Additional items Assist x 1;Increased time required;Verbal cues   Additional Comments Bed elevated. Quick Move initiated. Pt pulling himself up to stand from EOB, holding onto handles of QuickMove with mod assist x 1. Pt able to maintain standing 2 minutes while holding onto Quick Move. Steadying assist to maintain standing. Cues for foot flat  touch down WB on RLE. Pt stand > sit with min assist x 1. RW initiated. Pt sit <> stand with HOB elevated at mod assist x 1. Pt slightly exceeding RLE WB status during transition, but able to maintain once standing with VCs. Pt spt from bed to recliner chair with mod assist x 1 and RW. Cues for sequencing of the pivot and maintenance of foot flat touch down WB.   Functional Mobility   Functional Mobility 3  Moderate assistance   Additional Comments Pt able to take a few steps forward in room with mod assist x 1 and RW with chair follow for safety.   Toilet Transfers   Toilet Transfer From Bed   Toilet Transfer Type To   Toilet Transfer to Drop-arm extra wide bedside commode   Toilet Transfers   (Mod assist x 1 and min assist x 1)   Toilet Transfers Comments Pt sit > stand from BSC with mod assist x 1 and min assist x 1 with RW.   Cognition   Overall Cognitive Status WFL   Arousal/Participation Alert;Cooperative   Attention Within functional limits   Orientation Level Oriented X4   Memory Within functional limits   Following Commands Follows one step commands without difficulty   Activity Tolerance   Activity Tolerance Patient tolerated treatment well;Patient limited by fatigue;Patient limited by pain   Medical Staff Made Aware PT, Milli   Assessment   Assessment Pt completed OT tx session #2 focused on functional transfers and functional mobility. Pt alert and agreeable to participate. PT/OT co-treat completed d/t significant mobility deficits and safety concerns. Pt able to stand x various trials today, complete a stand pivot transfer, and take a few steps forward in the room. He also was able maintain standing for 2 minutes with steadying assistance. Pt completing toileting tasks with supervision. The pt is continuing to make functional gains and will benefit from continued skilled OT services to regain independence in functional transfers/mobility and ADLs. At end of session, pt seated in chair with chair alarm  on, call bell in reach, and all needs met.   Plan   Treatment Interventions ADL retraining;Functional transfer training;UE strengthening/ROM;Endurance training;Patient/family training;Equipment evaluation/education;Compensatory technique education;Continued evaluation;Energy conservation;Activityengagement   Goal Expiration Date 03/26/24   OT Treatment Day 2   OT Frequency 3-5x/wk   Discharge Recommendation   Rehab Resource Intensity Level, OT I (Maximum Resource Intensity)   AM-PAC Daily Activity Inpatient   Lower Body Dressing 1   Bathing 3   Toileting 3   Upper Body Dressing 3   Grooming 3   Eating 4   Daily Activity Raw Score 17   Daily Activity Standardized Score (Calc for Raw Score >=11) 37.26   AM-PAC Applied Cognition Inpatient   Following a Speech/Presentation 4   Understanding Ordinary Conversation 4   Taking Medications 4   Remembering Where Things Are Placed or Put Away 4   Remembering List of 4-5 Errands 4   Taking Care of Complicated Tasks 4   Applied Cognition Raw Score 24   Applied Cognition Standardized Score 62.21       The patient's raw score on the AM-PAC Daily Activity Inpatient Short Form is 17. A raw score of less than 19 suggests the patient may benefit from discharge to post-acute rehabilitation services. Please refer to the recommendation of the Occupational Therapist for safe discharge planning.    Pt goals to be met by 3/26/2024:     Pt will demonstrate ability to complete grooming/hygiene tasks @ mod I after set-up.  Pt will demonstrate ability to complete supine<>sit @ mod I in order to increase safety and independence during ADL tasks.  Pt will demonstrate ability to complete UB ADLs including washing/dressing @ mod I in order to increase performance and participation during meaningful tasks  Pt will demonstrate ability to complete LB dressing @ mod I in order to increase safety and independence during meaningful tasks.   Pt will demonstrate ability to lor/doff socks/shoes while  sitting EOB/chair @ mod I in order to increase safety and independence during meaningful tasks.   Pt will demonstrate ability to complete toileting tasks including CM and pericare @ mod I in order to increase safety and independence during meaningful tasks.  Pt will demonstrate ability to complete EOB, chair, toilet/commode transfers @ mod I in order to increase performance and participation during functional tasks.  Pt will demonstrate ability to stand for 10 minutes while maintaining F+ balance with use of RW for UB support PRN.  Pt will demonstrate ability to tolerate 20 minute OT session with no vc'ing for deep breathing or use of energy conservation techniques in order to increase activity tolerance during functional tasks.   Pt will demonstrate Good carryover of use of energy conservation/compensatory strategies during ADLs and functional tasks in order to increase safety and reduce risk for falls.   Pt will demonstrate Good attention and participation in continued evaluation of functional ambulation house hold distances in order to assist with safe d/c planning.  Pt will attend to continued cognitive assessments 100% of the time in order to provide most appropriate d/c recommendations.   Pt will follow 100% simple 2-step commands and be A&O x4 consistently with environmental cues to increase participation in functional activities.   Pt will identify 3 areas of interest/hobbies and 1 intervention on how to incorporate into daily life in order to increase interaction with environment and peers as well as increase participation in meaningful tasks.   Pt will demonstrate 100% carryover of BUE HEP in order to increase BUE MS and increase performance during functional tasks upon d/c home.       Ambrocio Alexis, MS, OTR/L

## 2024-03-14 NOTE — PROGRESS NOTES
[facility]  Progress Note  Name: Peterson Salinas I  MRN: [id number]  Unit/Bed#: -01 I Date of Admission: 3/12/2024   Date of Service: 3/14/2024 I Hospital Day: 0    Assessment/Plan   * Rupture of left quadriceps muscle  Assessment & Plan  S/p bilateral repair tendon quadriceps on 3/12  Procedure went well. Orthopedics placed patient in bilateral TROM brace locked in extension  TROM brace should remain in place at all times for minimum 3 weeks, removed carefully for cleansing  Pain management  PT/OT consult - maximum resource intensity   Fall precautions  Aspirin 325 BID for DVT prophylaxis    Hypothyroidism  Assessment & Plan  Currently on Synthroid 25 mcg daily, continue    Ambulatory dysfunction  Assessment & Plan  Presented to ED on 2/28 with fall that occurred and bilateral lower extremity pain. Inability to ambulate. Followed up with ortho outpatient and MRI done showing bilateral rupture of quadriceps muscles. S/p repair on 3/12 with orthopedics  Baseline ambulation patient walks well on own prior to accident. Post op: TROM brace should be in place.   PT/OT evaluations appreciated - maximum resource intensity     Rupture of right quadriceps muscle  Assessment & Plan  See management under principal problem.     CHEVY (obstructive sleep apnea)  Assessment & Plan  Continue CPAP    Chronic bilateral low back pain without sciatica  Assessment & Plan  Follows with PCP for this and taking Mobic, continue  Continue outpatient follow-up on discharge    MDD (major depressive disorder), recurrent episode, mild (HCC)  Assessment & Plan  Currently taking Lexapro 30 mg daily, Seroquel 150 mg daily at bedtime, BuSpar 5 mg twice daily  Also takes propranolol 10 mg twice daily as needed for anxiety  Continue home medications and recommending continuing outpatient follow-up with psychiatry             VTE Pharmacologic Prophylaxis: VTE Score: 4 Moderate Risk (Score 3-4) - Pharmacological DVT  Prophylaxis Ordered: other medication aspiring 325 bid.    Mobility:   Basic Mobility Inpatient Raw Score: 12  JH-HLM Goal: 4: Move to chair/commode  JH-HLM Achieved: 4: Move to chair/commode  HLM Goal achieved. Continue to encourage appropriate mobility.    Patient Centered Rounds: I performed bedside rounds with nursing staff today.   Discussions with Specialists or Other Care Team Provider: nursing and cm    Education and Discussions with Family / Patient:  per primary.     Total Time Spent on Date of Encounter in care of patient:  mins. This time was spent on one or more of the following: performing physical exam; counseling and coordination of care; obtaining or reviewing history; documenting in the medical record; reviewing/ordering tests, medications or procedures; communicating with other healthcare professionals and discussing with patient's family/caregivers.    Current Length of Stay: 0 day(s)  Current Patient Status: Outpatient Surgery   Certification Statement: The patient will continue to require additional inpatient hospital stay due to awaiting safe discharge planning  Discharge Plan: [facility] is following this patient on consult. They are medically stable for discharge when deemed appropriate by primary service.    Code Status: Level 1 - Full Code    Subjective:   Patient states that he is feeling fine today and does not offer any complaints at this time. Denies chest pain or shortness of breath.    Objective:     Vitals:   Temp (24hrs), Av.3 °F (36.3 °C), Min:97.2 °F (36.2 °C), Max:97.3 °F (36.3 °C)    Temp:  [97.2 °F (36.2 °C)-97.3 °F (36.3 °C)] 97.3 °F (36.3 °C)  HR:  [69-75] 69  Resp:  [18] 18  BP: (136-138)/(82-86) 137/84  SpO2:  [91 %-95 %] 95 %  Body mass index is 37.5 kg/m².     Input and Output Summary (last 24 hours):     Intake/Output Summary (Last 24 hours) at 3/14/2024 1009  Last data filed at 3/14/2024 0728  Gross per 24 hour   Intake 1540 ml   Output 1200 ml   Net 340 ml        Physical Exam:   Physical Exam  Vitals reviewed.   Constitutional:       General: He is not in acute distress.     Appearance: Normal appearance. He is obese. He is not ill-appearing.   HENT:      Head: Normocephalic and atraumatic.      Nose: Nose normal.      Mouth/Throat:      Mouth: Mucous membranes are moist.      Pharynx: Oropharynx is clear.   Eyes:      Extraocular Movements: Extraocular movements intact.      Conjunctiva/sclera: Conjunctivae normal.   Cardiovascular:      Rate and Rhythm: Normal rate and regular rhythm.      Pulses: Normal pulses.      Heart sounds: Normal heart sounds. No murmur heard.  Pulmonary:      Effort: Pulmonary effort is normal. No respiratory distress.      Breath sounds: Normal breath sounds. No wheezing.   Abdominal:      General: Abdomen is flat. Bowel sounds are normal. There is no distension.      Palpations: Abdomen is soft.      Tenderness: There is no abdominal tenderness. There is no guarding.   Musculoskeletal:         General: Normal range of motion.      Cervical back: Normal range of motion.      Right lower leg: No edema.      Left lower leg: No edema.   Skin:     General: Skin is warm.      Comments: TROM in place   Neurological:      General: No focal deficit present.      Mental Status: He is alert and oriented to person, place, and time. Mental status is at baseline.      Sensory: No sensory deficit.      Motor: No weakness.   Psychiatric:         Mood and Affect: Mood normal.         Behavior: Behavior normal.         Thought Content: Thought content normal.         Judgment: Judgment normal.          Additional Data:     Labs:  Results from last 7 days   Lab Units 03/14/24  0449   WBC Thousand/uL 8.52   HEMOGLOBIN g/dL 12.7   HEMATOCRIT % 37.9   PLATELETS Thousands/uL 193     Results from last 7 days   Lab Units 03/13/24  0506 03/11/24  1048   SODIUM mmol/L 137 140   POTASSIUM mmol/L 4.1 3.7   CHLORIDE mmol/L 106 104   CO2 mmol/L 26 27   BUN mg/dL 15 13    CREATININE mg/dL 0.89 0.90   ANION GAP mmol/L 5 9   CALCIUM mg/dL 8.1* 8.9   ALBUMIN g/dL  --  4.0   TOTAL BILIRUBIN mg/dL  --  0.86   ALK PHOS U/L  --  54   ALT U/L  --  27   AST U/L  --  21   GLUCOSE RANDOM mg/dL 127  --                        Lines/Drains:  Invasive Devices       Peripheral Intravenous Line  Duration             Peripheral IV 03/13/24 Dorsal (posterior);Right Hand <1 day                          Imaging: No pertinent imaging reviewed.    Recent Cultures (last 7 days):         Last 24 Hours Medication List:   Current Facility-Administered Medications   Medication Dose Route Frequency Provider Last Rate    aspirin  325 mg Oral BID Miguel Ángel Taj Seyler, PA-C      busPIRone  5 mg Oral BID Miguel Ángel Taj Seyler, PA-C      docusate sodium  100 mg Oral BID Miguel Ángel Taj Seyler, PA-C      escitalopram  10 mg Oral Daily Fallon Mattson, PA-TOVA      escitalopram  20 mg Oral Daily Miguel Ángel Taj Seyler, PA-C      lactated ringers  125 mL/hr Intravenous Continuous Miguel Ángel Cottrellr, PA-C 125 mL/hr (03/14/24 0343)    levothyroxine  25 mcg Oral Early Morning Miguel Ángel Taj Seronaldr, PA-C      melatonin  3 mg Oral HS Tommy Lawler DO      morphine injection  2 mg Intravenous Q4H PRN Miguel Ángel Fernando, PA-C      ondansetron  4 mg Intravenous Q6H PRN Miguel Ángel Fernando, PA-C      oxyCODONE  2.5 mg Oral Q4H PRN Fallon Mattson PA-C      Or    oxyCODONE  5 mg Oral Q4H PRN Fallon Mattson PA-C      propranolol  10 mg Oral BID PRN Miguel Ángel Taj Seyler, PA-C      QUEtiapine  150 mg Oral HS Miguel Ángel Fernando PAWeiC          Today, Patient Was Seen By: Gay Armando PA-C    **Please Note: This note may have been constructed using a voice recognition system.**

## 2024-03-14 NOTE — PHYSICAL THERAPY NOTE
"   PHYSICAL THERAPY TREATMENT NOTE  NAME:  Peterson Salinas  DATE: 03/14/24    Length Of Stay: 0  Performed at least 2 patient identifiers during session: Name and Birthday    TREATMENT FLOW SHEET:    03/14/24 1115   PT Last Visit   PT Visit Date 03/14/24   Note Type   Note Type Treatment   Pain Assessment   Pain Assessment Tool 0-10   Pain Score 2   Pain Location/Orientation Orientation: Bilateral;Location: Knee  (right knee > left)   Restrictions/Precautions   Weight Bearing Precautions Per Order Yes   RLE Weight Bearing Per Order PWB  (foot flat touch down WB)   LLE Weight Bearing Per Order WBAT   Braces or Orthoses Other (Comment)  (bilateral TROM braces locked in extension; No knee ROM bilat)   Other Precautions Chair Alarm;Bed Alarm;Fall Risk;Pain;Multiple lines;WBS  (No knee ROM bilat, B TROM braces)   General   Chart Reviewed Yes   Response to Previous Treatment   (REPORTING PAIN, BUT AGREEABLE TO PARTICIPATE)   Family/Caregiver Present No   Cognition   Orientation Level Oriented X4   Following Commands Follows one step commands without difficulty   Subjective   Subjective \"My knee caps hurt.\"   Bed Mobility   Supine to Sit 5  Supervision   Additional items Increased time required;Verbal cues   Additional Comments HOB flat with buse of bedrail. inc time and effort to complete.   Transfers   Sit to Stand 3  Moderate assistance   Additional items Assist x 1;Increased time required;Verbal cues   Stand to Sit 3  Moderate assistance   Additional items Assist x 1;Increased time required;Verbal cues   Stand pivot 3  Moderate assistance   Additional items Assist x 1;Increased time required;Verbal cues   Additional Comments bed elevated ~ 28.5 inches. use of quick move. sit to stand with modAx1 with manual cues to ahcieve standing and verbal cues for ffot plat touch down WB on right LE. pt stood for 2' with quick move with steadying assistance with cues for decreased WB on right LE, pt demonstrating foot flat touch " down WB ~ 85% of time able to maintain with verbal cues. cues for lateral lean to left. stand to sit with minAx1 with manual cues for controlled descent. then trialed with RW. sit <>stand from elevated bed with modAx1 with manual cues to ahcieve standing and fot controlled descent. pt slightly exceeding right LE WB during transition, but able to maintain once standing with verbal cues. pt slightly wt shifting to step backward to BSC. cues for R LE WB status. min verbal cues for technique and sequence for stand to sit with modAx1 for safe completion. sit to stand from lower surface of [facility] wiht modAx1 and minAx1 (2 people) with verbal cues for technique to achieve standing and to maintain WB R LE. spt with RW with modAx1 with amnaul cues for RW management, verbal cues for technique and sequence and for decreased WB R LE. inc time to complete.   Ambulation/Elevation   Gait pattern Wide NATHALIA;Antalgic;Improper Weight shift;Decreased foot clearance;Short stride;Decreased R stance   Gait Assistance 3  Moderate assist   Additional items Assist x 1;Verbal cues   Assistive Device Rolling walker   Distance verbal and visual instruction for technique and sequence to maintain foot flat touch down WB R LE. verbal cues throughout and for decreased WB R LE. mod manual cues for wt shfiting and safe completion. ambulated 5'x1 wiht chair follow.   Balance   Static Sitting Normal   Dynamic Sitting Good   Static Standing Fair -   Dynamic Standing Poor   Activity Tolerance   Activity Tolerance Patient limited by pain   Medical Staff Made Aware Liz BRYSON CM Kaylee   Nurse Made Aware Jeaneth RACHEL   Exercises   Ankle Pumps   (reviewed. pt reports completing.)   Assessment   Prognosis Good   Problem List Decreased strength;Decreased range of motion;Decreased endurance;Decreased mobility;Impaired balance;Decreased safety awareness;Obesity;Decreased skin integrity;Orthopedic restrictions;Pain   Barriers to Discharge Decreased caregiver  "support;Inaccessible home environment   Barriers to Discharge Comments requires assistance with mobility, 2 SH with EMMY   Goals   Patient Goals \"Get better\"   PT Treatment Day 2   Plan   Treatment/Interventions ADL retraining;Functional transfer training;LE strengthening/ROM;Elevations;Therapeutic exercise;Endurance training;Patient/family training;Equipment eval/education;Bed mobility;Gait training;Compensatory technique education;Spoke to nursing;Spoke to case management;OT   Progress Progressing toward goals   PT Frequency 4-6x/wk   Discharge Recommendation   Rehab Resource Intensity Level, PT I (Maximum Resource Intensity)   AM-PAC Basic Mobility Inpatient   Turning in Flat Bed Without Bedrails 3   Lying on Back to Sitting on Edge of Flat Bed Without Bedrails 3   Moving Bed to Chair 2   Standing Up From Chair Using Arms 1   Walk in Room 2   Climb 3-5 Stairs With Railing 1   Basic Mobility Inpatient Raw Score 12   Basic Mobility Standardized Score 32.23   Highest Level Of Mobility   -[facility] Goal 4: Move to chair/commode   JH-HLM Achieved 5: Stand (1 or more minutes)   Education   Education Provided Mobility training;Home exercise program;Assistive device   Patient Demonstrates acceptance/verbal understanding   End of Consult   Patient Position at End of Consult Bedside chair;All needs within reach       The patient's AM-[facility] Basic Mobility Inpatient Short Form Raw Score is 12. A Raw score of less than or equal to 16 suggests the patient may benefit from discharge to post-acute rehabilitation services. Please also refer to the recommendation of the Physical Therapist for safe discharge planning.     Pt tolerated session fairly. He was able to achieve standing this date with bed elevated with decreased assistance compared to lower height of C. He was able to maintain standing for at least 2' with quick move and then to swap bed for BSC during 2nd transfer trial. He requires verbal cues to maintain foot flat touch " down WB right LE and increased verbal cues for technique and sequence with transfers and ambulation. He was able to ambulate short distance limited by pain and safety increased WB however was able to attempt decreased WB with verbal cues. He is able to tolerate increased activity this date. He is motivated to improve mobility to return to home. He is limited by decreased strength, ROM, balance, endurance. Right TROM brace readjusted slightly this date for optimal positioning. He will continue to benefit from PT services to maximize LOF and maximum resource intensity upon discharge.      Milil Anna, PT,DPT

## 2024-03-14 NOTE — PLAN OF CARE
Problem: OCCUPATIONAL THERAPY ADULT  Goal: Performs self-care activities at highest level of function for planned discharge setting.  See evaluation for individualized goals.  Description: Treatment Interventions: ADL retraining, Functional transfer training, UE strengthening/ROM, Endurance training, Patient/family training, Equipment evaluation/education, Compensatory technique education, Continued evaluation, Energy conservation, Activityengagement          See flowsheet documentation for full assessment, interventions and recommendations.   Outcome: Progressing  Note: Limitation: Decreased ADL status, Decreased endurance, Decreased self-care trans, Decreased high-level ADLs  Prognosis: Good  Assessment: Pt completed OT tx session #2 focused on functional transfers and functional mobility. Pt alert and agreeable to participate. PT/OT co-treat completed d/t significant mobility deficits and safety concerns. Pt able to stand x various trials today, complete a stand pivot transfer, and take a few steps forward in the room. He also was able maintain standing for 2 minutes with steadying assistance. Pt completing toileting tasks with supervision. The pt is continuing to make functional gains and will benefit from continued skilled OT services to regain independence in functional transfers/mobility and ADLs. At end of session, pt seated in chair with chair alarm on, call bell in reach, and all needs met.     Rehab Resource Intensity Level, OT: I (Maximum Resource Intensity)

## 2024-03-14 NOTE — CASE MANAGEMENT
[facility] received request for authorization from Care Manager.  Authorization request for: Acute Rehab  Facility Name: [facility] NPI: [id number]  Facility MD: Kenny Snyder NPI: [id number]   Authorization initiated by contacting insurance: [email or web address]   Via: Fax  Clinicals submitted via: fax # [phone]    Care Manager notified: Meghana Garcia     Not managed through [employer].

## 2024-03-14 NOTE — CASE MANAGEMENT
Case Management Discharge Planning Note    Patient name Peterson Salinas  Location /-01 MRN [id number]  : 1965 Date 3/14/2024       Current Admission Date: 3/12/2024  Current Admission Diagnosis:Rupture of left quadriceps muscle   Patient Active Problem List    Diagnosis Date Noted    Ambulatory dysfunction 2024    Hypothyroidism 2024    Acute pain of both knees 2024    Rupture of left quadriceps muscle 2024    Rupture of right quadriceps muscle 2024    Rupture of right quadriceps muscle, initial encounter 2024    Rupture of left quadriceps muscle, initial encounter 2024    Bluish skin discoloration 2022    Tension type headache 2022    Obesity, Class II, BMI 35-39.9 2022    CHEVY (obstructive sleep apnea) 2022    Chronic bilateral low back pain without sciatica 2022    Bilateral carpal tunnel syndrome 2021    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 2021    Weight gain 2021    MDD (major depressive disorder), recurrent episode, mild (HCC) 2021    Lightheadedness 2021    Benign paroxysmal positional vertigo 2021    Medical clearance for psychiatric admission 2021    PVC (premature ventricular contraction) 2021    Depression with suicidal ideation 2021    Dizziness     Palpitations       LOS (days): 0  Geometric Mean LOS (GMLOS) (days):   Days to GMLOS:     OBJECTIVE:            Current admission status: Outpatient Surgery   Preferred Pharmacy:   [facility]/pharmacy #[id number] [place] [address]  Phone: [phone] Fax: [phone]    Primary Care Provider: Billy Mckeon DO    Primary Insurance: [place]  Secondary Insurance:     DISCHARGE DETAILS:          Other Referral/Resources/Interventions Provided:  Interventions: Acute Rehab  Referral Comments: [facility] reserved in [facility]          Treatment Team Recommendation: Acute Rehab  Discharge Destination Plan:: Acute Rehab  Transport at Discharge : Wheelchair van            CM reserved [facility] for acute rehab and uploaded updated clinical and pre admission narrative from [facility] for insurance authorization.     CM to follow patient's care and discharge needs.

## 2024-03-14 NOTE — PROGRESS NOTES
Patient:  PRESTON MORRIS    MRN:  [id number]    [facility] Request ID:  [id number]    Level of care reserved:    Partner Reserved:    Clinical needs requested:    Geography searched:  40 miles around [id number]    Start of Service:    Request sent:  6:31am EDT on 3/13/2024 by Juani Starr    Partner reserved:  by Meghana Garcia    Choice list shared:  1:23pm EDT on 3/14/2024 by Meghana Garcia

## 2024-03-14 NOTE — PLAN OF CARE
Problem: PHYSICAL THERAPY ADULT  Goal: Performs mobility at highest level of function for planned discharge setting.  See evaluation for individualized goals.  Description: Treatment/Interventions: ADL retraining, Functional transfer training, Elevations, LE strengthening/ROM, Therapeutic exercise, Endurance training, Patient/family training, Equipment eval/education, Bed mobility, Gait training, Compensatory technique education, Spoke to nursing, Spoke to case management, OT          See flowsheet documentation for full assessment, interventions and recommendations.  Outcome: Progressing  Note: Prognosis: Good  Problem List: Decreased strength, Decreased range of motion, Decreased endurance, Decreased mobility, Impaired balance, Decreased safety awareness, Obesity, Decreased skin integrity, Orthopedic restrictions, Pain  Assessment: Pt tolerated session fairly. He was able to achieve standing this date with bed elevated with decreased assistance compared to lower height of BSC. He was able to maintain standing for at least 2' with quick move and then to swap bed for BSC during 2nd transfer trial. He requires verbal cues to maintain foot flat touch down WB right LE and increased verbal cues for technique and sequence with transfers and ambulation. He was able to ambulate short distance limited by pain and safety increased WB however was able to attempt decreased WB with verbal cues. He is able to tolerate increased activity this date. He is motivated to improve mobility to return to home. He is limited by decreased strength, ROM, balance, endurance. Right TROM brace readjusted slightly this date for optimal positioning. He will continue to benefit from PT services to maximize LOF and maximum resource intensity upon discharge.  Barriers to Discharge: Decreased caregiver support, Inaccessible home environment  Barriers to Discharge Comments: requires assistance with mobility, 2 SH with EMMY  Rehab Resource Intensity  Level, PT: I (Maximum Resource Intensity)    See flowsheet documentation for full assessment.

## 2024-03-14 NOTE — PLAN OF CARE
Problem: PAIN - ADULT  Goal: Verbalizes/displays adequate comfort level or baseline comfort level  Description: Interventions:  - Encourage patient to monitor pain and request assistance  - Assess pain using appropriate pain scale  - Administer analgesics based on type and severity of pain and evaluate response  - Implement non-pharmacological measures as appropriate and evaluate response  - Consider cultural and social influences on pain and pain management  - Notify physician/advanced practitioner if interventions unsuccessful or patient reports new pain  Outcome: Progressing     Problem: INFECTION - ADULT  Goal: Absence or prevention of progression during hospitalization  Description: INTERVENTIONS:  - Assess and monitor for signs and symptoms of infection  - Monitor lab/diagnostic results  - Monitor all insertion sites, i.e. indwelling lines, tubes, and drains  - Monitor endotracheal if appropriate and nasal secretions for changes in amount and color  - [place] appropriate cooling/warming therapies per order  - Administer medications as ordered  - Instruct and encourage patient and family to use good hand hygiene technique  - Identify and instruct in appropriate isolation precautions for identified infection/condition  Outcome: Progressing     Problem: SAFETY ADULT  Goal: Patient will remain free of falls  Description: INTERVENTIONS:  - Educate patient/family on patient safety including physical limitations  - Instruct patient to call for assistance with activity   - Consult OT/PT to assist with strengthening/mobility   - Keep Call bell within reach  - Keep bed low and locked with side rails adjusted as appropriate  - Keep care items and personal belongings within reach  - Initiate and maintain comfort rounds  - Make Fall Risk Sign visible to staff  - Offer Toileting every 2 Hours, in advance of need  - Initiate/Maintain bed alarm  - Obtain necessary fall risk management equipment: non skid socks  - Apply  yellow socks and bracelet for high fall risk patients  - Consider moving patient to room near nurses station  Outcome: Progressing  Goal: Maintain or return to baseline ADL function  Description: INTERVENTIONS:  -  Assess patient's ability to carry out ADLs; assess patient's baseline for ADL function and identify physical deficits which impact ability to perform ADLs (bathing, care of mouth/teeth, toileting, grooming, dressing, etc.)  - Assess/evaluate cause of self-care deficits   - Assess range of motion  - Assess patient's mobility; develop plan if impaired  - Assess patient's need for assistive devices and provide as appropriate  - Encourage maximum independence but intervene and supervise when necessary  - Involve family in performance of ADLs  - Assess for home care needs following discharge   - Consider OT consult to assist with ADL evaluation and planning for discharge  - Provide patient education as appropriate  Outcome: Progressing  Goal: Maintains/Returns to pre admission functional level  Description: INTERVENTIONS:  - Perform AM-PAC 6 Click Basic Mobility/ Daily Activity assessment daily.  - Set and communicate daily mobility goal to care team and patient/family/caregiver.   - Collaborate with rehabilitation services on mobility goals if consulted  - Perform Range of Motion 3 times a day.  - Reposition patient every 2 hours.  - Dangle patient 2 times a day  - Stand patient 2 times a day  - Ambulate patient 2 times a day  - Out of bed to chair 2 times a day   - Out of bed for meals 2 times a day  - Out of bed for toileting  - Record patient progress and toleration of activity level   Outcome: Progressing     Problem: DISCHARGE PLANNING  Goal: Discharge to home or other facility with appropriate resources  Description: INTERVENTIONS:  - Identify barriers to discharge w/patient and caregiver  - Arrange for needed discharge resources and transportation as appropriate  - Identify discharge learning needs  (meds, wound care, etc.)  - Arrange for interpretive services to assist at discharge as needed  - Refer to Case Management Department for coordinating discharge planning if the patient needs post-hospital services based on physician/advanced practitioner order or complex needs related to functional status, cognitive ability, or social support system  Outcome: Progressing     Problem: Knowledge Deficit  Goal: Patient/family/caregiver demonstrates understanding of disease process, treatment plan, medications, and discharge instructions  Description: Complete learning assessment and assess knowledge base.  Interventions:  - Provide teaching at level of understanding  - Provide teaching via preferred learning methods  Outcome: Progressing     Problem: Prexisting or High Potential for Compromised Skin Integrity  Goal: Skin integrity is maintained or improved  Description: INTERVENTIONS:  - Identify patients at risk for skin breakdown  - Assess and monitor skin integrity  - Assess and monitor nutrition and hydration status  - Monitor labs   - Assess for incontinence   - Turn and reposition patient  - Assist with mobility/ambulation  - Relieve pressure over bony prominences  - Avoid friction and shearing  - Provide appropriate hygiene as needed including keeping skin clean and dry  - Evaluate need for skin moisturizer/barrier cream  - Collaborate with interdisciplinary team   - Patient/family teaching  - Consider wound care consult   Outcome: Progressing

## 2024-03-15 VITALS
HEART RATE: 70 BPM | RESPIRATION RATE: 18 BRPM | HEIGHT: 75 IN | TEMPERATURE: 97.3 F | BODY MASS INDEX: 37.3 KG/M2 | WEIGHT: 300 LBS | DIASTOLIC BLOOD PRESSURE: 65 MMHG | SYSTOLIC BLOOD PRESSURE: 116 MMHG | OXYGEN SATURATION: 93 %

## 2024-03-15 LAB — SARS-COV-2 RNA RESP QL NAA+PROBE: NEGATIVE

## 2024-03-15 PROCEDURE — 87635 SARS-COV-2 COVID-19 AMP PRB: CPT | Performed by: PHYSICIAN ASSISTANT

## 2024-03-15 PROCEDURE — 99024 POSTOP FOLLOW-UP VISIT: CPT | Performed by: PHYSICIAN ASSISTANT

## 2024-03-15 RX ORDER — DOCUSATE SODIUM 100 MG/1
100 CAPSULE, LIQUID FILLED ORAL 2 TIMES DAILY
Start: 2024-03-15

## 2024-03-15 RX ORDER — ASPIRIN 325 MG
325 TABLET ORAL 2 TIMES DAILY
Qty: 60 TABLET | Refills: 0
Start: 2024-03-15

## 2024-03-15 RX ADMIN — ESCITALOPRAM OXALATE 10 MG: 10 TABLET ORAL at 08:58

## 2024-03-15 RX ADMIN — BUSPIRONE HYDROCHLORIDE 5 MG: 5 TABLET ORAL at 08:58

## 2024-03-15 RX ADMIN — ESCITALOPRAM OXALATE 20 MG: 10 TABLET ORAL at 09:01

## 2024-03-15 RX ADMIN — MORPHINE SULFATE 2 MG: 2 INJECTION, SOLUTION INTRAMUSCULAR; INTRAVENOUS at 12:02

## 2024-03-15 RX ADMIN — ASPIRIN 325 MG ORAL TABLET 325 MG: 325 PILL ORAL at 08:58

## 2024-03-15 RX ADMIN — DOCUSATE SODIUM 100 MG: 100 CAPSULE, LIQUID FILLED ORAL at 08:58

## 2024-03-15 RX ADMIN — LEVOTHYROXINE SODIUM 25 MCG: 25 TABLET ORAL at 05:13

## 2024-03-15 RX ADMIN — OXYCODONE HYDROCHLORIDE 5 MG: 5 TABLET ORAL at 09:21

## 2024-03-15 NOTE — CASE MANAGEMENT
Case Management Discharge Planning Note    Patient name Peterson Salinas  Location /-01 MRN [id number]  : 1965 Date 3/15/2024       Current Admission Date: 3/12/2024  Current Admission Diagnosis:Rupture of left quadriceps muscle   Patient Active Problem List    Diagnosis Date Noted    Ambulatory dysfunction 2024    Hypothyroidism 2024    Acute pain of both knees 2024    Rupture of left quadriceps muscle 2024    Rupture of right quadriceps muscle 2024    Rupture of right quadriceps muscle, initial encounter 2024    Rupture of left quadriceps muscle, initial encounter 2024    Bluish skin discoloration 2022    Tension type headache 2022    Obesity, Class II, BMI 35-39.9 2022    CHEVY (obstructive sleep apnea) 2022    Chronic bilateral low back pain without sciatica 2022    Bilateral carpal tunnel syndrome 2021    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 2021    Weight gain 2021    MDD (major depressive disorder), recurrent episode, mild (HCC) 2021    Lightheadedness 2021    Benign paroxysmal positional vertigo 2021    Medical clearance for psychiatric admission 2021    PVC (premature ventricular contraction) 2021    Depression with suicidal ideation 2021    Dizziness     Palpitations       LOS (days): 1  Geometric Mean LOS (GMLOS) (days):   Days to GMLOS:     OBJECTIVE:  Risk of Unplanned Readmission Score: 7.5         Current admission status: Inpatient   Preferred Pharmacy:   [facility]/pharmacy #[id number] [place] [address]  Phone: [phone] Fax: [phone]    Primary Care Provider: Billy Mckeon DO    Primary Insurance: [email or web address]  Secondary Insurance:     DISCHARGE DETAILS:                 CM plaaaron transportation request to [facility] in [place], requesting a WC van            As per JULIO,  [facility] will  pt at 1100 to transport to [facility]       Pt can not tolerate a seated position for  transport.  Cm editing trip to [facility], awaiting [facility] confirmation         As per SLETS, [facility] will  pt at 1200 with [facility]                                                                              FREE:[LAST:[Dr. Boyle],PHONE:[(   )    -],FAX:[(   )    -],ADDRESS:[[address] [place] [address], [place] [address]    [phone]]]

## 2024-03-15 NOTE — DISCHARGE SUMMARY
Discharge Summary - Orthopedics   Peterson Salinas 58 y.o. male MRN: [id number]  Unit/Bed#: -01    Attending Physician: David Castellano DO    Admitting diagnosis: Acute pain of both knees [M25.561, M25.562]  Rupture of left quadriceps muscle, initial encounter [S76.112A]  Rupture of right quadriceps muscle, initial encounter [S76.111A]    Discharge diagnosis: Acute pain of both knees [M25.561, M25.562]  Rupture of left quadriceps muscle, initial encounter [S76.112A]  Rupture of right quadriceps muscle, initial encounter [S76.111A]    Date of admission: 3/12/2024    Date of discharge: 03/15/24    Procedure: Bilateral quad tendon repairs 3/12/2024 left.  Back to the patella and the right was intersubstance repair.    HPI: 58 y.o. male with a history of lateral quad tears who has been seen by Dr. Castellano in clinic.  Pt has failed previous non operative therapies and was scheduled for lateral quad tendon repair.  Prior to surgery the risks and benefits of surgery were explained and informed consent was obtained.    Hospital course: Pt was taken to the OR on 3/12/2024.  Surgery went without complications.  She was placed in a T ROM brace is locked in extension to prevent any knee flexion.  Patient had difficulty with ambulation and was maintained at hospital for extended amount of time awaiting bed at rehab facility.patient was started on aspirin 325 mg twice daily on the operative day for DVT prophylaxis.  Patient  made slow progress in his postop rehab with in-house physical therapy.  On discharge date pt was cleared by PT and the medicine team and determined to be safe for discharge.  Daily discussion was had with the patient, nursing staff, orthopaedic team, and family members if present.  All questions were answered to the patients satisifaction.    0   Lab Value Date/Time    HGB 12.7 03/14/2024 0449    HGB 12.9 03/13/2024 0506    HGB 14.9 03/11/2024 1048    HGB 16.1 10/25/2023 1241    HGB 15.9 09/15/2023  1243    HGB 15.4 05/03/2021 1414    HGB 16.2 11/04/2020 1913    HGB 15.5 06/20/2020 0911     There was never any significant drop in hemoglobin.  Vital signs remained stable and pt was resuscitated with IVF as needed.     Discharge Instructions:   Maintain knee braces at all times locked in extension to prevent any knee flexion.  Keep dressings clean and dry at all times   Complete DVT prophylaxis aspirin 325 mg twice daily  Orthopedic office follow-up next week  Physical therapy  Dressings may be removed 3/16/2024    Discharge Medications:  For the complete list of discharge medications, please refer to the patient's medication reconciliation.

## 2024-03-15 NOTE — CASE MANAGEMENT
PA Support [place] has received APPROVED authorization.  Insurance:   [employer]  Authorization received for: Acute Rehab  Facility: [facility]   Authorization #: [id number]  Start of Care: 3/15  Next Review Date: 3/21  Continued Stay Care Coordinator:  none given  Submit next review to: [phone]     Care Manager notified: Yessi Milan

## 2024-03-15 NOTE — CASE MANAGEMENT
Case Management Discharge Planning Note    Patient name Peterson Salinas  Location /-01 MRN [id number]  : 1965 Date 3/15/2024       Current Admission Date: 3/12/2024  Current Admission Diagnosis:Rupture of left quadriceps muscle   Patient Active Problem List    Diagnosis Date Noted    Ambulatory dysfunction 2024    Hypothyroidism 2024    Acute pain of both knees 2024    Rupture of left quadriceps muscle 2024    Rupture of right quadriceps muscle 2024    Rupture of right quadriceps muscle, initial encounter 2024    Rupture of left quadriceps muscle, initial encounter 2024    Bluish skin discoloration 2022    Tension type headache 2022    Obesity, Class II, BMI 35-39.9 2022    CHEVY (obstructive sleep apnea) 2022    Chronic bilateral low back pain without sciatica 2022    Bilateral carpal tunnel syndrome 2021    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 2021    Weight gain 2021    MDD (major depressive disorder), recurrent episode, mild (HCC) 2021    Lightheadedness 2021    Benign paroxysmal positional vertigo 2021    Medical clearance for psychiatric admission 2021    PVC (premature ventricular contraction) 2021    Depression with suicidal ideation 2021    Dizziness     Palpitations       LOS (days): 1  Geometric Mean LOS (GMLOS) (days):   Days to GMLOS:     OBJECTIVE:  Risk of Unplanned Readmission Score: 7.5         Current admission status: Inpatient   Preferred Pharmacy:   [facility]/pharmacy #[id number] [address] [address]  Phone: [phone] Fax: [phone]    Primary Care Provider: Billy Mckeon DO    Primary Insurance: [employer]  Secondary Insurance:     DISCHARGE DETAILS:                                                                                                               Facility  Insurance Auth Number: [id number]

## 2024-03-15 NOTE — QUICK NOTE
Patient not seen or examined prior to discharge. Patient medically stable for discharge today from internal medicine perspective. Continue aspirin 325mg bid per ortho, as well as outpatient follow up in one week. Labs and VS remain stable. Continue all other prior to admission medications on discharge. Internal medicine will sign off at this time, please reconsult for any further questions.

## 2024-03-15 NOTE — PROGRESS NOTES
Orthopedics   Peterson Salinas 58 y.o. male MRN: [id number]  Unit/Bed#: -01      Subjective:  58 y.o.male post operative day 3 bilateral quad tendon repairs.  Patient notes continued pain about the knees but is improving some.  Notes some increased ambulation distance minimally.  Patient has been maintained in the hospital not for medical reasons but awaiting placement and was changed from an outpatient no charge bed to inpatient yesterday awaiting rehab placement.  Denies chest pain calf pain or shortness of breath.    Labs:  0   Lab Value Date/Time    HCT 37.9 03/14/2024 0449    HCT 38.2 03/13/2024 0506    HCT 45.9 03/11/2024 1048    HGB 12.7 03/14/2024 0449    HGB 12.9 03/13/2024 0506    HGB 14.9 03/11/2024 1048    INR 0.91 11/04/2020 1913    WBC 8.52 03/14/2024 0449    WBC 13.58 (H) 03/13/2024 0506    WBC 7.46 03/11/2024 1048     Meds:    Current Facility-Administered Medications:     aspirin tablet 325 mg, 325 mg, Oral, BID, Miguel Ángel Fernando PA-C, 325 mg at 03/14/24 2135    busPIRone (BUSPAR) tablet 5 mg, 5 mg, Oral, BID, Miguel Ángel Fernando PA-C, 5 mg at 03/14/24 1724    docusate sodium (COLACE) capsule 100 mg, 100 mg, Oral, BID, Miguel Ángel Fernando PA-C, 100 mg at 03/14/24 0846    escitalopram (LEXAPRO) tablet 10 mg, 10 mg, Oral, Daily, Fallon Mattson PA-C, 10 mg at 03/14/24 0848    escitalopram (LEXAPRO) tablet 20 mg, 20 mg, Oral, Daily, Miguel Ángel Fernando PA-C, 20 mg at 03/14/24 0845    levothyroxine tablet 25 mcg, 25 mcg, Oral, Early Morning, Miguel Ángel Fernando PA-C, 25 mcg at 03/15/24 0513    melatonin tablet 3 mg, 3 mg, Oral, HS, Tommy Lawler DO, 3 mg at 03/14/24 2135    morphine injection 2 mg, 2 mg, Intravenous, Q4H PRN, Miguel Ángel Fernando PA-C, 2 mg at 03/14/24 2135    ondansetron (ZOFRAN) injection 4 mg, 4 mg, Intravenous, Q6H PRN, Miguel Ángel Fernando PA-C    oxyCODONE (ROXICODONE) IR tablet 2.5 mg, 2.5 mg, Oral, Q4H PRN, 2.5 mg at 03/13/24 2143 **OR** oxyCODONE (ROXICODONE) IR tablet 5 mg, 5  "mg, Oral, Q4H PRN, Fallon Mattson PA-C, 5 mg at 03/14/24 2017    propranolol (INDERAL) tablet 10 mg, 10 mg, Oral, BID PRN, Miguel Ángel Fernando PA-C, 10 mg at 03/14/24 2136    QUEtiapine (SEROquel XR) 24 hr tablet 150 mg, 150 mg, Oral, HS, Miguel Ángel Fernando PA-C, 150 mg at 03/14/24 2136    Blood Culture:   No results found for: \"BLOODCX\"    Wound Culture:   No results found for: \"WOUNDCULT\"    Ins and Outs:  I/O last 24 hours:  In: 480 [P.O.:480]  Out: 3020 [Urine:3020]    Physical Exam:  Vitals:    03/15/24 0727   BP: 116/65   Pulse: 70   Resp: 18   Temp: (!) 97.3 °F (36.3 °C)   SpO2: 93%     bilateral Lower Extremity extremity:  Dressing clean dry and intact bilateral knees with braces bilateral knees locked in extension  TTP at the anterior knees  Sensation intact to saphenous, sural, tibial, superficial peroneal nerve, and deep peroneal  Motor intact to +FHL/EHL, +ankle dorsi/plantar flexion  2+ DP pulse, symmetric bilaterally  Digits warm and well perfused.  No calf pain negative Homans' sign.  Thigh and calf soft nontender bilaterally.    Assessment: 58 y.o.male post operative day 3 bilateral quad tendon repairs.  Doing better.  Left repaired to patellar bone and right repaired intersubstance.    Plan:  Awaiting rehab placement and discharge as soon as bed available  DVT prophylaxis aspirin 325 twice daily   analgesics as needed  PT/OT  Dispo: Ok for discharge from ortho perspective  Will continue to assess for acute blood loss anemia    Miguel Ángel Fernando PA-C             "

## 2024-03-18 NOTE — UTILIZATION REVIEW
NOTIFICATION OF ADMISSION DISCHARGE   This is a Notification of Discharge from [facility]. Please be advised that this patient has been discharge from our facility. Below you will find the admission and discharge date and time including the patient’s disposition.   UTILIZATION REVIEW CONTACT:  Kim Yo  Utilization   Network Utilization Review Department  Phone: [phone] x carefully listen to the prompts. All voicemails are confidential.  Email: [email or web address]     ADMISSION INFORMATION  PRESENTATION DATE: 3/12/2024  5:52 AM  OBERVATION ADMISSION DATE:   INPATIENT ADMISSION DATE: 3/14/24  3:37 PM   DISCHARGE DATE: 3/15/2024 12:10 PM   DISPOSITION:Non [facility]    Network Utilization Review Department  ATTENTION: Please call with any questions or concerns to [phone] and carefully listen to the prompts so that you are directed to the right person. All voicemails are confidential.   For Discharge needs, contact Care Management DC Support Team at [phone] opt. 2  Send all requests for admission clinical reviews, approved or denied determinations and any other requests to dedicated fax number below belonging to the campus where the patient is receiving treatment. List of dedicated fax numbers for the Facilities:  FACILITY NAME UR FAX NUMBER   ADMISSION DENIALS (Administrative/Medical Necessity) [phone]   DISCHARGE SUPPORT TEAM ([facility]) [phone]   PARENT CHILD HEALTH (Maternity/NICU/Pediatrics) [phone]   [facility] [phone]   [facility] [phone]   [facility] [phone]   [facility] [phone]   [facility] [phone]   [facility] [phone]   [facility] [phone]   [facility]  [place] [phone]   [facility] [phone]   [facility] [phone]   [facility] [phone]   [facility] [phone]

## 2024-03-19 ENCOUNTER — TELEPHONE (OUTPATIENT)
Age: 59
End: 2024-03-19

## 2024-03-19 NOTE — TELEPHONE ENCOUNTER
Caller: Dejah at [facility]    Doctor: Nona    Reason for call:     Patient is scheduled for his post op from surgery on 3/12/24, he was to be scheduled  One week out, on 3/22/24 there were only new patient slots, so I scheduled as new   Patient instead of post op.  If this needs to be changed can the office change it??    Call back#: n/a

## 2024-03-22 ENCOUNTER — OFFICE VISIT (OUTPATIENT)
Dept: OBGYN CLINIC | Facility: CLINIC | Age: 59
End: 2024-03-22

## 2024-03-22 VITALS
BODY MASS INDEX: 37.5 KG/M2 | TEMPERATURE: 97.7 F | SYSTOLIC BLOOD PRESSURE: 100 MMHG | HEIGHT: 75 IN | DIASTOLIC BLOOD PRESSURE: 70 MMHG | HEART RATE: 76 BPM

## 2024-03-22 DIAGNOSIS — S76.112D RUPTURE OF LEFT QUADRICEPS MUSCLE, SUBSEQUENT ENCOUNTER: Primary | ICD-10-CM

## 2024-03-22 DIAGNOSIS — S76.111D RUPTURE OF RIGHT QUADRICEPS MUSCLE, SUBSEQUENT ENCOUNTER: ICD-10-CM

## 2024-03-22 PROCEDURE — 99024 POSTOP FOLLOW-UP VISIT: CPT | Performed by: ORTHOPAEDIC SURGERY

## 2024-03-22 NOTE — PROGRESS NOTES
"Patient Name:  Peterson Salinas  MRN:  [id number]    Assessment     1. Rupture of left quadriceps muscle, subsequent encounter        2. Rupture of right quadriceps muscle, subsequent encounter            Plan     I would recommend follow-up in 1 week.  In the interim, the braces are to remain in place locked in extension.  He is to continue with weightbearing as tolerated ambulation left lower extremity touchdown weightbearing right lower extremity.  I have informed him that the dressings and Ace bandages do not need to be changed.  His staples will be removed at follow-up.  At this time, he is to continue with the brace is locked in extension and there is to be no range of motion at this time.    Return in about 1 week (around 3/29/2024).      Subjective   Peterson Salinas returns for follow-up of his bilateral quadricep tendon ruptures.  The patient is 10 day(s) post surgical treatment of both lower extremity injuries and returns for routine follow-up. Patient  indicates that the rehab facility has been changing the dressings.  He is participating in therapy with limited weightbearing right lower extremity and weightbearing as tolerated left lower extremity.  He anticipates discharge home on 3/26/2024.      Objective     /70   Pulse 76   Temp 97.7 °F (36.5 °C) (Temporal)   Ht 6' 3\" (1.905 m)   BMI 37.50 kg/m²     The exam demonstrated the brace is in place.  These were opened to allow for inspection of the knees.  Ace bandages were removed and a Mepilex type dressing was removed.  Both incisions are benign without erythema with staples in place.  He did demonstrate active quadricep contraction with palpable contraction at the distal aspect of the quadricep consistent with intact repair.  Distal sensation is intact.  Calf compartments are soft, nontender and compressible.      David Castellano"

## 2024-03-26 ENCOUNTER — TELEPHONE (OUTPATIENT)
Dept: OBGYN CLINIC | Facility: CLINIC | Age: 59
End: 2024-03-26

## 2024-03-26 NOTE — TELEPHONE ENCOUNTER
Avani from [facility] called patient is being discharged from [facility] [place] today at 4:00 Patient is scheduled for an appointment with Dr. Castellano 4/1/24 to have his staples removed. Patient sent email to them to see if they can remove his staples due to his immobility and hardship getting transported to the appointment.   said they would need an order from Dr. Castellano to remove the staples.   Please advise   Avani [phone]

## 2024-03-29 NOTE — TELEPHONE ENCOUNTER
Caller: Patient    Doctor: Nona    Reason for call: Patient is calling back again stating there is no way he can get to his appointment. He was told he can get medical transport but it will cost him $700. He is unsure what to do because he cannot get himself out of the house and into a vehicle. He    Call back#: [phone]

## 2024-04-01 ENCOUNTER — OFFICE VISIT (OUTPATIENT)
Dept: OBGYN CLINIC | Facility: CLINIC | Age: 59
End: 2024-04-01

## 2024-04-01 VITALS — DIASTOLIC BLOOD PRESSURE: 82 MMHG | TEMPERATURE: 97.6 F | HEART RATE: 97 BPM | SYSTOLIC BLOOD PRESSURE: 130 MMHG

## 2024-04-01 DIAGNOSIS — Z09 POSTOP CHECK: Primary | ICD-10-CM

## 2024-04-01 DIAGNOSIS — S76.112D RUPTURE OF LEFT QUADRICEPS MUSCLE, SUBSEQUENT ENCOUNTER: ICD-10-CM

## 2024-04-01 DIAGNOSIS — S76.111D RUPTURE OF RIGHT QUADRICEPS MUSCLE, SUBSEQUENT ENCOUNTER: ICD-10-CM

## 2024-04-01 PROCEDURE — 99024 POSTOP FOLLOW-UP VISIT: CPT | Performed by: ORTHOPAEDIC SURGERY

## 2024-04-01 NOTE — PATIENT INSTRUCTIONS
Patient will follow-up in orthopedic office in 3 weeks.  Recommend aspirin 325 twice a day to prevent blood clots.  He is to start home therapy 2 or 3 times a week with therapy to be done in the braces.  He may weight-bear as tolerated on the left lower extremity and no touch versus flatfoot weightbearing on the right lower extremity.  Left brace was open for 0 to 30 degrees range of motion.  The right brace is to be kept in extension until next visit.  Do hamstring sets and active assisted knee extension and bilateral hip flexions in the braces.  He is due ankle pumps and other rehab.  3 strips remain in place for another 7 to 10 days on both incisions.  He may wash around the incisional area but not soak or submerge the incisions in water for another 2 weeks.  He is to be in the braces at all times except to wash the lower extremities.  No weightbearing or motion without the brace is in place.

## 2024-04-01 NOTE — PROGRESS NOTES
Patient Name:  Peterson Salinas  MRN:  [id number]    Assessment     1. Postop check        2. Rupture of right quadriceps muscle, subsequent encounter        3. Rupture of left quadriceps muscle, subsequent encounter          Plan     Patient will follow-up in orthopedic office in 3 weeks.  Recommend aspirin 325 twice a day to prevent blood clots.  He is to start home therapy 2 or 3 times a week with therapy to be done in the braces.  He may weight-bear as tolerated on the left lower extremity and no touch versus flatfoot weightbearing on the right lower extremity.  Left brace was open for 0 to 30 degrees range of motion.  The right brace is to be kept in extension until next visit.  Do hamstring sets and active assisted knee extension and bilateral hip flexions in the braces.  He is due ankle pumps and other rehab.  3 strips remain in place for another 7 to 10 days on both incisions.  He may wash around the incisional area but not soak or submerge the incisions in water for another 2 weeks.  He is to be in the braces at all times except to wash the lower extremities.  No weightbearing or motion without the brace is in place.    Return in about 3 weeks (around 4/22/2024) for Recheck.    Subjective   Peterson Salinas returns for follow-up of bilateral quad tendon repairs, DOS 3/12/2024.  The patient is 20 day(s) post op and returns for routine follow-up.  Left quad tendon was repaired back to patellar bone.  Right quad tendon was intersubstance tear.  Patient complains of moderate superficial paresthesia related to the incisions.  He notes he is using only Tylenol for pain and is off narcotics.  Denies calf pain chest pain or shortness of breath.  He reports he did have venous duplexes which were negative for clots.    Objective     /82   Pulse 97   Temp 97.6 °F (36.4 °C) (Temporal)     Left knee incision healing well without signs of infection.  Staples removed by MA in the office.  Steri-Strips applied  over benzoin.  No calf pain.  Mild quad atrophy as expected.  Light lateral incision paresthesia due to the vision location.  Excellent plantar and dorsiflexion without weakness or pain.  No lower extremity edema.  Light touch sensation intact grossly.   DP and PT pulses intact.  Brace open from 0 to 30 degrees of flexion patient can tolerate this shin without pain.  Quad tendon repair intact without signs of rupture.    Right knee incision healing well without signs of infection.  Staples removed by MA in the office.  Slight superficial of incomplete healing mid incision addressed with the Steri-Strip.  No calf pain.  Mild quad atrophy as expected.  Light lateral incision paresthesia due to the vision location.  Excellent plantar and dorsiflexion without weakness or pain. No lower extremity edema.  Light touch sensation intact grossly.   DP and PT pulses intact. Quad tendon repair intact without signs of rupture.      Data Review     No pertinent data for review today.    Miguel Ángel Fernando PA-C

## 2024-04-09 NOTE — PHYSICAL THERAPY NOTE
"   PHYSICAL THERAPY EVALUATION  NAME:  Peterson Salinas  DATE: 03/12/24    AGE:   58 y.o.  Mrn:   [id number]  ADMIT DX:  Acute pain of both knees [M25.561, M25.562]  Rupture of left quadriceps muscle, initial encounter [S76.112A]  Rupture of right quadriceps muscle, initial encounter [S76.111A]    Past Medical History:   Diagnosis Date    Anxiety     CPAP (continuous positive airway pressure) dependence     Depression     Headache(784.0) March 2020    Degenerative disk in neck may be cause.    Hypertension     Palpitation     Panic attack     PVC (premature ventricular contraction)     Sleep apnea      Length Of Stay: 0  Performed at least 2 patient identifiers during session: Name and Birthday  PHYSICAL THERAPY EVALUATION :    03/12/24 1421   PT Last Visit   PT Visit Date 03/12/24   Note Type   Note type Evaluation   Pain Assessment   Pain Assessment Tool 0-10   Pain Score 5   Pain Location/Orientation Orientation: Bilateral;Location: Knee   Restrictions/Precautions   Weight Bearing Precautions Per Order Yes   RLE Weight Bearing Per Order PWB  (foot flat touch down WB in TROM brace)   LLE Weight Bearing Per Order WBAT  (in TROM brace)   Braces or Orthoses Knee brace;Other (Comment)  (bilateral TROM braces locked in extension; No knee ROM bilat)   Other Precautions Chair Alarm;Bed Alarm;Fall Risk;Pain;Multiple lines;WBS  (No knee ROM bilat, B TROM braces)   Home Living   Type of Home House  (2+3 EMMY no HRs)   Home Layout Two level;Bed/bath upstairs  (FF + 3 steps to 2nd floor. no HRs, just grab bar type  at top of steps bilaterally)   Bathroom Shower/Tub Tub/shower unit   Bathroom Toilet Standard   Bathroom Equipment Commode  (Reports having grab bars in shower and around toilet, but are \"not useful\". Commode in attic)   Home Equipment Wheelchair-manual  (2 W/Cs, one upstairs and one downstairs. Reports there were orders for a walker but he didn't get it yet)   Additional Comments Pt has been sliding from " "bed/toilet<>wheelchair and then bumping up and down steps on buttock and using wheelchair to access home environment since falling 2 weeks ago.   Prior Function   Level of [place] Independent with ADLs;Independent with functional mobility;Independent with IADLS   Lives With Spouse   Receives Help From Family   IADLs Independent with driving;Independent with meal prep;Independent with medication management   Falls in the last 6 months 1 to 4   Vocational Retired   Comments Reports being independent wiht mobility, ADLs and IADLs prior to fall. Pt with fall on 2/29/24, seen in orthopedic office on 3/6/24 and given B TROM braces locked in extension. per patient and spouse, braces were uncomfortable and patient had difficulty transferring with braces on.   General   Additional Pertinent History Left MRI showing : Complete rupture of the distal quadriceps tendon. Small partial tear proximal patellar tendon. Radial tear posterior root of the medial meniscus with mild medial extrusion. Mucoid degeneration of the anterior cruciate ligament. Mild mucoid degeneration of the posterior cruciate ligament. Moderately severe patellofemoral compartment osteoarthrosis. Moderate osteoarthrosis medial femoral tibial compartment. Small joint effusion and small Baker's cyst. Right MRI showing: Complete rupture of the distal quadriceps tendon. Mild grade 1 sprain medial collateral ligament. Mild to moderate patella cartilage thinning. Contusions of the anterior subchondral weightbearing portion of the lateral femoral condyle, posterolateral tibial plateau, and proximal fibula. Partial tears of the visualized vastus medialis and lateralis muscles. Small joint effusion and tiny Baker's cyst. Pt now s/p Bilateral - REPAIR TENDON QUADRICEPS this date, 3/12/24.   Cognition   Orientation Level Oriented X4   Following Commands Follows one step commands without difficulty   Subjective   Subjective \"My legs are too weak to stand.\"   RLE " Assessment   RLE Assessment   (hip flexion minimal AROM, to at least 90 degrees sitting EOB, hip abd WFL, knee ROM not assessed in B TROm brace locked in extension, ankle DF/PF AROM WFL.)   LLE Assessment   LLE Assessment   (hip flexion minimal AROM, to at least 90 degrees sitting EOB, hip abd WFL, knee ROM not assessed in B TROm brace locked in extension, ankle DF/PF AROM WFL.)   Light Touch   RLE Light Touch Grossly intact   LLE Light Touch Grossly intact   Bed Mobility   Supine to Sit 3  Moderate assistance   Additional items Assist x 1;Increased time required;Verbal cues;LE management  (trunk management)   Additional Comments HOB flat without bedrail. increased time and effort to complete with modAx1. verbal cues for sequence and technique   Transfers   Sit to Stand   (pt declined trial due to fatigue and pain)   Sliding Board transfer 4  Minimal assistance   Additional items Assist x 1;Increased time required;Verbal cues   Additional Comments bed elevated. pt educated on WB status right LE foot flat touch down WB and WBAT L LE. discussed sequence and technique with lateral tranfer. pt attempted lateral transfer to right to drop arm recliner without success requiring use of transfer board. with transfer board. pt able to complete lateral transfer with modA/minAx1 with manual cues for wt shifting and verbal cues for technique. required maximal asisstance to place board with patient laterally leaning to left. pt then declined transfer trial due to pain and fatigue, lightheadedness. /78   Ambulation/Elevation   Distance pt unable to ambulate for past 2 weeks since fall associated wiht bilateral quad tendon rupture   Balance   Static Sitting Good   Dynamic Sitting Fair +   Endurance Deficit   Endurance Deficit Yes   Endurance Deficit Description slight c/o lightheadedness. Spo2 on 1-2 lpm 96%. with activity Spo2 high 80s after lateral transfer briefly, but then recovered to 91% at end of session. pt on room  "air at end of session at 91%.   Activity Tolerance   Activity Tolerance Patient limited by fatigue;Patient limited by pain   Medical Staff Made Aware Liz BRYSON   Nurse Made Aware RN, Diane   Assessment   Prognosis Good   Problem List Decreased strength;Decreased range of motion;Decreased endurance;Impaired balance;Decreased mobility;Impaired judgement;Decreased safety awareness;Obesity;Decreased skin integrity;Orthopedic restrictions;Pain   Barriers to Discharge Decreased caregiver support;Inaccessible home environment   Barriers to Discharge Comments requires assistance wi mobility [facility] home, 2  wiht 2nd floor bathroom, lfamily available to help but limited.   Goals   Patient Goals \"Get better\"   STG Expiration Date 03/26/24   PT Treatment Day 0   Plan   Treatment/Interventions ADL retraining;Functional transfer training;Elevations;LE strengthening/ROM;Therapeutic exercise;Endurance training;Patient/family training;Equipment eval/education;Bed mobility;Gait training;Compensatory technique education;Spoke to nursing;Spoke to case management;OT   PT Frequency 4-6x/wk   Discharge Recommendation   Rehab Resource Intensity Level, PT I (Maximum Resource Intensity)   AM-PAC Basic Mobility Inpatient   Turning in Flat Bed Without Bedrails 3   Lying on Back to Sitting on Edge of Flat Bed Without Bedrails 2   Moving Bed to Chair 3   Standing Up From Chair Using Arms 1   Walk in Room 1   Climb 3-5 Stairs With Railing 1   Basic Mobility Inpatient Raw Score 11   Basic Mobility Standardized Score 30.25   Highest Level Of Mobility   -HLM Goal 4: Move to chair/commode   JH-HLM Achieved 4: Move to chair/commode   End of Consult   Patient Position at End of Consult Bedside chair;All needs within reach       Pt requires PT/OT co-eval due to medical complexity, safety concerns, fall risk, significant assistance with mobility and/or cognitive-behavioral impairments.    (Please find full objective findings from PT assessment " regarding body systems outlined above).     Assessment: Pt is a 58 y.o. male seen for PT evaluation s/p admission to [facility] on [facility]/12/2024 with Rupture of left quadriceps muscle.  Order placed for PT services.  Upon evaluation: Pt is presenting with impaired functional mobility due to pain, decreased strength, decreased ROM, decreased endurance, impaired balance, decreased safety awareness, orthopedic restrictions, fall risk, LE edema, and impaired skin integrity requiring  moderate assistance for bed mobility and minimal to moderate assistance for transfers with transfer board. Pt's clinical presentation is currently unpredictable given the functional mobility deficits above, especially weakness, decreased ROM, edema of extremities, decreased skin integrity, decreased endurance, impaired balance, pain, decreased activity tolerance, decreased functional mobility tolerance, and decreased safety awareness, c/o lightheadedness, coupled with fall risks as indicated by AM-PAC 6-Clicks: 11/24 as well as hx of falls, impaired balance, polypharmacy, decreased safety awareness, and obesity and combined with medical complications of pain impacting overall mobility status, abnormal blood sugars, new onset O2 use, need for input for mobility technique/safety, and rupture of bilateral quadriceps tendon s/p repair .  Pt's PMHx and comorbidities that may affect physical performance and progress include:  major depressive disorder, CHEVY, chronic bilateral LBP without sciatica, anxiety, HA, HTN, h/o panic attacks, CHEVY . Personal factors affecting pt at time of IE include: unable to perform caregiver support/tasks, inaccessible home environment, step(s) to enter environment, multi-level environment, limited home support, inability to perform IADLs, inability to perform ADLs, inability to navigate level surfaces without external assistance, inability to ambulate household distances, and recent fall(s)/fall history.  Pt will benefit from continued skilled PT services to address deficits as defined above and to maximize level of functional mobility to facilitate return toward PLOF and improved QOL. From PT/mobility standpoint, recommendation at time of d/c would be Level I (Maximum Resource Intensity) in order to reduce fall risk and maximize pt's functional independence and consistency with mobility. Recommend trial with walker next 1-2 sessions and ther ex next 1-2 sessions.       The patient's AM-PAC Basic Mobility Inpatient Short Form Raw Score is 11. A Raw score of less than or equal to 16 suggests the patient may benefit from discharge to post-acute rehabilitation services. Please also refer to the recommendation of the Physical Therapist for safe discharge planning.       Goals: Pt will: Perform bed mobility tasks with modified Independent to reposition in bed and prepare for transfers. Pt will perform lateral transfers with supervision to decrease burden of care, decrease risk for falls, improve activity tolerance, and demonstrate compliance with WB limitations and transfer sit<>stand and spt with RW with maxAx2 to prepare for ambulation. Pt will ambulate with RW for >/= 10' with   maxAx1-2   to decrease burden of care, decrease risk for falls, improve activity tolerance, demonstrate compliance with WB limitations, and improve gait quality and to access home environment. Trial stairs as appropriate. Pt will self propel manual wheelchair >/= 250' modified independent to access home and living environment. Pt will participate in objective balance assessment to determine baseline fall risk. Pt will increase B LE strength >/= 1/2 MMT grade to facilitate functional mobility.      Milli Anna, PT,DPT         DISPLAY PLAN FREE TEXT

## 2024-04-13 DIAGNOSIS — F41.1 GAD (GENERALIZED ANXIETY DISORDER): ICD-10-CM

## 2024-04-15 RX ORDER — BUSPIRONE HYDROCHLORIDE 5 MG/1
5 TABLET ORAL 2 TIMES DAILY
Qty: 60 TABLET | Refills: 1 | Status: SHIPPED | OUTPATIENT
Start: 2024-04-15

## 2024-04-16 ENCOUNTER — TELEMEDICINE (OUTPATIENT)
Dept: PSYCHIATRY | Facility: CLINIC | Age: 59
End: 2024-04-16
Payer: COMMERCIAL

## 2024-04-16 DIAGNOSIS — F39 MOOD DISORDER (HCC): ICD-10-CM

## 2024-04-16 DIAGNOSIS — F41.1 GAD (GENERALIZED ANXIETY DISORDER): ICD-10-CM

## 2024-04-16 DIAGNOSIS — F33.1 MAJOR DEPRESSIVE DISORDER, RECURRENT EPISODE, MODERATE (HCC): ICD-10-CM

## 2024-04-16 DIAGNOSIS — F33.1 MODERATE EPISODE OF RECURRENT MAJOR DEPRESSIVE DISORDER (HCC): ICD-10-CM

## 2024-04-16 PROCEDURE — 99214 OFFICE O/P EST MOD 30 MIN: CPT | Performed by: NURSE PRACTITIONER

## 2024-04-16 RX ORDER — ACETAMINOPHEN 500 MG
500 TABLET ORAL EVERY 6 HOURS PRN
COMMUNITY
Start: 2024-03-28

## 2024-04-16 RX ORDER — PROPRANOLOL HYDROCHLORIDE 10 MG/1
10 TABLET ORAL 2 TIMES DAILY PRN
Qty: 60 TABLET | Refills: 2 | Status: SHIPPED | OUTPATIENT
Start: 2024-04-16

## 2024-04-16 RX ORDER — QUETIAPINE 150 MG/1
150 TABLET, FILM COATED, EXTENDED RELEASE ORAL
Qty: 30 TABLET | Refills: 2 | Status: SHIPPED | OUTPATIENT
Start: 2024-04-16

## 2024-04-16 NOTE — PSYCH
Virtual Regular Visit    Verification of patient location:    Patient is located in the following state in which I hold an active license [place]    Problem List Items Addressed This Visit     Major depressive disorder, recurrent episode, moderate (HCC)    Relevant Medications    QUEtiapine (SEROquel XR) 150 mg 24 hr tablet    MIKA (generalized anxiety disorder)    Relevant Medications    QUEtiapine (SEROquel XR) 150 mg 24 hr tablet    propranolol (INDERAL) 10 mg tablet   Other Visit Diagnoses     Moderate episode of recurrent major depressive disorder (HCC)        Relevant Medications    QUEtiapine (SEROquel XR) 150 mg 24 hr tablet    Mood disorder (HCC)        Relevant Medications    QUEtiapine (SEROquel XR) 150 mg 24 hr tablet                 Encounter provider KOLBY Monae    Provider located at    [address] [address] [phone]    Recent Visits  No visits were found meeting these conditions.  Showing recent visits within past 7 days and meeting all other requirements  Today's Visits  Date Type Provider Dept   04/16/24 Telemedicine KOLBY Monae  [facility]   Showing today's visits and meeting all other requirements  Future Appointments  No visits were found meeting these conditions.  Showing future appointments within next 150 days and meeting all other requirements           The patient was identified by name and date of birth. Patient was informed that this is a telemedicine visit and that the visit is being conducted throughthe Epic Embedded platform. He agrees to proceed..  My office door was closed. KOLBY Mcmahan student, was also in the room with patient consent. He acknowledged consent and understanding of privacy and security of the video platform. The patient has agreed to participate and understands they can discontinue the visit at any time.    Patient is aware this is a  [facility] service.     HPI     Current Outpatient Medications   Medication Sig Dispense Refill   • acetaminophen (TYLENOL) 500 mg tablet Take 500 mg by mouth every 6 (six) hours as needed for mild pain or moderate pain     • aspirin 325 mg tablet Take 1 tablet (325 mg total) by mouth 2 (two) times a day 60 tablet 0   • betamethasone valerate (VALISONE) 0.1 % cream Apply topically 3 (three) times a week For eczema     • busPIRone (BUSPAR) 5 mg tablet TAKE 1 TABLET BY MOUTH TWICE A DAY 60 tablet 1   • docusate sodium (COLACE) 100 mg capsule Take 1 capsule (100 mg total) by mouth 2 (two) times a day     • escitalopram (LEXAPRO) 10 mg tablet Take 0.5 tablets (5 mg total) by mouth daily Take with 20mg pill to total 30mg daily.     • escitalopram (LEXAPRO) 20 mg tablet TAKE 1 TABLET (20 MG TOTAL) BY MOUTH DAILY TAKE WITH 10MG TABLET TO TOTAL 30MG. 90 tablet 1   • levothyroxine 25 mcg tablet Take by mouth     • propranolol (INDERAL) 10 mg tablet Take 1 tablet (10 mg total) by mouth 2 (two) times a day as needed (anxiety) 60 tablet 2   • QUEtiapine (SEROquel XR) 150 mg 24 hr tablet Take 1 tablet (150 mg total) by mouth daily at bedtime 30 tablet 2   • oxyCODONE (Roxicodone) 5 immediate release tablet 1-2 p.o. every 6 hours as needed pain (Patient not taking: Reported on 4/16/2024) 30 tablet 0     No current facility-administered medications for this visit.       Review of Systems  Video Exam    There were no vitals filed for this visit.    Physical Exam   As a result of this visit, I have referred the patient for further respiratory evaluation. No    I spent 30 minutes directly with the patient during this visit  VIRTUAL VISIT DISCLAIMER    JosrSOLEDAD Salinas acknowledges that he has consented to an online visit or consultation. He understands that the online visit is based solely on information provided by him, and that, in the absence of a face-to-face physical evaluation by the physician, the diagnosis he receives is both  limited and provisional in terms of accuracy and completeness. This is not intended to replace a full medical face-to-face evaluation by the physician. Peterson Salinas understands and accepts these terms.    MEDICATION MANAGEMENT NOTE        [facility]    Name and Date of Birth:  Peterson Salinas 58 y.o. 1965 MRN: [id number]    Date of Visit: April 16, 2024    Allergies   Allergen Reactions   • Shellfish Allergy - Food Allergy Anaphylaxis, Tongue Swelling and Swelling   • Shellfish-Derived Products - Food Allergy Anaphylaxis       Visit Time    Visit Start Time: 1400  Visit Stop Time: 1430  Total Visit Duration:  30 minutes    SUBJECTIVE:    Peterson is seen today for a follow up for Major Depressive Disorder. He continues to do fairly well since the last visit. Peterson seen virtually today for medication management follow up. He was last seen by this provider on 2/6/24.  Pt calm and cooperative. Pt states he was physically injured after a fall when he tripped on a box outside of his door on 2/28/24.  He reports having B/L leg tendon repair sx. He states he has been home since the sx and has been confined to his living room as he cannot bend his knees and needs to continuously keep them extended. He states he mostly lays around and watches TV and his other devices. He reports that his anxiety has been high, but as expected.  He is anxious about wanting to walk again and ruminates on how he could have done things differently that night he fell. He denies SI/Hi, denies auditory and visual hallucinations.  He does state that he is worried about developing pneumonia r/t current mobility status and reports he has increased phlegm. He was instructed to consult his PC and utilize tools that were provided to him while in rehab (incentive spirometer, etc). He appears to be doing well otherwise. He was ensured that the increased anxiety is expected in relation to  recent events. Current meds continued and refilled. F/u scheduled in 6 weeks.    He denies any side effects from current psychiatric medications.    PLAN:  Continue Seroquel  mg p.o. at bedtime  Continue Lexapro to 25mg p.o. daily  Propranolol 10 mg p.o. twice daily as needed  Initiate buspar 5mg PO BID  Follow up in 6 weeks  He will call sooner with concerns or issues if they arise prior to scheduled appointment    Aware of 24 hour and weekend coverage for urgent situations accessed by calling [facility] main practice number  Medication management every 6 weeks  Aware of need to follow up with family physician for medical issues    Diagnoses and all orders for this visit:    Major depressive disorder, recurrent episode, moderate (HCC)  -     QUEtiapine (SEROquel XR) 150 mg 24 hr tablet; Take 1 tablet (150 mg total) by mouth daily at bedtime    MIKA (generalized anxiety disorder)  -     propranolol (INDERAL) 10 mg tablet; Take 1 tablet (10 mg total) by mouth 2 (two) times a day as needed (anxiety)    Moderate episode of recurrent major depressive disorder (HCC)    Mood disorder (HCC)    Other orders  -     acetaminophen (TYLENOL) 500 mg tablet; Take 500 mg by mouth every 6 (six) hours as needed for mild pain or moderate pain      Patient education on serotonin syndrome symptoms completed which included the following:  symptoms routinely occur when starting a new drug or increasing the dose of a drug you're already taking. Signs and symptoms include: agitation,restlessness,confusion,rapid heart rate, high blood pressure, dilated pupils, loss of muscle coordination, muscle twitching or rigidity, heavy sweating, diarrhea, headache, shivering, goose bumps.  The patient was also informed that severe serotonin syndrome can be life-threatening and needs to seek immediate medical care, these sign and symptoms include: high fever, seizures, irregular heartbeat, and unconsciousness.  The patient  verbalized understanding.    Seroquel PARQ completed including dizziness, sedation, GI distress, orthostatic hypotension and cardiovascular risks, metabolic syndrome, NMS, TD, EPS, Seizures, and others    Current Outpatient Medications on File Prior to Visit   Medication Sig Dispense Refill   • acetaminophen (TYLENOL) 500 mg tablet Take 500 mg by mouth every 6 (six) hours as needed for mild pain or moderate pain     • aspirin 325 mg tablet Take 1 tablet (325 mg total) by mouth 2 (two) times a day 60 tablet 0   • betamethasone valerate (VALISONE) 0.1 % cream Apply topically 3 (three) times a week For eczema     • busPIRone (BUSPAR) 5 mg tablet TAKE 1 TABLET BY MOUTH TWICE A DAY 60 tablet 1   • docusate sodium (COLACE) 100 mg capsule Take 1 capsule (100 mg total) by mouth 2 (two) times a day     • escitalopram (LEXAPRO) 10 mg tablet Take 0.5 tablets (5 mg total) by mouth daily Take with 20mg pill to total 30mg daily.     • escitalopram (LEXAPRO) 20 mg tablet TAKE 1 TABLET (20 MG TOTAL) BY MOUTH DAILY TAKE WITH 10MG TABLET TO TOTAL 30MG. 90 tablet 1   • levothyroxine 25 mcg tablet Take by mouth     • [DISCONTINUED] propranolol (INDERAL) 10 mg tablet Take 1 tablet (10 mg total) by mouth 2 (two) times a day as needed (anxiety) 60 tablet 2   • [DISCONTINUED] QUEtiapine (SEROquel XR) 150 mg 24 hr tablet Take 1 tablet (150 mg total) by mouth daily at bedtime 30 tablet 1   • oxyCODONE (Roxicodone) 5 immediate release tablet 1-2 p.o. every 6 hours as needed pain (Patient not taking: Reported on 4/16/2024) 30 tablet 0     No current facility-administered medications on file prior to visit.       Psychotherapy Provided:     Individual psychotherapy provided: Yes  Counseling was provided during the session today for 16 minutes.  Supportive counseling provided.  Medication changes discussed with Peterson.  Medication education provided to Peterson.  Discussed with Peterson coping with ongoing anxiety and recent fall & subsequent  surgery .   Coping strategies reviewed with Peterson.   Importance of medication and treatment compliance reviewed with Peterson.  Importance of follow up with family physician for medical issues reviewed with Peterson.  Reassurance and supportive therapy provided.   Reoriented to reality and reassured.   Crisis/safety plan discussed with Peterson. Patient will call prior to scheduled appointment if they have any issues or concerns.  Patient understands they can access the office by calling the main number at any time if they are in crisis.  They also understand they can call their [facility]'s crisis number or go to their nearest ED if suicidal ideation increases or if they develop a plan or intent.     HPI ROS Appetite Changes and Sleep:     He reports normal sleep, normal appetite, adequate energy level. Denies homicidal ideation, Intermittent passive suicidal ideation without intent or plan    Review Of Systems:  HPI ROS:               Medication Side Effects:  denies   Depression (10 worst): increased 5/10   Anxiety (10 worst): increased 4/10   Safety concerns (SI, HI, etc): denies Fleeting SI at times, without plan or intent   Sleep: good good   Energy: low good   Appetite: good good     General normal    Personality no change in personality   Constitutional as noted in HPI   ENT negative   Cardiovascular negative   Respiratory negative   Gastrointestinal negative   Genitourinary negative   Musculoskeletal knee pain and as noted in HPI   Integumentary negative   Neurological negative   Endocrine negative   Other Symptoms none, all other systems are negative     Mental Status Evaluation:    Appearance Appropriately dressed and Good eye contact   Behavior calm and cooperative, friendly and conversational   Mood anxious and depressed, improved  Depression Scale -  increased  of 10 (0 = No depression)  Anxiety Scale -  increased  of 10 (0 = No anxiety)   Speech Normal rate and volume   Affect appropriate, mood-congruent    Thought Processes Goal directed and coherent   Thought Content Does not verbalize delusional material   Associations Tightly connected   Perceptual Disturbances Denies hallucinations and does not appear to be responding to internal stimuli   Risk Potential Suicidal/Homicidal Ideation - Passive suicidal ideation without intent or plan  Risk of Violence - No evidence of risk for violence found on assessment  Risk of Self Mutilation - No evidence of risk for self mutilation found on assessment   Orientation oriented to person, place, time/date and situation   Memory recent and remote memory grossly intact   Consciousness alert and awake   Attention/Concentration attention span and concentration are age appropriate   Insight fair   Judgement fair and improved   Muscle Strength and Gait normal muscle strength and normal muscle tone, normal gait/station and normal balance   Motor Activity no abnormal movements   Language no difficulty naming common objects, no difficulty repeating a phrase, no difficulty writing a sentence   Fund of Knowledge adequate knowledge of current events  adequate fund of knowledge regarding past history  adequate fund of knowledge regarding vocabulary      Past Psychiatric History - per Dr. Salcido's H&P  Previous diagnoses include Depression and Anxiety   Prior outpatient psychiatric treatment:   With PCP till now   Prior therapy:    Prior inpatient psychiatric treatment: x1   Prior suicide attempts:  None   Prior self harm:  None   Prior violence or aggression:  None       Past Psychiatric History Update:     Inpatient Psychiatric Admission Since Last Encounter:   no  Changes to Outpatient Psychiatric Treatment Team:    no  Suicide Attempt Or Self Mutilation Since Last Encounter:   no  Incidence of Violent Behavior Since Last Encounter:   no    Traumatic History Update:     New Onset of Abuse Since Last Encounter:   no  Traumatic Events Since Last Encounter:   no    Past Medical  History:    Past Medical History:   Diagnosis Date   • Anxiety    • CPAP (continuous positive airway pressure) dependence    • Depression    • Headache(784.0) March 2020    Degenerative disk in neck may be cause.   • Hypertension    • Palpitation    • Panic attack    • PVC (premature ventricular contraction)    • Sleep apnea         Past Surgical History:   Procedure Laterality Date   • ANAL EXAMINATION UNDER ANESTHESIA     • COLONOSCOPY     • FRACTURE SURGERY  6/1970 and 6/1979    Left and right wrists   • KNEE SURGERY  08/1982   • MULTIPLE TOOTH EXTRACTIONS Bilateral    • QUADRICEPS TENDON REPAIR Bilateral 3/12/2024    Procedure: REPAIR TENDON QUADRICEPS;  Surgeon: David Castellano;  Location:  MAIN OR;  Service: Orthopedics     Allergies   Allergen Reactions   • Shellfish Allergy - Food Allergy Anaphylaxis, Tongue Swelling and Swelling   • Shellfish-Derived Products - Food Allergy Anaphylaxis     Substance Abuse History:    Social History     Substance and Sexual Activity   Alcohol Use Not Currently   • Alcohol/week: 3.0 - 4.0 standard drinks of alcohol     Social History     Substance and Sexual Activity   Drug Use Never     Social History:    Social History     Socioeconomic History   • Marital status: /Civil Union     Spouse name: Not on file   • Number of children: Not on file   • Years of education: Not on file   • Highest education level: Not on file   Occupational History   • Not on file   Tobacco Use   • Smoking status: Never     Passive exposure: Yes   • Smokeless tobacco: Never   Vaping Use   • Vaping status: Never Used   Substance and Sexual Activity   • Alcohol use: Not Currently     Alcohol/week: 3.0 - 4.0 standard drinks of alcohol   • Drug use: Never   • Sexual activity: Not Currently     Partners: Female     Birth control/protection: Female Sterilization   Other Topics Concern   • Not on file   Social History Narrative   • Not on file     Social Determinants of Health     Financial Resource  Strain: Not on file   Food Insecurity: No Food Insecurity (3/13/2024)    Hunger Vital Sign    • Worried About Running Out of Food in the Last Year: Never true    • Ran Out of Food in the Last Year: Never true   Transportation Needs: No Transportation Needs (3/13/2024)    PRAPARE - Transportation    • Lack of Transportation (Medical): No    • Lack of Transportation (Non-Medical): No   Physical Activity: Not on file   Stress: Not on file   Social Connections: Not on file   Intimate Partner Violence: Not on file   Housing Stability: Unknown (3/13/2024)    Housing Stability Vital Sign    • Unable to Pay for Housing in the Last Year: Not on file    • Number of Places Lived in the Last Year: 1    • Unstable Housing in the Last Year: No     Family Psychiatric History:     Family History   Problem Relation Age of Onset   • Hypertension Mother    • COPD Mother    • Migraines Mother    • Stroke Father         Mini stroke in 2010     History Review:The following portions of the patient's history were reviewed and updated as appropriate: allergies, current medications, past family history, past medical history, past social history, past surgical history and problem list     OBJECTIVE:     Vital signs in last 24 hours:    There were no vitals filed for this visit.  Laboratory Results:   Recent Labs (last 2 months):   Admission on 03/12/2024, Discharged on 03/15/2024   Component Date Value   • WBC 03/13/2024 13.58 (H)    • RBC 03/13/2024 4.11    • Hemoglobin 03/13/2024 12.9    • Hematocrit 03/13/2024 38.2    • MCV 03/13/2024 93    • MCH 03/13/2024 31.4    • MCHC 03/13/2024 33.8    • RDW 03/13/2024 12.7    • Platelets 03/13/2024 208    • MPV 03/13/2024 11.3    • Sodium 03/13/2024 137    • Potassium 03/13/2024 4.1    • Chloride 03/13/2024 106    • CO2 03/13/2024 26    • ANION GAP 03/13/2024 5    • BUN 03/13/2024 15    • Creatinine 03/13/2024 0.89    • Glucose 03/13/2024 127    • Glucose, Fasting 03/13/2024 127 (H)    • Calcium  03/13/2024 8.1 (L)    • eGFR 03/13/2024 92    • Magnesium 03/13/2024 1.8 (L)    • WBC 03/14/2024 8.52    • RBC 03/14/2024 4.01    • Hemoglobin 03/14/2024 12.7    • Hematocrit 03/14/2024 37.9    • MCV 03/14/2024 95    • MCH 03/14/2024 31.7    • MCHC 03/14/2024 33.5    • RDW 03/14/2024 13.0    • Platelets 03/14/2024 193    • MPV 03/14/2024 10.9    • Magnesium 03/14/2024 1.9    • SARS-CoV-2 03/15/2024 Negative    Appointment on 03/11/2024   Component Date Value   • Sodium 03/11/2024 140    • Potassium 03/11/2024 3.7    • Chloride 03/11/2024 104    • CO2 03/11/2024 27    • ANION GAP 03/11/2024 9    • BUN 03/11/2024 13    • Creatinine 03/11/2024 0.90    • Glucose, Fasting 03/11/2024 102 (H)    • Calcium 03/11/2024 8.9    • AST 03/11/2024 21    • ALT 03/11/2024 27    • Alkaline Phosphatase 03/11/2024 54    • Total Protein 03/11/2024 6.6    • Albumin 03/11/2024 4.0    • Total Bilirubin 03/11/2024 0.86    • eGFR 03/11/2024 92    • WBC 03/11/2024 7.46    • RBC 03/11/2024 4.88    • Hemoglobin 03/11/2024 14.9    • Hematocrit 03/11/2024 45.9    • MCV 03/11/2024 94    • MCH 03/11/2024 30.5    • MCHC 03/11/2024 32.5    • RDW 03/11/2024 12.7    • Platelets 03/11/2024 253    • MPV 03/11/2024 10.9      I have personally reviewed all pertinent laboratory/tests results.    Suicide/Homicide Risk Assessment:    Risk of Harm to Self:  The following ratings are based on assessment at the time of the interview  Recent Specific Risk Factors include: current depressive symptoms, current anxiety symptoms, experienced fleeting suicidal ideation, unemployed  Demographic risk factors include: , male, age: over 50 or older  Historical Risk Factors include: chronic depressive symptoms, chronic anxiety symptoms  Protective Factors: access to mental health treatment, being a parent, being , compliant with medications, compliant with mental health treatment, connection to own children, having a desire to be alive, having a sense of  purpose or meaning in life, responsibilities and duties to others, stable living environment, sense of determination, supportive family  Based on today's assessment, Peterson presents the following risk of harm to self: low    Risk of Harm to Others:  The following ratings are based on assessment at the time of the interview  Protective Factors: no current homicidal ideation  Based on today's assessment, Peterson presents the following risk of harm to others: none    The following interventions are recommended: contracts for safety at present - agrees to go to ED if feeling unsafe, return in 6 weeks for reassessment, contracts for safety at present - agrees to call Crisis Intervention Service if feeling unsafe    Medications Risks/Benefits:      Risks, Benefits And Possible Side Effects Of Medications:    Discussed risks and benefits of treatment with patient including risk of suicidality, serotonin syndrome, increased QTc interval and SIADH related to treatment with antidepressants; Risk of induction of manic symptoms in certain patient populations and risk of parkinsonian symptoms, metabolic syndrome, tardive dyskinesia and neuroleptic malignant syndrome related to treatment with antipsychotic medications     Controlled Medication Discussion:     Patient is not on any controlled substances at this time.    Treatment Plan:    Due for update/Updated:   no  Last treatment plan done 2/6/24 by KOLBY Lake.  Treatment Plan due on 8/6/24.    KOLBY Monae 04/16/24    This note was shared with patient.

## 2024-04-16 NOTE — BH TREATMENT PLAN
TREATMENT PLAN (Medication Management Only)        [facility]    Name and Date of Birth:  Peterson Salinas 58 y.o. 1965  Date of Treatment Plan: April 16, 2024  Diagnosis/Diagnoses:    1. Major depressive disorder, recurrent episode, moderate (HCC)    2. MIKA (generalized anxiety disorder)    3. Moderate episode of recurrent major depressive disorder (HCC)    4. Mood disorder (HCC)      Strengths/Personal Resources for Self-Care: ability to adapt to life changes.  Area/Areas of need (in own words): anxiety symptoms, depressive symptoms  1. Long Term Goal: improve acceptable anxiety level.  Target Date:6 months - 10/16/2024  Person/Persons responsible for completion of goal: Peterson  2.  Short Term Objective (s) - How will we reach this goal?:   A. Provider new recommended medication/dosage changes and/or continue medication(s): continue current medications as prescribed.  B. Think positively.  C. Eat a healthy diet .  Target Date:6 months - 10/16/2024  Person/Persons Responsible for Completion of Goal: Peterson  Progress Towards Goals: stable, continuing treatment  Treatment Modality: medication management every 2 months, medication education at every visit  Review due 180 days from date of this plan: 6 months - 10/16/2024  Expected length of service: ongoing treatment  My Physician/PA/NP and I have developed this plan together and I agree to work on the goals and objectives. I understand the treatment goals that were developed for my treatment.

## 2024-04-26 ENCOUNTER — OFFICE VISIT (OUTPATIENT)
Dept: OBGYN CLINIC | Facility: CLINIC | Age: 59
End: 2024-04-26

## 2024-04-26 VITALS
WEIGHT: 300 LBS | HEIGHT: 75 IN | DIASTOLIC BLOOD PRESSURE: 70 MMHG | SYSTOLIC BLOOD PRESSURE: 110 MMHG | TEMPERATURE: 97.7 F | HEART RATE: 92 BPM | BODY MASS INDEX: 37.3 KG/M2

## 2024-04-26 DIAGNOSIS — S76.111A RUPTURE OF RIGHT QUADRICEPS MUSCLE, INITIAL ENCOUNTER: Primary | ICD-10-CM

## 2024-04-26 DIAGNOSIS — S76.112A RUPTURE OF LEFT QUADRICEPS MUSCLE, INITIAL ENCOUNTER: ICD-10-CM

## 2024-04-26 PROCEDURE — 99024 POSTOP FOLLOW-UP VISIT: CPT | Performed by: ORTHOPAEDIC SURGERY

## 2024-04-26 RX ORDER — MELOXICAM 15 MG/1
15 TABLET ORAL
COMMUNITY
Start: 2024-04-22

## 2024-04-26 RX ORDER — LEVOFLOXACIN 500 MG/1
TABLET, FILM COATED ORAL
COMMUNITY
Start: 2024-04-16

## 2024-04-26 NOTE — PROGRESS NOTES
Patient Name:  Peterson Salinas  MRN:  [id number]    Assessment     1. Rupture of right quadriceps muscle, subsequent encounter  Ambulatory Referral to Physical Therapy      2. Rupture of left quadriceps muscle, subsequent encounter  Ambulatory Referral to Physical Therapy          Plan     Plan: I have strongly recommended that Peterson begin outpatient physical therapy.  He has had very minimal therapy so far, a home therapist seeing him once weekly and Occupational Therapy canceling therapy until after this visit today.  I see no reason for him to need occupational therapy.  I will see him in 3 weeks for clinical reevaluation.  He is now allowed to bear weight on both lower extremities as tolerated and braces were set to allow range of motion from 0 to 60 degrees.  Physical therapy prescription was provided.  He is to contact me if questions or concerns arise prior to follow-up in 3 weeks.  I would anticipate increasing his range of motion to 90 degrees of flexion assuming that he continues to progress adequately.    In regards to his respiratory complaints he has been contacting his primary care physician and I have recommended he continue to do so.  In regards to his upper extremity complaints, they seem to be relatively infrequent and are intermittent.  If he would like more formal evaluation of the upper extremity complaints, he should schedule an appointment to see me for these complaints.    Return in about 3 weeks (around 5/17/2024).      Subjective   Peterson Salinas returns for follow-up of his bilateral quadricep tendon rupture.  The patient is 6 1/2 week(s) post surgical reconstruction and returns for routine follow-up. Patient complains of continued weakness in both lower extremities, right greater than left.  He is bearing weight as tolerated on the left lower extremity and partial weight on the right lower extremity.  He continues to maintain his braces.  He states that he has had home physical  "therapy but only sees a therapist once weekly.  He denies any lower extremity paresthesias.    He did have multiple other complaints including respiratory congestion, upper extremity numbness and tingling.      Objective     /70   Pulse 92   Temp 97.7 °F (36.5 °C) (Temporal)   Ht 6' 3\" (1.905 m)   Wt 136 kg (300 lb)   BMI 37.50 kg/m²     The exam today demonstrated both T ROM brace is in place.  The left was set at 0 to 30 degrees and was increased to 60 degrees.  The right was locked at 0.  This was set from 0 to 60 degrees, as well.  With the brace removed, he was able to independently straight leg raise bilaterally maintaining both knees and about 10 degree flexed position.  I was able to passively fully extend the knee and he demonstrates 4+5 strength on the right and 5/5 strength on the left I resisted knee extension.  Calf compartments are soft, nontender and compressible.  Distal sensation is intact.  There is quadricep atrophy noted consistent with his injury.  There is no palpable defect appreciated on palpation of the distal quadricep.          David Castellano    "

## 2024-05-01 DIAGNOSIS — F33.1 MODERATE EPISODE OF RECURRENT MAJOR DEPRESSIVE DISORDER (HCC): ICD-10-CM

## 2024-05-01 DIAGNOSIS — F39 MOOD DISORDER (HCC): ICD-10-CM

## 2024-05-01 RX ORDER — ESCITALOPRAM OXALATE 10 MG/1
TABLET ORAL
Qty: 90 TABLET | Refills: 1 | Status: SHIPPED | OUTPATIENT
Start: 2024-05-01

## 2024-05-01 RX ORDER — ESCITALOPRAM OXALATE 20 MG/1
20 TABLET ORAL DAILY
Qty: 90 TABLET | Refills: 1 | Status: SHIPPED | OUTPATIENT
Start: 2024-05-01

## (undated) DEVICE — GROUNDING PAD UNIVERSAL SLW

## (undated) DEVICE — NEPTUNE E-SEP SMOKE EVACUATION PENCIL, COATED, 70MM BLADE, PUSH BUTTON SWITCH: Brand: NEPTUNE E-SEP

## (undated) DEVICE — GLOVE SRG BIOGEL 7.5

## (undated) DEVICE — MEDI-VAC YANK SUCT HNDL W/TPRD BULBOUS TIP: Brand: CARDINAL HEALTH

## (undated) DEVICE — ASTOUND IMPERVIOUS SURGICAL GOWN: Brand: CONVERTORS

## (undated) DEVICE — BANDAGE, ESMARK LF STR 6"X9' (20/CS): Brand: CYPRESS

## (undated) DEVICE — ACE WRAP 6 IN STERILE

## (undated) DEVICE — STOCKINETTE 4 IN COTTON NS  1 PLY 25 YD

## (undated) DEVICE — HYDROPHILIC WOUND DRESSING WITH ZINC PLUS VITAMINS A AND B6.: Brand: DERMAGRAN®-B

## (undated) DEVICE — CUFF TOURNIQUET 34 X 4 IN QUICK CONNECT DISP 1BLA

## (undated) DEVICE — SUT ETHIBOND 2 OS-4 R/C 30 IN X519H

## (undated) DEVICE — TUBING SUCTION 5MM X 12 FT

## (undated) DEVICE — HEWSON SUTURE RETRIEVER: Brand: HEWSON SUTURE RETRIEVER

## (undated) DEVICE — COBAN 6 IN STERILE

## (undated) DEVICE — TRANSPOSAL ULTRAFLEX DUO/QUAD ULTRA CART MANIFOLD

## (undated) DEVICE — SUTURETAPE 1.3MM W/NEEDLE WHITE/BLUE

## (undated) DEVICE — INTENDED FOR TISSUE SEPARATION, AND OTHER PROCEDURES THAT REQUIRE A SHARP SURGICAL BLADE TO PUNCTURE OR CUT.: Brand: BARD-PARKER ® CARBON RIB-BACK BLADES

## (undated) DEVICE — PADDING CAST 4 IN  COTTON STRL

## (undated) DEVICE — 4.75MM BC KNOTLESS SWIVELOCK
Type: IMPLANTABLE DEVICE | Site: FEMUR | Status: NON-FUNCTIONAL
Brand: ARTHREX®

## (undated) DEVICE — CHLORAPREP HI-LITE 26ML ORANGE

## (undated) DEVICE — CURITY NON-ADHERENT STRIPS: Brand: CURITY

## (undated) DEVICE — INTENT TO BE USED WITH SUTURE MATERIAL FOR TISSUE CLOSURE: Brand: RICHARD-ALLAN® NEEDLE 1/2 CIRCLE TAPER

## (undated) DEVICE — DRAPE SHEET X-LG

## (undated) DEVICE — PROXIMATE SKIN STAPLERS (35 WIDE) CONTAINS 35 STAINLESS STEEL STAPLES (FIXED HEAD): Brand: PROXIMATE

## (undated) DEVICE — PADDING CAST 6IN COTTON STRL

## (undated) DEVICE — GLOVE INDICATOR UNDERGLOVE SZ 7.5 GREEN

## (undated) DEVICE — SUT VICRYL 0 CP-1 27 IN J267H

## (undated) DEVICE — SUT VICRYL 2-0 CT-1 27 IN J259H

## (undated) DEVICE — SPONGE LAP 18 X 18 IN STRL RFD